# Patient Record
Sex: FEMALE | Race: BLACK OR AFRICAN AMERICAN | Employment: OTHER
[De-identification: names, ages, dates, MRNs, and addresses within clinical notes are randomized per-mention and may not be internally consistent; named-entity substitution may affect disease eponyms.]

---

## 2017-02-20 DIAGNOSIS — M25.551 RIGHT HIP PAIN: Primary | ICD-10-CM

## 2017-02-22 ENCOUNTER — OFFICE VISIT (OUTPATIENT)
Dept: ORTHOPEDIC SURGERY | Facility: CLINIC | Age: 67
End: 2017-02-22

## 2017-02-22 VITALS
DIASTOLIC BLOOD PRESSURE: 83 MMHG | BODY MASS INDEX: 31.62 KG/M2 | HEIGHT: 62 IN | HEART RATE: 79 BPM | WEIGHT: 171.8 LBS | SYSTOLIC BLOOD PRESSURE: 130 MMHG

## 2017-02-22 DIAGNOSIS — M16.11 ARTHRITIS OF RIGHT HIP: Primary | ICD-10-CM

## 2017-02-22 PROCEDURE — 1090F PRES/ABSN URINE INCON ASSESS: CPT | Performed by: ORTHOPAEDIC SURGERY

## 2017-02-22 PROCEDURE — 4004F PT TOBACCO SCREEN RCVD TLK: CPT | Performed by: ORTHOPAEDIC SURGERY

## 2017-02-22 PROCEDURE — G8427 DOCREV CUR MEDS BY ELIG CLIN: HCPCS | Performed by: ORTHOPAEDIC SURGERY

## 2017-02-22 PROCEDURE — G8484 FLU IMMUNIZE NO ADMIN: HCPCS | Performed by: ORTHOPAEDIC SURGERY

## 2017-02-22 PROCEDURE — G8419 CALC BMI OUT NRM PARAM NOF/U: HCPCS | Performed by: ORTHOPAEDIC SURGERY

## 2017-02-22 PROCEDURE — 3017F COLORECTAL CA SCREEN DOC REV: CPT | Performed by: ORTHOPAEDIC SURGERY

## 2017-02-22 PROCEDURE — 4040F PNEUMOC VAC/ADMIN/RCVD: CPT | Performed by: ORTHOPAEDIC SURGERY

## 2017-02-22 PROCEDURE — 99213 OFFICE O/P EST LOW 20 MIN: CPT | Performed by: ORTHOPAEDIC SURGERY

## 2017-02-22 PROCEDURE — 3014F SCREEN MAMMO DOC REV: CPT | Performed by: ORTHOPAEDIC SURGERY

## 2017-02-22 ASSESSMENT — ENCOUNTER SYMPTOMS
COUGH: 0
SHORTNESS OF BREATH: 0
WHEEZING: 0
ABDOMINAL PAIN: 0
CONSTIPATION: 0
VOMITING: 0
NAUSEA: 0
VOICE CHANGE: 0
CHOKING: 0
TROUBLE SWALLOWING: 0
DIARRHEA: 0

## 2017-03-08 ENCOUNTER — HOSPITAL ENCOUNTER (OUTPATIENT)
Dept: PAIN MANAGEMENT | Age: 67
Discharge: HOME | End: 2017-03-08
Payer: MEDICARE

## 2017-03-08 VITALS
WEIGHT: 171 LBS | HEIGHT: 63 IN | RESPIRATION RATE: 16 BRPM | SYSTOLIC BLOOD PRESSURE: 150 MMHG | HEART RATE: 60 BPM | TEMPERATURE: 98 F | OXYGEN SATURATION: 96 % | BODY MASS INDEX: 30.3 KG/M2 | DIASTOLIC BLOOD PRESSURE: 81 MMHG

## 2017-03-08 DIAGNOSIS — M25.552 HIP PAIN, LEFT: ICD-10-CM

## 2017-03-08 PROCEDURE — 2500000003 HC RX 250 WO HCPCS

## 2017-03-08 PROCEDURE — 6360000002 HC RX W HCPCS

## 2017-03-08 PROCEDURE — 20610 DRAIN/INJ JOINT/BURSA W/O US: CPT

## 2017-03-08 PROCEDURE — 27093 INJECTION FOR HIP X-RAY: CPT | Performed by: ORTHOPAEDIC SURGERY

## 2017-03-08 PROCEDURE — 77002 NEEDLE LOCALIZATION BY XRAY: CPT

## 2017-03-08 ASSESSMENT — PAIN DESCRIPTION - DESCRIPTORS: DESCRIPTORS: CONSTANT;BURNING;SHARP;STABBING

## 2017-03-08 ASSESSMENT — PAIN - FUNCTIONAL ASSESSMENT
PAIN_FUNCTIONAL_ASSESSMENT: 0-10
PAIN_FUNCTIONAL_ASSESSMENT: 0-10

## 2017-03-17 ENCOUNTER — OFFICE VISIT (OUTPATIENT)
Dept: ORTHOPEDIC SURGERY | Age: 67
End: 2017-03-17
Payer: MEDICARE

## 2017-03-17 DIAGNOSIS — M16.11 ARTHRITIS OF RIGHT HIP: Primary | ICD-10-CM

## 2017-03-17 PROCEDURE — 99213 OFFICE O/P EST LOW 20 MIN: CPT | Performed by: ORTHOPAEDIC SURGERY

## 2017-03-17 PROCEDURE — G8419 CALC BMI OUT NRM PARAM NOF/U: HCPCS | Performed by: ORTHOPAEDIC SURGERY

## 2017-03-17 PROCEDURE — 1123F ACP DISCUSS/DSCN MKR DOCD: CPT | Performed by: ORTHOPAEDIC SURGERY

## 2017-03-17 PROCEDURE — 3014F SCREEN MAMMO DOC REV: CPT | Performed by: ORTHOPAEDIC SURGERY

## 2017-03-17 PROCEDURE — G8427 DOCREV CUR MEDS BY ELIG CLIN: HCPCS | Performed by: ORTHOPAEDIC SURGERY

## 2017-03-17 PROCEDURE — G8399 PT W/DXA RESULTS DOCUMENT: HCPCS | Performed by: ORTHOPAEDIC SURGERY

## 2017-03-17 PROCEDURE — G8484 FLU IMMUNIZE NO ADMIN: HCPCS | Performed by: ORTHOPAEDIC SURGERY

## 2017-03-17 PROCEDURE — 4040F PNEUMOC VAC/ADMIN/RCVD: CPT | Performed by: ORTHOPAEDIC SURGERY

## 2017-03-17 PROCEDURE — 3017F COLORECTAL CA SCREEN DOC REV: CPT | Performed by: ORTHOPAEDIC SURGERY

## 2017-03-17 PROCEDURE — 1090F PRES/ABSN URINE INCON ASSESS: CPT | Performed by: ORTHOPAEDIC SURGERY

## 2017-03-17 PROCEDURE — 4004F PT TOBACCO SCREEN RCVD TLK: CPT | Performed by: ORTHOPAEDIC SURGERY

## 2017-03-17 ASSESSMENT — ENCOUNTER SYMPTOMS
ANAL BLEEDING: 0
RECTAL PAIN: 0
BLOOD IN STOOL: 0
VOMITING: 0
WHEEZING: 0
ABDOMINAL PAIN: 0
NAUSEA: 0
BACK PAIN: 0
COLOR CHANGE: 0
DIARRHEA: 0
CONSTIPATION: 0
COUGH: 0

## 2017-06-09 ENCOUNTER — OFFICE VISIT (OUTPATIENT)
Dept: ORTHOPEDIC SURGERY | Age: 67
End: 2017-06-09
Payer: MEDICARE

## 2017-06-09 VITALS — WEIGHT: 171.08 LBS | HEIGHT: 63 IN | BODY MASS INDEX: 30.31 KG/M2

## 2017-06-09 DIAGNOSIS — M16.11 ARTHRITIS OF RIGHT HIP: Primary | ICD-10-CM

## 2017-06-09 PROCEDURE — 4040F PNEUMOC VAC/ADMIN/RCVD: CPT | Performed by: ORTHOPAEDIC SURGERY

## 2017-06-09 PROCEDURE — G8399 PT W/DXA RESULTS DOCUMENT: HCPCS | Performed by: ORTHOPAEDIC SURGERY

## 2017-06-09 PROCEDURE — G8419 CALC BMI OUT NRM PARAM NOF/U: HCPCS | Performed by: ORTHOPAEDIC SURGERY

## 2017-06-09 PROCEDURE — 4004F PT TOBACCO SCREEN RCVD TLK: CPT | Performed by: ORTHOPAEDIC SURGERY

## 2017-06-09 PROCEDURE — 3017F COLORECTAL CA SCREEN DOC REV: CPT | Performed by: ORTHOPAEDIC SURGERY

## 2017-06-09 PROCEDURE — 1090F PRES/ABSN URINE INCON ASSESS: CPT | Performed by: ORTHOPAEDIC SURGERY

## 2017-06-09 PROCEDURE — 99213 OFFICE O/P EST LOW 20 MIN: CPT | Performed by: ORTHOPAEDIC SURGERY

## 2017-06-09 PROCEDURE — G8427 DOCREV CUR MEDS BY ELIG CLIN: HCPCS | Performed by: ORTHOPAEDIC SURGERY

## 2017-06-09 PROCEDURE — 3014F SCREEN MAMMO DOC REV: CPT | Performed by: ORTHOPAEDIC SURGERY

## 2017-06-09 PROCEDURE — 1123F ACP DISCUSS/DSCN MKR DOCD: CPT | Performed by: ORTHOPAEDIC SURGERY

## 2017-06-09 ASSESSMENT — ENCOUNTER SYMPTOMS
BLOOD IN STOOL: 0
ANAL BLEEDING: 0
COLOR CHANGE: 0
ABDOMINAL PAIN: 0
DIARRHEA: 0
VOMITING: 0
CONSTIPATION: 0
RECTAL PAIN: 0
NAUSEA: 0
WHEEZING: 0
COUGH: 0
BACK PAIN: 0

## 2017-08-09 ENCOUNTER — HOSPITAL ENCOUNTER (OUTPATIENT)
Dept: PREADMISSION TESTING | Age: 67
Discharge: HOME | End: 2017-08-09
Payer: MEDICARE

## 2017-08-09 ENCOUNTER — HOSPITAL ENCOUNTER (OUTPATIENT)
Dept: GENERAL RADIOLOGY | Age: 67
Discharge: HOME | End: 2017-08-09
Payer: MEDICARE

## 2017-08-09 ENCOUNTER — TELEPHONE (OUTPATIENT)
Dept: ORTHOPEDIC SURGERY | Age: 67
End: 2017-08-09

## 2017-08-09 VITALS
WEIGHT: 179.45 LBS | SYSTOLIC BLOOD PRESSURE: 136 MMHG | DIASTOLIC BLOOD PRESSURE: 88 MMHG | HEIGHT: 62 IN | TEMPERATURE: 98.2 F | OXYGEN SATURATION: 99 % | RESPIRATION RATE: 16 BRPM | HEART RATE: 80 BPM | BODY MASS INDEX: 33.02 KG/M2

## 2017-08-09 DIAGNOSIS — Z01.818 PRE-OP TESTING: Primary | ICD-10-CM

## 2017-08-09 DIAGNOSIS — M16.11 ARTHRITIS OF RIGHT HIP: ICD-10-CM

## 2017-08-09 LAB
ALBUMIN SERPL-MCNC: 3.9 G/DL (ref 3.5–5.2)
ALBUMIN/GLOBULIN RATIO: 1.2 (ref 1–2.5)
ALP BLD-CCNC: 66 U/L (ref 35–104)
ALT SERPL-CCNC: 15 U/L (ref 5–33)
ANION GAP SERPL CALCULATED.3IONS-SCNC: 16 MMOL/L (ref 9–17)
AST SERPL-CCNC: 16 U/L
BILIRUB SERPL-MCNC: 0.38 MG/DL (ref 0.3–1.2)
BILIRUBIN URINE: NEGATIVE
BUN BLDV-MCNC: 18 MG/DL (ref 8–23)
BUN/CREAT BLD: NORMAL (ref 9–20)
CALCIUM SERPL-MCNC: 9.1 MG/DL (ref 8.6–10.4)
CHLORIDE BLD-SCNC: 100 MMOL/L (ref 98–107)
CO2: 26 MMOL/L (ref 20–31)
COLOR: YELLOW
COMMENT UA: NORMAL
CREAT SERPL-MCNC: 0.7 MG/DL (ref 0.5–0.9)
GFR AFRICAN AMERICAN: >60 ML/MIN
GFR NON-AFRICAN AMERICAN: >60 ML/MIN
GFR SERPL CREATININE-BSD FRML MDRD: NORMAL ML/MIN/{1.73_M2}
GFR SERPL CREATININE-BSD FRML MDRD: NORMAL ML/MIN/{1.73_M2}
GLUCOSE BLD-MCNC: 84 MG/DL (ref 70–99)
GLUCOSE URINE: NEGATIVE
HCT VFR BLD CALC: 41 % (ref 36–46)
HEMOGLOBIN: 13.2 G/DL (ref 12–16)
KETONES, URINE: NEGATIVE
LEUKOCYTE ESTERASE, URINE: NEGATIVE
MCH RBC QN AUTO: 30 PG (ref 26–34)
MCHC RBC AUTO-ENTMCNC: 32.3 G/DL (ref 31–37)
MCV RBC AUTO: 92.7 FL (ref 80–100)
NITRITE, URINE: NEGATIVE
PDW BLD-RTO: 16.1 % (ref 12.5–15.4)
PH UA: 7.5 (ref 5–8)
PLATELET # BLD: 296 K/UL (ref 140–450)
PMV BLD AUTO: 8.8 FL (ref 6–12)
POTASSIUM SERPL-SCNC: 3.8 MMOL/L (ref 3.7–5.3)
PROTEIN UA: NEGATIVE
RBC # BLD: 4.42 M/UL (ref 4–5.2)
SODIUM BLD-SCNC: 142 MMOL/L (ref 135–144)
SPECIFIC GRAVITY UA: 1.02 (ref 1–1.03)
TOTAL PROTEIN: 7.2 G/DL (ref 6.4–8.3)
TURBIDITY: CLEAR
URINE HGB: NEGATIVE
UROBILINOGEN, URINE: NORMAL
WBC # BLD: 9.1 K/UL (ref 3.5–11)

## 2017-08-09 PROCEDURE — 93005 ELECTROCARDIOGRAM TRACING: CPT

## 2017-08-09 PROCEDURE — 36415 COLL VENOUS BLD VENIPUNCTURE: CPT

## 2017-08-09 PROCEDURE — 71020 XR CHEST STANDARD TWO VW: CPT

## 2017-08-09 PROCEDURE — 80053 COMPREHEN METABOLIC PANEL: CPT

## 2017-08-09 PROCEDURE — 81003 URINALYSIS AUTO W/O SCOPE: CPT

## 2017-08-09 PROCEDURE — 85027 COMPLETE CBC AUTOMATED: CPT

## 2017-08-09 RX ORDER — ASPIRIN 325 MG
325 TABLET ORAL DAILY
Status: ON HOLD | COMMUNITY
End: 2020-03-24 | Stop reason: HOSPADM

## 2017-08-09 RX ORDER — SODIUM CHLORIDE, SODIUM LACTATE, POTASSIUM CHLORIDE, CALCIUM CHLORIDE 600; 310; 30; 20 MG/100ML; MG/100ML; MG/100ML; MG/100ML
1000 INJECTION, SOLUTION INTRAVENOUS CONTINUOUS
Status: CANCELLED | OUTPATIENT
Start: 2017-08-09

## 2017-08-09 ASSESSMENT — PAIN SCALES - GENERAL: PAINLEVEL_OUTOF10: 6

## 2017-08-09 ASSESSMENT — PAIN DESCRIPTION - LOCATION: LOCATION: HIP

## 2017-08-09 ASSESSMENT — PAIN DESCRIPTION - ORIENTATION: ORIENTATION: RIGHT

## 2017-08-09 ASSESSMENT — PAIN DESCRIPTION - PAIN TYPE: TYPE: CHRONIC PAIN

## 2017-08-13 LAB
EKG ATRIAL RATE: 60 BPM
EKG P AXIS: 21 DEGREES
EKG P-R INTERVAL: 154 MS
EKG Q-T INTERVAL: 412 MS
EKG QRS DURATION: 72 MS
EKG QTC CALCULATION (BAZETT): 412 MS
EKG R AXIS: -15 DEGREES
EKG T AXIS: 62 DEGREES
EKG VENTRICULAR RATE: 60 BPM

## 2017-08-21 ENCOUNTER — ANESTHESIA EVENT (OUTPATIENT)
Dept: OPERATING ROOM | Age: 67
DRG: 469 | End: 2017-08-21
Payer: MEDICARE

## 2017-08-22 ENCOUNTER — ANESTHESIA (OUTPATIENT)
Dept: OPERATING ROOM | Age: 67
DRG: 469 | End: 2017-08-22
Payer: MEDICARE

## 2017-08-22 ENCOUNTER — APPOINTMENT (OUTPATIENT)
Dept: GENERAL RADIOLOGY | Age: 67
DRG: 469 | End: 2017-08-22
Attending: ORTHOPAEDIC SURGERY
Payer: MEDICARE

## 2017-08-22 ENCOUNTER — HOSPITAL ENCOUNTER (INPATIENT)
Age: 67
LOS: 3 days | Discharge: HOME HEALTH | DRG: 469 | End: 2017-08-25
Attending: ORTHOPAEDIC SURGERY | Admitting: ORTHOPAEDIC SURGERY
Payer: MEDICARE

## 2017-08-22 VITALS
SYSTOLIC BLOOD PRESSURE: 86 MMHG | DIASTOLIC BLOOD PRESSURE: 61 MMHG | OXYGEN SATURATION: 100 % | RESPIRATION RATE: 18 BRPM

## 2017-08-22 DIAGNOSIS — M25.552 HIP PAIN, LEFT: Primary | ICD-10-CM

## 2017-08-22 LAB
HCT VFR BLD CALC: 36.8 % (ref 36–46)
HEMOGLOBIN: 12.1 G/DL (ref 12–16)
POC POTASSIUM: 4.5 MMOL/L (ref 3.5–4.5)

## 2017-08-22 PROCEDURE — 6360000002 HC RX W HCPCS: Performed by: SPECIALIST

## 2017-08-22 PROCEDURE — 0SR902A REPLACEMENT OF RIGHT HIP JOINT WITH METAL ON POLYETHYLENE SYNTHETIC SUBSTITUTE, UNCEMENTED, OPEN APPROACH: ICD-10-PCS | Performed by: ORTHOPAEDIC SURGERY

## 2017-08-22 PROCEDURE — 7100000001 HC PACU RECOVERY - ADDTL 15 MIN: Performed by: ORTHOPAEDIC SURGERY

## 2017-08-22 PROCEDURE — 27130 TOTAL HIP ARTHROPLASTY: CPT | Performed by: ORTHOPAEDIC SURGERY

## 2017-08-22 PROCEDURE — 3700000001 HC ADD 15 MINUTES (ANESTHESIA): Performed by: ORTHOPAEDIC SURGERY

## 2017-08-22 PROCEDURE — 84132 ASSAY OF SERUM POTASSIUM: CPT

## 2017-08-22 PROCEDURE — 3600000004 HC SURGERY LEVEL 4 BASE: Performed by: ORTHOPAEDIC SURGERY

## 2017-08-22 PROCEDURE — 2500000003 HC RX 250 WO HCPCS: Performed by: ORTHOPAEDIC SURGERY

## 2017-08-22 PROCEDURE — G8987 SELF CARE CURRENT STATUS: HCPCS

## 2017-08-22 PROCEDURE — 6370000000 HC RX 637 (ALT 250 FOR IP): Performed by: ORTHOPAEDIC SURGERY

## 2017-08-22 PROCEDURE — 85018 HEMOGLOBIN: CPT

## 2017-08-22 PROCEDURE — 2580000003 HC RX 258: Performed by: ORTHOPAEDIC SURGERY

## 2017-08-22 PROCEDURE — 3600000014 HC SURGERY LEVEL 4 ADDTL 15MIN: Performed by: ORTHOPAEDIC SURGERY

## 2017-08-22 PROCEDURE — 97535 SELF CARE MNGMENT TRAINING: CPT

## 2017-08-22 PROCEDURE — 6370000000 HC RX 637 (ALT 250 FOR IP): Performed by: STUDENT IN AN ORGANIZED HEALTH CARE EDUCATION/TRAINING PROGRAM

## 2017-08-22 PROCEDURE — G8979 MOBILITY GOAL STATUS: HCPCS

## 2017-08-22 PROCEDURE — 97166 OT EVAL MOD COMPLEX 45 MIN: CPT

## 2017-08-22 PROCEDURE — 97530 THERAPEUTIC ACTIVITIES: CPT

## 2017-08-22 PROCEDURE — A4364 ADHESIVE, LIQUID OR EQUAL: HCPCS | Performed by: ORTHOPAEDIC SURGERY

## 2017-08-22 PROCEDURE — 64450 NJX AA&/STRD OTHER PN/BRANCH: CPT | Performed by: ANESTHESIOLOGY

## 2017-08-22 PROCEDURE — 7100000000 HC PACU RECOVERY - FIRST 15 MIN: Performed by: ORTHOPAEDIC SURGERY

## 2017-08-22 PROCEDURE — 2500000003 HC RX 250 WO HCPCS: Performed by: ANESTHESIOLOGY

## 2017-08-22 PROCEDURE — 73502 X-RAY EXAM HIP UNI 2-3 VIEWS: CPT

## 2017-08-22 PROCEDURE — 6360000002 HC RX W HCPCS: Performed by: ANESTHESIOLOGY

## 2017-08-22 PROCEDURE — 85014 HEMATOCRIT: CPT

## 2017-08-22 PROCEDURE — 1200000000 HC SEMI PRIVATE

## 2017-08-22 PROCEDURE — 94640 AIRWAY INHALATION TREATMENT: CPT

## 2017-08-22 PROCEDURE — 87086 URINE CULTURE/COLONY COUNT: CPT

## 2017-08-22 PROCEDURE — 6360000002 HC RX W HCPCS: Performed by: ORTHOPAEDIC SURGERY

## 2017-08-22 PROCEDURE — 2580000003 HC RX 258: Performed by: ANESTHESIOLOGY

## 2017-08-22 PROCEDURE — 2500000003 HC RX 250 WO HCPCS: Performed by: STUDENT IN AN ORGANIZED HEALTH CARE EDUCATION/TRAINING PROGRAM

## 2017-08-22 PROCEDURE — C1776 JOINT DEVICE (IMPLANTABLE): HCPCS | Performed by: ORTHOPAEDIC SURGERY

## 2017-08-22 PROCEDURE — 2720000010 HC SURG SUPPLY STERILE: Performed by: ORTHOPAEDIC SURGERY

## 2017-08-22 PROCEDURE — 2580000003 HC RX 258: Performed by: SPECIALIST

## 2017-08-22 PROCEDURE — 97162 PT EVAL MOD COMPLEX 30 MIN: CPT

## 2017-08-22 PROCEDURE — 2500000003 HC RX 250 WO HCPCS: Performed by: SPECIALIST

## 2017-08-22 PROCEDURE — 3700000000 HC ANESTHESIA ATTENDED CARE: Performed by: ORTHOPAEDIC SURGERY

## 2017-08-22 PROCEDURE — G8988 SELF CARE GOAL STATUS: HCPCS

## 2017-08-22 PROCEDURE — G8978 MOBILITY CURRENT STATUS: HCPCS

## 2017-08-22 DEVICE — HC PE LINER 28 / DME
Type: IMPLANTABLE DEVICE | Status: FUNCTIONAL
Brand: DOUBLE MOBILITY LINER

## 2017-08-22 DEVICE — DOUBLE MOBILITY ACETABULAR SHELL Ø50
Type: IMPLANTABLE DEVICE | Status: FUNCTIONAL
Brand: MPACT DOUBLE MOBILITY SHELLS

## 2017-08-22 DEVICE — COMPONENT TOT HIP CAPPED ADV LNR MTL CERM: Type: IMPLANTABLE DEVICE | Status: FUNCTIONAL

## 2017-08-22 DEVICE — AMISTEM H CEMENTLESS STEM STANDARD SIZE 3
Type: IMPLANTABLE DEVICE | Status: FUNCTIONAL
Brand: AMISTEM H FEMORAL STEMS

## 2017-08-22 DEVICE — FEMORAL HEAD Ø 28 SIZE S
Type: IMPLANTABLE DEVICE | Status: FUNCTIONAL
Brand: COCR FEMORAL BALL HEAD

## 2017-08-22 RX ORDER — ALBUTEROL SULFATE 90 UG/1
1 AEROSOL, METERED RESPIRATORY (INHALATION) 4 TIMES DAILY
Status: DISCONTINUED | OUTPATIENT
Start: 2017-08-22 | End: 2017-08-23

## 2017-08-22 RX ORDER — DIPHENHYDRAMINE HCL 25 MG
25 TABLET ORAL EVERY 6 HOURS PRN
Status: DISCONTINUED | OUTPATIENT
Start: 2017-08-22 | End: 2017-08-25 | Stop reason: HOSPADM

## 2017-08-22 RX ORDER — SODIUM CHLORIDE 0.9 % (FLUSH) 0.9 %
10 SYRINGE (ML) INJECTION PRN
Status: DISCONTINUED | OUTPATIENT
Start: 2017-08-22 | End: 2017-08-25 | Stop reason: HOSPADM

## 2017-08-22 RX ORDER — ONDANSETRON 2 MG/ML
INJECTION INTRAMUSCULAR; INTRAVENOUS PRN
Status: DISCONTINUED | OUTPATIENT
Start: 2017-08-22 | End: 2017-08-22 | Stop reason: SDUPTHER

## 2017-08-22 RX ORDER — DOCUSATE SODIUM 100 MG/1
100 CAPSULE, LIQUID FILLED ORAL 2 TIMES DAILY
Status: DISCONTINUED | OUTPATIENT
Start: 2017-08-22 | End: 2017-08-25 | Stop reason: HOSPADM

## 2017-08-22 RX ORDER — MELOXICAM 7.5 MG/1
15 TABLET ORAL DAILY
Status: DISCONTINUED | OUTPATIENT
Start: 2017-08-22 | End: 2017-08-23

## 2017-08-22 RX ORDER — PREGABALIN 75 MG/1
75 CAPSULE ORAL 2 TIMES DAILY
Status: DISCONTINUED | OUTPATIENT
Start: 2017-08-22 | End: 2017-08-23

## 2017-08-22 RX ORDER — ONDANSETRON 2 MG/ML
4 INJECTION INTRAMUSCULAR; INTRAVENOUS EVERY 6 HOURS PRN
Status: DISCONTINUED | OUTPATIENT
Start: 2017-08-22 | End: 2017-08-25 | Stop reason: HOSPADM

## 2017-08-22 RX ORDER — MIDAZOLAM HYDROCHLORIDE 1 MG/ML
INJECTION INTRAMUSCULAR; INTRAVENOUS PRN
Status: DISCONTINUED | OUTPATIENT
Start: 2017-08-22 | End: 2017-08-22 | Stop reason: SDUPTHER

## 2017-08-22 RX ORDER — ALENDRONATE SODIUM 35 MG/1
35 TABLET ORAL
Status: DISCONTINUED | OUTPATIENT
Start: 2017-08-22 | End: 2017-08-22

## 2017-08-22 RX ORDER — SODIUM CHLORIDE, SODIUM LACTATE, POTASSIUM CHLORIDE, CALCIUM CHLORIDE 600; 310; 30; 20 MG/100ML; MG/100ML; MG/100ML; MG/100ML
1000 INJECTION, SOLUTION INTRAVENOUS CONTINUOUS
Status: DISCONTINUED | OUTPATIENT
Start: 2017-08-22 | End: 2017-08-22

## 2017-08-22 RX ORDER — SODIUM CHLORIDE, SODIUM LACTATE, POTASSIUM CHLORIDE, CALCIUM CHLORIDE 600; 310; 30; 20 MG/100ML; MG/100ML; MG/100ML; MG/100ML
INJECTION, SOLUTION INTRAVENOUS CONTINUOUS PRN
Status: DISCONTINUED | OUTPATIENT
Start: 2017-08-22 | End: 2017-08-22 | Stop reason: SDUPTHER

## 2017-08-22 RX ORDER — OXYCODONE HYDROCHLORIDE 5 MG/1
5 TABLET ORAL EVERY 4 HOURS PRN
Status: DISCONTINUED | OUTPATIENT
Start: 2017-08-22 | End: 2017-08-25 | Stop reason: HOSPADM

## 2017-08-22 RX ORDER — PROPOFOL 10 MG/ML
INJECTION, EMULSION INTRAVENOUS CONTINUOUS PRN
Status: DISCONTINUED | OUTPATIENT
Start: 2017-08-22 | End: 2017-08-22 | Stop reason: SDUPTHER

## 2017-08-22 RX ORDER — ASPIRIN 325 MG
325 TABLET ORAL DAILY
Status: CANCELLED | OUTPATIENT
Start: 2017-08-22

## 2017-08-22 RX ORDER — VARENICLINE TARTRATE 25 MG
1 KIT ORAL 2 TIMES DAILY
Status: DISCONTINUED | OUTPATIENT
Start: 2017-08-22 | End: 2017-08-22

## 2017-08-22 RX ORDER — PROMETHAZINE HYDROCHLORIDE 25 MG/ML
6.25 INJECTION, SOLUTION INTRAMUSCULAR; INTRAVENOUS EVERY 6 HOURS PRN
Status: DISCONTINUED | OUTPATIENT
Start: 2017-08-22 | End: 2017-08-22 | Stop reason: HOSPADM

## 2017-08-22 RX ORDER — SODIUM CHLORIDE 0.9 % (FLUSH) 0.9 %
10 SYRINGE (ML) INJECTION EVERY 12 HOURS SCHEDULED
Status: DISCONTINUED | OUTPATIENT
Start: 2017-08-22 | End: 2017-08-25 | Stop reason: HOSPADM

## 2017-08-22 RX ORDER — ACETAMINOPHEN 500 MG
1000 TABLET ORAL 3 TIMES DAILY
Status: DISCONTINUED | OUTPATIENT
Start: 2017-08-22 | End: 2017-08-25 | Stop reason: HOSPADM

## 2017-08-22 RX ORDER — CETIRIZINE HYDROCHLORIDE 5 MG/1
10 TABLET ORAL DAILY
Status: DISCONTINUED | OUTPATIENT
Start: 2017-08-22 | End: 2017-08-25 | Stop reason: HOSPADM

## 2017-08-22 RX ORDER — TRANEXAMIC ACID 100 MG/ML
INJECTION, SOLUTION INTRAVENOUS PRN
Status: DISCONTINUED | OUTPATIENT
Start: 2017-08-22 | End: 2017-08-22 | Stop reason: SDUPTHER

## 2017-08-22 RX ORDER — ASPIRIN 81 MG/1
81 TABLET, CHEWABLE ORAL 2 TIMES DAILY
Status: DISCONTINUED | OUTPATIENT
Start: 2017-08-22 | End: 2017-08-25 | Stop reason: HOSPADM

## 2017-08-22 RX ORDER — MIDAZOLAM HYDROCHLORIDE 1 MG/ML
2 INJECTION INTRAMUSCULAR; INTRAVENOUS ONCE
Status: COMPLETED | OUTPATIENT
Start: 2017-08-22 | End: 2017-08-22

## 2017-08-22 RX ORDER — LORATADINE 10 MG/1
10 TABLET ORAL DAILY
COMMUNITY

## 2017-08-22 RX ORDER — ATENOLOL 50 MG/1
50 TABLET ORAL 2 TIMES DAILY
Status: DISCONTINUED | OUTPATIENT
Start: 2017-08-22 | End: 2017-08-25 | Stop reason: HOSPADM

## 2017-08-22 RX ORDER — SODIUM CHLORIDE 0.9 % (FLUSH) 0.9 %
10 SYRINGE (ML) INJECTION EVERY 12 HOURS SCHEDULED
Status: DISCONTINUED | OUTPATIENT
Start: 2017-08-22 | End: 2017-08-22 | Stop reason: HOSPADM

## 2017-08-22 RX ORDER — HYDROCHLOROTHIAZIDE 50 MG/1
50 TABLET ORAL DAILY
Status: DISCONTINUED | OUTPATIENT
Start: 2017-08-23 | End: 2017-08-25 | Stop reason: HOSPADM

## 2017-08-22 RX ORDER — MAGNESIUM HYDROXIDE 1200 MG/15ML
LIQUID ORAL CONTINUOUS PRN
Status: DISCONTINUED | OUTPATIENT
Start: 2017-08-22 | End: 2017-08-22 | Stop reason: HOSPADM

## 2017-08-22 RX ORDER — KETOROLAC TROMETHAMINE 30 MG/ML
30 INJECTION, SOLUTION INTRAMUSCULAR; INTRAVENOUS EVERY 6 HOURS PRN
Status: DISCONTINUED | OUTPATIENT
Start: 2017-08-22 | End: 2017-08-23

## 2017-08-22 RX ORDER — ATORVASTATIN CALCIUM 40 MG/1
40 TABLET, FILM COATED ORAL DAILY
Status: DISCONTINUED | OUTPATIENT
Start: 2017-08-22 | End: 2017-08-25 | Stop reason: HOSPADM

## 2017-08-22 RX ORDER — OXYCODONE HYDROCHLORIDE 5 MG/1
10 TABLET ORAL EVERY 4 HOURS PRN
Status: DISCONTINUED | OUTPATIENT
Start: 2017-08-22 | End: 2017-08-25 | Stop reason: HOSPADM

## 2017-08-22 RX ORDER — SODIUM CHLORIDE, SODIUM LACTATE, POTASSIUM CHLORIDE, CALCIUM CHLORIDE 600; 310; 30; 20 MG/100ML; MG/100ML; MG/100ML; MG/100ML
INJECTION, SOLUTION INTRAVENOUS CONTINUOUS
Status: DISCONTINUED | OUTPATIENT
Start: 2017-08-22 | End: 2017-08-25

## 2017-08-22 RX ORDER — FENTANYL CITRATE 50 UG/ML
100 INJECTION, SOLUTION INTRAMUSCULAR; INTRAVENOUS ONCE
Status: COMPLETED | OUTPATIENT
Start: 2017-08-22 | End: 2017-08-22

## 2017-08-22 RX ORDER — EPHEDRINE SULFATE 50 MG/ML
INJECTION, SOLUTION INTRAVENOUS PRN
Status: DISCONTINUED | OUTPATIENT
Start: 2017-08-22 | End: 2017-08-22 | Stop reason: SDUPTHER

## 2017-08-22 RX ORDER — FENTANYL CITRATE 50 UG/ML
INJECTION, SOLUTION INTRAMUSCULAR; INTRAVENOUS PRN
Status: DISCONTINUED | OUTPATIENT
Start: 2017-08-22 | End: 2017-08-22 | Stop reason: SDUPTHER

## 2017-08-22 RX ORDER — SODIUM CHLORIDE 0.9 % (FLUSH) 0.9 %
10 SYRINGE (ML) INJECTION PRN
Status: DISCONTINUED | OUTPATIENT
Start: 2017-08-22 | End: 2017-08-22 | Stop reason: HOSPADM

## 2017-08-22 RX ORDER — LIDOCAINE HYDROCHLORIDE 10 MG/ML
1 INJECTION, SOLUTION EPIDURAL; INFILTRATION; INTRACAUDAL; PERINEURAL
Status: COMPLETED | OUTPATIENT
Start: 2017-08-22 | End: 2017-08-22

## 2017-08-22 RX ADMIN — ALBUTEROL SULFATE 1 PUFF: 90 AEROSOL, METERED RESPIRATORY (INHALATION) at 18:11

## 2017-08-22 RX ADMIN — PHENYLEPHRINE HYDROCHLORIDE 100 MCG: 10 INJECTION INTRAMUSCULAR; INTRAVENOUS; SUBCUTANEOUS at 08:03

## 2017-08-22 RX ADMIN — Medication 10 ML: at 23:53

## 2017-08-22 RX ADMIN — MIDAZOLAM HYDROCHLORIDE 2 MG: 1 INJECTION, SOLUTION INTRAMUSCULAR; INTRAVENOUS at 06:54

## 2017-08-22 RX ADMIN — FENTANYL CITRATE 100 MCG: 50 INJECTION INTRAMUSCULAR; INTRAVENOUS at 06:54

## 2017-08-22 RX ADMIN — PREGABALIN 75 MG: 75 CAPSULE ORAL at 20:31

## 2017-08-22 RX ADMIN — Medication 900 MG: at 16:30

## 2017-08-22 RX ADMIN — IPRATROPIUM BROMIDE 1 PUFF: 17 AEROSOL, METERED RESPIRATORY (INHALATION) at 18:11

## 2017-08-22 RX ADMIN — Medication 900 MG: at 07:27

## 2017-08-22 RX ADMIN — LIDOCAINE HYDROCHLORIDE 0.4 ML: 10 INJECTION, SOLUTION INFILTRATION; PERINEURAL at 06:11

## 2017-08-22 RX ADMIN — Medication 900 MG: at 20:29

## 2017-08-22 RX ADMIN — HYDROMORPHONE HYDROCHLORIDE 0.25 MG: 1 INJECTION, SOLUTION INTRAMUSCULAR; INTRAVENOUS; SUBCUTANEOUS at 12:18

## 2017-08-22 RX ADMIN — CETIRIZINE HYDROCHLORIDE 10 MG: 5 TABLET, FILM COATED ORAL at 20:31

## 2017-08-22 RX ADMIN — ATENOLOL 50 MG: 50 TABLET ORAL at 20:31

## 2017-08-22 RX ADMIN — OXYCODONE HYDROCHLORIDE 10 MG: 5 TABLET ORAL at 23:53

## 2017-08-22 RX ADMIN — TRANEXAMIC ACID 801 MG: 100 INJECTION, SOLUTION INTRAVENOUS at 07:49

## 2017-08-22 RX ADMIN — PROPOFOL 120 MCG/KG/MIN: 10 INJECTION, EMULSION INTRAVENOUS at 07:23

## 2017-08-22 RX ADMIN — PHENYLEPHRINE HYDROCHLORIDE 50 MCG: 10 INJECTION INTRAMUSCULAR; INTRAVENOUS; SUBCUTANEOUS at 09:32

## 2017-08-22 RX ADMIN — HYDROMORPHONE HYDROCHLORIDE 0.25 MG: 1 INJECTION, SOLUTION INTRAMUSCULAR; INTRAVENOUS; SUBCUTANEOUS at 12:10

## 2017-08-22 RX ADMIN — KETOROLAC TROMETHAMINE 30 MG: 30 INJECTION, SOLUTION INTRAMUSCULAR at 23:54

## 2017-08-22 RX ADMIN — ONDANSETRON 4 MG: 2 INJECTION, SOLUTION INTRAMUSCULAR; INTRAVENOUS at 09:13

## 2017-08-22 RX ADMIN — PHENYLEPHRINE HYDROCHLORIDE 100 MCG: 10 INJECTION INTRAMUSCULAR; INTRAVENOUS; SUBCUTANEOUS at 09:08

## 2017-08-22 RX ADMIN — EPHEDRINE SULFATE 5 MG: 50 INJECTION, SOLUTION INTRAMUSCULAR; INTRAVENOUS; SUBCUTANEOUS at 07:35

## 2017-08-22 RX ADMIN — SODIUM CHLORIDE, POTASSIUM CHLORIDE, SODIUM LACTATE AND CALCIUM CHLORIDE: 600; 310; 30; 20 INJECTION, SOLUTION INTRAVENOUS at 07:21

## 2017-08-22 RX ADMIN — EPHEDRINE SULFATE 5 MG: 50 INJECTION, SOLUTION INTRAMUSCULAR; INTRAVENOUS; SUBCUTANEOUS at 08:50

## 2017-08-22 RX ADMIN — IPRATROPIUM BROMIDE 1 PUFF: 17 AEROSOL, METERED RESPIRATORY (INHALATION) at 21:10

## 2017-08-22 RX ADMIN — ALBUTEROL SULFATE 1 PUFF: 90 AEROSOL, METERED RESPIRATORY (INHALATION) at 21:10

## 2017-08-22 RX ADMIN — ASPIRIN 81 MG: 81 TABLET, CHEWABLE ORAL at 20:31

## 2017-08-22 RX ADMIN — MIDAZOLAM HYDROCHLORIDE 1 MG: 1 INJECTION, SOLUTION INTRAMUSCULAR; INTRAVENOUS at 07:21

## 2017-08-22 RX ADMIN — FENTANYL CITRATE 50 MCG: 50 INJECTION INTRAMUSCULAR; INTRAVENOUS at 07:21

## 2017-08-22 RX ADMIN — HYDROMORPHONE HYDROCHLORIDE 0.5 MG: 1 INJECTION, SOLUTION INTRAMUSCULAR; INTRAVENOUS; SUBCUTANEOUS at 13:40

## 2017-08-22 RX ADMIN — EPHEDRINE SULFATE 5 MG: 50 INJECTION, SOLUTION INTRAMUSCULAR; INTRAVENOUS; SUBCUTANEOUS at 08:03

## 2017-08-22 RX ADMIN — SODIUM CHLORIDE, POTASSIUM CHLORIDE, SODIUM LACTATE AND CALCIUM CHLORIDE: 600; 310; 30; 20 INJECTION, SOLUTION INTRAVENOUS at 23:57

## 2017-08-22 RX ADMIN — OXYCODONE HYDROCHLORIDE 10 MG: 5 TABLET ORAL at 18:50

## 2017-08-22 RX ADMIN — PHENYLEPHRINE HYDROCHLORIDE 100 MCG: 10 INJECTION INTRAMUSCULAR; INTRAVENOUS; SUBCUTANEOUS at 09:16

## 2017-08-22 RX ADMIN — ACETAMINOPHEN 1000 MG: 500 TABLET ORAL at 20:31

## 2017-08-22 RX ADMIN — PHENYLEPHRINE HYDROCHLORIDE 100 MCG: 10 INJECTION INTRAMUSCULAR; INTRAVENOUS; SUBCUTANEOUS at 08:50

## 2017-08-22 RX ADMIN — HYDROMORPHONE HYDROCHLORIDE 0.25 MG: 1 INJECTION, SOLUTION INTRAMUSCULAR; INTRAVENOUS; SUBCUTANEOUS at 11:18

## 2017-08-22 RX ADMIN — HYDROMORPHONE HYDROCHLORIDE 0.25 MG: 1 INJECTION, SOLUTION INTRAMUSCULAR; INTRAVENOUS; SUBCUTANEOUS at 11:10

## 2017-08-22 RX ADMIN — SODIUM CHLORIDE, POTASSIUM CHLORIDE, SODIUM LACTATE AND CALCIUM CHLORIDE 1000 ML: 600; 310; 30; 20 INJECTION, SOLUTION INTRAVENOUS at 06:11

## 2017-08-22 RX ADMIN — DOCUSATE SODIUM 100 MG: 100 TABLET, FILM COATED ORAL at 20:29

## 2017-08-22 RX ADMIN — TRANEXAMIC ACID 1000 MG: 100 INJECTION, SOLUTION INTRAVENOUS at 09:33

## 2017-08-22 RX ADMIN — PHENYLEPHRINE HYDROCHLORIDE 100 MCG: 10 INJECTION INTRAMUSCULAR; INTRAVENOUS; SUBCUTANEOUS at 07:35

## 2017-08-22 RX ADMIN — PHENYLEPHRINE HYDROCHLORIDE 50 MCG: 10 INJECTION INTRAMUSCULAR; INTRAVENOUS; SUBCUTANEOUS at 09:27

## 2017-08-22 RX ADMIN — PHENYLEPHRINE HYDROCHLORIDE 100 MCG: 10 INJECTION INTRAMUSCULAR; INTRAVENOUS; SUBCUTANEOUS at 08:19

## 2017-08-22 RX ADMIN — HYDROMORPHONE HYDROCHLORIDE 0.5 MG: 1 INJECTION, SOLUTION INTRAMUSCULAR; INTRAVENOUS; SUBCUTANEOUS at 13:49

## 2017-08-22 ASSESSMENT — PAIN DESCRIPTION - PROGRESSION
CLINICAL_PROGRESSION: NOT CHANGED
CLINICAL_PROGRESSION: GRADUALLY WORSENING

## 2017-08-22 ASSESSMENT — PAIN SCALES - GENERAL
PAINLEVEL_OUTOF10: 2
PAINLEVEL_OUTOF10: 0
PAINLEVEL_OUTOF10: 7
PAINLEVEL_OUTOF10: 6
PAINLEVEL_OUTOF10: 2
PAINLEVEL_OUTOF10: 6
PAINLEVEL_OUTOF10: 7
PAINLEVEL_OUTOF10: 7
PAINLEVEL_OUTOF10: 8
PAINLEVEL_OUTOF10: 6
PAINLEVEL_OUTOF10: 2
PAINLEVEL_OUTOF10: 3
PAINLEVEL_OUTOF10: 2
PAINLEVEL_OUTOF10: 4
PAINLEVEL_OUTOF10: 6
PAINLEVEL_OUTOF10: 8
PAINLEVEL_OUTOF10: 7
PAINLEVEL_OUTOF10: 0
PAINLEVEL_OUTOF10: 0
PAINLEVEL_OUTOF10: 2
PAINLEVEL_OUTOF10: 2
PAINLEVEL_OUTOF10: 6
PAINLEVEL_OUTOF10: 0
PAINLEVEL_OUTOF10: 2

## 2017-08-22 ASSESSMENT — PAIN DESCRIPTION - DESCRIPTORS
DESCRIPTORS: ACHING;DULL
DESCRIPTORS: CONSTANT;DISCOMFORT

## 2017-08-22 ASSESSMENT — PAIN DESCRIPTION - FREQUENCY
FREQUENCY: CONTINUOUS
FREQUENCY: CONTINUOUS

## 2017-08-22 ASSESSMENT — PAIN DESCRIPTION - ORIENTATION
ORIENTATION: RIGHT
ORIENTATION: RIGHT;OUTER

## 2017-08-22 ASSESSMENT — PAIN DESCRIPTION - ONSET
ONSET: AWAKENED FROM SLEEP
ONSET: ON-GOING

## 2017-08-22 ASSESSMENT — PAIN DESCRIPTION - LOCATION
LOCATION: HIP
LOCATION: HIP

## 2017-08-22 ASSESSMENT — PAIN DESCRIPTION - PAIN TYPE
TYPE: ACUTE PAIN
TYPE: SURGICAL PAIN

## 2017-08-23 ENCOUNTER — APPOINTMENT (OUTPATIENT)
Dept: ULTRASOUND IMAGING | Age: 67
DRG: 469 | End: 2017-08-23
Attending: ORTHOPAEDIC SURGERY
Payer: MEDICARE

## 2017-08-23 PROBLEM — N17.9 AKI (ACUTE KIDNEY INJURY) (HCC): Status: ACTIVE | Noted: 2017-08-23

## 2017-08-23 LAB
ABSOLUTE EOS #: 0.1 K/UL (ref 0–0.4)
ABSOLUTE LYMPH #: 1.2 K/UL (ref 1–4.8)
ABSOLUTE MONO #: 1.2 K/UL (ref 0.1–1.2)
ANION GAP SERPL CALCULATED.3IONS-SCNC: 14 MMOL/L (ref 9–17)
ANION GAP SERPL CALCULATED.3IONS-SCNC: 19 MMOL/L (ref 9–17)
BASOPHILS # BLD: 0 %
BASOPHILS ABSOLUTE: 0 K/UL (ref 0–0.2)
BUN BLDV-MCNC: 28 MG/DL (ref 8–23)
BUN BLDV-MCNC: 34 MG/DL (ref 8–23)
BUN/CREAT BLD: ABNORMAL (ref 9–20)
BUN/CREAT BLD: ABNORMAL (ref 9–20)
CALCIUM SERPL-MCNC: 7.8 MG/DL (ref 8.6–10.4)
CALCIUM SERPL-MCNC: 8 MG/DL (ref 8.6–10.4)
CHLORIDE BLD-SCNC: 100 MMOL/L (ref 98–107)
CHLORIDE BLD-SCNC: 99 MMOL/L (ref 98–107)
CO2: 24 MMOL/L (ref 20–31)
CO2: 27 MMOL/L (ref 20–31)
CREAT SERPL-MCNC: 1.44 MG/DL (ref 0.5–0.9)
CREAT SERPL-MCNC: 2.14 MG/DL (ref 0.5–0.9)
CULTURE: NO GROWTH
CULTURE: NORMAL
DIFFERENTIAL TYPE: ABNORMAL
EOSINOPHILS RELATIVE PERCENT: 1 %
GFR AFRICAN AMERICAN: 28 ML/MIN
GFR AFRICAN AMERICAN: 44 ML/MIN
GFR NON-AFRICAN AMERICAN: 23 ML/MIN
GFR NON-AFRICAN AMERICAN: 36 ML/MIN
GFR SERPL CREATININE-BSD FRML MDRD: ABNORMAL ML/MIN/{1.73_M2}
GLUCOSE BLD-MCNC: 121 MG/DL (ref 70–99)
GLUCOSE BLD-MCNC: 134 MG/DL (ref 70–99)
HCT VFR BLD CALC: 31.7 % (ref 36–46)
HEMOGLOBIN: 10.3 G/DL (ref 12–16)
LYMPHOCYTES # BLD: 12 %
Lab: NORMAL
MCH RBC QN AUTO: 30.1 PG (ref 26–34)
MCHC RBC AUTO-ENTMCNC: 32.6 G/DL (ref 31–37)
MCV RBC AUTO: 92.5 FL (ref 80–100)
MONOCYTES # BLD: 13 %
PDW BLD-RTO: 16.2 % (ref 12.5–15.4)
PLATELET # BLD: 157 K/UL (ref 140–450)
PLATELET ESTIMATE: ABNORMAL
PMV BLD AUTO: 8.2 FL (ref 6–12)
POTASSIUM SERPL-SCNC: 3.7 MMOL/L (ref 3.7–5.3)
POTASSIUM SERPL-SCNC: 3.7 MMOL/L (ref 3.7–5.3)
RBC # BLD: 3.43 M/UL (ref 4–5.2)
RBC # BLD: ABNORMAL 10*6/UL
SEG NEUTROPHILS: 74 %
SEGMENTED NEUTROPHILS ABSOLUTE COUNT: 7.3 K/UL (ref 1.8–7.7)
SODIUM BLD-SCNC: 137 MMOL/L (ref 135–144)
SODIUM BLD-SCNC: 146 MMOL/L (ref 135–144)
SPECIMEN DESCRIPTION: NORMAL
STATUS: NORMAL
WBC # BLD: 9.7 K/UL (ref 3.5–11)
WBC # BLD: ABNORMAL 10*3/UL

## 2017-08-23 PROCEDURE — 2580000003 HC RX 258: Performed by: ORTHOPAEDIC SURGERY

## 2017-08-23 PROCEDURE — 97530 THERAPEUTIC ACTIVITIES: CPT

## 2017-08-23 PROCEDURE — 6370000000 HC RX 637 (ALT 250 FOR IP): Performed by: ORTHOPAEDIC SURGERY

## 2017-08-23 PROCEDURE — 76937 US GUIDE VASCULAR ACCESS: CPT

## 2017-08-23 PROCEDURE — 6360000002 HC RX W HCPCS: Performed by: ORTHOPAEDIC SURGERY

## 2017-08-23 PROCEDURE — 80048 BASIC METABOLIC PNL TOTAL CA: CPT

## 2017-08-23 PROCEDURE — 36415 COLL VENOUS BLD VENIPUNCTURE: CPT

## 2017-08-23 PROCEDURE — 82570 ASSAY OF URINE CREATININE: CPT

## 2017-08-23 PROCEDURE — 97535 SELF CARE MNGMENT TRAINING: CPT

## 2017-08-23 PROCEDURE — 85025 COMPLETE CBC W/AUTO DIFF WBC: CPT

## 2017-08-23 PROCEDURE — 1200000000 HC SEMI PRIVATE

## 2017-08-23 PROCEDURE — 2580000003 HC RX 258: Performed by: INTERNAL MEDICINE

## 2017-08-23 PROCEDURE — 97110 THERAPEUTIC EXERCISES: CPT

## 2017-08-23 PROCEDURE — 2500000003 HC RX 250 WO HCPCS: Performed by: ORTHOPAEDIC SURGERY

## 2017-08-23 PROCEDURE — 99221 1ST HOSP IP/OBS SF/LOW 40: CPT | Performed by: INTERNAL MEDICINE

## 2017-08-23 PROCEDURE — 84300 ASSAY OF URINE SODIUM: CPT

## 2017-08-23 PROCEDURE — 94640 AIRWAY INHALATION TREATMENT: CPT

## 2017-08-23 PROCEDURE — 76770 US EXAM ABDO BACK WALL COMP: CPT

## 2017-08-23 RX ORDER — ALBUTEROL SULFATE 90 UG/1
1 AEROSOL, METERED RESPIRATORY (INHALATION) EVERY 6 HOURS PRN
Status: DISCONTINUED | OUTPATIENT
Start: 2017-08-23 | End: 2017-08-25 | Stop reason: HOSPADM

## 2017-08-23 RX ORDER — ASPIRIN 81 MG/1
81 TABLET ORAL DAILY
Qty: 42 TABLET | Refills: 0 | Status: SHIPPED | OUTPATIENT
Start: 2017-08-23 | End: 2017-08-25 | Stop reason: HOSPADM

## 2017-08-23 RX ORDER — 0.9 % SODIUM CHLORIDE 0.9 %
500 INTRAVENOUS SOLUTION INTRAVENOUS ONCE
Status: COMPLETED | OUTPATIENT
Start: 2017-08-23 | End: 2017-08-23

## 2017-08-23 RX ORDER — OXYCODONE HYDROCHLORIDE AND ACETAMINOPHEN 5; 325 MG/1; MG/1
1 TABLET ORAL EVERY 6 HOURS PRN
Qty: 90 TABLET | Refills: 0 | Status: SHIPPED | OUTPATIENT
Start: 2017-08-23 | End: 2017-09-18 | Stop reason: SDUPTHER

## 2017-08-23 RX ADMIN — SODIUM CHLORIDE, POTASSIUM CHLORIDE, SODIUM LACTATE AND CALCIUM CHLORIDE: 600; 310; 30; 20 INJECTION, SOLUTION INTRAVENOUS at 08:43

## 2017-08-23 RX ADMIN — ALBUTEROL SULFATE 1 PUFF: 90 AEROSOL, METERED RESPIRATORY (INHALATION) at 12:26

## 2017-08-23 RX ADMIN — DOCUSATE SODIUM 100 MG: 100 TABLET, FILM COATED ORAL at 08:17

## 2017-08-23 RX ADMIN — ASPIRIN 81 MG: 81 TABLET, CHEWABLE ORAL at 20:20

## 2017-08-23 RX ADMIN — PREGABALIN 75 MG: 75 CAPSULE ORAL at 08:17

## 2017-08-23 RX ADMIN — ASPIRIN 81 MG: 81 TABLET, CHEWABLE ORAL at 08:17

## 2017-08-23 RX ADMIN — IPRATROPIUM BROMIDE 1 PUFF: 17 AEROSOL, METERED RESPIRATORY (INHALATION) at 12:26

## 2017-08-23 RX ADMIN — Medication 900 MG: at 03:01

## 2017-08-23 RX ADMIN — ATORVASTATIN CALCIUM 40 MG: 40 TABLET, FILM COATED ORAL at 08:17

## 2017-08-23 RX ADMIN — Medication 900 MG: at 08:43

## 2017-08-23 RX ADMIN — KETOROLAC TROMETHAMINE 30 MG: 30 INJECTION, SOLUTION INTRAMUSCULAR at 06:14

## 2017-08-23 RX ADMIN — ACETAMINOPHEN 1000 MG: 500 TABLET ORAL at 08:17

## 2017-08-23 RX ADMIN — OXYCODONE HYDROCHLORIDE 10 MG: 5 TABLET ORAL at 03:53

## 2017-08-23 RX ADMIN — ATENOLOL 50 MG: 50 TABLET ORAL at 08:17

## 2017-08-23 RX ADMIN — OXYCODONE HYDROCHLORIDE 10 MG: 5 TABLET ORAL at 08:17

## 2017-08-23 RX ADMIN — ACETAMINOPHEN 1000 MG: 500 TABLET ORAL at 20:20

## 2017-08-23 RX ADMIN — IPRATROPIUM BROMIDE 1 PUFF: 17 AEROSOL, METERED RESPIRATORY (INHALATION) at 08:33

## 2017-08-23 RX ADMIN — DOCUSATE SODIUM 100 MG: 100 TABLET, FILM COATED ORAL at 20:20

## 2017-08-23 RX ADMIN — ALBUTEROL SULFATE 1 PUFF: 90 AEROSOL, METERED RESPIRATORY (INHALATION) at 08:33

## 2017-08-23 RX ADMIN — SODIUM CHLORIDE 500 ML: 9 INJECTION, SOLUTION INTRAVENOUS at 14:56

## 2017-08-23 RX ADMIN — ATENOLOL 50 MG: 50 TABLET ORAL at 20:20

## 2017-08-23 ASSESSMENT — PAIN DESCRIPTION - DESCRIPTORS
DESCRIPTORS: DISCOMFORT;ACHING
DESCRIPTORS: CONSTANT;DISCOMFORT

## 2017-08-23 ASSESSMENT — PROMIS GLOBAL HEALTH SCALE
WHO IS THE PERSON COMPLETING THE PROMIS V1.1 SURVEY?: 0
IN GENERAL, HOW WOULD YOU RATE YOUR PHYSICAL HEALTH [ON A SCALE OF 1 (POOR) TO 5 (EXCELLENT)]?: 5
IN GENERAL, HOW WOULD YOU RATE YOUR SATISFACTION WITH YOUR SOCIAL ACTIVITIES AND RELATIONSHIPS [ON A SCALE OF 1 (POOR) TO 5 (EXCELLENT)]?: 5
TO WHAT EXTENT ARE YOU ABLE TO CARRY OUT YOUR EVERYDAY PHYSICAL ACTIVITIES SUCH AS WALKING, CLIMBING STAIRS, CARRYING GROCERIES, OR MOVING A CHAIR [ON A SCALE OF 1 (NOT AT ALL) TO 5 (COMPLETELY)]?: 5
IN THE PAST 7 DAYS, HOW WOULD YOU RATE YOUR FATIGUE ON AVERAGE [ON A SCALE FROM 1 (NONE) TO 5 (VERY SEVERE)]?: 4
IN GENERAL, WOULD YOU SAY YOUR HEALTH IS...[ON A SCALE OF 1 (POOR) TO 5 (EXCELLENT)]: 3
HOW IS THE PROMIS V1.1 BEING ADMINISTERED?: 0
SUM OF RESPONSES TO QUESTIONS 2, 4, 5, & 10: 17
SUM OF RESPONSES TO QUESTIONS 3, 6, 7, & 8: 22
IN GENERAL, HOW WOULD YOU RATE YOUR MENTAL HEALTH, INCLUDING YOUR MOOD AND YOUR ABILITY TO THINK [ON A SCALE OF 1 (POOR) TO 5 (EXCELLENT)]?: 5
IN THE PAST 7 DAYS, HOW WOULD YOU RATE YOUR PAIN ON AVERAGE [ON A SCALE FROM 0 (NO PAIN) TO 10 (WORST IMAGINABLE PAIN)]?: 8
IN THE PAST 7 DAYS, HOW OFTEN HAVE YOU BEEN BOTHERED BY EMOTIONAL PROBLEMS, SUCH AS FEELING ANXIOUS, DEPRESSED, OR IRRITABLE [ON A SCALE FROM 1 (NEVER) TO 5 (ALWAYS)]?: 2
IN GENERAL, WOULD YOU SAY YOUR QUALITY OF LIFE IS...[ON A SCALE OF 1 (POOR) TO 5 (EXCELLENT)]: 5
IN GENERAL, PLEASE RATE HOW WELL YOU CARRY OUT YOUR USUAL SOCIAL ACTIVITIES (INCLUDES ACTIVITIES AT HOME, AT WORK, AND IN YOUR COMMUNITY, AND RESPONSIBILITIES AS A PARENT, CHILD, SPOUSE, EMPLOYEE, FRIEND, ETC) [ON A SCALE OF 1 (POOR) TO 5 (EXCELLENT)]?: 5

## 2017-08-23 ASSESSMENT — HOOS JR
GOING UP OR DOWN STAIRS: 3
WALKING ON UNEVEN SURFACE: 3
RISING FROM SITTING: 1
BENDING TO THE FLOOR TO PICK UP OBJECT: 0
SITTING: 0
LYING IN BED (TURNING OVER, MAINTAINING HIP POSITION): 3
HOOS JR RAW SCORE: 10

## 2017-08-23 ASSESSMENT — PAIN DESCRIPTION - FREQUENCY
FREQUENCY: CONTINUOUS
FREQUENCY: CONTINUOUS

## 2017-08-23 ASSESSMENT — PAIN DESCRIPTION - LOCATION
LOCATION: HIP

## 2017-08-23 ASSESSMENT — PAIN SCALES - GENERAL
PAINLEVEL_OUTOF10: 7
PAINLEVEL_OUTOF10: 4
PAINLEVEL_OUTOF10: 4
PAINLEVEL_OUTOF10: 5
PAINLEVEL_OUTOF10: 4
PAINLEVEL_OUTOF10: 5
PAINLEVEL_OUTOF10: 7
PAINLEVEL_OUTOF10: 7
PAINLEVEL_OUTOF10: 4
PAINLEVEL_OUTOF10: 7

## 2017-08-23 ASSESSMENT — PAIN DESCRIPTION - ONSET: ONSET: ON-GOING

## 2017-08-23 ASSESSMENT — PAIN DESCRIPTION - ORIENTATION
ORIENTATION: RIGHT

## 2017-08-23 ASSESSMENT — PAIN DESCRIPTION - PAIN TYPE
TYPE: SURGICAL PAIN
TYPE: SURGICAL PAIN

## 2017-08-24 PROBLEM — R33.9 URINE RETENTION: Status: ACTIVE | Noted: 2017-08-24

## 2017-08-24 LAB
ANION GAP SERPL CALCULATED.3IONS-SCNC: 13 MMOL/L (ref 9–17)
BUN BLDV-MCNC: 27 MG/DL (ref 8–23)
BUN/CREAT BLD: ABNORMAL (ref 9–20)
CALCIUM SERPL-MCNC: 7.5 MG/DL (ref 8.6–10.4)
CHLORIDE BLD-SCNC: 101 MMOL/L (ref 98–107)
CO2: 24 MMOL/L (ref 20–31)
COMPLEMENT C3: 137 MG/DL (ref 90–180)
COMPLEMENT C4: 26 MG/DL (ref 10–40)
CREAT SERPL-MCNC: 1.08 MG/DL (ref 0.5–0.9)
CREATININE URINE: 100.2 MG/DL (ref 28–217)
CREATININE URINE: 38.7 MG/DL (ref 28–217)
FREE KAPPA/LAMBDA RATIO: 1.46 (ref 0.26–1.65)
GFR AFRICAN AMERICAN: >60 ML/MIN
GFR NON-AFRICAN AMERICAN: 50 ML/MIN
GFR SERPL CREATININE-BSD FRML MDRD: ABNORMAL ML/MIN/{1.73_M2}
GFR SERPL CREATININE-BSD FRML MDRD: ABNORMAL ML/MIN/{1.73_M2}
GLUCOSE BLD-MCNC: 148 MG/DL (ref 70–99)
KAPPA FREE LIGHT CHAINS QNT: 5.47 MG/DL (ref 0.37–1.94)
LAMBDA FREE LIGHT CHAINS QNT: 3.74 MG/DL (ref 0.57–2.63)
PHOSPHORUS: 1.8 MG/DL (ref 2.6–4.5)
POTASSIUM SERPL-SCNC: 3.4 MMOL/L (ref 3.7–5.3)
SODIUM BLD-SCNC: 138 MMOL/L (ref 135–144)
SODIUM,UR: 20 MMOL/L
TOTAL PROTEIN, URINE: 7 MG/DL

## 2017-08-24 PROCEDURE — 84100 ASSAY OF PHOSPHORUS: CPT

## 2017-08-24 PROCEDURE — 94620 HC 6-MINUTE WALK TEST/PULM STRESS TEST SIMPLE: CPT

## 2017-08-24 PROCEDURE — 36415 COLL VENOUS BLD VENIPUNCTURE: CPT

## 2017-08-24 PROCEDURE — 6370000000 HC RX 637 (ALT 250 FOR IP): Performed by: ORTHOPAEDIC SURGERY

## 2017-08-24 PROCEDURE — 86038 ANTINUCLEAR ANTIBODIES: CPT

## 2017-08-24 PROCEDURE — 97535 SELF CARE MNGMENT TRAINING: CPT

## 2017-08-24 PROCEDURE — 2580000003 HC RX 258: Performed by: ORTHOPAEDIC SURGERY

## 2017-08-24 PROCEDURE — 51798 US URINE CAPACITY MEASURE: CPT

## 2017-08-24 PROCEDURE — 82570 ASSAY OF URINE CREATININE: CPT

## 2017-08-24 PROCEDURE — 80048 BASIC METABOLIC PNL TOTAL CA: CPT

## 2017-08-24 PROCEDURE — 97110 THERAPEUTIC EXERCISES: CPT

## 2017-08-24 PROCEDURE — 86160 COMPLEMENT ANTIGEN: CPT

## 2017-08-24 PROCEDURE — 97116 GAIT TRAINING THERAPY: CPT

## 2017-08-24 PROCEDURE — 6370000000 HC RX 637 (ALT 250 FOR IP): Performed by: STUDENT IN AN ORGANIZED HEALTH CARE EDUCATION/TRAINING PROGRAM

## 2017-08-24 PROCEDURE — 51701 INSERT BLADDER CATHETER: CPT

## 2017-08-24 PROCEDURE — 83883 ASSAY NEPHELOMETRY NOT SPEC: CPT

## 2017-08-24 PROCEDURE — 84155 ASSAY OF PROTEIN SERUM: CPT

## 2017-08-24 PROCEDURE — 1200000000 HC SEMI PRIVATE

## 2017-08-24 PROCEDURE — 84156 ASSAY OF PROTEIN URINE: CPT

## 2017-08-24 PROCEDURE — 84165 PROTEIN E-PHORESIS SERUM: CPT

## 2017-08-24 PROCEDURE — 99232 SBSQ HOSP IP/OBS MODERATE 35: CPT | Performed by: INTERNAL MEDICINE

## 2017-08-24 PROCEDURE — 6370000000 HC RX 637 (ALT 250 FOR IP): Performed by: INTERNAL MEDICINE

## 2017-08-24 RX ORDER — MAGNESIUM SULFATE 1 G/100ML
1 INJECTION INTRAVENOUS PRN
Status: DISCONTINUED | OUTPATIENT
Start: 2017-08-24 | End: 2017-08-25 | Stop reason: HOSPADM

## 2017-08-24 RX ORDER — POLYETHYLENE GLYCOL 3350 17 G/17G
17 POWDER, FOR SOLUTION ORAL DAILY
Status: DISCONTINUED | OUTPATIENT
Start: 2017-08-24 | End: 2017-08-25 | Stop reason: HOSPADM

## 2017-08-24 RX ORDER — POTASSIUM CHLORIDE 20MEQ/15ML
40 LIQUID (ML) ORAL PRN
Status: DISCONTINUED | OUTPATIENT
Start: 2017-08-24 | End: 2017-08-25 | Stop reason: HOSPADM

## 2017-08-24 RX ORDER — POTASSIUM CHLORIDE 20 MEQ/1
40 TABLET, EXTENDED RELEASE ORAL PRN
Status: DISCONTINUED | OUTPATIENT
Start: 2017-08-24 | End: 2017-08-25 | Stop reason: HOSPADM

## 2017-08-24 RX ORDER — DOCUSATE SODIUM 100 MG/1
100 CAPSULE, LIQUID FILLED ORAL 2 TIMES DAILY
Qty: 30 CAPSULE | Refills: 0 | Status: SHIPPED | OUTPATIENT
Start: 2017-08-24

## 2017-08-24 RX ORDER — POTASSIUM CHLORIDE 7.45 MG/ML
10 INJECTION INTRAVENOUS PRN
Status: DISCONTINUED | OUTPATIENT
Start: 2017-08-24 | End: 2017-08-25 | Stop reason: HOSPADM

## 2017-08-24 RX ADMIN — POTASSIUM CHLORIDE 40 MEQ: 20 TABLET, EXTENDED RELEASE ORAL at 16:01

## 2017-08-24 RX ADMIN — ACETAMINOPHEN 1000 MG: 500 TABLET ORAL at 14:25

## 2017-08-24 RX ADMIN — DOCUSATE SODIUM 100 MG: 100 TABLET, FILM COATED ORAL at 08:29

## 2017-08-24 RX ADMIN — OXYCODONE HYDROCHLORIDE 10 MG: 5 TABLET ORAL at 16:01

## 2017-08-24 RX ADMIN — Medication 10 ML: at 21:47

## 2017-08-24 RX ADMIN — ASPIRIN 81 MG: 81 TABLET, CHEWABLE ORAL at 21:46

## 2017-08-24 RX ADMIN — CETIRIZINE HYDROCHLORIDE 10 MG: 5 TABLET, FILM COATED ORAL at 08:29

## 2017-08-24 RX ADMIN — ACETAMINOPHEN 1000 MG: 500 TABLET ORAL at 08:29

## 2017-08-24 RX ADMIN — DOCUSATE SODIUM 100 MG: 100 TABLET, FILM COATED ORAL at 21:46

## 2017-08-24 RX ADMIN — ATENOLOL 50 MG: 50 TABLET ORAL at 21:46

## 2017-08-24 RX ADMIN — ATENOLOL 50 MG: 50 TABLET ORAL at 08:29

## 2017-08-24 RX ADMIN — POLYETHYLENE GLYCOL 3350 17 G: 17 POWDER, FOR SOLUTION ORAL at 14:25

## 2017-08-24 RX ADMIN — ACETAMINOPHEN 1000 MG: 500 TABLET ORAL at 21:46

## 2017-08-24 RX ADMIN — OXYCODONE HYDROCHLORIDE 5 MG: 5 TABLET ORAL at 07:25

## 2017-08-24 RX ADMIN — SODIUM CHLORIDE, POTASSIUM CHLORIDE, SODIUM LACTATE AND CALCIUM CHLORIDE: 600; 310; 30; 20 INJECTION, SOLUTION INTRAVENOUS at 04:00

## 2017-08-24 RX ADMIN — ASPIRIN 81 MG: 81 TABLET, CHEWABLE ORAL at 08:29

## 2017-08-24 RX ADMIN — ATORVASTATIN CALCIUM 40 MG: 40 TABLET, FILM COATED ORAL at 08:29

## 2017-08-24 RX ADMIN — HYDROCHLOROTHIAZIDE 50 MG: 50 TABLET ORAL at 08:29

## 2017-08-24 ASSESSMENT — PAIN DESCRIPTION - LOCATION
LOCATION: HIP

## 2017-08-24 ASSESSMENT — PAIN DESCRIPTION - ORIENTATION
ORIENTATION: RIGHT

## 2017-08-24 ASSESSMENT — PAIN SCALES - GENERAL
PAINLEVEL_OUTOF10: 7
PAINLEVEL_OUTOF10: 2
PAINLEVEL_OUTOF10: 6
PAINLEVEL_OUTOF10: 2
PAINLEVEL_OUTOF10: 6
PAINLEVEL_OUTOF10: 7
PAINLEVEL_OUTOF10: 7
PAINLEVEL_OUTOF10: 6
PAINLEVEL_OUTOF10: 7
PAINLEVEL_OUTOF10: 6
PAINLEVEL_OUTOF10: 5
PAINLEVEL_OUTOF10: 6

## 2017-08-24 ASSESSMENT — PAIN DESCRIPTION - FREQUENCY
FREQUENCY: CONTINUOUS

## 2017-08-24 ASSESSMENT — PAIN DESCRIPTION - PAIN TYPE
TYPE: SURGICAL PAIN

## 2017-08-24 ASSESSMENT — PAIN DESCRIPTION - DESCRIPTORS
DESCRIPTORS: ACHING
DESCRIPTORS: ACHING;DISCOMFORT
DESCRIPTORS: CONSTANT;DISCOMFORT

## 2017-08-24 ASSESSMENT — PAIN DESCRIPTION - PROGRESSION
CLINICAL_PROGRESSION: GRADUALLY IMPROVING
CLINICAL_PROGRESSION: NOT CHANGED
CLINICAL_PROGRESSION: GRADUALLY IMPROVING

## 2017-08-24 ASSESSMENT — PAIN DESCRIPTION - ONSET
ONSET: ON-GOING
ONSET: ON-GOING

## 2017-08-25 VITALS
OXYGEN SATURATION: 94 % | BODY MASS INDEX: 36.84 KG/M2 | SYSTOLIC BLOOD PRESSURE: 149 MMHG | WEIGHT: 200.2 LBS | RESPIRATION RATE: 18 BRPM | HEART RATE: 86 BPM | TEMPERATURE: 98.8 F | DIASTOLIC BLOOD PRESSURE: 65 MMHG | HEIGHT: 62 IN

## 2017-08-25 LAB
ALBUMIN (CALCULATED): 2.9 G/DL (ref 3.2–5.2)
ALBUMIN PERCENT: 51 % (ref 45–65)
ALPHA 1 PERCENT: 7 % (ref 3–6)
ALPHA 2 PERCENT: 15 % (ref 6–13)
ALPHA-1-GLOBULIN: 0.4 G/DL (ref 0.1–0.4)
ALPHA-2-GLOBULIN: 0.9 G/DL (ref 0.5–0.9)
ANION GAP SERPL CALCULATED.3IONS-SCNC: 10 MMOL/L (ref 9–17)
ANTI-NUCLEAR ANTIBODY (ANA): NEGATIVE
BETA GLOBULIN: 0.9 G/DL (ref 0.5–1.1)
BETA PERCENT: 15 % (ref 11–19)
BUN BLDV-MCNC: 19 MG/DL (ref 8–23)
BUN/CREAT BLD: ABNORMAL (ref 9–20)
CALCIUM SERPL-MCNC: 7.9 MG/DL (ref 8.6–10.4)
CHLORIDE BLD-SCNC: 104 MMOL/L (ref 98–107)
CO2: 25 MMOL/L (ref 20–31)
CREAT SERPL-MCNC: 0.66 MG/DL (ref 0.5–0.9)
GAMMA GLOBULIN %: 12 % (ref 9–20)
GAMMA GLOBULIN: 0.7 G/DL (ref 0.5–1.5)
GFR AFRICAN AMERICAN: >60 ML/MIN
GFR NON-AFRICAN AMERICAN: >60 ML/MIN
GFR SERPL CREATININE-BSD FRML MDRD: ABNORMAL ML/MIN/{1.73_M2}
GFR SERPL CREATININE-BSD FRML MDRD: ABNORMAL ML/MIN/{1.73_M2}
GLUCOSE BLD-MCNC: 112 MG/DL (ref 70–99)
PATHOLOGIST: ABNORMAL
POTASSIUM SERPL-SCNC: 3.7 MMOL/L (ref 3.7–5.3)
PROTEIN ELECTROPHORESIS, SERUM: ABNORMAL
SODIUM BLD-SCNC: 139 MMOL/L (ref 135–144)
TOTAL PROT. SUM,%: 100 % (ref 98–102)
TOTAL PROT. SUM: 5.8 G/DL (ref 6.3–8.2)
TOTAL PROTEIN: 5.7 G/DL (ref 6.4–8.3)

## 2017-08-25 PROCEDURE — 97110 THERAPEUTIC EXERCISES: CPT

## 2017-08-25 PROCEDURE — 97530 THERAPEUTIC ACTIVITIES: CPT

## 2017-08-25 PROCEDURE — 6370000000 HC RX 637 (ALT 250 FOR IP): Performed by: ORTHOPAEDIC SURGERY

## 2017-08-25 PROCEDURE — 80048 BASIC METABOLIC PNL TOTAL CA: CPT

## 2017-08-25 PROCEDURE — 36415 COLL VENOUS BLD VENIPUNCTURE: CPT

## 2017-08-25 PROCEDURE — 97535 SELF CARE MNGMENT TRAINING: CPT

## 2017-08-25 PROCEDURE — 6370000000 HC RX 637 (ALT 250 FOR IP): Performed by: STUDENT IN AN ORGANIZED HEALTH CARE EDUCATION/TRAINING PROGRAM

## 2017-08-25 PROCEDURE — 97116 GAIT TRAINING THERAPY: CPT

## 2017-08-25 PROCEDURE — 2580000003 HC RX 258: Performed by: ORTHOPAEDIC SURGERY

## 2017-08-25 PROCEDURE — 99232 SBSQ HOSP IP/OBS MODERATE 35: CPT | Performed by: INTERNAL MEDICINE

## 2017-08-25 RX ORDER — SODIUM CHLORIDE 1000 MG
2 TABLET, SOLUBLE MISCELLANEOUS 2 TIMES DAILY WITH MEALS
Status: DISCONTINUED | OUTPATIENT
Start: 2017-08-25 | End: 2017-08-25

## 2017-08-25 RX ADMIN — POLYETHYLENE GLYCOL 3350 17 G: 17 POWDER, FOR SOLUTION ORAL at 08:24

## 2017-08-25 RX ADMIN — HYDROCHLOROTHIAZIDE 50 MG: 50 TABLET ORAL at 08:23

## 2017-08-25 RX ADMIN — ATENOLOL 50 MG: 50 TABLET ORAL at 08:23

## 2017-08-25 RX ADMIN — ATORVASTATIN CALCIUM 40 MG: 40 TABLET, FILM COATED ORAL at 08:23

## 2017-08-25 RX ADMIN — ACETAMINOPHEN 1000 MG: 500 TABLET ORAL at 08:23

## 2017-08-25 RX ADMIN — DOCUSATE SODIUM 100 MG: 100 TABLET, FILM COATED ORAL at 08:24

## 2017-08-25 RX ADMIN — ASPIRIN 81 MG: 81 TABLET, CHEWABLE ORAL at 08:23

## 2017-08-25 RX ADMIN — Medication 10 ML: at 08:24

## 2017-08-25 ASSESSMENT — PAIN DESCRIPTION - DESCRIPTORS
DESCRIPTORS: ACHING;DISCOMFORT
DESCRIPTORS: ACHING

## 2017-08-25 ASSESSMENT — PAIN DESCRIPTION - ORIENTATION
ORIENTATION: RIGHT

## 2017-08-25 ASSESSMENT — PAIN SCALES - GENERAL
PAINLEVEL_OUTOF10: 7
PAINLEVEL_OUTOF10: 3
PAINLEVEL_OUTOF10: 5
PAINLEVEL_OUTOF10: 7
PAINLEVEL_OUTOF10: 4
PAINLEVEL_OUTOF10: 7

## 2017-08-25 ASSESSMENT — PAIN DESCRIPTION - PROGRESSION

## 2017-08-25 ASSESSMENT — PAIN DESCRIPTION - FREQUENCY
FREQUENCY: CONTINUOUS
FREQUENCY: INTERMITTENT

## 2017-08-25 ASSESSMENT — PAIN DESCRIPTION - ONSET: ONSET: ON-GOING

## 2017-08-25 ASSESSMENT — PAIN DESCRIPTION - LOCATION
LOCATION: HIP

## 2017-08-25 ASSESSMENT — PAIN DESCRIPTION - PAIN TYPE
TYPE: SURGICAL PAIN

## 2017-08-26 ENCOUNTER — CARE COORDINATION (OUTPATIENT)
Dept: CASE MANAGEMENT | Age: 67
End: 2017-08-26

## 2017-09-01 DIAGNOSIS — M16.11 ARTHRITIS OF RIGHT HIP: Primary | ICD-10-CM

## 2017-09-06 ENCOUNTER — OFFICE VISIT (OUTPATIENT)
Dept: ORTHOPEDIC SURGERY | Age: 67
End: 2017-09-06

## 2017-09-06 VITALS — WEIGHT: 173.4 LBS | HEIGHT: 62 IN | BODY MASS INDEX: 31.91 KG/M2

## 2017-09-06 DIAGNOSIS — Z96.641 HX OF TOTAL HIP ARTHROPLASTY, RIGHT: Primary | ICD-10-CM

## 2017-09-06 DIAGNOSIS — M16.11 ARTHRITIS OF RIGHT HIP: ICD-10-CM

## 2017-09-06 PROCEDURE — 99024 POSTOP FOLLOW-UP VISIT: CPT | Performed by: ORTHOPAEDIC SURGERY

## 2017-09-06 ASSESSMENT — ENCOUNTER SYMPTOMS
COUGH: 0
DIARRHEA: 0
NAUSEA: 0
CONSTIPATION: 0

## 2017-09-15 ENCOUNTER — HOSPITAL ENCOUNTER (OUTPATIENT)
Dept: PHYSICAL THERAPY | Age: 67
Setting detail: THERAPIES SERIES
Discharge: HOME | End: 2017-09-15
Payer: MEDICARE

## 2017-09-15 PROCEDURE — 97161 PT EVAL LOW COMPLEX 20 MIN: CPT

## 2017-09-15 PROCEDURE — G8979 MOBILITY GOAL STATUS: HCPCS

## 2017-09-15 PROCEDURE — G8978 MOBILITY CURRENT STATUS: HCPCS

## 2017-09-15 PROCEDURE — 97110 THERAPEUTIC EXERCISES: CPT

## 2017-09-18 RX ORDER — OXYCODONE HYDROCHLORIDE AND ACETAMINOPHEN 5; 325 MG/1; MG/1
1 TABLET ORAL EVERY 6 HOURS PRN
Qty: 60 TABLET | Refills: 0 | Status: SHIPPED | OUTPATIENT
Start: 2017-09-18 | End: 2020-06-26

## 2017-09-21 ENCOUNTER — HOSPITAL ENCOUNTER (OUTPATIENT)
Dept: PHYSICAL THERAPY | Age: 67
Setting detail: THERAPIES SERIES
Discharge: HOME | End: 2017-09-21
Payer: MEDICARE

## 2017-09-21 PROCEDURE — 97016 VASOPNEUMATIC DEVICE THERAPY: CPT

## 2017-09-21 PROCEDURE — 97110 THERAPEUTIC EXERCISES: CPT

## 2017-09-25 ENCOUNTER — HOSPITAL ENCOUNTER (OUTPATIENT)
Dept: PHYSICAL THERAPY | Age: 67
Setting detail: THERAPIES SERIES
Discharge: HOME | End: 2017-09-25
Payer: MEDICARE

## 2017-09-25 PROCEDURE — 97110 THERAPEUTIC EXERCISES: CPT

## 2017-09-27 ENCOUNTER — HOSPITAL ENCOUNTER (OUTPATIENT)
Dept: PHYSICAL THERAPY | Age: 67
Setting detail: THERAPIES SERIES
Discharge: HOME | End: 2017-09-27
Payer: MEDICARE

## 2017-09-27 PROCEDURE — 97110 THERAPEUTIC EXERCISES: CPT

## 2017-09-29 ENCOUNTER — HOSPITAL ENCOUNTER (OUTPATIENT)
Dept: PHYSICAL THERAPY | Age: 67
Setting detail: THERAPIES SERIES
Discharge: HOME | End: 2017-09-29
Payer: MEDICARE

## 2017-09-29 PROCEDURE — 97530 THERAPEUTIC ACTIVITIES: CPT

## 2017-09-29 PROCEDURE — 97110 THERAPEUTIC EXERCISES: CPT

## 2017-10-02 ENCOUNTER — HOSPITAL ENCOUNTER (OUTPATIENT)
Dept: PHYSICAL THERAPY | Age: 67
Setting detail: THERAPIES SERIES
Discharge: HOME | End: 2017-10-02
Payer: MEDICARE

## 2017-10-02 PROCEDURE — 97110 THERAPEUTIC EXERCISES: CPT

## 2017-10-02 PROCEDURE — 97530 THERAPEUTIC ACTIVITIES: CPT

## 2017-10-02 NOTE — FLOWSHEET NOTE
[x] Jacob Rausch       Outpatient Physical        Therapy       [address]       Phone: [phone]       Fax: [phone] [] [facility] at [address] [facility]       Phone: [phone]       Fax: [phone] [] [facility]  [address]   Phone: [phone]   Fax:  [phone]     Physical Therapy Daily Treatment Note    Date:  10/2/2017  Patient Name:  Rikki Garcia    :  1950  MRN: [id number]  Physician: Lata WATT Higden: Medicare  Diagnosis: Right total hip   Onset Date: 17              Next Dr. Miguel Stockton:   Visit# / total visits:              Cancels/No Shows: 0/1  G Code due visit 10    Subjective:  Says she forgot her walker in the car, but her  is going to bring it up to her when she is done with therapy  Pain:  [x] Yes  [] No Location: left hip Pain Rating: (0-10 scale) 3/10  Pain altered Tx:  [x] No  [] Yes  Action:  Comments:    Objective:  Pt arrived ambulating with a cane  Modalities: cold pack to left hip x 15 minutes-in supine with legs on wedge after exercises  Precautions: anterior approach- no extension, no ER, no pivoting  Exercises:   Right AIDEE 17  Exercise Reps/ Time Weight/ Level Comments   NuStep 16 min Level 3    Seated:      LAQ 20 1 lb    Hamstring curls 20 green    Adductor sets 15  Ball squeeze   Abductor sets 15  Against pillow   Supine:      Quad sets 15     Hamstring sets 15     Gluteal sets 15     Heel slides 20 1.5 lb    Hip abduction 20 1.5 lb Maxi slide   SAQ 20 1.5 lb    SLR 10 x 2 AAROM    Standing:   Open and closed chain   Mini squats 15     Heel raises 15     Hip flexion-march in place 15  Add weight next visit   Hip abduction 15  Hip neutral no ER   Hamstring curls 15     Step ups-forward 15 4 inch    Step down 10 4 inch    Calf stretches  3 20  sec    Standing with eyes closed without UE support

## 2017-10-04 ENCOUNTER — CARE COORDINATION (OUTPATIENT)
Dept: CASE MANAGEMENT | Age: 67
End: 2017-10-04

## 2017-10-04 ENCOUNTER — HOSPITAL ENCOUNTER (OUTPATIENT)
Dept: PHYSICAL THERAPY | Age: 67
Setting detail: THERAPIES SERIES
Discharge: HOME | End: 2017-10-04
Payer: MEDICARE

## 2017-10-04 PROCEDURE — 97110 THERAPEUTIC EXERCISES: CPT

## 2017-10-04 NOTE — FLOWSHEET NOTE
for next treatment: stairs, add weight to standing exercises    Treatment Charges: Mins Units   [x]  Modalities-CP 15 0   [x]  Ther Exercise 33 2   []  Manual Therapy     []  Ther Activities      []  Aquatics     []  Vasocompression     []  Other     Total Treatment time 41        Assessment: [x] Progressing toward goals. Demonstrates good recall of exercise program    [] No change. [x] Other:Still requires assist with SLR  STG: (to be met in 6 treatments)  1. ? Pain: 4/10  2. ? ROM: R hip abd. 25°  3. ? Strength: R hip and knee musculature increased 1/2 MMT grade  4. ? Function: Patient to report ambulating 100% of the time with cane  5. Independent with Home Exercise Programs  6. Pt to be compliment with all AIDEE precautions     LTG: (to be met in 12 treatments)  1. R hip/knee  MMT 4+/5 grossly  2. Patient to report pain <2/10  3. Patient to have a Lower Extremity Functional score <10                  4.   Patient to demonstrate normal gait symmetry during each phase of gait    Pt. Education:  [] Yes  [] No  [] Reviewed Prior HEP/Ed  Method of Education: [] Verbal  [] Demo  [] Written  Comprehension of Education:  [] Verbalizes understanding. [] Demonstrates understanding. [] Needs review. [x] Demonstrates/verbalizes HEP/Ed previously given. Plan: [x] Continue per plan of care.    [] Other:      Time In: 9:47          Time Out: 11:56    Electronically signed by:  Silvano Cantor PTA

## 2017-10-04 NOTE — CARE COORDINATION
Opened encounter to contact patient for [facility]. Noted patient was in OP PT at time of attempt. Writer to attempt at later time/date.

## 2017-10-06 ENCOUNTER — HOSPITAL ENCOUNTER (OUTPATIENT)
Dept: PHYSICAL THERAPY | Age: 67
Setting detail: THERAPIES SERIES
Discharge: HOME | End: 2017-10-06
Payer: MEDICARE

## 2017-10-06 PROCEDURE — 97110 THERAPEUTIC EXERCISES: CPT

## 2017-10-06 NOTE — FLOWSHEET NOTE
[x] Allison Wei       Outpatient Physical        Therapy       [address]       Phone: [phone]       Fax: [phone] [] [facility] at [address]       Phone: [phone]       Fax: [phone] [] [place]. [address]  [address] [place] [address]   Phone: [phone]   Fax:  [phone]     Physical Therapy Daily Treatment Note    Date:  10/6/2017  Patient Name:  Nell Zhou    :  1950  MRN: [id number]  Physician: [address]: Medicare  Diagnosis: Right total hip   Onset Date: 17              Next Dr. Arndt Hollow:   Visit# / total visits:              Cancels/No Shows: 0/1  G Code due visit 10    Subjective:   Pain:  [x] Yes  [] No Location: right hip Pain Rating: (0-10 scale) 3/10  Pain altered Tx:  [x] No  [] Yes  Action:  Comments: Pt reports mild pain on this date in anterior aspect of R hip. Pt states she did a lot of walking yesterday with both her walker and cane and on both even and uneven terrain. Pt states she is compliant with HEP, performing 15-20 reps 1x/day.      Objective:    Modalities: cold pack to left hip x 15 minutes-in supine with legs on wedge after exercises  Precautions: anterior approach- no extension, no ER, no pivoting  Exercises:   Right AIDEE 17  Exercise Reps/ Time Weight/ Level Comments   NuStep 10 min Level 3    Seated:      LAQ 20 1 lb    Hamstring curls 20 Green    Adductor sets 20  Ball squeeze   Abductor sets 20  Against pillow   Supine:      Quad sets 20     Hamstring sets 20     Gluteal sets 20     Heel slides 20 1 lb    Hip abduction 20 1 lb    SAQ 20 1 lb    SLR 10 x 2     Standing:   Open and closed chain   Mini squats 20     Heel raises 20     Marches 20 1#    Hip abduction 20 1# Hip neutral no ER   Hamstring curls 20 1#    Step ups-forward 20 4 inch    Step down 20 4 inch    Calf stretches  3 15 sec    Standing previously given. Plan: [x] Continue per plan of care.    [] Other:      Time In: 9:50am          Time Out: 10:55am    Electronically signed by:  Ana Laura Tomlin PTA

## 2017-10-09 ENCOUNTER — HOSPITAL ENCOUNTER (OUTPATIENT)
Dept: PHYSICAL THERAPY | Age: 67
Setting detail: THERAPIES SERIES
Discharge: HOME | End: 2017-10-09
Payer: MEDICARE

## 2017-10-09 PROCEDURE — 97110 THERAPEUTIC EXERCISES: CPT

## 2017-10-09 RX ORDER — OXYCODONE HYDROCHLORIDE AND ACETAMINOPHEN 5; 325 MG/1; MG/1
1 TABLET ORAL EVERY 6 HOURS PRN
Qty: 60 TABLET | Refills: 0 | Status: CANCELLED | OUTPATIENT
Start: 2017-10-09

## 2017-10-09 RX ORDER — TRAMADOL HYDROCHLORIDE 50 MG/1
50 TABLET ORAL 3 TIMES DAILY PRN
Qty: 21 TABLET | Refills: 0 | Status: SHIPPED | OUTPATIENT
Start: 2017-10-09

## 2017-10-09 NOTE — FLOWSHEET NOTE
[x] Joseph Vinson       Outpatient Physical        Therapy       [address]       Phone: [phone]       Fax: [phone] [] [facility] at [address] [facility]       Phone: [phone]       Fax: [phone] [] [place]. [facility] [address]   Phone: [phone]   Fax:  [phone]     Physical Therapy Daily Treatment Note    Date:  10/9/2017  Patient Name:  Bridgett Abel    :  [birth year]  MRN: [id number]  Physician: Lata WATT Calypso: Medicare  Diagnosis: Right total hip   Onset Date: 17              Next Dr. Edwige Harmon: pt to call   Visit# / total visits:              Cancels/No Shows: 0/1  G Code due visit 10    Subjective: Pt reports she has soreness in the groin and in the distal incision. Had sharp pain over the weekend, thinks she did too much in therapy on Friday.   Pain:  [x] Yes  [] No Location: right hip Pain Rating: (0-10 scale) 3/10  Pain altered Tx:  [x] No  [] Yes  Action:  Comments:      Objective:    Modalities: cold pack to left hip x 15 minutes-in supine with legs on wedge after exercises  Precautions: anterior approach- no extension, no ER, no pivoting  Exercises:   Right AIDEE 17  Exercise Reps/ Time Weight/ Level Comments   SciFit 12 min Level 3.5    Seated:      LAQ 20 1 lb    Hamstring curls 20 Green    Adductor sets 20 5 sec Ball squeeze   Abductor sets   Against pillow   Supine:      Quad sets 20     Hamstring sets 20     Gluteal sets 20     Heel slides 20 1 lb    Hip abduction 20 1 lb    SAQ 20 1 lb    SLR 10 x 2 AROM    Standing:   Open and closed chain   Mini squats 20     Heel raises 15     Marches 15 1 lb    Hip abduction 15 1 lb Hip neutral no ER   Hamstring curls 15 1 lb    Step ups-forward 15 4 inch    Step down 15 4 inch    Calf stretches  3 20 sec    Standing with eyes closed without UE support  30 sec On foam mat   Other: Specific Instructions for next treatment: Stairs    Treatment Charges: Mins Units   [x]  Modalities-CP 15 0   [x]  Ther Exercise 32 2   []  Manual Therapy     []  Ther Activities      []  Aquatics     []  Vasocompression     []  Other     Total Treatment time 32        Assessment: [x] Progressing toward goals. [] No change. [x] Other:  Decreased reps with some exercises due to pt report of increased pain after last treatment. No c/o increased pain after today's session    STG: (to be met in 6 treatments)  1. ? Pain: 4/10  2. ? ROM: R hip abd. 25°  3. ? Strength: R hip and knee musculature increased 1/2 MMT grade  4. ? Function: Patient to report ambulating 100% of the time with cane  5. Independent with Home Exercise Programs  6. Pt to be compliment with all AIDEE precautions     LTG: (to be met in 12 treatments)  1. R hip/knee  MMT 4+/5 grossly  2. Patient to report pain <2/10  3. Patient to have a Lower Extremity Functional score <10                  4.   Patient to demonstrate normal gait symmetry during each phase of gait    Pt. Education:  [] Yes  [] No  [x] Reviewed Prior HEP/Ed  Method of Education: [x] Verbal  [] Demo  [] Written  Comprehension of Education:  [] Verbalizes understanding. [] Demonstrates understanding. [] Needs review. [x] Demonstrates/verbalizes HEP/Ed previously given. Plan: [x] Continue per plan of care.  recheck scheduled next visit   [] Other:      Time In: 10:01 am          Time Out: 10:59 am    Electronically signed by:  Chaz Patton PTA

## 2017-10-11 ENCOUNTER — HOSPITAL ENCOUNTER (OUTPATIENT)
Dept: PHYSICAL THERAPY | Age: 67
Setting detail: THERAPIES SERIES
Discharge: HOME | End: 2017-10-11
Payer: MEDICARE

## 2017-10-11 ENCOUNTER — CARE COORDINATION (OUTPATIENT)
Dept: CASE MANAGEMENT | Age: 67
End: 2017-10-11

## 2017-10-11 PROCEDURE — G8979 MOBILITY GOAL STATUS: HCPCS

## 2017-10-11 PROCEDURE — 97110 THERAPEUTIC EXERCISES: CPT

## 2017-10-11 PROCEDURE — G8978 MOBILITY CURRENT STATUS: HCPCS

## 2017-10-11 NOTE — PRE-CERTIFICATION NOTE
Medicare Cap   [x] Physical Therapy  [] Speech Therapy  [] Occupational therapy  *PT and Speech caps combine      $1980 Cap limit < kx modifier needed < $8738 requires pre-cert        Patient Name: Mahesh Randall: 1950     Date of Mö[address] 63 Name # units/ charge $$$ charge Daily Total Charge Ongoing Total $$$   9/15/17 eval+ex  77.68+23.89  101.57    3 ex  78.97  180.54    2 ex  55.08  235.62    2 ex  55.08  290.70    2 ex, 1 ther act  47.78 + 33.40 81.18 371.88   10/2 2 ex, 1 there act   81.18 453.06   10/4 2 ex  55.08     10/6 2 ex  55.08     10/9 2 ex  55.08     10/11 3 ex  78.97  697.27

## 2017-10-11 NOTE — FLOWSHEET NOTE
[x] Laurie Chanel       Outpatient Physical        Therapy       [address]       Phone: [phone]       Fax: [phone] [] [facility] at [address]       Phone: [phone]       Fax: [phone] [] [place]. [address] [place] [address] [facility]  [address] [place] [address]   Phone: [phone]   Fax:  [phone]     Physical Therapy Daily Treatment Note    Date:  10/11/2017  Patient Name:  Garry Clark    :  [birth year]  MRN: [id number]  Physician: Lata E Rasheed: Medicare  Diagnosis: Right total hip   Onset Date: 17              Next Dr. Malik Late:  Oct 18  Visit# / total visits: 10/12             Cancels/No Shows: 0/1  G Code due visit 10    Subjective: Pt still anxious to be able to drive but knows she is not ready  Pain:  [x] Yes  [] No Location: right hip Pain Rating: (0-10 scale) 3/10  Pain altered Tx:  [x] No  [] Yes  Action:  Comments:      Objective:    Modalities: cold pack to left hip x 15 minutes-in supine with legs on wedge after exercises  Precautions: anterior approach- no extension, no ER, no pivoting  Exercises:   Right AIDEE 17  Exercise Reps/ Time Weight/ Level Comments   SciFit 12 min Level 3.5    Seated:      LAQ 20 1.5 lb Educated with green Tband loop   Hamstring curls 20 Green Tband loop   Adductor sets 20 5 sec Ball squeeze   Abductor sets 20 green Theraband resisted   Supine:      Quad sets 20     Hamstring sets 20     Gluteal sets 20     Heel slides 20 1.5 lb    Hip abduction 20 1.5 lb    Hip abduction 10 x 2 AROM Side lying   SAQ 20 1.5 lb    SLR 10 x 2 AROM    Standing:   Open and closed chain   Mini squats 15     Heel raises 15     Marches 15 1.5 lb    Hip abduction 15 1.5 lb Hip neutral no ER   Hamstring curls 15 1.5 lb    Step ups-forward 20 4 inch Right leg on step   Step down 15 4 inch Right leg on step   Calf stretches  3 20 sec    Standing with eyes closed

## 2017-10-13 ENCOUNTER — HOSPITAL ENCOUNTER (OUTPATIENT)
Dept: PHYSICAL THERAPY | Age: 67
Setting detail: THERAPIES SERIES
Discharge: HOME | End: 2017-10-13
Payer: MEDICARE

## 2017-10-13 NOTE — FLOWSHEET NOTE
step   Calf stretches  3 20 sec    Standing with eyes closed without UE support  30 sec On foam mat   Other:  Educated in use of Theraband loop for knee flexion and extension in sitting    Ther activity:  Up and down 4 inch and 6 inch stairs using single handrail with reciprocal gait pattern    Specific Instructions for next treatment:     Treatment Charges: Mins Units   [x]  Modalities-CP 15 0   [x]  Ther Exercise 45 3   []  Manual Therapy     []  Ther Activities      []  Aquatics     []  Vasocompression     [x]  Other-gait  10 0   Total Treatment time 70 3       Assessment: [x] Progressing toward goals-see progress note same date. [] No change. [x] Other: Tolerated increased weight with exercises, able to ascend and descend stairs (4-6 inch only) with reciprocal gait pattern    STG: (to be met in 6 treatments)  1. ? Pain: 4/10  2. ? ROM: R hip abd. 25°  3. ? Strength: R hip and knee musculature increased 1/2 MMT grade  4. ? Function: Patient to report ambulating 100% of the time with cane  5. Independent with Home Exercise Programs  6. Pt to be compliment with all AIDEE precautions     LTG: (to be met in 12 treatments)  1. R hip/knee  MMT 4+/5 grossly  2. Patient to report pain <2/10  3. Patient to have a Lower Extremity Functional score <10                  4.   Patient to demonstrate normal gait symmetry during each phase of gait    Pt. Education:  [x] Yes  [] No  [x] Reviewed Prior HEP/Ed  Method of Education: [x] Verbal  [] Demo  [] Written  Comprehension of Education:  [] Verbalizes understanding. [x] Demonstrates understanding-stairs, Theraband loop. [] Needs review. [x] Demonstrates/verbalizes HEP/Ed previously given. Plan: [x] Continue per plan of care.  recheck scheduled next visit   [x] Other:additional visits requested      Time In: 1000          Time Out: 1110am    Electronically signed by:  Jordon Sánchez PT

## 2017-10-13 NOTE — PRE-CERTIFICATION NOTE
Medicare Cap   [x] Physical Therapy  [] Speech Therapy  [] Occupational therapy  *PT and Speech caps combine      $1980 Cap limit < kx modifier needed < $0223 requires pre-cert        Patient Name: Katheryn Drop: 1950     Date of Möhe 63 Name # units/ charge $$$ charge Daily Total Charge Ongoing Total $$$   9/15/17 eval+ex  77.68+23.89  101.57   9/21 3 ex  78.97  180.54   9/25 2 ex  55.08  235.62   9/27 2 ex  55.08  290.70   9/29 2 ex, 1 ther act  47.78 + 33.40 81.18 371.88   10/2 2 ex, 1 there act   81.18 453.06   10/4 2 ex  55.08     10/6 2 ex  55.08     10/9 2 ex  55.08     10/11 3 ex  78.97  697.27   10/13 3 ex  78.97  776.24

## 2017-10-16 ENCOUNTER — HOSPITAL ENCOUNTER (OUTPATIENT)
Dept: PHYSICAL THERAPY | Age: 67
Setting detail: THERAPIES SERIES
Discharge: HOME | End: 2017-10-16
Payer: MEDICARE

## 2017-10-16 PROCEDURE — 97110 THERAPEUTIC EXERCISES: CPT

## 2017-10-16 NOTE — FLOWSHEET NOTE
[x] Karely Maradiaga       Outpatient Physical        Therapy       [address]       Phone: [phone]       Fax: [phone]       Physical Therapy Daily Treatment Note    Date:  10/16/2017  Patient Name:  Kim Armstrong    :  [birth year]  MRN: [id number]  Physician: Lata WATT Rasheed: Medicare  Diagnosis: Right total hip   Onset Date: 17              Next Dr. Santoro Ban:  Oct 18  Visit# / total visits:              Cancels/No Shows: 0/1  G Code due visit 20    Subjective:   Has pain in her right low back and posterior thigh this morning, took pain meds, but is still 9/10  Pain:  [x] Yes  [] No Location: right low back Pain Rating: (0-10 scale) 9/10  Pain altered Tx:  [x] No  [] Yes  Action:  Comments:      Objective:    Modalities: cold pack to left hip x 15 minutes-in supine with legs on wedge after exercises  Precautions: anterior approach- no extension, no ER, no pivoting  Exercises:   Right AIDEE 17  Exercise Reps/ Time Weight/ Level Comments   SciFit 12 min Level 3.5    Seated:      LAQ 20 1.5 lb Educated with green Tband loop   Hamstring curls 20 Green Tband loop   Adductor sets 20 5 sec Ball squeeze   Abductor sets 20 green Theraband resisted   Supine:      Quad sets 20     Hamstring sets 20     Gluteal sets 20     Heel slides 20 1.5 lb    Hip abduction 20 1.5 lb    Hip abduction 10 x 2 AROM Side lying   SAQ 20 1.5 lb    SLR 10 x 2 AROM    Standing:   Open and closed chain   Mini squats 20     Heel raises 20     Marches 20 1.5 lb    Hip abduction 20 1.5 lb Hip neutral no ER   Hamstring curls 20 1.5 lb    Step ups-forward 20 6 inch Right leg on step   Step down 20 4 inch Right leg on step   Crossover steps      Calf stretches  3 20 sec    Standing with eyes closed without UE support  30 sec On foam mat   Other:        Specific Instructions for next treatment:     Treatment Charges: Mins Units   [x]  Modalities-CP 15 0   [x]  Ther Exercise 45 3   []  Manual Therapy

## 2017-10-18 ENCOUNTER — HOSPITAL ENCOUNTER (OUTPATIENT)
Dept: PHYSICAL THERAPY | Age: 67
Setting detail: THERAPIES SERIES
Discharge: HOME | End: 2017-10-18
Payer: MEDICARE

## 2017-10-18 ENCOUNTER — OFFICE VISIT (OUTPATIENT)
Dept: ORTHOPEDIC SURGERY | Age: 67
End: 2017-10-18

## 2017-10-18 VITALS — WEIGHT: 172 LBS | HEIGHT: 62 IN | BODY MASS INDEX: 31.65 KG/M2

## 2017-10-18 DIAGNOSIS — M16.11 ARTHRITIS OF RIGHT HIP: Primary | ICD-10-CM

## 2017-10-18 DIAGNOSIS — Z96.641 HX OF TOTAL HIP ARTHROPLASTY, RIGHT: ICD-10-CM

## 2017-10-18 PROCEDURE — 99024 POSTOP FOLLOW-UP VISIT: CPT | Performed by: ORTHOPAEDIC SURGERY

## 2017-10-18 PROCEDURE — 97110 THERAPEUTIC EXERCISES: CPT

## 2017-10-18 ASSESSMENT — ENCOUNTER SYMPTOMS
NAUSEA: 0
CONSTIPATION: 0
DIARRHEA: 0
COUGH: 0

## 2017-10-18 NOTE — FLOWSHEET NOTE
[x] Kathy Malik       Outpatient Physical        Therapy       [address]       Phone: [phone]       Fax: [phone]       Physical Therapy Daily Treatment Note    Date:  10/18/2017  Patient Name:  Frankey Ginger    :    MRN: [id number]  Physician: [address]: Medicare  Diagnosis: Right total hip   Onset Date: 17              Next Dr. Gerald Biswasft: Oct 18  Visit# / total visits:              Cancels/No Shows: 0/1  G Code due visit 20    Subjective:   No hip pain, but still has some pain in her low back-sees pain mgmt tomorrow for her back.   Saw Dr Edilia Azevedo today, he released her to drive and she is excited about that  Pain:  [x] Yes  [] No Location: hip Pain Rating: (0-10 scale) 0/10  Pain altered Tx:  [x] No  [] Yes  Action:  Comments:      Objective:    Modalities: cold pack to left hip x 15 minutes-in supine with legs on wedge after exercises  Precautions: anterior approach- no extension, no ER, no pivoting  Exercises:   Right AIDEE 17  Exercise Reps/ Time Weight/ Level Comments   SciFit 12 min Level 3.5    Seated:      LAQ 20 1.5 lb    Hamstring curls 20 Green Tband loop   Adductor sets 20 5 sec Ball squeeze   Abductor sets 20 green Theraband resisted   Supine:      Quad sets 20     Hamstring sets 20     Gluteal sets 20     Heel slides 20 1.5 lb    Hip abduction 20 1.5 lb    Hip abduction 20 AROM Side lying   SAQ 20 1.5 lb    SLR 20 AROM    Standing:   Open and closed chain   Mini squats 20     Heel raises 20     Marches 20 1.5 lb    Hip abduction 20 1.5 lb Hip neutral no ER   Hamstring curls 20 1.5 lb    Step ups-forward 20 6 inch Right leg on step   Step down 10 4 inch Right leg on step   Crossover steps 10 4 inch    Calf stretches  3 20 sec    Other:  Added crossover steps to improve strength and balance and to improve ability to ascend and descend stairs with reciprocal gait pattern      Specific Instructions for next

## 2017-10-18 NOTE — PROGRESS NOTES
noted. Patient has negative hip logroll and Stinchfield test on the right. Motor, sensory, vascular examination right lower stomach is grossly intact without focal deficits. Mild tenderness to the right buttock is appreciated. Neuro: alert. oriented  Eyes: Extra-ocular muscles intact  Mouth: Oral mucosa moist. No perioral lesions  Pulm: Respirations unlabored and regular. Skin: warm, well perfused  Psych:   Patient has good fund of knowledge and displays understanging of exam, diagnosis, and plan. Assessment:      1. Arthritis of right hip    2. Hx of total hip arthroplasty, right         Plan:      The patient is already driving her car. . Patient to follow up in 1 year with x-rays. She should continue an aggressive home exercise program for strengthening of her hip. Call the office with any questions or concerns    Follow up:Return in about 1 year (around 10/18/2018) for x rays. Ross Stout RN am scribing for and in the presence of Dr. Corrie Hardin  10/29/2017 7:37 PM    I have reviewed and made changes accordingly to the work scribed by Agustin Pereira RN. The documentation accurately reflects work and decisions made by me. I have also reviewed documentation completed by clinical staff. Corrie Hardin DO, [address] [place] [address]  10/29/2017 7:39 PM      No orders of the defined types were placed in this encounter. No orders of the defined types were placed in this encounter.        Electronically signed by Alba Alicea DO, FAOAO on 10/29/2017 at 7:37 PM

## 2017-10-20 ENCOUNTER — HOSPITAL ENCOUNTER (OUTPATIENT)
Dept: PHYSICAL THERAPY | Age: 67
Setting detail: THERAPIES SERIES
Discharge: HOME | End: 2017-10-20
Payer: MEDICARE

## 2017-10-20 PROCEDURE — 97110 THERAPEUTIC EXERCISES: CPT

## 2017-10-20 NOTE — FLOWSHEET NOTE
[x] [facility]       Outpatient Physical        Therapy       [address]       Phone: [phone]       Fax: [phone]     Physical Therapy Daily Treatment Note    Date:  10/20/2017  Patient Name:  Carter Gonzalez    :    MRN: [id number]  Physician: [address]: Medicare  Diagnosis: Right total hip   Onset Date: 17              Next Dr. Eastman Form: Oct 18  Visit# / total visits:              Cancels/No Shows: 0/1  G Code due visit 20    Subjective:   Pt arrives stating she is feeling much better. No hip pain at the moment.   Pain:  [x] Yes  [] No Location: hip Pain Rating: (0-10 scale) 0/10  Pain altered Tx:  [x] No  [] Yes  Action:  Comments:      Objective:    Modalities: cold pack to left hip x 15 minutes-in supine with legs on wedge after exercises  Precautions: anterior approach- no extension, no ER, no pivoting  Exercises:   Right AIDEE 17  Exercise Reps/ Time Weight/ Level Comments   SciFit 12 min Level 3.5    Seated:      LAQ 20 2 lb    Hamstring curls 20 Green Tband loop   Adductor sets 20 5 sec Ball squeeze   Abductor sets 20 blue Theraband resisted   Supine:      Quad sets 20     Hamstring sets 20     Gluteal sets 20     Heel slides 20 2 lb    Hip abduction 20 2 lb    Hip abduction 20 AROM Side lying   SAQ 20 2 lb    SLR 20 AROM    Standing:   Open and closed chain   Mini squats 20     Heel raises 20     Marches 20 2 lb    Hip abduction 20 2 lb Hip neutral no ER   Hamstring curls 20 2 lb    Step ups-forward 20 6 inch Right leg on step   Step down 20 4 inch Right leg on step   Crossover steps 10 4 inch    Calf stretches  3 20 sec    Other:        Specific Instructions for next treatment:    Treatment Charges: Mins Units   [x]  Modalities-CP 15 0   [x]  Ther Exercise 40 3   []  Manual Therapy     []  Ther Activities      []  Aquatics     []  Vasocompression     []  Other-gait      Total Treatment time 55 3       Assessment: [x] Progressing

## 2017-10-23 ENCOUNTER — HOSPITAL ENCOUNTER (OUTPATIENT)
Dept: PHYSICAL THERAPY | Age: 67
Setting detail: THERAPIES SERIES
Discharge: HOME | End: 2017-10-23
Payer: MEDICARE

## 2017-10-23 PROCEDURE — 97110 THERAPEUTIC EXERCISES: CPT

## 2017-10-23 NOTE — FLOWSHEET NOTE
[]  Ther Activities      []  Aquatics     []  Vasocompression     []  Other-gait      Total Treatment time         Assessment: [x] Progressing toward goals- Tolerated addition of Total Gym squats. Still weak with hip flexion and abduction, self assists for last couple of reps    [] No change. [] Other:      STG: (to be met in 6 treatments)  1. ? Pain: 4/10-progress  2. ? ROM: R hip abd. 25°-progress  3. ? Strength: R hip and knee musculature increased 1/2 MMT grade-met  4. ? Function: Patient to report ambulating 100% of the time with cane-progress  5. Independent with Home Exercise Programs-met  6. Pt to be compliment with all AIDEE precautions-progress     LTG: (to be met in 12 treatments)  1. R hip/knee  MMT 4+/5 grossly-progress  2. Patient to report pain <2/10-progress  3. Patient to have a Lower Extremity Functional score <10-progress                  4.   Patient to demonstrate normal gait symmetry during each phase of gait-progress    Pt. Education:  [x] Yes  [] No  [] Reviewed Prior HEP/Ed  Method of Education: [x] Verbal  [] Demo  [] Written  Comprehension of Education:  [x] Verbalizes understanding. [x] Demonstrates understanding  [] Needs review. [] Demonstrates/verbalizes HEP/Ed previously given. Plan: [x] Continue per plan of care.     [] Other:      Time In: 10:52          Time Out: 11:55    Electronically signed by:  Shira Rodriguez PTA

## 2017-10-25 ENCOUNTER — HOSPITAL ENCOUNTER (OUTPATIENT)
Dept: PHYSICAL THERAPY | Age: 67
Setting detail: THERAPIES SERIES
Discharge: HOME | End: 2017-10-25
Payer: MEDICARE

## 2017-10-25 PROCEDURE — 97110 THERAPEUTIC EXERCISES: CPT

## 2017-10-25 NOTE — FLOWSHEET NOTE
[x] Tennis Coffey       Outpatient Physical        Therapy       [address]       Phone: [phone]       Fax: [phone]     Physical Therapy Daily Treatment Note    Date:  10/25/2017  Patient Name:  César Renner    :  1950  MRN: [id number]  Physician: Maximo Rinne            Insurance: Medicare  Diagnosis: Right total hip   Onset Date: 17              Next Dr. Pedro Aguilar: none  Visit# / total visits:              Cancels/No Shows: 0/1  G Code due visit 20    Subjective:    Still has soreness and occasional sharp pain  Pain:  [x] Yes  [] No Location: hip Pain Rating: (0-10 scale) 0/10  Pain altered Tx:  [x] No  [] Yes  Action:  Comments:      Objective:    Modalities: cold pack to left hip x 15 minutes-in supine with legs on wedge after exercises  Precautions: anterior approach- no extension, no ER, no pivoting  Exercises:   Right AIDEE 17  Exercise Reps/ Time Weight/ Level Comments   NuStep 12 min Level 4    Seated:      LAQ 20 2 lb    Hamstring curls 20 Green Tband loop   Adductor sets 20 5 sec Ball squeeze   Abductor sets 20 blue Theraband resisted   Isometric IR 20  Yellow ball   Supine:      Quad sets 20     Hamstring sets 20     Gluteal sets 20     Heel slides 20 2 lb    Hip abduction 20 2 lb    Hip abduction 20 AROM Side lying   SAQ 20 2 lb    SLR 20 AROM    Total Gym  squats 10 x 2 Level 35    Rebounder ball toss 20  SBA-green ball   Standing:   Open and closed chain   Mini squats 20     Heel raises 20     Marches 20 2 lb    Hip abduction 20 2 lb Hip neutral no ER   Hamstring curls 20 2 lb    Step ups-forward 20 6 inch Right leg on step   Step down 20 4 inch Right leg on step   Crossover steps 10 4 inch    Calf stretches  3 20 sec    Other:  Added isometric IR strengthening exercises. Educated pt to keep feet almost neutral, no excessive ER or IR while performing.   She verbalized understanding      Specific Instructions for next treatment:add compliant surface to

## 2017-10-27 ENCOUNTER — HOSPITAL ENCOUNTER (OUTPATIENT)
Dept: PHYSICAL THERAPY | Age: 67
Setting detail: THERAPIES SERIES
Discharge: HOME | End: 2017-10-27
Payer: MEDICARE

## 2017-10-27 PROCEDURE — 97110 THERAPEUTIC EXERCISES: CPT

## 2017-10-27 NOTE — FLOWSHEET NOTE
[x] Joseph Vinson       Outpatient Physical        Therapy       [address]       Phone: [phone]       Fax: [phone]     Physical Therapy Daily Treatment Note    Date:  10/27/2017  Patient Name:  Bridgett Abel    :  1950  MRN: [id number]  Physician: [address]: Medicare  Diagnosis: Right total hip   Onset Date: 17              Next Dr. Edwige Harmon: none  Visit# / total visits:              Cancels/No Shows: 0/1  G Code due visit 20    Subjective:    Still has soreness and some pain through R hip today  Pain:  [x] Yes  [] No Location: hip Pain Rating: (0-10 scale) 4/10  Pain altered Tx:  [x] No  [] Yes  Action:  Comments:      Objective:    Modalities: cold pack to left hip x 15 minutes-in supine with legs on wedge after exercises  Precautions: anterior approach- no extension, no ER, no pivoting  Exercises:   Right AIDEE 17  Exercise Reps/ Time Weight/ Level Comments   NuStep 12 min Level 4    Seated:      LAQ 20 2 lb    Hamstring curls 20 blue Tband loop   Adductor sets 20 5 sec Ball squeeze   Abductor sets 20 blue Theraband resisted   Isometric IR 20  Yellow ball   Supine:      Quad sets 20     Hamstring sets 20     Gluteal sets 20     Heel slides 20 2 lb    Hip abduction 20 2 lb    Hip abduction 20 AROM Side lying   SAQ 20 2 lb    SLR 10 x2 AROM    Total Gym  squats 20 Level 35    Rebounder ball toss 20  SBA-green ball   Standing:   Open and closed chain on green foam   Mini squats 20     Heel raises 20     Marches 20 2 lb    Hip abduction 20 2 lb Hip neutral no ER   Hamstring curls 20 2 lb    Step ups-forward 20 6 inch Right leg on step   Step down 20 4 inch Right leg on step   Crossover steps 10 4 inch    Calf stretches  3 20 sec wedge   Other:  Educated pt to keep feet almost neutral, no excessive ER or IR while performing standing exercises.   She verbalized understanding      Specific Instructions for next treatment:     Treatment Charges:

## 2017-10-30 ENCOUNTER — HOSPITAL ENCOUNTER (OUTPATIENT)
Dept: PHYSICAL THERAPY | Age: 67
Setting detail: THERAPIES SERIES
Discharge: HOME | End: 2017-10-30
Payer: MEDICARE

## 2017-10-30 PROCEDURE — 97110 THERAPEUTIC EXERCISES: CPT

## 2017-11-01 NOTE — CARE COORDINATION
Spoke with Rhiannon Achilles    Incision status: Stated healing well. Denies s/s of infection. Edema/Swelling: Denies swelling    Pain level and status: Indicated some pain now and then. Use of pain medications: Stated thinking she is taking Tylenol or something like that. Bowels:     Home Care Agency active:     Outpatient therapy: [id number]     Do you have all of your medications:     Changes in medications:      Follow up appointments:    Future Appointments  Date Time Provider Jody Rubio   11/3/2017 10:00 AM Dagmar Cantu PTA [facility]   11/6/2017 10:00 AM Dagmar Cantu PTA [facility]   11/8/2017 10:00 AM Dagmar Cantu PTA [facility]  [place]   11/10/2017 10:00 AM Dagmar Cantu PTA [facility]in[facility]

## 2017-11-03 ENCOUNTER — HOSPITAL ENCOUNTER (OUTPATIENT)
Dept: PHYSICAL THERAPY | Age: 67
Setting detail: THERAPIES SERIES
Discharge: HOME | End: 2017-11-03
Payer: MEDICARE

## 2017-11-03 PROCEDURE — 97110 THERAPEUTIC EXERCISES: CPT

## 2017-11-06 ENCOUNTER — HOSPITAL ENCOUNTER (OUTPATIENT)
Dept: PHYSICAL THERAPY | Age: 67
Setting detail: THERAPIES SERIES
Discharge: HOME | End: 2017-11-06
Payer: MEDICARE

## 2017-11-06 PROCEDURE — 97110 THERAPEUTIC EXERCISES: CPT

## 2017-11-06 NOTE — FLOWSHEET NOTE
[x] Ishan Segundo       Outpatient Physical        Therapy       [address]       Phone: [phone]       Fax: [phone]     Physical Therapy Daily Treatment Note    Date:  2017  Patient Name:  Mariluz Ricketts    :    MRN: [id number]  Physician: [address]: Medicare  Diagnosis: Right total hip   Onset Date: 17              Next Dr. Killian Almonte: none  Visit# / total visits:              Cancels/No Shows:   G Code due visit 20    Subjective:    Says she felt sore this weekend and is still feeling pretty sore today. Pt states she did not perform exercises given previous session at home yet.   Pain:  [x] Yes  [] No Location: hip Pain Rating: (0-10 scale) 7/10-took pain meds before coming today  Pain altered Tx:  [x] No  [] Yes  Action:  Comments:      Objective:    Modalities: cold pack to left hip x 15 minutes-in supine with legs on wedge after exercises  Precautions: anterior approach- no extension, no ER, no pivoting  Exercises:   Right AIDEE 17  Exercise Reps/ Time Weight/ Level Comments   NuStep 6 min Level 4 Decreased time this session due to soreness   Seated:      LAQ 20 2.5 lb    Hamstring curls 20 blue Tband loop   Adductor sets 20 5 sec Ball squeeze   Abductor sets 20 blue Theraband resisted   Isometric IR 20  Yellow ball   Supine:      Quad sets 20     Hamstring sets 20     Gluteal sets 20     Heel slides 20 2.5 lb    Hip abduction 20 2.5 lb    Hip abduction 20 AROM Side lying   SAQ 20 2.5 lb    SLR 10 x 2 AROM    Total Gym  squats 20 Level 35    Rebounder ball toss 20  SBA-green ball   Standing:   Open and closed chain on green foam   Mini squats 20     Heel raises 20     Marches 20 2 lb    Hip abduction 20 2 lb Hip neutral no ER   Hamstring curls 20 2 lb    Step ups-forward 20 6 inch Right leg on step   Step down 20 6 inch Right leg on step   Crossover steps 10 4 inch    Calf stretches  3 20 sec wedge   Other:  Reviewed [facility] for HEP and performing mini lunges in place of step exercises in packet      Specific Instructions for next treatment: review mini lunges    Treatment Charges: Mins Units   [x]  Modalities-CP 15 0   [x]  Ther Exercise 41 3   []  Manual Therapy     []  Ther Activities      []  Aquatics     []  Vasocompression     []  Other-gait      Total Treatment time 41        Assessment: [x] Progressing toward goals- Good tolerance to increased step height for step downs, pt has decreased hip circumduction with increased knee flexion during ambulation with cane. [] No change. [x] Other: Pt able to self correct R ER while performing standing exercises. STG: (to be met in 6 treatments)  1. ? Pain: 4/10-progress  2. ? ROM: R hip abd. 25°-progress  3. ? Strength: R hip and knee musculature increased 1/2 MMT grade-met  4. ? Function: Patient to report ambulating 100% of the time with cane-progress  5. Independent with Home Exercise Programs-met  6. Pt to be compliment with all AIDEE precautions-progress     LTG: (to be met in 12 treatments)  1. R hip/knee  MMT 4+/5 grossly-progress  2. Patient to report pain <2/10-progress  3. Patient to have a Lower Extremity Functional score <10-progress                  4.   Patient to demonstrate normal gait symmetry during each phase of gait-progress    Pt. Education:  [x] Yes  [] No  [x] Reviewed Prior HEP/Ed  Method of Education: [x] Verbal  [x] Demo  [] Written  Comprehension of Education:  [x] Verbalizes understanding. [x] Demonstrates understanding  [] Needs review. [x] Demonstrates/verbalizes HEP/Ed previously given. Plan: [x] Continue per plan of care.      [x] Other: Reassessment G Codes due next visit       Time In: 10:01 am          Time Out: 11:03 am    Electronically signed by:  Jemma ISAACS

## 2017-11-08 ENCOUNTER — HOSPITAL ENCOUNTER (OUTPATIENT)
Dept: PHYSICAL THERAPY | Age: 67
Setting detail: THERAPIES SERIES
Discharge: HOME | End: 2017-11-08
Payer: MEDICARE

## 2017-11-08 PROCEDURE — G8980 MOBILITY D/C STATUS: HCPCS

## 2017-11-08 PROCEDURE — 97110 THERAPEUTIC EXERCISES: CPT

## 2017-11-08 PROCEDURE — G8979 MOBILITY GOAL STATUS: HCPCS

## 2017-11-08 NOTE — DISCHARGE SUMMARY
[x] [facility]ULO [facility]        Outpatient Physical                Therapy       [address]       Phone: [phone]       Fax: [phone]       Physical Therapy Discharge Note    Date: 2017      Patient: Mallory Fleming  : 1950  MRN: [id number]    Physician: [address]: Medicare  Diagnosis: Right total hip      Onset Date: 17                                      Next Dr. Junior Sample: none  Total visits attended:21  Cancels/No shows:  Date of initial visit: 9/15/2017                Date of final visit: 2017      Subjective:  Pain:  [x] Yes  [] No  Location: R Hip Pain Rating: (0-10 scale) 4-5/10  Pain altered Tx:  [x] No  [] Yes  Action:  Comments: Pt reports still has soreness where incision is and will have sharp unexpected pains inside hip approx 1x per day-only lasts a second, rates at 8/10, but cont to be sore afterward, feels like something is \"poking. \"  Pain at worst 8/10-when has sharp \"poking\" pains. Pt feels ready to end PT. Objective:  Test Measurements:    ROM DEGREES A/P ROM DEGREES A/P STRENGTH  STRENGTH      LEFT  RIGHT LEFT RIGHT   HIP FLEX       4+p   HIPEXT       4   HIP ABD   42°   4+   HIP ADD                       KNEE FLEX       4+   KNEE EXT       5               ANKLE DF        4+                PF                Function: LEFS 21% loss of function. Pt reports uses walker for long distances outside of home (leaves in car) uses cane for shorter distances outside of home, ie going to neighbors. Pt reports is trying to go without cane at home. Note Pt has decreased hip circumduction with increased knee flexion during ambulation with cane. Assessment:  STG:(to be met in 6 treatments)  1. ? Pain: 4/10-Progress Toward  2. ? ROM: R hip abd. 25°-Met  3. ? Strength: R hip and knee musculature increased 1/2 MMT grade-Met  4. ? Function: Patient to report ambulating 100% of the time with cane-Progress Toward  5.  Independent with Home Exercise Programs-Met  6. Pt to be compliment with all AIDEE precautions-Progress Toward     LTG:(to be met in 12 treatments)  1. R hip/knee  MMT 4+/5 grossly-Met for all but hip ext  2. Patient to report pain <2/10-Progress Toward   3. Patient to have a Lower Extremity Functional score <10-Progress Toward  4. Patient to demonstrate normal gait symmetry during each phase of gait-Progress Toward    G-CODE  Functional Limitation: Mobility/Walking  Functional Assessment Used: LE functional Index  Current Status Modifier: CJ                   Goal Status Modifier: CI  Discharge Status Modifier:            Treatment to Date:  [x] Therapeutic Exercise    [x] Modalities:  [x] Therapeutic Activity     [] Ultrasound  [] Electrical Stimulation  [x] Gait Training     [] Massage       [] Lumbar/Cervical Traction  [] Neuromuscular Re-education [x] Cold/hotpack [] Iontophoresis: 4 mg/mL  [x] Instruction in Home Exercise Program                     Dexamethasone Sodium  [] Manual Therapy             Phosphate 40-80 mAmin  [] Aquatic Therapy                   [] Vasocompression/    [] Other:             Game Ready    Discharge Status:     [] Pt recovered from conditions. Treatment goals were met. [x] Pt received maximum benefit. No further therapy indicated at this time. [x] Pt to continue exercise/home instructions independently. [] Therapy interrupted due to:    [] Pt has 2 or more no shows/cancels, is discontinued per our policy. [x] Pt has completed prescribed number of treatment sessions. [] Other:         Electronically signed by Claritza Montes De Oca PT on 11/8/2017 at 10:11 AM        If you have any questions or concerns, please don't hesitate to call.   Thank you for your referral.

## 2017-11-08 NOTE — FLOWSHEET NOTE
[x] Lissa Lyonsdelmar       Outpatient Physical        Therapy       [address]       Phone: [phone]       Fax: [phone]     Physical Therapy Daily Treatment Note    Date:  2017  Patient Name:  Domingo Hill    :    MRN: [id number]  Physician: [address]: Medicare  Diagnosis: Right total hip   Onset Date: 17              Next Dr. Emi Vinson: none  Visit# / total visits:              Cancels/No Shows:       Subjective:    Says the pain felt today is due to soreness. She has an appt with pain management on Friday 11/10 so she wants today to be her last therapy session. Says she will be getting a pain injection on Friday and will try driving then. Says she is performing the exercises at home.   Pain:  [x] Yes  [] No Location: hip Pain Rating: (0-10 scale) 4 or 5/10-took pain meds before coming today  Pain altered Tx:  [x] No  [] Yes  Action:  Comments:      Objective:    Modalities: cold pack to left hip x 15 minutes-in supine with legs on wedge after exercises  Precautions: anterior approach- no extension, no ER, no pivoting  Exercises:   Right AIDEE 17  Exercise Reps/ Time Weight/ Level Comments   NuStep 13 min Level 4    Seated:      LAQ 20 2.5 lb    Hamstring curls 20 blue Tband loop   Adductor sets 20 5 sec Ball squeeze   Abductor sets 20 blue Theraband resisted   Isometric IR 20  Yellow ball   Supine:      Quad sets 20     Hamstring sets 20     Gluteal sets 20     Heel slides 20 2.5 lb    Hip abduction 20 2.5 lb    Hip abduction 20 AROM Side lying   SAQ 20 2.5 lb    SLR 10 x 2 AROM    Total Gym  squats 20 Level 35    Rebounder ball toss 20  SBA-green ball   Standing:   Open and closed chain   Mini squats 20     Heel raises 20     Marches 20 2 lb    Hip abduction 20 2 lb Hip neutral no ER   Hamstring curls 20 2 lb    Step ups-forward 20 6 inch Right leg on step   Step down 20 6 inch Right leg on step   Crossover steps 10 4 inch    Calf stretches  3 20 sec wedge   Other:  Reviewed [facility] for HEP and performing mini lunges in place of step exercises in packet      Specific Instructions for next treatment:    Treatment Charges: Mins Units   [x]  Modalities-CP 15 0   [x]  Ther Exercise 32 2   []  Manual Therapy     []  Ther Activities      []  Aquatics     []  Vasocompression     [x]  Other-recheck      Total Treatment time 32        Assessment: [x] Progressing toward goals- Pt has decreased hip circumduction with increased knee flexion during ambulation with cane. Lower Extremity Functional Scale scored 21% impairment compared to previous score of 50% impairment. See progress note same date    [] No change. [x] Other: Pt able to self correct R ER while performing standing exercises. STG: (to be met in 6 treatments)  1. ? Pain: 4/10-progress  2. ? ROM: R hip abd. 25°-progress  3. ? Strength: R hip and knee musculature increased 1/2 MMT grade-met  4. ? Function: Patient to report ambulating 100% of the time with cane-progress  5. Independent with Home Exercise Programs-met  6. Pt to be compliment with all AIDEE precautions-progress     LTG: (to be met in 12 treatments)  1. R hip/knee  MMT 4+/5 grossly-progress  2. Patient to report pain <2/10-progress  3. Patient to have a Lower Extremity Functional score <10-progress                  4.   Patient to demonstrate normal gait symmetry during each phase of gait-progress    Pt. Education:  [x] Yes  [] No  [x] Reviewed Prior HEP/Ed  Method of Education: [x] Verbal  [x] Demo  [] Written  Comprehension of Education:  [x] Verbalizes understanding. [x] Demonstrates understanding  [] Needs review. [x] Demonstrates/verbalizes HEP/Ed previously given. Plan: [] Continue per plan of care.      [x] Other: Pt to continue exercises at home       Time In: 10:01 am          Time Out: 11:20 am    Electronically signed by:  Gaurav ISAACS

## 2017-11-08 NOTE — PRE-CERTIFICATION NOTE
Medicare Cap   [x] Physical Therapy  [] Speech Therapy  [] Occupational therapy  *PT and Speech caps combine      $1980 Cap limit < kx modifier needed < $5221 requires pre-cert        Patient Name: Pacheco Beat: 1950     Date of [place] 63 Name # units/ charge $$$ charge Daily Total Charge Ongoing Total $$$   9/15/17 eval+ex  77.68+23.89  101.57   9/21 3 ex  78.97  180.54   9/25 2 ex  55.08  235.62   9/27 2 ex  55.08  290.70   9/29 2 ex, 1 ther act  47.78 + 33.40 81.18 371.88   10/2 2 ex, 1 there act   81.18 453.06   10/4 2 ex  55.08     10/6 2 ex  55.08     10/9 2 ex  55.08     10/11 3 ex  78.97  697.27   10/13 3 ex  78.97  776.24   10/16 3 ex  78.97     10/18 2 ex  55.08     10/20 3 ex  78.97  989.26   10/23 3 ex  78.97     10/25 3 ex  78.97  1147.20   10/27 2 ex  55.08     10/30 2 ex  55.08  1257.36   11/3 3 ex  78.97     11/6 3 ex  78.97  1415.30

## 2017-11-10 ENCOUNTER — APPOINTMENT (OUTPATIENT)
Dept: PHYSICAL THERAPY | Age: 67
End: 2017-11-10
Payer: MEDICARE

## 2017-11-20 ENCOUNTER — CARE COORDINATION (OUTPATIENT)
Dept: CASE MANAGEMENT | Age: 67
End: 2017-11-20

## 2017-12-15 DIAGNOSIS — M16.11 ARTHRITIS OF RIGHT HIP: Primary | ICD-10-CM

## 2017-12-20 ENCOUNTER — OFFICE VISIT (OUTPATIENT)
Dept: ORTHOPEDIC SURGERY | Age: 67
End: 2017-12-20
Payer: MEDICARE

## 2017-12-20 VITALS — HEIGHT: 62 IN | BODY MASS INDEX: 31.17 KG/M2 | WEIGHT: 169.4 LBS

## 2017-12-20 DIAGNOSIS — M70.61 TROCHANTERIC BURSITIS OF BOTH HIPS: Primary | ICD-10-CM

## 2017-12-20 DIAGNOSIS — M70.62 TROCHANTERIC BURSITIS OF BOTH HIPS: Primary | ICD-10-CM

## 2017-12-20 PROCEDURE — 1090F PRES/ABSN URINE INCON ASSESS: CPT | Performed by: ORTHOPAEDIC SURGERY

## 2017-12-20 PROCEDURE — 1123F ACP DISCUSS/DSCN MKR DOCD: CPT | Performed by: ORTHOPAEDIC SURGERY

## 2017-12-20 PROCEDURE — G8427 DOCREV CUR MEDS BY ELIG CLIN: HCPCS | Performed by: ORTHOPAEDIC SURGERY

## 2017-12-20 PROCEDURE — 4004F PT TOBACCO SCREEN RCVD TLK: CPT | Performed by: ORTHOPAEDIC SURGERY

## 2017-12-20 PROCEDURE — G8399 PT W/DXA RESULTS DOCUMENT: HCPCS | Performed by: ORTHOPAEDIC SURGERY

## 2017-12-20 PROCEDURE — 99213 OFFICE O/P EST LOW 20 MIN: CPT | Performed by: ORTHOPAEDIC SURGERY

## 2017-12-20 PROCEDURE — 4040F PNEUMOC VAC/ADMIN/RCVD: CPT | Performed by: ORTHOPAEDIC SURGERY

## 2017-12-20 PROCEDURE — 3014F SCREEN MAMMO DOC REV: CPT | Performed by: ORTHOPAEDIC SURGERY

## 2017-12-20 PROCEDURE — 3017F COLORECTAL CA SCREEN DOC REV: CPT | Performed by: ORTHOPAEDIC SURGERY

## 2017-12-20 PROCEDURE — G8417 CALC BMI ABV UP PARAM F/U: HCPCS | Performed by: ORTHOPAEDIC SURGERY

## 2017-12-20 PROCEDURE — G8484 FLU IMMUNIZE NO ADMIN: HCPCS | Performed by: ORTHOPAEDIC SURGERY

## 2017-12-20 ASSESSMENT — ENCOUNTER SYMPTOMS
NAUSEA: 0
CONSTIPATION: 0
COUGH: 0
DIARRHEA: 0

## 2017-12-20 NOTE — PROGRESS NOTES
[address] [place] [address]  Dept: [phone]  Dept Fax: [phone]        Ambulatory Follow Up      Subjective:   Ruth Landrum is a 75y.o. year old female who presents to our office today for routine followup regarding her   1. Trochanteric bursitis of both hips    . Chief Complaint   Patient presents with    Hip Pain     right       HPI   Enzo Kuo is here for a follow up regarding her bilateral hip pain. She has a hx of a Right AIDEE on 8/22/17. She states that pain is on the outside of her hips. Her right side is worse than the left. She states she just had a random onset of pain about 2 weeks. She does have a hx of back pain as well but she is being treated by her pain management doctor. Review of Systems   Constitutional: Negative for chills and fever. Respiratory: Negative for cough. Gastrointestinal: Negative for constipation, diarrhea and nausea. Musculoskeletal: Negative for arthralgias, gait problem, joint swelling and myalgias. Neurological: Negative for dizziness, weakness and numbness. I have reviewed the CC, HPI, ROS, PMH, FHX, Social History. I agree with the documentation provided by other staff, residents and have reviewed their documentation prior to providing my signature indicating agreement. Objective :   Ht 5' 2\" (1.575 m)   Wt 169 lb 6.4 oz (76.8 kg)   BMI 30.98 kg/m²  Body mass index is 30.98 kg/m². General: Ruth Landrum is a 75 y.o. female who is alert and oriented and sitting comfortably in our office. Ortho Exam  MS:  Mild limp, no pain with WB  Evaluation of the Bilateral  hip reveals no outward deformity. Patient ambulates with minimal shortening weightbearing phase to the  Bilateral hip. Negative hip log roll and Stinchfield test is appreciated on the affected side.   Increased pain with palpation of the greater trochanteric region of the Bilateral  hip and posterior to the greater trochanteric region is appreciated. Increased pain with piriformis stretch of the hip is noted. Motor, sensory, vascular examination to the affected lower extremity is grossly intact without focal deficits. Patient has full range of motion of the lumbosacral spine and the knee on the affected side. Neuro: alert. oriented  Eyes: Extra-ocular muscles intact  Mouth: Oral mucosa moist. No perioral lesions  Pulm: Respirations unlabored and regular. Skin: warm, well perfused  Psych:   Patient has good fund of knowledge and displays understanging of exam, diagnosis, and plan. Radiology:     History: Bilateral hip pain    Findings: Low AP pelvis and crosstable lateral x-ray of the right hip done the office today shows total hip arthroplasty in good position without complications. No signs of loosening of components is appreciated. No evidence of fracture, subluxation, dislocation, radiopaque foreign body, radiopaque tumors noted. Impression: Right total hip arthroplasty in good position as described above. Assessment:      1. Trochanteric bursitis of both hips       Plan: We will get patient started inPhysical therapy for bilateral hip bursitis. We talked about a possible corticosteroid injection in the future. She will follow up in 6 weeks. Roby Strickland MA am scribing for and in the presence of Dr. Peter Tony  12/22/2017 7:23 AM          Follow up:Return in about 6 weeks (around 1/31/2018). No orders of the defined types were placed in this encounter. Orders Placed This Encounter   Procedures    Ambulatory referral to Physical Therapy     Referral Priority:   Routine     Referral Type:   Eval and Treat     Referral Reason:   Specialty Services Required     Requested Specialty:   Physical Therapy     Number of Visits Requested:   1       I have reviewed and made changes accordingly to the work scribed by Clyde Martinez MA.   The documentation accurately reflects work and decisions made by me. I have also reviewed documentation completed by clinical staff.     Regis Dominguez DO, [facility]  12/22/2017 7:24 AM      Electronically signed by Jose Sher DO, FAOAO on 12/22/2017 at 7:23 AM

## 2018-01-04 ENCOUNTER — HOSPITAL ENCOUNTER (OUTPATIENT)
Dept: PHYSICAL THERAPY | Age: 68
Setting detail: THERAPIES SERIES
Discharge: HOME | End: 2018-01-04
Payer: MEDICARE

## 2018-01-04 PROCEDURE — G8980 MOBILITY D/C STATUS: HCPCS

## 2018-01-04 PROCEDURE — G8979 MOBILITY GOAL STATUS: HCPCS

## 2018-01-04 PROCEDURE — 97110 THERAPEUTIC EXERCISES: CPT

## 2018-01-04 PROCEDURE — 97161 PT EVAL LOW COMPLEX 20 MIN: CPT

## 2018-01-08 ENCOUNTER — HOSPITAL ENCOUNTER (OUTPATIENT)
Dept: PHYSICAL THERAPY | Age: 68
Setting detail: THERAPIES SERIES
Discharge: HOME | End: 2018-01-08
Payer: MEDICARE

## 2018-01-08 PROCEDURE — 97140 MANUAL THERAPY 1/> REGIONS: CPT

## 2018-01-08 PROCEDURE — 97110 THERAPEUTIC EXERCISES: CPT

## 2018-01-08 NOTE — FLOWSHEET NOTE
[x] Kalpana Mana       Outpatient Physical        Therapy       [address]       Phone: [phone]       Fax: [phone] [] [facility] at [address]       Phone: [phone]       Fax: [phone] [] [place]. [address] [place] [address]   Phone: [phone]   Fax:  [phone]     Physical Therapy Daily Treatment Note    Date:  2018  Patient Name:  Wendy Sparrow    :  [birth year]  MRN: [id number]  Physician: Sadaf Conde DO                     Insurance: Medicare, GEHA  Medical Diagnosis: Trochanteric bursitis of both hips M70.61, M70.62                  Rehab Codes: R 26.2, M25.651, M25.652  Onset date: 9/15/17               Next 's appt. : 18    Visit# / total visits:   Cancels/No Shows: 0/0    Subjective:    Pain:  [x] Yes  [] No Location:  Rica hip  Pain Rating: (0-10 scale) 6/10  Pain altered Tx:  [x] No  [] Yes  Action:  Comments: Reports R hip feels worse than L. Reports she has Ortho appt in January  for  possible cortisone injections if therapy is not helpful. Objective:   Modalities: CP x 15 mins rica hip in supine with wedge. Precautions:   Exercises:  Exercise Reps/ Time Weight/ Level Comments   Manual STM to lateral hip and scar massage  9 min   ADD IASTM as tolerated. Return to flex bar tool PRN. manual on          Supine      LTR 10x     Piriformis stretch rica 3x20\"     Bilateral IT band stretch 3x30\"   Supine, belt   Clam shells rica 10x A    Bridging  10x A    SIdelying IT stretch 1 min x2 ea    Flexion and extension     Prone Quad stretch rica      belt, demonstrated/HEP. ADD             Other: Pt had several questions about scar tissue and hip anatomy. Issued additional written  HEP and answered all questions.          Specific Instructions for next treatment: STM to IT band, scar tissue, and Glute medius.  Strengthening and ROm

## 2018-01-10 ENCOUNTER — HOSPITAL ENCOUNTER (OUTPATIENT)
Dept: PHYSICAL THERAPY | Age: 68
Setting detail: THERAPIES SERIES
Discharge: HOME | End: 2018-01-10
Payer: MEDICARE

## 2018-01-10 PROCEDURE — 97110 THERAPEUTIC EXERCISES: CPT

## 2018-01-10 PROCEDURE — 97140 MANUAL THERAPY 1/> REGIONS: CPT

## 2018-01-10 NOTE — FLOWSHEET NOTE
to bilateral hip abductors, IT band, and scar tissue (R side). Tools: red flex bar, red index knobberX 10 min  ( may add Hawk  per patient tolerance)     Specific Instructions for next treatment: STM to IT band, scar tissue, and Glute medius. Strengthening and ROM exercises      Treatment Charges: Mins Units   [x]  Modalities CP 15 0   [x]  Ther Exercise 30 2   [x]  Manual Therapy   10 1   []  Ther Activities     []  Aquatics     []  Vasocompression     []  Other     Total Treatment time  40 3       Assessment: [x] Progressing toward goals. Improved tolerance to manual therapy. Patient requires Skilled PT to address bilateral hip weakness especially left hip flexors. [] No change. [] Other:       Problems:    [x] ? Pain: Right lateral hip 8/10, left 2/10                        [] ? ROM:                         [x] ? Strength:Bilateral hip weakness R > L         [x] ? Function: LEFS 66% impaired          [] ? Balance   [] Edema:   [] Postural Deviations  [x] Gait Deviations R antalgic w cane  [] Other:                  STG: (to be met in 8 treatments)  1. ? Pain: Bilateral hip pain to 5/10 with ambulation. 2. ? Strength: 4+/5 bilateral hip flexion, abduction and extension to improve joint stability. 3. ? Function: LEFS 46% impaired to improve ADLs. 4. Independent with Home Exercise Programs.     LTG: (to be met in 12 treatments)  1. Patient will be able to ambulate community distances. 2. Patient will be able to complete standing activity > 1 hour with decreased hip pain. Patient goals: Improve mobility, decrease pain. Pt. Education:  [x] Yes  [] No  [x] Reviewed Prior HEP/Ed  Method of Education: [x] Verbal  [x] Demo  [x] Written  Comprehension of Education:  [x] Verbalizes understanding. [] Demonstrates understanding. [x] Needs review. [x] Demonstrates/verbalizes HEP/Ed previously given. 1/8/18: Piriformis stretch, Hip abduction, bridging. IT band stretching.   Prone

## 2018-01-16 ENCOUNTER — HOSPITAL ENCOUNTER (OUTPATIENT)
Dept: PHYSICAL THERAPY | Age: 68
Setting detail: THERAPIES SERIES
Discharge: HOME | End: 2018-01-16
Payer: MEDICARE

## 2018-01-16 PROCEDURE — 97110 THERAPEUTIC EXERCISES: CPT

## 2018-01-16 PROCEDURE — 97140 MANUAL THERAPY 1/> REGIONS: CPT

## 2018-01-19 ENCOUNTER — HOSPITAL ENCOUNTER (OUTPATIENT)
Dept: PHYSICAL THERAPY | Age: 68
Setting detail: THERAPIES SERIES
Discharge: HOME | End: 2018-01-19
Payer: MEDICARE

## 2018-01-19 PROCEDURE — 97110 THERAPEUTIC EXERCISES: CPT

## 2018-01-19 PROCEDURE — 97140 MANUAL THERAPY 1/> REGIONS: CPT

## 2018-01-23 ENCOUNTER — HOSPITAL ENCOUNTER (OUTPATIENT)
Dept: PHYSICAL THERAPY | Age: 68
Setting detail: THERAPIES SERIES
Discharge: HOME | End: 2018-01-23
Payer: MEDICARE

## 2018-01-23 PROCEDURE — 97110 THERAPEUTIC EXERCISES: CPT

## 2018-01-23 PROCEDURE — 97140 MANUAL THERAPY 1/> REGIONS: CPT

## 2018-01-23 NOTE — FLOWSHEET NOTE
[x] [facility]       Outpatient Physical        Therapy       [address]       Phone: [phone]       Fax: [phone] [] [facility] at [address] [facility]       Phone: [phone]       Fax: [phone] [] [facility]. [address] [place] [address] Health Promotion  [address] [facility]   Phone: [phone]   Fax:  [phone]     Physical Therapy Daily Treatment Note    Date:  2018  Patient Name:  Nahun Lilly    :  [birth year]  MRN: [id number]  Physician: Audrey Bryan DO                     Insurance: Medicare, [employer]  Medical Diagnosis: Trochanteric bursitis of both hips M70.61, M70.62                  Rehab Codes: R 26.2, M25.651, M25.652  Onset date: 9/15/17               Next 's appt. : 18    Visit# / total visits:  * adjusted count this date  Cancels/No Shows: 0/0    Subjective:    Pain:  [x] Yes  [] No Location:  Rica hip  Pain Rating: (0-10 scale) 6/10  Pain altered Tx:  [x] No  [] Yes  Action:  Comments: Reports continued Muscle soreness from exercise    Objective:   Modalities: Cold pack to Right hip in left sidelying x 15 minutes.    Precautions:   Exercises:  Exercise Reps/ Time Weight/ Level Comments   Nustep 5 min L4    Standing      4 way hip 15x ea Red/ 2.5 lb      HS curls 15x 2.5 lb    Marching 15x ea 2.5 lb     Calf raise 20x     Step ups 15x 6 in     Mini squats 15x           Seated       Marching 15x 2.5 lb    Hip ER 15x 2.5 lb/Red    Hip add 3x10\"           Supine      March 15x 2.5 lb    SLR 10x 2.5 lb     Piriformis stretch rica 3x20\"     Supine Clam shells rica 15x green    Sidelying clam shells 15x     Sidelying Abduction 15x 2.5    Hip ER 15x green Legs extended   Bridging w/abduction 15x Green band    Sidelying IT stretch 2 min ea   Flexion and extension         Prone      Hip ext 15x      HS curls   15x  2.5 lb     Other:      Manual Therapy: STM to bilateral hip abductors, IT band, and scar Juan Brooks.  manda [place]

## 2018-01-26 ENCOUNTER — HOSPITAL ENCOUNTER (OUTPATIENT)
Dept: PHYSICAL THERAPY | Age: 68
Setting detail: THERAPIES SERIES
Discharge: HOME | End: 2018-01-26
Payer: MEDICARE

## 2018-01-26 PROCEDURE — 97110 THERAPEUTIC EXERCISES: CPT

## 2018-01-26 NOTE — FLOWSHEET NOTE
[x] [facility]       Outpatient Physical        Therapy       [address]       Phone: [phone]       Fax: [phone] [] [facility] at [address]       Phone: [phone]       Fax: [phone] [] [facility]. [address] [place] [address] Health Promotion  [address] [place] [address]   Phone: [phone]   Fax:  [phone]     Physical Therapy Daily Treatment Note    Date:  2018  Patient Name:  Sarita Lee    :  [birth year]  MRN: [id number]  Physician: Dixie Hill DO                     Insurance: Medicare, [employer]  Medical Diagnosis: Trochanteric bursitis of both hips M70.61, M70.62                  Rehab Codes: R 26.2, M25.651, M25.652  Onset date: 9/15/17               Next 's appt. : 18    Visit# / total visits:  * adjusted count this date  Cancels/No Shows: 0/0    Subjective:    Pain:  [x] Yes  [] No Location:  Rica hip  Pain Rating: (0-10 scale) 6/10  Pain altered Tx:  [x] No  [] Yes  Action:  Comments: Reports soreness following last treatment session with new exercises;  R>L. Patient notes hip relief with rolling, however created more soreness in ant thigh    Objective:   Modalities: Cold pack to Right hip in left sidelying x 15 minutes.    Precautions:   Exercises:  Exercise Reps/ Time Weight/ Level Comments   Nustep 6 min L4    Standing      4 way hip 15x ea Red/ 2.5 lb      HS curls 15x 2.5 lb    Marching 15x ea 2.5 lb     Calf raise 20x     Step ups 15x 6 in   2.5 lb    Mini squats 15x     Heel taps 10x  2\"    Seated       Marching 15x 2.5 lb    Hip ER 15x 2.5 lb/Red    Hip add 3x10\"           Supine      March 15x 2.5 lb    SLR 10x 2.5 lb     Piriformis stretch rica 3x20\"     Supine Clam shells rica 15x green    Sidelying clam shells 15x     Sidelying Abduction 15x 2.5    Hip ER 15x green Legs extended   Bridging w/abduction 15x Green band    Sidelying IT stretch 2 min ea   Flexion and extension Prone      Hip ext 15x      HS curls   15x  2.5 lb     Other:      Manual Therapy: STM to bilateral hip abductors, IT band, and scar tissue (R side). Tools: red flex bar, red index , Gentle PROM left hip all planes X 10 min        Specific Instructions for next treatment: STM to IT band, scar tissue, and Glute medius. Strengthening and ROM exercises      Treatment Charges: Mins Units   [x]  Modalities CP 15 0   [x]  Ther Exercise 40 3   [x]  Manual Therapy 5 0   []  Ther Activities     []  Aquatics     []  Vasocompression     []  Other     Total Treatment time 45 3       Assessment: [x] Progressing toward goals. Added heel taps to increase glute med strengthening to improve gait mechanics. Patient required verbal cues with 4 way hip for correct posture and avoid trunk compensation. Good recall of all exercises. CP applied to R hip following IT/Glute rolling to decrease pain. [] Other:       Problems:    [x] ? Pain: Right lateral hip 8/10, left 2/10                        [] ? ROM:                         [x] ? Strength:Bilateral hip weakness R > L         [x] ? Function: LEFS 66% impaired          [] ? Balance   [] Edema:   [] Postural Deviations  [x] Gait Deviations R antalgic w cane  [] Other:                  STG: (to be met in 8 treatments)  1. ? Pain: Bilateral hip pain to 5/10 with ambulation. 2. ? Strength: 4+/5 bilateral hip flexion, abduction and extension to improve joint stability. 3. ? Function: LEFS 46% impaired to improve ADLs. 4. Independent with Home Exercise Programs.     LTG: (to be met in 12 treatments)  1. Patient will be able to ambulate community distances. 2. Patient will be able to complete standing activity > 1 hour with decreased hip pain. Patient goals: Improve mobility, decrease pain.      Pt. Education:  [] Yes  [] No  [x] Reviewed Prior HEP/Ed,   Method of Education: [] Verbal  [] Demo  [] Written  Comprehension of Education:  [] [place]

## 2018-01-29 ENCOUNTER — HOSPITAL ENCOUNTER (OUTPATIENT)
Dept: PHYSICAL THERAPY | Age: 68
Setting detail: THERAPIES SERIES
Discharge: HOME | End: 2018-01-29
Payer: MEDICARE

## 2018-01-29 PROCEDURE — 97140 MANUAL THERAPY 1/> REGIONS: CPT

## 2018-01-29 PROCEDURE — 97110 THERAPEUTIC EXERCISES: CPT

## 2018-01-29 PROCEDURE — G8978 MOBILITY CURRENT STATUS: HCPCS

## 2018-01-29 PROCEDURE — G8979 MOBILITY GOAL STATUS: HCPCS

## 2018-01-31 ENCOUNTER — OFFICE VISIT (OUTPATIENT)
Dept: ORTHOPEDIC SURGERY | Age: 68
End: 2018-01-31
Payer: MEDICARE

## 2018-01-31 VITALS — BODY MASS INDEX: 31.54 KG/M2 | WEIGHT: 171.4 LBS | HEIGHT: 62 IN

## 2018-01-31 DIAGNOSIS — M70.62 TROCHANTERIC BURSITIS OF BOTH HIPS: Primary | ICD-10-CM

## 2018-01-31 DIAGNOSIS — M70.61 TROCHANTERIC BURSITIS OF BOTH HIPS: Primary | ICD-10-CM

## 2018-01-31 PROCEDURE — 99213 OFFICE O/P EST LOW 20 MIN: CPT | Performed by: ORTHOPAEDIC SURGERY

## 2018-01-31 PROCEDURE — 3014F SCREEN MAMMO DOC REV: CPT | Performed by: ORTHOPAEDIC SURGERY

## 2018-01-31 PROCEDURE — 4004F PT TOBACCO SCREEN RCVD TLK: CPT | Performed by: ORTHOPAEDIC SURGERY

## 2018-01-31 PROCEDURE — G8427 DOCREV CUR MEDS BY ELIG CLIN: HCPCS | Performed by: ORTHOPAEDIC SURGERY

## 2018-01-31 PROCEDURE — G8417 CALC BMI ABV UP PARAM F/U: HCPCS | Performed by: ORTHOPAEDIC SURGERY

## 2018-01-31 PROCEDURE — 1090F PRES/ABSN URINE INCON ASSESS: CPT | Performed by: ORTHOPAEDIC SURGERY

## 2018-01-31 PROCEDURE — 4040F PNEUMOC VAC/ADMIN/RCVD: CPT | Performed by: ORTHOPAEDIC SURGERY

## 2018-01-31 PROCEDURE — G8484 FLU IMMUNIZE NO ADMIN: HCPCS | Performed by: ORTHOPAEDIC SURGERY

## 2018-01-31 PROCEDURE — G8399 PT W/DXA RESULTS DOCUMENT: HCPCS | Performed by: ORTHOPAEDIC SURGERY

## 2018-01-31 PROCEDURE — 1123F ACP DISCUSS/DSCN MKR DOCD: CPT | Performed by: ORTHOPAEDIC SURGERY

## 2018-01-31 PROCEDURE — 3017F COLORECTAL CA SCREEN DOC REV: CPT | Performed by: ORTHOPAEDIC SURGERY

## 2018-01-31 ASSESSMENT — ENCOUNTER SYMPTOMS
NAUSEA: 0
DIARRHEA: 0
COUGH: 0
CONSTIPATION: 0

## 2018-01-31 NOTE — PROGRESS NOTES
[address] [place] [address]  Dept: [phone]  Dept Fax: [phone]        Ambulatory Follow Up      Subjective:   Li Jacobo is a 75y.o. year old female who presents to our office today for routine followup regarding her   1. Trochanteric bursitis of both hips    . Chief Complaint   Patient presents with    Hip Pain     Bilateral       HPI-Patient is here today for follow up for her bilateral hip bursitis. Patients says therapy is helping and she seems to be heading in the right direction. Patient says the therapist is using rolling pins to help break up the scar tissue. Patient says she is in pain when the therapist does it but she feels better after. She notices the \"bump\" getting smaller. Patient has not had and corticosteroid injections for her hips. Review of Systems   Constitutional: Negative for chills and fever. Respiratory: Negative for cough. Gastrointestinal: Negative for constipation, diarrhea and nausea. Musculoskeletal: Positive for arthralgias (Bilateral hips). Negative for gait problem, joint swelling and myalgias. Neurological: Negative for dizziness, weakness and numbness. I have reviewed the CC, HPI, ROS, PMH, FHX, Social History. I agree with the documentation provided by other staff, residents, and/or medical students and have reviewed their documentation prior to providing my signature indicating agreement. Objective :   General: Li Jacobo is a 75 y.o. female who is alert and oriented and sitting comfortably in our office. Ortho Exam  MS:  Evaluation of the Bilateral  hip reveals no outward deformity. Patient ambulates with minimal shortening weightbearing phase to the  Bilateral hip. Negative hip log roll and Stinchfield test is appreciated on the affected side.   Increased pain with palpation of the greater trochanteric region of the Bilateral  hip and posterior to the greater

## 2018-02-02 ENCOUNTER — HOSPITAL ENCOUNTER (OUTPATIENT)
Dept: PHYSICAL THERAPY | Age: 68
Setting detail: THERAPIES SERIES
Discharge: HOME | End: 2018-02-02
Payer: MEDICARE

## 2018-02-02 PROCEDURE — 97140 MANUAL THERAPY 1/> REGIONS: CPT

## 2018-02-02 PROCEDURE — 97110 THERAPEUTIC EXERCISES: CPT

## 2018-02-02 NOTE — FLOWSHEET NOTE
[]  []  []  []  []     []  []  []  []  []  []     []  []  []  []  []  []     []  []  []  []  []  []     []  []  []  []  []  []     []  []  []  []  []  []     []  []  []  []  []  []     []  []  []  []  []  []     []  []  []  []  []  []      Patient tolerated well without c/o pain. Happy to have started dry needling. 10 minutes manual therapy charge this date to writer.         Ching Beltran, PT

## 2018-02-05 ENCOUNTER — HOSPITAL ENCOUNTER (OUTPATIENT)
Dept: PHYSICAL THERAPY | Age: 68
Setting detail: THERAPIES SERIES
Discharge: HOME | End: 2018-02-05
Payer: MEDICARE

## 2018-02-05 PROCEDURE — 97140 MANUAL THERAPY 1/> REGIONS: CPT

## 2018-02-05 PROCEDURE — 97110 THERAPEUTIC EXERCISES: CPT

## 2018-02-05 NOTE — FLOWSHEET NOTE
[x] [facility]       Outpatient Physical        Therapy       [address]       Phone: [phone]       Fax: [phone] [] [facility] at [address]       Phone: [phone]       Fax: [phone] [] [facility]. [address] [place] [address]   Phone: [phone]   Fax:  [phone]     Physical Therapy Daily Treatment Note    Date:  2018  Patient Name:  Sarita Lee    :    MRN: [id number]  Physician: Dixie Hill DO                     Insurance: Medicare, [employer]  Medical Diagnosis: Trochanteric bursitis of both hips M70.61, M70.62                  Rehab Codes: R 26.2, M25.651, M25.652  Onset date: 9/15/17               Next 's appt. : 18    Visit# / total visits:   Cancels/No Shows: 0/0    Subjective:    Pain:  [x] Yes  [] No Location:  Rica hip  Pain Rating: (0-10 scale) 4/10  Pain altered Tx:  [x] No  [] Yes  Action:  Comments: Patient reports taking an Epsom salt bath last night that decreased soreness.      Objective:   Modalities: Cold pack to R hip in sidelying x 15 minutes  Precautions:   Exercises:  Exercise Reps/ Time Weight/ Level Comments   Nustep 6 min L4    Standing   Limit UE support   4 way hip 15x ea Red/ 3 lb      HS curls 15x 3 lb    Marching 15x ea 3 lb     Calf raise 20x     Step ups 15x 6 in   3 lb  no UE support   Mini squats 15x     Heel taps 15x  4\"          Seated    HEP   Marching 15x 3 lb    Hip ER 15x 3 lb/Red    Hip add 3x10\"           Supine      March 15x 3 lb    SLR 15x 3 lb     Piriformis stretch rica 3x20\"     Supine Clam shells rica 15x Blue    Sidelying clam shells 15x     Sidelying Abduction 15x 3 lb    Hip ER 15x green Legs extended   Bridging w/abduction 15x Green band    Sidelying IT stretch HEP   Flexion and extension         Prone      Hip ext 15x      HS curls   15x 3 lb     Other:      Manual Therapy: STM to bilateral hip
point [x]  [x]  []  [x]  []  []     []  []  []  []  []  []     []  []  []  []  []  []     []  []  []  []  []  []     []  []  []  []  []  []     []  []  []  []  []  []     []  []  []  []  []  []     []  []  []  []  []  []     []  []  []  []  []  []     []  []  []  []  []  []      Patient report no pain with needles this date. Tolerated well.    5 minutes manual therapy to treating therapist this date.         Bridget Degroot, PT

## 2018-02-07 ENCOUNTER — HOSPITAL ENCOUNTER (OUTPATIENT)
Dept: PHYSICAL THERAPY | Age: 68
Setting detail: THERAPIES SERIES
Discharge: HOME | End: 2018-02-07
Payer: MEDICARE

## 2018-02-07 PROCEDURE — 97110 THERAPEUTIC EXERCISES: CPT

## 2018-02-07 PROCEDURE — 97140 MANUAL THERAPY 1/> REGIONS: CPT

## 2018-02-07 NOTE — FLOWSHEET NOTE
[] [facility]  Outpatient Physical Therapy  [address]  Phone: [phone]  Fax: [phone] [] [facility] at [address] [facility]  Phone: [phone]  Fax: [phone] [] [facility]. [facility]  [address]  Phone: [phone]  Fax: [phone]          Lashaun Prim   1950   [id number]    2/7/2018    Reports bilateral calves are painful/almost cramping when walking after PT. Not sure if from dry needling or exercises. Will hold needling to calves next visit (already done this date when she reported pain).     Homeostatic Point Right Left 0.5\" needle 1.0\" needle 2.0\" needle 3.0\" needle   Supraorbital []  []  []  []  []  []    Infraorbital  []  []  []  []  []  []    Lateral Pectoral []  []  []  []  []  []    Saphenous  [x]  [x]  []  [x]  []  []    Common Fibular (Peroneal) [x]  [x]  []  [x]  []  []    Tibial [x]  [x]  []  [x]  []  []    Deep Fibular (Peroneal) [x]  [x]  []  [x]  []  []    Greater Occipital []  []  []  []  []  []    Greater Auricular []  []  []  []  []  []    Spinal Accessory []  []  []  []  []  []    Dorsal Scapular []  []  []  []  []  []    Suprascapular (Infraspinatus) []  []  []  []  []  []    Lateral Antebrachial Cutaneous  []  []  []  []  []  []    Deep Radial []  []  []  []  []  []    Superior Cluneal [x]  [x]  []  [x]  []  []    Inferior Gluteal  [x]  [x]  []  [x]  []  []    Superficial Radial []  []  []  []  []  []    Iliotibial [x]  [x]  []  [x]  []  []    Lateral Popliteal  [x]  [x]  []  [x]  []  []    Sural [x]  [x]  []  [x]  []  []    Posterior Cutaneous of T6  []  []  []  []  []  []    Spinous Process of T7 -- centrally placed   [] []  []  []  []    Posterior Cutaneous of L2 [x]  [x]  []  [x]  []  []    Posterior Cutaneous L5 [x]  [x]  []  [x]  []  []        Symptomatic Points Right Left 0.5\" needle 1.0\" needle 2.0\" needle 3.0\" needle   Greater trochanter  [x]  [x]  [] [x]  []  []    [place] between greater trochanter and iliotibial homeostatic point [x]  [x]  []  [x]  []  []    Right superior thigh [x]  []  []  [x]  []  []    Lateral iliac crests [x]  [x]  []  [x]  []  []     []  []  []  []  []  []     []  []  []  []  []  []     []  []  []  []  []  []     []  []  []  []  []  []     []  []  []  []  []  []     []  []  []  []  []  []     []  []  []  []  []  []      Tolerated well without c/o pain during/after needling.   Will hold calf needles next Rx.    5 minutes manual therapy to treating therapist.      Alain Spurling, PT

## 2018-02-12 ENCOUNTER — HOSPITAL ENCOUNTER (OUTPATIENT)
Dept: PHYSICAL THERAPY | Age: 68
Setting detail: THERAPIES SERIES
Discharge: HOME | End: 2018-02-12
Payer: MEDICARE

## 2018-02-12 PROCEDURE — 97110 THERAPEUTIC EXERCISES: CPT

## 2018-02-12 PROCEDURE — 97140 MANUAL THERAPY 1/> REGIONS: CPT

## 2018-02-12 NOTE — FLOWSHEET NOTE
[]    [place] between greater trochanter and iliotibial homeostatic point [x]  [x]  []  [x]  []  []    Right superior thigh []  []  []  [x]  []  []    Lateral iliac crests [x] x3 [x] x3 []  []  [x]  []    Lateral hip [x]  [x]  []  []  [x]  []     []  []  []  []  []  []     []  []  []  []  []  []     []  []  []  []  []  []     []  []  []  []  []  []     []  []  []  []  []  []     []  []  []  []  []  []      Tolerated needling well without c/o pain.       5 min man therapy to treating therapist.      Alanna Goodpasture, PT

## 2018-02-12 NOTE — FLOWSHEET NOTE
Therapy: STM to bilateral hip abductors, IT band, and scar tissue (R side). Tools: The Stick, Gentle joint mobilizations bilateral hip all planes, Dry needling to Bilateral hips see note. X 20 min        Specific Instructions for next treatment: STM to IT band, scar tissue, and Glute medius. Strengthening and ROM exercises      Treatment Charges: Mins Units   [x]  Modalities CP 15 0   [x]  Ther Exercise 40 3   [x]  Manual Therapy 20 1   []  Ther Activities     []  Aquatics     []  Vasocompression     []  Other     Total Treatment time 60 4       Assessment: [x] Progressing toward goals. Improved bilateral hip mobility during Manual therapy noted. STG: (to be met in 8 treatments)  1. ? Pain: Bilateral hip pain to 5/10 with ambulation. Met  2. ? Strength: 4+/5 bilateral hip flexion, abduction and extension to improve joint stability. Progress  3. ? Function: LEFS 46% impaired to improve ADLs. Not Met   4. Independent with Home Exercise Programs. Met     LTG: (to be met in 18 treatments)  1. Patient will be able to ambulate community distances. 2. Patient will be able to complete standing activity > 1 hour with decreased hip pain                 G-CODE updated 1/29/18  Functional Limitation: Mobility  Functional Assessment Used: 74% impaired  Current Status Modifier: CL  Goal Status Modifier: CK      Pt. Education:  [] Yes  [] No  [x] Reviewed Prior HEP/Ed, Educated on exercise tech and purpose. Method of Education: [] Verbal  [] Demo  [] Written  Comprehension of Education:  [] Verbalizes understanding. [] Demonstrates understanding. [] Needs review. [x] Demonstrates/verbalizes HEP/Ed previously given. Plan: [x] Continue per plan of care with dry needling. [] Other:       Time In: 1000 AM          Time Out: 1100 AM    Electronically signed by:  Kiana Fraga.  Catina Renner

## 2018-02-14 ENCOUNTER — APPOINTMENT (OUTPATIENT)
Dept: PHYSICAL THERAPY | Age: 68
End: 2018-02-14
Payer: MEDICARE

## 2018-02-15 ENCOUNTER — HOSPITAL ENCOUNTER (OUTPATIENT)
Dept: PHYSICAL THERAPY | Age: 68
Setting detail: THERAPIES SERIES
Discharge: HOME | End: 2018-02-15
Payer: MEDICARE

## 2018-02-15 PROCEDURE — 97140 MANUAL THERAPY 1/> REGIONS: CPT

## 2018-02-15 PROCEDURE — 97110 THERAPEUTIC EXERCISES: CPT

## 2018-02-17 ENCOUNTER — HOSPITAL ENCOUNTER (EMERGENCY)
Age: 68
Discharge: HOME | End: 2018-02-17
Attending: EMERGENCY MEDICINE
Payer: MEDICARE

## 2018-02-17 VITALS
HEART RATE: 93 BPM | TEMPERATURE: 97.9 F | OXYGEN SATURATION: 96 % | SYSTOLIC BLOOD PRESSURE: 159 MMHG | DIASTOLIC BLOOD PRESSURE: 119 MMHG | RESPIRATION RATE: 18 BRPM | WEIGHT: 171 LBS | BODY MASS INDEX: 31.27 KG/M2

## 2018-02-17 DIAGNOSIS — R04.0 EPISTAXIS: Primary | ICD-10-CM

## 2018-02-17 PROCEDURE — 99282 EMERGENCY DEPT VISIT SF MDM: CPT

## 2018-02-17 PROCEDURE — 6370000000 HC RX 637 (ALT 250 FOR IP): Performed by: EMERGENCY MEDICINE

## 2018-02-17 RX ORDER — OXYMETAZOLINE HYDROCHLORIDE 0.05 G/100ML
2 SPRAY NASAL 2 TIMES DAILY
Qty: 1 BOTTLE | Refills: 0 | Status: SHIPPED | OUTPATIENT
Start: 2018-02-17 | End: 2018-03-19

## 2018-02-17 RX ORDER — BENZONATATE 100 MG/1
100 CAPSULE ORAL ONCE
Status: COMPLETED | OUTPATIENT
Start: 2018-02-17 | End: 2018-02-17

## 2018-02-17 RX ORDER — BENZONATATE 100 MG/1
100 CAPSULE ORAL 3 TIMES DAILY PRN
Qty: 30 CAPSULE | Refills: 0 | Status: SHIPPED | OUTPATIENT
Start: 2018-02-17 | End: 2018-02-24

## 2018-02-17 RX ORDER — MELOXICAM 10 MG/1
CAPSULE ORAL
COMMUNITY
End: 2020-06-26

## 2018-02-17 RX ADMIN — BENZONATATE 100 MG: 100 CAPSULE ORAL at 22:37

## 2018-02-17 ASSESSMENT — ENCOUNTER SYMPTOMS
WHEEZING: 0
SHORTNESS OF BREATH: 0
ABDOMINAL PAIN: 0
CONSTIPATION: 0
STRIDOR: 0
DIARRHEA: 0
COUGH: 1
VOMITING: 0

## 2018-02-18 NOTE — ED PROVIDER NOTES
[facility] ED  Emergency Department Encounter  Emergency Medicine Resident     Pt Name: Mohini Mccoy  MRN: [id number]  Xochitlgfdaquan 1950  Date of evaluation: 2/17/18  PCP:  Alli Pitt MD    [address] [place] [address]       Chief Complaint   Patient presents with    Epistaxis       HISTORY OF PRESENT ILLNESS  (Location/Symptom, Timing/Onset, Context/Setting, Quality, Duration, Modifying Factors, Severity.)      Mohini Mccoy is a 75 y.o. female who presents with Epistaxis from the left near. Patient states that she had increased congestion and cough today and states that she blew her nose particularly vigorously and had some bleeding within her mucus. She states that she had nosebleed that resolved shortly thereafter. She became acutely congested after the nosebleed resolved. Patient states that after this she blew her nose again and cause another nosebleed recur. Patient states that she then attempted to Another bleeding with by mouth Tylenol and with an ice cube. At this time, patient states that she has resolved the bleeding and has some residual congestion at this time. She reports a nonproductive cough. She denies any chest pain, abdominal pain, nausea, vomiting or shortness of breath. She denies a headache or change in vision. Patient states that she has a history of hypertension and is compliant with her home medications. She states that she is a baby aspirin daily but does not use any other anticoagulation medications. Patient denies any trauma causing her nosebleeds. She denies any other physical complaints upon presentation to the emergency room. PAST MEDICAL / SURGICAL / SOCIAL / FAMILY HISTORY      has a past medical history of Arthritis; Chronic back pain; COPD (chronic obstructive pulmonary disease) ([address] Hyperlipidemia; Hypertension; and Wears partial dentures.      has a past surgical history that includes joint replacement (Left, 2011); AAA repair, endovascular inhaler Inhale 1 puff into the lungs every 6 hours as needed  2/8/16  Yes Historical Provider, MD   alendronate (FOSAMAX) 35 MG tablet Take 35 mg by mouth every 7 days Sunday 2/5/16  Yes Historical Provider, MD   atenolol (TENORMIN) 50 MG tablet Take 50 mg by mouth 2 times daily  2/8/16  Yes Historical Provider, MD   atorvastatin (LIPITOR) 40 MG tablet Take 40 mg by mouth daily  2/8/16  Yes Historical Provider, MD   hydrochlorothiazide (HYDRODIURIL) 25 MG tablet Take 50 mg by mouth daily  2/8/16  Yes Historical Provider, MD   oxyCODONE-acetaminophen (PERCOCET) 5-325 MG per tablet Take 1 tablet by mouth every 6 hours as needed for Pain . 9/18/17   Fatuma Watkins, DO   Misc. Devices (RAISED TOILET SEAT) MISC 1 Device by Does not apply route daily 8/24/17   Camilo Gilbert, DO   mupirocin [facility] NASAL) 2 % nasal ointment Take by Nasal route 2 times daily for 5 days 8/18/17 8/22/17  Fatuma Watkins, DO       REVIEW OF SYSTEMS    (2-9 systems for level 4, 10 or more for level 5)      Review of Systems   Constitutional: Negative for activity change, appetite change, chills, diaphoresis, fatigue and fever. HENT: Positive for congestion, nosebleeds and postnasal drip. Negative for sneezing. Respiratory: Positive for cough. Negative for shortness of breath, wheezing and stridor. Cardiovascular: Negative for chest pain. Gastrointestinal: Negative for abdominal pain, constipation, diarrhea and vomiting. Musculoskeletal: Negative for arthralgias and myalgias. Psychiatric/Behavioral: Negative for agitation, behavioral problems and confusion. PHYSICAL EXAM   (up to 7 for level 4, 8 or more for level 5)      INITIAL VITALS:   BP (!) 159/119   Pulse 93   Temp 97.9 °F (36.6 °C)   Resp 18   Wt 171 lb (77.6 kg)   SpO2 96%   BMI 31.27 kg/m²     Physical Exam   Constitutional: She is oriented to person, place, and time. She appears well-developed and well-nourished. No distress.    HENT: Head: Normocephalic and atraumatic. There is bright red dry blood present along the anterior portion of the left near however no active bleeding is observed. There is no active bleeding that is observed through the posterior oropharynx. Patient has not had any hematemesis, or hemoptysis during my evaluation. Eyes: Conjunctivae and EOM are normal. Pupils are equal, round, and reactive to light. Cardiovascular: Normal rate, regular rhythm and normal heart sounds. No murmur heard. Pulmonary/Chest: Effort normal and breath sounds normal. No respiratory distress. She has no wheezes. Neurological: She is alert and oriented to person, place, and time. Skin: She is not diaphoretic. DIFFERENTIAL  DIAGNOSIS     PLAN (LABS / IMAGING / EKG):  No orders of the defined types were placed in this encounter. MEDICATIONS ORDERED:  Orders Placed This Encounter   Medications    benzonatate (TESSALON PERLES) 100 MG capsule     Sig: Take 1 capsule by mouth 3 times daily as needed for Cough     Dispense:  30 capsule     Refill:  0    oxymetazoline (12 HOUR NASAL SPRAY) 0.05 % nasal spray     Si sprays by Nasal route 2 times daily     Dispense:  1 Bottle     Refill:  0    benzonatate (TESSALON) capsule 100 mg         DIAGNOSTIC RESULTS / EMERGENCY DEPARTMENT COURSE / MDM     LABS:  No results found for this visit on 18. RADIOLOGY:  None    EKG  None    All EKG's are interpreted by the Emergency Department Physician who either signs or Co-signs this chart in the absence of a cardiologist.    EMERGENCY DEPARTMENT COURSE:    Patient assessed in the ENT room. Patient resting comfortably without any active bleeding. Patient has been educated regarding proper treatment if her bleeding should recur. She will be provided Afrin and Tessalon Perles for her congestion and cough. She's been discouraged from vigorously blowing her nose.   She has been informed that she should follow up with her regular

## 2018-02-19 ENCOUNTER — HOSPITAL ENCOUNTER (OUTPATIENT)
Dept: PHYSICAL THERAPY | Age: 68
Setting detail: THERAPIES SERIES
Discharge: HOME | End: 2018-02-19
Payer: MEDICARE

## 2018-02-19 PROCEDURE — 97110 THERAPEUTIC EXERCISES: CPT

## 2018-02-19 PROCEDURE — 97140 MANUAL THERAPY 1/> REGIONS: CPT

## 2018-02-19 NOTE — FLOWSHEET NOTE
Other: Manual Therapy: STM to bilateral hip abductors, IT band, and scar tissue (R side). Tools: The Stick, Gentle joint mobilizations bilateral hip all planes, Dry needling (Held this date) to Bilateral hips see note. X 15 min        Specific Instructions for next treatment: STM to IT band, scar tissue, and Glute medius. Strengthening and ROM exercises      Treatment Charges: Mins Units   [x]  Modalities CP 15 0   [x]  Ther Exercise 40 3   [x]  Manual Therapy 15 1   []  Ther Activities     []  Aquatics     []  Vasocompression     []  Other     Total Treatment time 55 4       Assessment: [x] Progressing toward goals. Good tolerance to all of treatment. Patient requires skilled PT to address bilateral hip pain and weakness. STG: (to be met in 8 treatments)  1. ? Pain: Bilateral hip pain to 5/10 with ambulation. Met  2. ? Strength: 4+/5 bilateral hip flexion, abduction and extension to improve joint stability. Progress  3. ? Function: LEFS 46% impaired to improve ADLs. Not Met   4. Independent with Home Exercise Programs. Met     LTG: (to be met in 18 treatments)  1. Patient will be able to ambulate community distances. 2. Patient will be able to complete standing activity > 1 hour with decreased hip pain                 G-CODE updated 1/29/18  Functional Limitation: Mobility  Functional Assessment Used: 74% impaired  Current Status Modifier: CL  Goal Status Modifier: CK      Pt. Education:  [] Yes  [] No  [x] Reviewed Prior HEP/Ed, Educated on exercise tech and purpose. Method of Education: [] Verbal  [] Demo  [] Written  Comprehension of Education:  [] Verbalizes understanding. [] Demonstrates understanding. [] Needs review. [x] Demonstrates/verbalizes HEP/Ed previously given. Plan: [x] Continue per plan of care with dry needling PRN. [] Other:       Time In: 1000 AM          Time Out: 1110 AM    Electronically signed by:  Albert Kowalski.  Emmanuel Roe

## 2018-02-21 ENCOUNTER — APPOINTMENT (OUTPATIENT)
Dept: PHYSICAL THERAPY | Age: 68
End: 2018-02-21
Payer: MEDICARE

## 2018-02-22 ENCOUNTER — HOSPITAL ENCOUNTER (OUTPATIENT)
Dept: PHYSICAL THERAPY | Age: 68
Setting detail: THERAPIES SERIES
Discharge: HOME | End: 2018-02-22
Payer: MEDICARE

## 2018-02-22 PROCEDURE — 97110 THERAPEUTIC EXERCISES: CPT

## 2018-02-22 PROCEDURE — 97140 MANUAL THERAPY 1/> REGIONS: CPT

## 2018-02-27 ENCOUNTER — HOSPITAL ENCOUNTER (OUTPATIENT)
Dept: PHYSICAL THERAPY | Age: 68
Setting detail: THERAPIES SERIES
Discharge: HOME | End: 2018-02-27
Payer: MEDICARE

## 2018-02-27 PROCEDURE — 97110 THERAPEUTIC EXERCISES: CPT

## 2018-02-27 PROCEDURE — 97140 MANUAL THERAPY 1/> REGIONS: CPT

## 2018-02-27 NOTE — FLOWSHEET NOTE
superior thigh []  []  []  []  []  []    Lateral iliac crests []  []  []  []  [x]  []    Lateral hip [x] x2 [x] x2  []  []  [x]  []    Posterior Cutaneous L1 [x]  [x]  []  []  [x]  []     []  []  []  []  []  []     []  []  []  []  []  []     []  []  []  []  []  []     []  []  []  []  []  []     []  []  []  []  []  []      Needling as noted without c/o pain.     5 min man therapy to treating therapist.      Alain Spurling, PT

## 2018-03-02 ENCOUNTER — HOSPITAL ENCOUNTER (OUTPATIENT)
Dept: PHYSICAL THERAPY | Age: 68
Setting detail: THERAPIES SERIES
Discharge: HOME | End: 2018-03-02
Payer: MEDICARE

## 2018-03-02 PROCEDURE — 97140 MANUAL THERAPY 1/> REGIONS: CPT

## 2018-03-02 PROCEDURE — 97110 THERAPEUTIC EXERCISES: CPT

## 2018-03-02 NOTE — FLOWSHEET NOTE
Prone      Hip ext 15x 3 lb      HS curls   15x 3 lb     Other:      Manual Therapy: STM to bilateral hip abductors, IT band, and scar tissue (R side). Tools: The Stick, Gentle joint mobilizations bilateral hip all planes, Dry needling to Bilateral hips see note. X 15 min        Specific Instructions for next treatment: STM to IT band, scar tissue, and Glute medius. Strengthening and ROM exercises      Treatment Charges: Mins Units   [x]  Modalities CP 15 0   [x]  Ther Exercise 40 3   [x]  Manual Therapy 15 1   []  Ther Activities     []  Aquatics     []  Vasocompression     []  Other     Total Treatment time 55 4       Assessment: [x] Progressing toward goals. Good tolerance to all of treatment. Patient requires skilled PT to address bilateral hip pain and weakness. STG: (to be met in 8 treatments)  1. ? Pain: Bilateral hip pain to 5/10 with ambulation. Met  2. ? Strength: 4+/5 bilateral hip flexion, abduction and extension to improve joint stability. Progress  3. ? Function: LEFS 46% impaired to improve ADLs. Not Met   4. Independent with Home Exercise Programs. Met     LTG: (to be met in 18 treatments)  1. Patient will be able to ambulate community distances. 2. Patient will be able to complete standing activity > 1 hour with decreased hip pain                 G-CODE updated 1/29/18  Functional Limitation: Mobility  Functional Assessment Used: 74% impaired  Current Status Modifier: CL  Goal Status Modifier: CK      Pt. Education:  [x] Yes  [] No  [x] Reviewed Prior HEP/Ed, Continued to explain Exercise principles. Method of Education: [] Verbal  [] Demo  [] Written  Comprehension of Education:  [x] Verbalizes understanding. [] Demonstrates understanding. [] Needs review. [x] Demonstrates/verbalizes HEP/Ed previously given. Plan: [x] Continue per plan of care with dry needling PRN. [] Other:       Time In: 1000 AM          Time Out: 0265 AM     Electronically signed by:  Anthony Ferreira.  Alison Sparks

## 2018-03-06 ENCOUNTER — HOSPITAL ENCOUNTER (OUTPATIENT)
Dept: PHYSICAL THERAPY | Age: 68
Setting detail: THERAPIES SERIES
Discharge: HOME | End: 2018-03-06
Payer: MEDICARE

## 2018-03-06 PROCEDURE — 97140 MANUAL THERAPY 1/> REGIONS: CPT

## 2018-03-06 PROCEDURE — 97110 THERAPEUTIC EXERCISES: CPT

## 2018-03-08 ENCOUNTER — HOSPITAL ENCOUNTER (OUTPATIENT)
Dept: PHYSICAL THERAPY | Age: 68
Setting detail: THERAPIES SERIES
Discharge: HOME | End: 2018-03-08
Payer: MEDICARE

## 2018-03-08 PROCEDURE — G8979 MOBILITY GOAL STATUS: HCPCS

## 2018-03-08 PROCEDURE — 97140 MANUAL THERAPY 1/> REGIONS: CPT

## 2018-03-08 PROCEDURE — G8980 MOBILITY D/C STATUS: HCPCS

## 2018-03-08 PROCEDURE — 97110 THERAPEUTIC EXERCISES: CPT

## 2018-03-08 NOTE — DISCHARGE SUMMARY
[x] Abdon Lemus        Outpatient Physical                Therapy       [address]       Phone: [phone]       Fax: [phone] [] [facility]       Promotion at [address] [facility]       Phone: [phone]       Fax: [phone] [] [facility] Promotion     [address]     Phone: [phone]     Fax:  [phone]     Physical Therapy Discharge Note    Date: 3/8/2018      Patient: Salena Guerrero  :   MRN: [id number]    Physician: Helen Gaona DO                     Insurance: Medicare, [employer]) Est. $1800 used by end of POC   Medical Diagnosis: Trochanteric bursitis of both hips M70.61, M70.62                  Rehab Codes: R 26.2, M25.651, M25.652  Onset date: 9/15/17                Next 's appt. : 18    Visit# / total visits:                   Cancels/No Shows: 0/0      Subjective:  Pain:  [x] Yes  [] No  Location: B hips  Pain Rating: (0-10 scale) 3/10  Pain altered Tx:  [] No  [] Yes  Action:  Comments: Patient B hip pain has improved and she is ready to discharge. Objective:  Test Measurements:  MMT: Bilateral hip flexion, abduction, and extension 4+/5   Function: LEFS 47% impaired    Assessment:  Patient has improved bilateral hip strength and pain has been reduced to a manageable level. Gait and mobility have improved. Discharging to [facility]. STG: (to be met in 8 treatments)  1. ? Pain: Bilateral hip pain to 5/10 with ambulation. Met  2. ? Strength: 4+/5 bilateral hip flexion, abduction and extension to improve joint stability. Met  3. ? Function: LEFS 46% impaired to improve ADLs.  Met  4. Independent with Home Exercise Programs. Met      LTG: (to be met in 18 treatments)  1. Patient will be able to ambulate community distances. Met  2.  Patient will be able to complete standing activity > 1 hour with decreased hip pain Met     G-CODE updated

## 2019-04-10 ENCOUNTER — HOSPITAL ENCOUNTER (OUTPATIENT)
Dept: MAMMOGRAPHY | Age: 69
Discharge: HOME | End: 2019-04-12
Payer: MEDICARE

## 2019-04-10 DIAGNOSIS — Z12.31 SCREENING MAMMOGRAM, ENCOUNTER FOR: ICD-10-CM

## 2019-04-10 PROCEDURE — 77063 BREAST TOMOSYNTHESIS BI: CPT

## 2020-03-11 ENCOUNTER — APPOINTMENT (OUTPATIENT)
Dept: CT IMAGING | Age: 70
DRG: 981 | End: 2020-03-11
Payer: MEDICARE

## 2020-03-11 ENCOUNTER — APPOINTMENT (OUTPATIENT)
Dept: GENERAL RADIOLOGY | Age: 70
DRG: 981 | End: 2020-03-11
Payer: MEDICARE

## 2020-03-11 ENCOUNTER — HOSPITAL ENCOUNTER (INPATIENT)
Age: 70
LOS: 15 days | Discharge: NURSING, REHAB OR LONG-TERM CARE | DRG: 981 | End: 2020-03-26
Attending: EMERGENCY MEDICINE | Admitting: INTERNAL MEDICINE
Payer: MEDICARE

## 2020-03-11 PROBLEM — R06.02 SHORTNESS OF BREATH: Status: ACTIVE | Noted: 2020-03-11

## 2020-03-11 LAB
-: NORMAL
ABSOLUTE EOS #: 0.42 K/UL (ref 0–0.4)
ABSOLUTE IMMATURE GRANULOCYTE: 0.21 K/UL (ref 0–0.3)
ABSOLUTE LYMPH #: 3.26 K/UL (ref 1–4.8)
ABSOLUTE MONO #: 0 K/UL (ref 0.1–0.8)
ACTION: NORMAL
ADENOVIRUS PCR: NOT DETECTED
ALBUMIN SERPL-MCNC: 3 G/DL (ref 3.5–5.2)
ALBUMIN/GLOBULIN RATIO: 0.9 (ref 1–2.5)
ALLEN TEST: ABNORMAL
ALLEN TEST: ABNORMAL
ALLEN TEST: POSITIVE
ALP BLD-CCNC: 89 U/L (ref 35–104)
ALT SERPL-CCNC: 64 U/L (ref 5–33)
AMORPHOUS: NORMAL
AMPHETAMINE SCREEN URINE: NEGATIVE
AMYLASE: 82 U/L (ref 28–100)
ANION GAP SERPL CALCULATED.3IONS-SCNC: 16 MMOL/L (ref 9–17)
ANION GAP: 19 MMOL/L (ref 7–16)
AST SERPL-CCNC: 78 U/L
BACTERIA: NORMAL
BARBITURATE SCREEN URINE: NEGATIVE
BASOPHILS # BLD: 1 % (ref 0–2)
BASOPHILS ABSOLUTE: 0.11 K/UL (ref 0–0.2)
BENZODIAZEPINE SCREEN, URINE: NEGATIVE
BILIRUB SERPL-MCNC: 0.18 MG/DL (ref 0.3–1.2)
BILIRUBIN DIRECT: 0.1 MG/DL
BILIRUBIN URINE: NEGATIVE
BILIRUBIN, INDIRECT: 0.08 MG/DL (ref 0–1)
BNP INTERPRETATION: ABNORMAL
BORDETELLA PARAPERTUSSIS: NOT DETECTED
BORDETELLA PERTUSSIS PCR: NOT DETECTED
BUN BLDV-MCNC: 16 MG/DL (ref 8–23)
BUN/CREAT BLD: ABNORMAL (ref 9–20)
BUPRENORPHINE URINE: NORMAL
CALCIUM SERPL-MCNC: 8 MG/DL (ref 8.6–10.4)
CANNABINOID SCREEN URINE: NEGATIVE
CASTS UA: NORMAL /LPF (ref 0–2)
CHLAMYDIA PNEUMONIAE BY PCR: NOT DETECTED
CHLORIDE BLD-SCNC: 110 MMOL/L (ref 98–107)
CO2: 17 MMOL/L (ref 20–31)
COCAINE METABOLITE, URINE: NEGATIVE
COLOR: YELLOW
COMMENT UA: ABNORMAL
CORONAVIRUS 229E PCR: NOT DETECTED
CORONAVIRUS HKU1 PCR: NOT DETECTED
CORONAVIRUS NL63 PCR: NOT DETECTED
CORONAVIRUS OC43 PCR: NOT DETECTED
CREAT SERPL-MCNC: 1.11 MG/DL (ref 0.5–0.9)
CREATININE URINE: 35.8 MG/DL (ref 28–217)
CRYSTALS, UA: NORMAL /HPF
DATE AND TIME: NORMAL
DIFFERENTIAL TYPE: ABNORMAL
DIRECT EXAM: NORMAL
EOSINOPHILS RELATIVE PERCENT: 4 % (ref 1–4)
EPITHELIAL CELLS UA: NORMAL /HPF (ref 0–5)
ESTIMATED AVERAGE GLUCOSE: 131 MG/DL
FIO2: 40
FIO2: 50
FIO2: ABNORMAL
GFR AFRICAN AMERICAN: 59 ML/MIN
GFR NON-AFRICAN AMERICAN: 47 ML/MIN
GFR NON-AFRICAN AMERICAN: 49 ML/MIN
GFR SERPL CREATININE-BSD FRML MDRD: 57 ML/MIN
GFR SERPL CREATININE-BSD FRML MDRD: ABNORMAL ML/MIN/{1.73_M2}
GLOBULIN: ABNORMAL G/DL (ref 1.5–3.8)
GLUCOSE BLD-MCNC: 257 MG/DL (ref 74–100)
GLUCOSE BLD-MCNC: 264 MG/DL (ref 70–99)
GLUCOSE BLD-MCNC: 600 MG/DL (ref 74–100)
GLUCOSE URINE: ABNORMAL
HBA1C MFR BLD: 6.2 % (ref 4–6)
HCO3 VENOUS: 15.3 MMOL/L (ref 22–29)
HCT VFR BLD CALC: 41.3 % (ref 36.3–47.1)
HEMOGLOBIN: 12.2 G/DL (ref 11.9–15.1)
HUMAN METAPNEUMOVIRUS PCR: NOT DETECTED
IMMATURE GRANULOCYTES: 2 %
INFLUENZA A BY PCR: NOT DETECTED
INFLUENZA A H1 (2009) PCR: NORMAL
INFLUENZA A H1 PCR: NORMAL
INFLUENZA A H3 PCR: NORMAL
INFLUENZA B BY PCR: NOT DETECTED
INR BLD: 1
KETONES, URINE: NEGATIVE
LACTIC ACID, WHOLE BLOOD: 5.3 MMOL/L (ref 0.7–2.1)
LACTIC ACID, WHOLE BLOOD: 5.4 MMOL/L (ref 0.7–2.1)
LACTIC ACID: ABNORMAL MMOL/L
LEUKOCYTE ESTERASE, URINE: NEGATIVE
LIPASE: 39 U/L (ref 13–60)
LYMPHOCYTES # BLD: 31 % (ref 24–44)
Lab: NORMAL
MCH RBC QN AUTO: 30.6 PG (ref 25.2–33.5)
MCHC RBC AUTO-ENTMCNC: 29.5 G/DL (ref 28.4–34.8)
MCV RBC AUTO: 103.5 FL (ref 82.6–102.9)
MDMA URINE: NORMAL
METHADONE SCREEN, URINE: NEGATIVE
METHAMPHETAMINE, URINE: NORMAL
MODE: ABNORMAL
MONOCYTES # BLD: 0 % (ref 1–7)
MORPHOLOGY: ABNORMAL
MORPHOLOGY: ABNORMAL
MRSA, DNA, NASAL: NORMAL
MUCUS: NORMAL
MYCOPLASMA PNEUMONIAE PCR: NOT DETECTED
NEGATIVE BASE EXCESS, ART: 7 (ref 0–2)
NEGATIVE BASE EXCESS, ART: 9 (ref 0–2)
NEGATIVE BASE EXCESS, VEN: 22 (ref 0–2)
NITRITE, URINE: NEGATIVE
NOTIFY: NORMAL
NRBC AUTOMATED: 0 PER 100 WBC
O2 DEVICE/FLOW/%: ABNORMAL
O2 SAT, VEN: 29 % (ref 60–85)
OPIATES, URINE: NEGATIVE
OTHER OBSERVATIONS UA: NORMAL
OXYCODONE SCREEN URINE: NEGATIVE
PARAINFLUENZA 1 PCR: NOT DETECTED
PARAINFLUENZA 2 PCR: NOT DETECTED
PARAINFLUENZA 3 PCR: NOT DETECTED
PARAINFLUENZA 4 PCR: NOT DETECTED
PARTIAL THROMBOPLASTIN TIME: 18.2 SEC (ref 20.5–30.5)
PATIENT TEMP: ABNORMAL
PCO2, VEN: 117 MM HG (ref 41–51)
PDW BLD-RTO: 14.7 % (ref 11.8–14.4)
PH UA: 6 (ref 5–8)
PH VENOUS: 6.72 (ref 7.32–7.43)
PHENCYCLIDINE, URINE: NEGATIVE
PLATELET # BLD: 254 K/UL (ref 138–453)
PLATELET ESTIMATE: ABNORMAL
PMV BLD AUTO: 10.3 FL (ref 8.1–13.5)
PO2, VEN: 38.9 MM HG (ref 30–50)
POC CHLORIDE: 96 MMOL/L (ref 98–107)
POC CREATININE: 1.14 MG/DL (ref 0.51–1.19)
POC HCO3: 18.1 MMOL/L (ref 21–28)
POC HCO3: 21.3 MMOL/L (ref 21–28)
POC HEMATOCRIT: 34 % (ref 36–46)
POC HEMOGLOBIN: 11.5 G/DL (ref 12–16)
POC IONIZED CALCIUM: 1.21 MMOL/L (ref 1.15–1.33)
POC LACTIC ACID: 8.21 MMOL/L (ref 0.56–1.39)
POC O2 SATURATION: 90 % (ref 94–98)
POC O2 SATURATION: 96 % (ref 94–98)
POC PCO2 TEMP: ABNORMAL MM HG
POC PCO2: 43.2 MM HG (ref 35–48)
POC PCO2: 55.6 MM HG (ref 35–48)
POC PH TEMP: ABNORMAL
POC PH: 7.19 (ref 7.35–7.45)
POC PH: 7.23 (ref 7.35–7.45)
POC PO2 TEMP: ABNORMAL MM HG
POC PO2: 73.2 MM HG (ref 83–108)
POC PO2: 95.4 MM HG (ref 83–108)
POC POTASSIUM: 3.9 MMOL/L (ref 3.5–4.5)
POC SODIUM: 130 MMOL/L (ref 138–146)
POSITIVE BASE EXCESS, ART: ABNORMAL (ref 0–3)
POSITIVE BASE EXCESS, ART: ABNORMAL (ref 0–3)
POSITIVE BASE EXCESS, VEN: ABNORMAL (ref 0–3)
POTASSIUM SERPL-SCNC: 4.3 MMOL/L (ref 3.7–5.3)
PRO-BNP: 3678 PG/ML
PROPOXYPHENE, URINE: NORMAL
PROTEIN UA: ABNORMAL
PROTHROMBIN TIME: 10.2 SEC (ref 9–12)
RBC # BLD: 3.99 M/UL (ref 3.95–5.11)
RBC # BLD: ABNORMAL 10*6/UL
RBC UA: NORMAL /HPF (ref 0–2)
READ BACK: YES
RENAL EPITHELIAL, UA: NORMAL /HPF
RESP SYNCYTIAL VIRUS PCR: NOT DETECTED
RHINO/ENTEROVIRUS PCR: NOT DETECTED
SAMPLE SITE: ABNORMAL
SEG NEUTROPHILS: 62 % (ref 36–66)
SEGMENTED NEUTROPHILS ABSOLUTE COUNT: 6.5 K/UL (ref 1.8–7.7)
SODIUM BLD-SCNC: 143 MMOL/L (ref 135–144)
SODIUM,UR: 148 MMOL/L
SPECIFIC GRAVITY UA: 1.01 (ref 1–1.03)
SPECIMEN DESCRIPTION: NORMAL
TCO2 (CALC), ART: 20 MMOL/L (ref 22–29)
TCO2 (CALC), ART: 23 MMOL/L (ref 22–29)
TEST INFORMATION: NORMAL
TOTAL CO2, VENOUS: 19 MMOL/L (ref 23–30)
TOTAL PROTEIN: 6.3 G/DL (ref 6.4–8.3)
TRICHOMONAS: NORMAL
TRICYCLIC ANTIDEPRESSANTS, UR: NORMAL
TROPONIN INTERP: ABNORMAL
TROPONIN INTERP: NORMAL
TROPONIN T: ABNORMAL NG/ML
TROPONIN T: NORMAL NG/ML
TROPONIN, HIGH SENSITIVITY: 12 NG/L (ref 0–14)
TROPONIN, HIGH SENSITIVITY: 199 NG/L (ref 0–14)
TURBIDITY: ABNORMAL
URINE HGB: ABNORMAL
UROBILINOGEN, URINE: NORMAL
WBC # BLD: 10.5 K/UL (ref 3.5–11.3)
WBC # BLD: ABNORMAL 10*3/UL
WBC UA: NORMAL /HPF (ref 0–5)
YEAST: NORMAL

## 2020-03-11 PROCEDURE — 80307 DRUG TEST PRSMV CHEM ANLYZR: CPT

## 2020-03-11 PROCEDURE — 6370000000 HC RX 637 (ALT 250 FOR IP): Performed by: STUDENT IN AN ORGANIZED HEALTH CARE EDUCATION/TRAINING PROGRAM

## 2020-03-11 PROCEDURE — 82565 ASSAY OF CREATININE: CPT

## 2020-03-11 PROCEDURE — 0BH18EZ INSERTION OF ENDOTRACHEAL AIRWAY INTO TRACHEA, VIA NATURAL OR ARTIFICIAL OPENING ENDOSCOPIC: ICD-10-PCS | Performed by: EMERGENCY MEDICINE

## 2020-03-11 PROCEDURE — 36556 INSERT NON-TUNNEL CV CATH: CPT

## 2020-03-11 PROCEDURE — 2580000003 HC RX 258: Performed by: STUDENT IN AN ORGANIZED HEALTH CARE EDUCATION/TRAINING PROGRAM

## 2020-03-11 PROCEDURE — 80048 BASIC METABOLIC PNL TOTAL CA: CPT

## 2020-03-11 PROCEDURE — 80076 HEPATIC FUNCTION PANEL: CPT

## 2020-03-11 PROCEDURE — 6360000002 HC RX W HCPCS: Performed by: STUDENT IN AN ORGANIZED HEALTH CARE EDUCATION/TRAINING PROGRAM

## 2020-03-11 PROCEDURE — 85027 COMPLETE CBC AUTOMATED: CPT

## 2020-03-11 PROCEDURE — 99291 CRITICAL CARE FIRST HOUR: CPT | Performed by: INTERNAL MEDICINE

## 2020-03-11 PROCEDURE — 82803 BLOOD GASES ANY COMBINATION: CPT

## 2020-03-11 PROCEDURE — 83036 HEMOGLOBIN GLYCOSYLATED A1C: CPT

## 2020-03-11 PROCEDURE — 84300 ASSAY OF URINE SODIUM: CPT

## 2020-03-11 PROCEDURE — 36600 WITHDRAWAL OF ARTERIAL BLOOD: CPT

## 2020-03-11 PROCEDURE — 6360000002 HC RX W HCPCS: Performed by: EMERGENCY MEDICINE

## 2020-03-11 PROCEDURE — 85049 AUTOMATED PLATELET COUNT: CPT

## 2020-03-11 PROCEDURE — 82150 ASSAY OF AMYLASE: CPT

## 2020-03-11 PROCEDURE — 6360000002 HC RX W HCPCS

## 2020-03-11 PROCEDURE — 82330 ASSAY OF CALCIUM: CPT

## 2020-03-11 PROCEDURE — 2500000003 HC RX 250 WO HCPCS

## 2020-03-11 PROCEDURE — 5A1955Z RESPIRATORY VENTILATION, GREATER THAN 96 CONSECUTIVE HOURS: ICD-10-PCS | Performed by: INTERNAL MEDICINE

## 2020-03-11 PROCEDURE — 51702 INSERT TEMP BLADDER CATH: CPT

## 2020-03-11 PROCEDURE — 87641 MR-STAPH DNA AMP PROBE: CPT

## 2020-03-11 PROCEDURE — 06JY3ZZ INSPECTION OF LOWER VEIN, PERCUTANEOUS APPROACH: ICD-10-PCS | Performed by: EMERGENCY MEDICINE

## 2020-03-11 PROCEDURE — 37799 UNLISTED PX VASCULAR SURGERY: CPT

## 2020-03-11 PROCEDURE — 87040 BLOOD CULTURE FOR BACTERIA: CPT

## 2020-03-11 PROCEDURE — 83880 ASSAY OF NATRIURETIC PEPTIDE: CPT

## 2020-03-11 PROCEDURE — 36620 INSERTION CATHETER ARTERY: CPT

## 2020-03-11 PROCEDURE — 2700000000 HC OXYGEN THERAPY PER DAY

## 2020-03-11 PROCEDURE — 93005 ELECTROCARDIOGRAM TRACING: CPT | Performed by: STUDENT IN AN ORGANIZED HEALTH CARE EDUCATION/TRAINING PROGRAM

## 2020-03-11 PROCEDURE — 2580000003 HC RX 258: Performed by: EMERGENCY MEDICINE

## 2020-03-11 PROCEDURE — 99291 CRITICAL CARE FIRST HOUR: CPT

## 2020-03-11 PROCEDURE — 6360000004 HC RX CONTRAST MEDICATION: Performed by: EMERGENCY MEDICINE

## 2020-03-11 PROCEDURE — 87899 AGENT NOS ASSAY W/OPTIC: CPT

## 2020-03-11 PROCEDURE — 84132 ASSAY OF SERUM POTASSIUM: CPT

## 2020-03-11 PROCEDURE — 83605 ASSAY OF LACTIC ACID: CPT

## 2020-03-11 PROCEDURE — C9113 INJ PANTOPRAZOLE SODIUM, VIA: HCPCS | Performed by: STUDENT IN AN ORGANIZED HEALTH CARE EDUCATION/TRAINING PROGRAM

## 2020-03-11 PROCEDURE — 84484 ASSAY OF TROPONIN QUANT: CPT

## 2020-03-11 PROCEDURE — 0100U HC RESPIRPTHGN MULT REV TRANS & AMP PRB TECH 21 TRGT: CPT

## 2020-03-11 PROCEDURE — 85014 HEMATOCRIT: CPT

## 2020-03-11 PROCEDURE — 81001 URINALYSIS AUTO W/SCOPE: CPT

## 2020-03-11 PROCEDURE — 85610 PROTHROMBIN TIME: CPT

## 2020-03-11 PROCEDURE — 87086 URINE CULTURE/COLONY COUNT: CPT

## 2020-03-11 PROCEDURE — 71260 CT THORAX DX C+: CPT

## 2020-03-11 PROCEDURE — 89220 SPUTUM SPECIMEN COLLECTION: CPT

## 2020-03-11 PROCEDURE — 83690 ASSAY OF LIPASE: CPT

## 2020-03-11 PROCEDURE — 70450 CT HEAD/BRAIN W/O DYE: CPT

## 2020-03-11 PROCEDURE — 94002 VENT MGMT INPAT INIT DAY: CPT

## 2020-03-11 PROCEDURE — 85730 THROMBOPLASTIN TIME PARTIAL: CPT

## 2020-03-11 PROCEDURE — 71045 X-RAY EXAM CHEST 1 VIEW: CPT

## 2020-03-11 PROCEDURE — 94640 AIRWAY INHALATION TREATMENT: CPT

## 2020-03-11 PROCEDURE — 93005 ELECTROCARDIOGRAM TRACING: CPT | Performed by: EMERGENCY MEDICINE

## 2020-03-11 PROCEDURE — 85025 COMPLETE CBC W/AUTO DIFF WBC: CPT

## 2020-03-11 PROCEDURE — 87070 CULTURE OTHR SPECIMN AEROBIC: CPT

## 2020-03-11 PROCEDURE — 2500000003 HC RX 250 WO HCPCS: Performed by: STUDENT IN AN ORGANIZED HEALTH CARE EDUCATION/TRAINING PROGRAM

## 2020-03-11 PROCEDURE — 2000000000 HC ICU R&B

## 2020-03-11 PROCEDURE — 84295 ASSAY OF SERUM SODIUM: CPT

## 2020-03-11 PROCEDURE — 36415 COLL VENOUS BLD VENIPUNCTURE: CPT

## 2020-03-11 PROCEDURE — 87449 NOS EACH ORGANISM AG IA: CPT

## 2020-03-11 PROCEDURE — 94761 N-INVAS EAR/PLS OXIMETRY MLT: CPT

## 2020-03-11 PROCEDURE — 82435 ASSAY OF BLOOD CHLORIDE: CPT

## 2020-03-11 PROCEDURE — 87205 SMEAR GRAM STAIN: CPT

## 2020-03-11 PROCEDURE — 87804 INFLUENZA ASSAY W/OPTIC: CPT

## 2020-03-11 PROCEDURE — 82570 ASSAY OF URINE CREATININE: CPT

## 2020-03-11 PROCEDURE — 82947 ASSAY GLUCOSE BLOOD QUANT: CPT

## 2020-03-11 PROCEDURE — 94770 HC ETCO2 MONITOR DAILY: CPT

## 2020-03-11 RX ORDER — FENTANYL CITRATE 50 UG/ML
INJECTION, SOLUTION INTRAMUSCULAR; INTRAVENOUS
Status: DISCONTINUED
Start: 2020-03-11 | End: 2020-03-11

## 2020-03-11 RX ORDER — SODIUM CHLORIDE 0.9 % (FLUSH) 0.9 %
10 SYRINGE (ML) INJECTION EVERY 12 HOURS SCHEDULED
Status: DISCONTINUED | OUTPATIENT
Start: 2020-03-11 | End: 2020-03-26 | Stop reason: HOSPADM

## 2020-03-11 RX ORDER — ALBUTEROL SULFATE 2.5 MG/3ML
2.5 SOLUTION RESPIRATORY (INHALATION) 2 TIMES DAILY
Status: DISCONTINUED | OUTPATIENT
Start: 2020-03-12 | End: 2020-03-13

## 2020-03-11 RX ORDER — FENTANYL CITRATE 50 UG/ML
100 INJECTION, SOLUTION INTRAMUSCULAR; INTRAVENOUS ONCE
Status: DISCONTINUED | OUTPATIENT
Start: 2020-03-11 | End: 2020-03-11

## 2020-03-11 RX ORDER — HEPARIN SODIUM 1000 [USP'U]/ML
80 INJECTION, SOLUTION INTRAVENOUS; SUBCUTANEOUS ONCE
Status: COMPLETED | OUTPATIENT
Start: 2020-03-11 | End: 2020-03-11

## 2020-03-11 RX ORDER — HEPARIN SODIUM 1000 [USP'U]/ML
80 INJECTION, SOLUTION INTRAVENOUS; SUBCUTANEOUS PRN
Status: DISCONTINUED | OUTPATIENT
Start: 2020-03-11 | End: 2020-03-13

## 2020-03-11 RX ORDER — PROPOFOL 10 MG/ML
INJECTION, EMULSION INTRAVENOUS
Status: DISCONTINUED
Start: 2020-03-11 | End: 2020-03-11

## 2020-03-11 RX ORDER — 0.9 % SODIUM CHLORIDE 0.9 %
250 INTRAVENOUS SOLUTION INTRAVENOUS ONCE
Status: COMPLETED | OUTPATIENT
Start: 2020-03-11 | End: 2020-03-11

## 2020-03-11 RX ORDER — ACETAMINOPHEN 325 MG/1
650 TABLET ORAL EVERY 6 HOURS PRN
Status: DISCONTINUED | OUTPATIENT
Start: 2020-03-11 | End: 2020-03-26 | Stop reason: HOSPADM

## 2020-03-11 RX ORDER — METHYLPREDNISOLONE SODIUM SUCCINATE 125 MG/2ML
125 INJECTION, POWDER, LYOPHILIZED, FOR SOLUTION INTRAMUSCULAR; INTRAVENOUS ONCE
Status: DISCONTINUED | OUTPATIENT
Start: 2020-03-11 | End: 2020-03-11

## 2020-03-11 RX ORDER — SODIUM CHLORIDE 9 MG/ML
INJECTION, SOLUTION INTRAVENOUS CONTINUOUS
Status: DISCONTINUED | OUTPATIENT
Start: 2020-03-11 | End: 2020-03-12

## 2020-03-11 RX ORDER — ALBUTEROL SULFATE 2.5 MG/3ML
2.5 SOLUTION RESPIRATORY (INHALATION)
Status: DISCONTINUED | OUTPATIENT
Start: 2020-03-11 | End: 2020-03-11

## 2020-03-11 RX ORDER — ACETAMINOPHEN 650 MG/1
650 SUPPOSITORY RECTAL EVERY 6 HOURS PRN
Status: DISCONTINUED | OUTPATIENT
Start: 2020-03-11 | End: 2020-03-26 | Stop reason: HOSPADM

## 2020-03-11 RX ORDER — KETAMINE HYDROCHLORIDE 10 MG/ML
INJECTION, SOLUTION INTRAMUSCULAR; INTRAVENOUS
Status: DISCONTINUED
Start: 2020-03-11 | End: 2020-03-11

## 2020-03-11 RX ORDER — HEPARIN SODIUM 5000 [USP'U]/ML
5000 INJECTION, SOLUTION INTRAVENOUS; SUBCUTANEOUS EVERY 8 HOURS SCHEDULED
Status: DISCONTINUED | OUTPATIENT
Start: 2020-03-11 | End: 2020-03-11

## 2020-03-11 RX ORDER — POLYETHYLENE GLYCOL 3350 17 G/17G
17 POWDER, FOR SOLUTION ORAL DAILY PRN
Status: DISCONTINUED | OUTPATIENT
Start: 2020-03-11 | End: 2020-03-26 | Stop reason: HOSPADM

## 2020-03-11 RX ORDER — ONDANSETRON 2 MG/ML
4 INJECTION INTRAMUSCULAR; INTRAVENOUS EVERY 6 HOURS PRN
Status: DISCONTINUED | OUTPATIENT
Start: 2020-03-11 | End: 2020-03-26 | Stop reason: HOSPADM

## 2020-03-11 RX ORDER — ALBUTEROL SULFATE 2.5 MG/3ML
2.5 SOLUTION RESPIRATORY (INHALATION) EVERY 4 HOURS
Status: DISCONTINUED | OUTPATIENT
Start: 2020-03-11 | End: 2020-03-11

## 2020-03-11 RX ORDER — SODIUM CHLORIDE 0.9 % (FLUSH) 0.9 %
10 SYRINGE (ML) INJECTION PRN
Status: DISCONTINUED | OUTPATIENT
Start: 2020-03-11 | End: 2020-03-26 | Stop reason: HOSPADM

## 2020-03-11 RX ORDER — NICOTINE 21 MG/24HR
1 PATCH, TRANSDERMAL 24 HOURS TRANSDERMAL DAILY
Status: DISCONTINUED | OUTPATIENT
Start: 2020-03-11 | End: 2020-03-26 | Stop reason: HOSPADM

## 2020-03-11 RX ORDER — IPRATROPIUM BROMIDE AND ALBUTEROL SULFATE 2.5; .5 MG/3ML; MG/3ML
1 SOLUTION RESPIRATORY (INHALATION) 4 TIMES DAILY
Status: DISCONTINUED | OUTPATIENT
Start: 2020-03-11 | End: 2020-03-13

## 2020-03-11 RX ORDER — PROMETHAZINE HYDROCHLORIDE 25 MG/1
12.5 TABLET ORAL EVERY 6 HOURS PRN
Status: DISCONTINUED | OUTPATIENT
Start: 2020-03-11 | End: 2020-03-26 | Stop reason: HOSPADM

## 2020-03-11 RX ORDER — SODIUM CHLORIDE 9 MG/ML
10 INJECTION INTRAVENOUS DAILY
Status: DISCONTINUED | OUTPATIENT
Start: 2020-03-11 | End: 2020-03-17

## 2020-03-11 RX ORDER — POTASSIUM CHLORIDE 7.45 MG/ML
10 INJECTION INTRAVENOUS PRN
Status: DISCONTINUED | OUTPATIENT
Start: 2020-03-11 | End: 2020-03-24

## 2020-03-11 RX ORDER — FENTANYL CITRATE 50 UG/ML
25 INJECTION, SOLUTION INTRAMUSCULAR; INTRAVENOUS
Status: DISCONTINUED | OUTPATIENT
Start: 2020-03-11 | End: 2020-03-11

## 2020-03-11 RX ORDER — HEPARIN SODIUM 10000 [USP'U]/100ML
18 INJECTION, SOLUTION INTRAVENOUS CONTINUOUS
Status: DISCONTINUED | OUTPATIENT
Start: 2020-03-11 | End: 2020-03-13

## 2020-03-11 RX ORDER — PROPOFOL 10 MG/ML
10 INJECTION, EMULSION INTRAVENOUS
Status: DISCONTINUED | OUTPATIENT
Start: 2020-03-11 | End: 2020-03-15

## 2020-03-11 RX ORDER — KETAMINE HYDROCHLORIDE 10 MG/ML
100 INJECTION, SOLUTION INTRAMUSCULAR; INTRAVENOUS ONCE
Status: DISCONTINUED | OUTPATIENT
Start: 2020-03-11 | End: 2020-03-11

## 2020-03-11 RX ORDER — ALBUTEROL SULFATE 2.5 MG/3ML
2.5 SOLUTION RESPIRATORY (INHALATION)
Status: DISCONTINUED | OUTPATIENT
Start: 2020-03-11 | End: 2020-03-26 | Stop reason: HOSPADM

## 2020-03-11 RX ORDER — PANTOPRAZOLE SODIUM 40 MG/10ML
40 INJECTION, POWDER, LYOPHILIZED, FOR SOLUTION INTRAVENOUS DAILY
Status: DISCONTINUED | OUTPATIENT
Start: 2020-03-11 | End: 2020-03-17

## 2020-03-11 RX ORDER — METHYLPREDNISOLONE SODIUM SUCCINATE 40 MG/ML
40 INJECTION, POWDER, LYOPHILIZED, FOR SOLUTION INTRAMUSCULAR; INTRAVENOUS EVERY 8 HOURS
Status: DISCONTINUED | OUTPATIENT
Start: 2020-03-11 | End: 2020-03-12

## 2020-03-11 RX ORDER — POTASSIUM CHLORIDE 29.8 MG/ML
20 INJECTION INTRAVENOUS PRN
Status: DISCONTINUED | OUTPATIENT
Start: 2020-03-11 | End: 2020-03-24

## 2020-03-11 RX ORDER — HEPARIN SODIUM 1000 [USP'U]/ML
40 INJECTION, SOLUTION INTRAVENOUS; SUBCUTANEOUS PRN
Status: DISCONTINUED | OUTPATIENT
Start: 2020-03-11 | End: 2020-03-13

## 2020-03-11 RX ADMIN — ALBUTEROL SULFATE 10 MG: 2.5 SOLUTION RESPIRATORY (INHALATION) at 12:10

## 2020-03-11 RX ADMIN — HEPARIN SODIUM AND DEXTROSE 18 UNITS/KG/HR: 10000; 5 INJECTION INTRAVENOUS at 20:11

## 2020-03-11 RX ADMIN — NOREPINEPHRINE BITARTRATE 10 MCG/MIN: 1 INJECTION, SOLUTION, CONCENTRATE INTRAVENOUS at 16:00

## 2020-03-11 RX ADMIN — ALBUTEROL SULFATE 2.5 MG: 2.5 SOLUTION RESPIRATORY (INHALATION) at 23:44

## 2020-03-11 RX ADMIN — Medication 10 ML: at 17:48

## 2020-03-11 RX ADMIN — METHYLPREDNISOLONE SODIUM SUCCINATE 40 MG: 40 INJECTION, POWDER, FOR SOLUTION INTRAMUSCULAR; INTRAVENOUS at 17:38

## 2020-03-11 RX ADMIN — SODIUM CHLORIDE: 9 INJECTION, SOLUTION INTRAVENOUS at 16:17

## 2020-03-11 RX ADMIN — PANTOPRAZOLE SODIUM 40 MG: 40 INJECTION, POWDER, FOR SOLUTION INTRAVENOUS at 17:38

## 2020-03-11 RX ADMIN — ALBUTEROL SULFATE 10 MG: 2.5 SOLUTION RESPIRATORY (INHALATION) at 12:24

## 2020-03-11 RX ADMIN — HEPARIN SODIUM 5000 UNITS: 5000 INJECTION INTRAVENOUS; SUBCUTANEOUS at 17:38

## 2020-03-11 RX ADMIN — Medication 500 MG: at 14:20

## 2020-03-11 RX ADMIN — Medication 150 MCG/HR: at 19:30

## 2020-03-11 RX ADMIN — Medication 2 MG/HR: at 16:25

## 2020-03-11 RX ADMIN — CEFTRIAXONE SODIUM 1 G: 1 INJECTION, POWDER, FOR SOLUTION INTRAMUSCULAR; INTRAVENOUS at 13:35

## 2020-03-11 RX ADMIN — ALBUTEROL SULFATE 10 MG: 2.5 SOLUTION RESPIRATORY (INHALATION) at 12:20

## 2020-03-11 RX ADMIN — HEPARIN SODIUM 6220 UNITS: 1000 INJECTION, SOLUTION INTRAVENOUS; SUBCUTANEOUS at 20:10

## 2020-03-11 RX ADMIN — SODIUM CHLORIDE 250 ML: 9 INJECTION, SOLUTION INTRAVENOUS at 20:00

## 2020-03-11 RX ADMIN — IPRATROPIUM BROMIDE AND ALBUTEROL SULFATE 1 AMPULE: .5; 3 SOLUTION RESPIRATORY (INHALATION) at 20:03

## 2020-03-11 RX ADMIN — IPRATROPIUM BROMIDE 0.5 MG: 0.5 SOLUTION RESPIRATORY (INHALATION) at 12:10

## 2020-03-11 RX ADMIN — IOHEXOL 75 ML: 350 INJECTION, SOLUTION INTRAVENOUS at 14:44

## 2020-03-11 ASSESSMENT — PULMONARY FUNCTION TESTS
PIF_VALUE: 11
PIF_VALUE: 24
PIF_VALUE: 8
PIF_VALUE: 13

## 2020-03-11 ASSESSMENT — PAIN SCALES - GENERAL: PAINLEVEL_OUTOF10: 0

## 2020-03-11 NOTE — ED NOTES
Pt attempting to sit up on transport to ICU while in elevator, propofol bolus 20 mg, fentanyl 100 mcg bolus administered, pt sedated, transport to unit continued     Gianfranco Mauricio RN  03/11/20 7484

## 2020-03-11 NOTE — ED PROVIDER NOTES
[facility] ED  EMERGENCY DEPARTMENT ENCOUNTER      Pt Name: March Hodgkin  MRN: [id number]  Xochitlgfdaquan 1950  Date of evaluation: 3/11/20  PCP:  Michelle Finch MD    CHIEF COMPLAINT:   Chief Complaint   Patient presents with    Respiratory Arrest     Pt arrives via medics for difficulty breathing, on arrival of medics pt outside on porch tripoding position with albuterol in hand then \"went out\" unknown if pulse palpated, CPR started, on monitor pt found to be ST pulse palpated, given mag 2 gm IV along with solumedrol 125 mg     HISTORY OF PRESENT ILLNESS   March Hodgkin is a 78 y.o. female whopresents with presents to the emergency department via EMS for respiratory arrest.  History is provided by EMS and the . EMS stated they found the patient on the porch trying to use her albuterol here earlier in respiratory distress. They thought she may have lost her pulse and she received CPR for approximately for45 seconds to a minute, per EMS. There is no signs of trauma. The  who is at bedside states the patient has a history of COPD she has been coughing and having worsening COPD symptoms over the past day and got abruptly worse this morning. No history of DVT or PE. There is no recent travel or sick contacts. REVIEW OF SYSTEMS       Review of Systems   Unable to perform ROS: Critical illness       10 essential systems negative except as stated above and in the HPI. PAST MEDICAL HISTORY   PMH:  has a past medical history of Arthritis, Chronic back pain, COPD (chronic obstructive pulmonary disease) ([address] Hyperlipidemia, Hypertension, and Wears partial dentures. SurgicalHistory:  has a past surgical history that includes joint replacement (Left, 2011); AAA repair, endovascular (06/29/2016); Nerve Block (Right, 03/08/2017); joint replacement (Right, 08/22/2017); and Total hip arthroplasty (Right, 8/22/2017). Social History:  reports that she has been smoking.  She has components:    POC Chloride 96 (*)     All other components within normal limits   VENOUS BLOOD GAS, POINT OF CARE - Abnormal; Notable for the following components:    pH, Valentin 6.724 (*)     pCO2, Valentin 117.0 (*)     HCO3, Venous 15.3 (*)     Total CO2, Venous 19 (*)     Negative Base Excess, Valentin 22 (*)     O2 Sat, Valentin 29 (*)     All other components within normal limits   CREATININE W/GFR POINT OF CARE - Abnormal; Notable for the following components:    GFR Comment 57 (*)     GFR Non- 47 (*)     All other components within normal limits   LACTIC ACID,POINT OF CARE - Abnormal; Notable for the following components:    POC Lactic Acid 8.21 (*)     All other components within normal limits   POCT GLUCOSE - Abnormal; Notable for the following components:    POC Glucose 600 (*)     All other components within normal limits   ANION GAP (CALC) POC - Abnormal; Notable for the following components:    Anion Gap 19 (*)     All other components within normal limits   CULTURE, URINE   CULTURE, BLOOD 1   CULTURE, BLOOD 1   RAPID INFLUENZA A/B ANTIGENS   RESPIRATORY VIRUS PCR PANEL   LIPASE   AMYLASE   TROPONIN   PROTIME-INR   POTASSIUM (POC)   CALCIUM, IONIC (POC)   MICROSCOPIC URINALYSIS          EKG: All EKG's are interpreted by the Emergency Department Physician who either signs or Co-signs this chart inthe absence of a cardiologist.  EKG Interpretation    Interpreted by me    Rhythm: Sinus tachycardia  Rate: normal  Axis: normal  Ectopy: none  Conduction: normal  ST Segments: no acute change  T Waves: no acute change  Q Waves: none    Clinical Impression: no acute changes and normal EKG  RADIOLOGY:  I directly visualized the following  images and reviewed the radiologist interpretations:  XR CHEST PORTABLE   Final Result   Chronic pulmonary change with interstitial and airspace opacities concerning   for pneumonia.          CT Head WO Contrast    (Results Pending)   CT CHEST PULMONARY EMBOLISM W CONTRAST

## 2020-03-11 NOTE — ED NOTES
On arrival pt agonal resp, lung sounds tight bilaterally  Atropine one amp IV @ 1137  Etomidate 20 mg IV @1138  Succs 100 mg IV @1138  Successful intubation attempt x 1 @ 1139 (+) color change, bilateral breath sounds, 7.5 ET 23 @ the lip     Breonna Max, RN  03/11/20 1143

## 2020-03-11 NOTE — ED PROVIDER NOTES
PROCEDURE NOTE - CENTRAL VENOUS LINE PLACEMENT    PATIENT NAME: Louise Bahena  MEDICAL RECORD NO. [id number]  DATE: 3/11/2020  ATTENDING PHYSICIAN: Dr. Siddiqui Body DIAGNOSIS:  vascular access and centrally administered medications  POSTOPERATIVE DIAGNOSIS:  Same  PROCEDURE PERFORMED:  Right Femoral Vein Central Line Insertion  PERFORMING PHYSICIAN: Ryan Gleason MD  ANESTHESIA:  Local utilizing 1% lidocaine  ESTIMATED BLOOD LOSS:  Less than 25 ml  COMPLICATIONS:  None immediately appreciated. DISCUSSION:  Louise Bahena is a 78y.o.-year-old female who requires central IV access vascular access and centrally administered medications. The history and physical examination were reviewed and confirmed. CONSENT: Unable to be obtained due to the emergent nature of this procedure. PROCEDURE:  A timeout was initiated by the bedside nurse and was confirmed by those present. The patient was placed in a supine position. The skin overlying the Right Femoral Vein was prepped with chlorhexadine and draped in sterile fashion. The skin was infiltrated with local anesthetic. The vessel and surrounding anatomy was visualized using ultrasound. Through the anesthetized region, the introducer needle was inserted into the femoral vein returning dark red non pulsatile blood. A guidewire was attempted to be placed through the center of the needle, but found resistance. Numerous attempts were made to thread the guidewire, but all were unsuccessful. The patient tolerated the procedure well with no immediate complication evident.      Ryan Gleason MD  2:30 PM, 3/11/20          Ryan Gleason MD  Resident  03/11/20 8334

## 2020-03-11 NOTE — PROGRESS NOTES
Patient's , Irving Mar, took all of patient's belongings home ,as charted, EXCEPT for patient's ring that remains on her left ring finger. Patient's primary nurse ,Elizabeth, notified.

## 2020-03-11 NOTE — PLAN OF CARE
Problem: OXYGENATION/RESPIRATORY FUNCTION  Goal: Patient will maintain patent airway  Outcome: Ongoing  Goal: Patient will achieve/maintain normal respiratory rate/effort  Description: Respiratory rate and effort will be within normal limits for the patient  Outcome: Ongoing     Problem: MECHANICAL VENTILATION  Goal: Patient will maintain patent airway  Outcome: Ongoing  Goal: Oral health is maintained or improved  Outcome: Ongoing  Goal: ET tube will be managed safely  Outcome: Ongoing  Goal: Ability to express needs and understand communication  Outcome: Ongoing  Goal: Mobility/activity is maintained at optimum level for patient  Outcome: Ongoing     Problem: SKIN INTEGRITY  Goal: Skin integrity is maintained or improved  Outcome: Ongoing

## 2020-03-11 NOTE — H&P
REPLACEMENT Right 08/22/2017    ant. total hip    NERVE BLOCK Right 03/08/2017    right hip arthrogram injection depomedrol 80mg    TOTAL HIP ARTHROPLASTY Right 8/22/2017    HIP TOTAL ARTHROPLASTY ANTERIOR APPROACH - MEDACTA, C-ARM, MEDACTA TABLE, FASCIA ILIACA BLOCK, NSA=SPINAL VS GENERAL  performed by Sandro Bah DO at [address]:      Allergies   Allergen Reactions    Penicillins      chills         HOME MEDS: :      Prior to Admission medications    Medication Sig Start Date End Date Taking? Authorizing Provider   Meloxicam 10 MG CAPS Take by mouth    Historical Provider, MD   traMADol (ULTRAM) 50 MG tablet Take 1 tablet by mouth 3 times daily as needed for Pain 10/9/17   Sandro Bah,    oxyCODONE-acetaminophen (PERCOCET) 5-325 MG per tablet Take 1 tablet by mouth every 6 hours as needed for Pain . 9/18/17   Camilo Rubio DO   docusate sodium (COLACE) 100 MG capsule Take 1 capsule by mouth 2 times daily 8/24/17   [facility], DO   Misc.  Devices (RAISED TOILET SEAT) MISC 1 Device by Does not apply route daily 8/24/17   Camilo Rubio, DO   loratadine (CLARITIN) 10 MG tablet Take 10 mg by mouth daily    Historical Provider, MD   mupirocin (BACTROBAN NASAL) 2 % nasal ointment Take by Nasal route 2 times daily for 5 days 8/18/17 8/22/17  Sandro Bah DO   aspirin 325 MG tablet Take 325 mg by mouth daily Pt to stop 7 days pre-op    Historical Provider, MD   CHANTIX STARTING MONTH JOEL 0.5 MG X 11 & 1 MG X 42 tablet On 5/20/16 states she has not yet started 2/8/16   Historical Provider, MD   COMBIVENT RESPIMAT  MCG/ACT AERS inhaler Inhale 1 puff into the lungs every 6 hours as needed  2/8/16   Historical Provider, MD   alendronate (FOSAMAX) 35 MG tablet Take 35 mg by mouth every 7 days Sunday 2/5/16   Historical Provider, MD   atenolol (TENORMIN) 50 MG tablet Take 50 mg by mouth 2 times daily  2/8/16   Historical Provider, MD   atorvastatin (LIPITOR) 1213 -- -- 18 100 % -- --   03/11/20 1210 (!) 150/104 120 30 100 % -- --   03/11/20 1205 (!) 159/111 123 23 100 % -- --   03/11/20 1201 (!) 176/117 126 25 100 % -- --   03/11/20 1158 (!) 194/133 133 27 100 % -- --   03/11/20 1150 -- 125 20 100 % -- --   03/11/20 1148 -- 120 20 -- 5' 2\" (1.575 m) --   03/11/20 1145 (!) 176/106 120 19 -- -- --   03/11/20 1143 -- -- -- -- 5' 2\" (1.575 m) 170 lb (77.1 kg)   03/11/20 1141 (!) 156/97 129 16 -- -- --   03/11/20 1138 (!) 146/102 137 24 -- -- --   03/11/20 1137 -- 69 (!) 36 -- -- --   03/11/20 1135 -- (!) 47 -- -- -- --       No intake or output data in the 24 hours ending 03/11/20 1434    Wt Readings from Last 3 Encounters:   03/11/20 170 lb (77.1 kg)   02/17/18 171 lb (77.6 kg)   01/31/18 171 lb 6.4 oz (77.7 kg)     Body mass index is 31.09 kg/m². PHYSICAL EXAM:  Constitutional: Appears well, no distress, intubated/sedatedon propofol and on analgesia fentanyl  EENT: neck supple with midline trachea.   Neck: Supple, symmetrical, trachea midline, no adenopathy,  no jvd, skin normal  Respiratory: Bilateral wheezing  Cardiovascular: regular rate and rhythm, normal S1, S2, no murmur noted  Abdomen: Patient is doing abdominal breathing, tachypneic  Extremities:   no pedal edema, no clubbing or cyanosis    MEDICATIONS:  Scheduled Meds:   propofol        methylPREDNISolone  125 mg Intravenous Once    fentaNYL        ketamine        fentaNYL        fentaNYL        azithromycin  500 mg Intravenous Once    ketamine  100 mg Intravenous Once    fentanNYL  100 mcg Intravenous Once    fentanNYL  100 mcg Intravenous Once    fentaNYL         Continuous Infusions:   propofol      fentaNYL       PRN Meds:   albuterol, 2.5 mg, As Directed RT PRN  ipratropium, 0.5 mg, Q4H PRN  fentanNYL, 25 mcg, Q1H PRN  iohexol, 75 mL, ONCE PRN          ABGs:   Lab Results   Component Value Date    FIO2 NOT REPORTED 03/11/2020       DATA:  Complete Blood Count:   Recent Labs elevated proBNP of greater than 3000 and bilateral pleural effusion and bilateral pulmonary edema with acute presentation and will need to determine if cardiac causes contributing for acute pulmonary edema, alternatively she may have acute bronchospasm as she has COPD and later she developed pulmonary edema which is possible. CTA chest shows that she has possible thrombus of the distal endograft she has large endovascular graft from ascending to arch and descending aorta apparently was done in 2016 (currently no record available)  When she is on ventilator in ICU a repeat blood gas was done and pH is 7.1 9/55/73/21 on PRVC/20/400/5/50 percent. Acute hypoxic and hypercapnic respiratory failure. Severe respiratory acidosis. Acute pulmonary edema likely cardiogenic. Bilateral pulmonary infiltrate/pneumonia possible. Hypotension which could be hypovolemic/cardiogenic. Possible thrombosis of distal limb of aortic graft. History of ascending and descending aortic graft. Chronic obstructive pulmonary disease severity is not known. Lactic acidosis secondary to hypovolemia or ischemia/hypoperfusion. Vascular surgery evaluation for possible thrombosis which could be causing lactic acidosis. We will give fluid boluses intermittently depending upon the lactic acid. We will repeat lactic acid. We will repeat troponin now and if elevated will keep following troponin. If troponin elevated then will get cardiology consultation. IV heparin to be started. Echocardiogram.  We will repeat ABG later. Continue with Levophed. Left femoral central line was placed in the ICU. Chest x-ray to be repeated tomorrow. Sputum blood and urine culture. Rocephin and Zithromax started will continue. Monitor urine output and renal function. We will check viral respiratory PCR. Discussed with nursing staff, treatment plan discussed.   Discussed with respiratory therapist.    Total critical care time caring for this patient with life threatening, unstable organ failure, including direct patient contact, management of life support systems, review of data including imaging and labs, discussions with other team members and physicians at least 54  Min so far today, excluding procedures. Please note that this chart was generated using voice recognition Dragon dictation software. Although every effort was made to ensure the accuracy of this automated transcription, some errors in transcription may have occurred.     Di Mitchell MD  3/11/2020 8:03 PM

## 2020-03-11 NOTE — PLAN OF CARE
Problem: OXYGENATION/RESPIRATORY FUNCTION  Goal: Patient will maintain patent airway  3/11/2020 1830 by Agus Olivarez RN  Outcome: Ongoing  3/11/2020 1201 by Caleb Welch RCP  Outcome: Ongoing  Goal: Patient will achieve/maintain normal respiratory rate/effort  Description: Respiratory rate and effort will be within normal limits for the patient  3/11/2020 1830 by Agus Olivarez RN  Outcome: Ongoing  3/11/2020 1201 by Caleb Welch RCP  Outcome: Ongoing     Problem: MECHANICAL VENTILATION  Goal: Patient will maintain patent airway  3/11/2020 1830 by Agus Olivarez RN  Outcome: Ongoing  3/11/2020 1201 by Caleb Welch RCP  Outcome: Ongoing  Goal: Oral health is maintained or improved  3/11/2020 1830 by Agus Olivarez RN  Outcome: Ongoing  3/11/2020 1201 by Caleb Welch RCP  Outcome: Ongoing  Goal: ET tube will be managed safely  3/11/2020 1830 by Agus Olivarez RN  Outcome: Ongoing  3/11/2020 1201 by Caleb Welch RCP  Outcome: Ongoing  Goal: Ability to express needs and understand communication  3/11/2020 1830 by Agus Olivarez RN  Outcome: Ongoing  3/11/2020 1201 by Caleb Welch RCP  Outcome: Ongoing  Goal: Mobility/activity is maintained at optimum level for patient  3/11/2020 1830 by Agus Olivarez RN  Outcome: Ongoing  3/11/2020 1201 by Caleb Welch RCP  Outcome: Ongoing     Problem: SKIN INTEGRITY  Goal: Skin integrity is maintained or improved  3/11/2020 1830 by Agus Olivarez RN  Outcome: Ongoing  3/11/2020 1201 by Caleb Welch RCP  Outcome: Ongoing     Problem: Falls - Risk of:  Goal: Will remain free from falls  Description: Will remain free from falls  Outcome: Ongoing  Goal: Absence of physical injury  Description: Absence of physical injury  Outcome: Ongoing     Problem: Restraint Use - Nonviolent/Non-Self-Destructive Behavior:  Goal: Absence of restraint indications  Description: Absence of restraint indications  Outcome: Not Met This Shift  Goal: Absence of restraint-related injury  Description: Absence of restraint-related injury  Outcome: Met This Shift     Problem: Anxiety:  Goal: Level of anxiety will decrease  Description: Level of anxiety will decrease  Outcome: Ongoing

## 2020-03-11 NOTE — ED NOTES
Bed: 13  Expected date:   Expected time:   Means of arrival:   Comments:  [address] Peacock RN  03/11/20 1134

## 2020-03-11 NOTE — ED NOTES
Propofol titrated to 30 mcg/kg/min @ 1231  Titrated to 20 mcg/kg/min @ 1235  Titrated to 10 mcg/kg/min @ Preeti Rios RN  03/11/20 0945

## 2020-03-11 NOTE — PROGRESS NOTES
Date: 3/11/2020  Time: 1145  Patient identity confirmed:  Yes  Indications: impending resp failure  Preoxygenation: yes    Laryngoscope size and type Glidescope  Airway introducer used: Yes  Evac: Yes  ETT size:a 7.5 cuffed  Number of attempts:1   Cords visualized:  [x] Clearly  [] Poorly  Breath sounds present bilaterally: Yes   ETCO2   [x] Positive   ETT secured at  23cm    ETT secured with luiza  Chest x-ray ordered: Yes     Difficult airway:    No       If yes, was red tape placed around ETT:   No    Was this a Code Situation:    No             BP: (!) 156/97        Procedure performed by: Dr Enio Garza  11:50 AM

## 2020-03-12 ENCOUNTER — APPOINTMENT (OUTPATIENT)
Dept: GENERAL RADIOLOGY | Age: 70
DRG: 981 | End: 2020-03-12
Payer: MEDICARE

## 2020-03-12 LAB
ABSOLUTE EOS #: 0 K/UL (ref 0–0.4)
ABSOLUTE IMMATURE GRANULOCYTE: 0.16 K/UL (ref 0–0.3)
ABSOLUTE LYMPH #: 0.79 K/UL (ref 1–4.8)
ABSOLUTE MONO #: 0.47 K/UL (ref 0.1–0.8)
ALLEN TEST: ABNORMAL
ALLEN TEST: ABNORMAL
ANION GAP SERPL CALCULATED.3IONS-SCNC: 16 MMOL/L (ref 9–17)
BASOPHILS # BLD: 0 % (ref 0–2)
BASOPHILS ABSOLUTE: 0 K/UL (ref 0–0.2)
BUN BLDV-MCNC: 22 MG/DL (ref 8–23)
BUN/CREAT BLD: ABNORMAL (ref 9–20)
CALCIUM SERPL-MCNC: 7.4 MG/DL (ref 8.6–10.4)
CHLORIDE BLD-SCNC: 114 MMOL/L (ref 98–107)
CO2: 16 MMOL/L (ref 20–31)
CREAT SERPL-MCNC: 1.37 MG/DL (ref 0.5–0.9)
CULTURE: NO GROWTH
CULTURE: NORMAL
DIFFERENTIAL TYPE: ABNORMAL
DIRECT EXAM: NORMAL
EKG ATRIAL RATE: 120 BPM
EKG ATRIAL RATE: 97 BPM
EKG P AXIS: 20 DEGREES
EKG P AXIS: 61 DEGREES
EKG P-R INTERVAL: 146 MS
EKG P-R INTERVAL: 156 MS
EKG Q-T INTERVAL: 324 MS
EKG Q-T INTERVAL: 356 MS
EKG QRS DURATION: 86 MS
EKG QRS DURATION: 94 MS
EKG QTC CALCULATION (BAZETT): 452 MS
EKG QTC CALCULATION (BAZETT): 457 MS
EKG R AXIS: -19 DEGREES
EKG R AXIS: 21 DEGREES
EKG T AXIS: 102 DEGREES
EKG T AXIS: 26 DEGREES
EKG VENTRICULAR RATE: 120 BPM
EKG VENTRICULAR RATE: 97 BPM
EOSINOPHILS RELATIVE PERCENT: 0 % (ref 1–4)
FIO2: 30
FIO2: 30
GFR AFRICAN AMERICAN: 46 ML/MIN
GFR NON-AFRICAN AMERICAN: 38 ML/MIN
GFR SERPL CREATININE-BSD FRML MDRD: ABNORMAL ML/MIN/{1.73_M2}
GFR SERPL CREATININE-BSD FRML MDRD: ABNORMAL ML/MIN/{1.73_M2}
GLUCOSE BLD-MCNC: 121 MG/DL (ref 65–105)
GLUCOSE BLD-MCNC: 129 MG/DL (ref 65–105)
GLUCOSE BLD-MCNC: 238 MG/DL (ref 70–99)
GLUCOSE BLD-MCNC: 276 MG/DL (ref 74–100)
HCT VFR BLD CALC: 35.9 % (ref 36.3–47.1)
HCT VFR BLD CALC: 39.1 % (ref 36.3–47.1)
HEMOGLOBIN: 11 G/DL (ref 11.9–15.1)
HEMOGLOBIN: 11.7 G/DL (ref 11.9–15.1)
IMMATURE GRANULOCYTES: 1 %
INR BLD: 1.1
LACTIC ACID, WHOLE BLOOD: 1.1 MMOL/L (ref 0.7–2.1)
LACTIC ACID, WHOLE BLOOD: 1.1 MMOL/L (ref 0.7–2.1)
LACTIC ACID, WHOLE BLOOD: 2.9 MMOL/L (ref 0.7–2.1)
LACTIC ACID, WHOLE BLOOD: 4.2 MMOL/L (ref 0.7–2.1)
LACTIC ACID: ABNORMAL MMOL/L
LACTIC ACID: ABNORMAL MMOL/L
LACTIC ACID: NORMAL MMOL/L
LACTIC ACID: NORMAL MMOL/L
LV EF: 54 %
LVEF MODALITY: NORMAL
LYMPHOCYTES # BLD: 5 % (ref 24–44)
Lab: NORMAL
MAGNESIUM: 2.3 MG/DL (ref 1.6–2.6)
MCH RBC QN AUTO: 30.1 PG (ref 25.2–33.5)
MCH RBC QN AUTO: 30.2 PG (ref 25.2–33.5)
MCHC RBC AUTO-ENTMCNC: 29.9 G/DL (ref 28.4–34.8)
MCHC RBC AUTO-ENTMCNC: 30.6 G/DL (ref 28.4–34.8)
MCV RBC AUTO: 101 FL (ref 82.6–102.9)
MCV RBC AUTO: 98.4 FL (ref 82.6–102.9)
MODE: ABNORMAL
MODE: ABNORMAL
MONOCYTES # BLD: 3 % (ref 1–7)
MORPHOLOGY: ABNORMAL
MORPHOLOGY: ABNORMAL
NEGATIVE BASE EXCESS, ART: 6 (ref 0–2)
NEGATIVE BASE EXCESS, ART: 8 (ref 0–2)
NRBC AUTOMATED: 0 PER 100 WBC
NRBC AUTOMATED: 0 PER 100 WBC
NUCLEATED RED BLOOD CELLS: 1 PER 100 WBC
O2 DEVICE/FLOW/%: ABNORMAL
O2 DEVICE/FLOW/%: ABNORMAL
PARTIAL THROMBOPLASTIN TIME: >120 SEC (ref 20.5–30.5)
PARTIAL THROMBOPLASTIN TIME: >120 SEC (ref 20.5–30.5)
PATIENT TEMP: ABNORMAL
PATIENT TEMP: ABNORMAL
PDW BLD-RTO: 14.9 % (ref 11.8–14.4)
PDW BLD-RTO: 15.1 % (ref 11.8–14.4)
PLATELET # BLD: 242 K/UL (ref 138–453)
PLATELET # BLD: 259 K/UL (ref 138–453)
PLATELET ESTIMATE: ABNORMAL
PMV BLD AUTO: 10.2 FL (ref 8.1–13.5)
PMV BLD AUTO: 11.4 FL (ref 8.1–13.5)
POC HCO3: 19.2 MMOL/L (ref 21–28)
POC HCO3: 21.3 MMOL/L (ref 21–28)
POC O2 SATURATION: 92 % (ref 94–98)
POC O2 SATURATION: 94 % (ref 94–98)
POC PCO2 TEMP: ABNORMAL MM HG
POC PCO2 TEMP: ABNORMAL MM HG
POC PCO2: 44.4 MM HG (ref 35–48)
POC PCO2: 46.4 MM HG (ref 35–48)
POC PH TEMP: ABNORMAL
POC PH TEMP: ABNORMAL
POC PH: 7.24 (ref 7.35–7.45)
POC PH: 7.27 (ref 7.35–7.45)
POC PO2 TEMP: ABNORMAL MM HG
POC PO2 TEMP: ABNORMAL MM HG
POC PO2: 73.4 MM HG (ref 83–108)
POC PO2: 80.3 MM HG (ref 83–108)
POSITIVE BASE EXCESS, ART: ABNORMAL (ref 0–3)
POSITIVE BASE EXCESS, ART: ABNORMAL (ref 0–3)
POTASSIUM SERPL-SCNC: 3.5 MMOL/L (ref 3.7–5.3)
PROTHROMBIN TIME: 11.1 SEC (ref 9–12)
RBC # BLD: 3.65 M/UL (ref 3.95–5.11)
RBC # BLD: 3.87 M/UL (ref 3.95–5.11)
RBC # BLD: ABNORMAL 10*6/UL
SAMPLE SITE: ABNORMAL
SAMPLE SITE: ABNORMAL
SEG NEUTROPHILS: 91 % (ref 36–66)
SEGMENTED NEUTROPHILS ABSOLUTE COUNT: 14.38 K/UL (ref 1.8–7.7)
SODIUM BLD-SCNC: 146 MMOL/L (ref 135–144)
SPECIMEN DESCRIPTION: NORMAL
TCO2 (CALC), ART: 21 MMOL/L (ref 22–29)
TCO2 (CALC), ART: 23 MMOL/L (ref 22–29)
TROPONIN INTERP: ABNORMAL
TROPONIN T: ABNORMAL NG/ML
TROPONIN, HIGH SENSITIVITY: 453 NG/L (ref 0–14)
TROPONIN, HIGH SENSITIVITY: 560 NG/L (ref 0–14)
TROPONIN, HIGH SENSITIVITY: 663 NG/L (ref 0–14)
WBC # BLD: 15.8 K/UL (ref 3.5–11.3)
WBC # BLD: 17.4 K/UL (ref 3.5–11.3)
WBC # BLD: ABNORMAL 10*3/UL

## 2020-03-12 PROCEDURE — 82803 BLOOD GASES ANY COMBINATION: CPT

## 2020-03-12 PROCEDURE — 94770 HC ETCO2 MONITOR DAILY: CPT

## 2020-03-12 PROCEDURE — 94003 VENT MGMT INPAT SUBQ DAY: CPT

## 2020-03-12 PROCEDURE — 83735 ASSAY OF MAGNESIUM: CPT

## 2020-03-12 PROCEDURE — 2500000003 HC RX 250 WO HCPCS: Performed by: STUDENT IN AN ORGANIZED HEALTH CARE EDUCATION/TRAINING PROGRAM

## 2020-03-12 PROCEDURE — 2000000000 HC ICU R&B

## 2020-03-12 PROCEDURE — 37799 UNLISTED PX VASCULAR SURGERY: CPT

## 2020-03-12 PROCEDURE — 99291 CRITICAL CARE FIRST HOUR: CPT | Performed by: INTERNAL MEDICINE

## 2020-03-12 PROCEDURE — 93010 ELECTROCARDIOGRAM REPORT: CPT | Performed by: INTERNAL MEDICINE

## 2020-03-12 PROCEDURE — 85730 THROMBOPLASTIN TIME PARTIAL: CPT

## 2020-03-12 PROCEDURE — 6360000002 HC RX W HCPCS: Performed by: STUDENT IN AN ORGANIZED HEALTH CARE EDUCATION/TRAINING PROGRAM

## 2020-03-12 PROCEDURE — 2580000003 HC RX 258: Performed by: STUDENT IN AN ORGANIZED HEALTH CARE EDUCATION/TRAINING PROGRAM

## 2020-03-12 PROCEDURE — 93306 TTE W/DOPPLER COMPLETE: CPT

## 2020-03-12 PROCEDURE — 80307 DRUG TEST PRSMV CHEM ANLYZR: CPT

## 2020-03-12 PROCEDURE — 85025 COMPLETE CBC W/AUTO DIFF WBC: CPT

## 2020-03-12 PROCEDURE — 6370000000 HC RX 637 (ALT 250 FOR IP): Performed by: STUDENT IN AN ORGANIZED HEALTH CARE EDUCATION/TRAINING PROGRAM

## 2020-03-12 PROCEDURE — 83605 ASSAY OF LACTIC ACID: CPT

## 2020-03-12 PROCEDURE — 2700000000 HC OXYGEN THERAPY PER DAY

## 2020-03-12 PROCEDURE — C9113 INJ PANTOPRAZOLE SODIUM, VIA: HCPCS | Performed by: STUDENT IN AN ORGANIZED HEALTH CARE EDUCATION/TRAINING PROGRAM

## 2020-03-12 PROCEDURE — 94640 AIRWAY INHALATION TREATMENT: CPT

## 2020-03-12 PROCEDURE — 36415 COLL VENOUS BLD VENIPUNCTURE: CPT

## 2020-03-12 PROCEDURE — 6360000002 HC RX W HCPCS: Performed by: INTERNAL MEDICINE

## 2020-03-12 PROCEDURE — 71045 X-RAY EXAM CHEST 1 VIEW: CPT

## 2020-03-12 PROCEDURE — 82947 ASSAY GLUCOSE BLOOD QUANT: CPT

## 2020-03-12 PROCEDURE — 2580000003 HC RX 258: Performed by: INTERNAL MEDICINE

## 2020-03-12 PROCEDURE — 80048 BASIC METABOLIC PNL TOTAL CA: CPT

## 2020-03-12 PROCEDURE — 94761 N-INVAS EAR/PLS OXIMETRY MLT: CPT

## 2020-03-12 PROCEDURE — 84484 ASSAY OF TROPONIN QUANT: CPT

## 2020-03-12 RX ORDER — ATORVASTATIN CALCIUM 40 MG/1
40 TABLET, FILM COATED ORAL NIGHTLY
Status: DISCONTINUED | OUTPATIENT
Start: 2020-03-12 | End: 2020-03-26 | Stop reason: HOSPADM

## 2020-03-12 RX ORDER — ASPIRIN 81 MG/1
81 TABLET, CHEWABLE ORAL DAILY
Status: DISCONTINUED | OUTPATIENT
Start: 2020-03-12 | End: 2020-03-26 | Stop reason: HOSPADM

## 2020-03-12 RX ORDER — DEXTROSE MONOHYDRATE 50 MG/ML
100 INJECTION, SOLUTION INTRAVENOUS PRN
Status: DISCONTINUED | OUTPATIENT
Start: 2020-03-12 | End: 2020-03-26 | Stop reason: HOSPADM

## 2020-03-12 RX ORDER — DEXTROSE MONOHYDRATE 25 G/50ML
12.5 INJECTION, SOLUTION INTRAVENOUS PRN
Status: DISCONTINUED | OUTPATIENT
Start: 2020-03-12 | End: 2020-03-26 | Stop reason: HOSPADM

## 2020-03-12 RX ORDER — NICOTINE POLACRILEX 4 MG
15 LOZENGE BUCCAL PRN
Status: DISCONTINUED | OUTPATIENT
Start: 2020-03-12 | End: 2020-03-26 | Stop reason: HOSPADM

## 2020-03-12 RX ORDER — SODIUM CHLORIDE 450 MG/100ML
INJECTION, SOLUTION INTRAVENOUS CONTINUOUS
Status: DISCONTINUED | OUTPATIENT
Start: 2020-03-12 | End: 2020-03-15

## 2020-03-12 RX ORDER — METHYLPREDNISOLONE SODIUM SUCCINATE 40 MG/ML
40 INJECTION, POWDER, LYOPHILIZED, FOR SOLUTION INTRAMUSCULAR; INTRAVENOUS EVERY 12 HOURS
Status: DISCONTINUED | OUTPATIENT
Start: 2020-03-13 | End: 2020-03-13

## 2020-03-12 RX ADMIN — IPRATROPIUM BROMIDE AND ALBUTEROL SULFATE 1 AMPULE: .5; 3 SOLUTION RESPIRATORY (INHALATION) at 20:48

## 2020-03-12 RX ADMIN — METHYLPREDNISOLONE SODIUM SUCCINATE 40 MG: 40 INJECTION, POWDER, FOR SOLUTION INTRAMUSCULAR; INTRAVENOUS at 01:01

## 2020-03-12 RX ADMIN — ALBUTEROL SULFATE 2.5 MG: 2.5 SOLUTION RESPIRATORY (INHALATION) at 03:19

## 2020-03-12 RX ADMIN — Medication 150 MCG/HR: at 01:48

## 2020-03-12 RX ADMIN — Medication 10 ML: at 08:43

## 2020-03-12 RX ADMIN — POTASSIUM CHLORIDE 20 MEQ: 29.8 INJECTION, SOLUTION INTRAVENOUS at 08:40

## 2020-03-12 RX ADMIN — AZITHROMYCIN MONOHYDRATE 500 MG: 500 INJECTION, POWDER, LYOPHILIZED, FOR SOLUTION INTRAVENOUS at 01:01

## 2020-03-12 RX ADMIN — CEFTRIAXONE SODIUM 1 G: 1 INJECTION, POWDER, FOR SOLUTION INTRAMUSCULAR; INTRAVENOUS at 23:51

## 2020-03-12 RX ADMIN — POTASSIUM CHLORIDE 20 MEQ: 29.8 INJECTION, SOLUTION INTRAVENOUS at 06:47

## 2020-03-12 RX ADMIN — Medication 125 MCG/HR: at 21:06

## 2020-03-12 RX ADMIN — Medication 150 MCG/HR: at 08:28

## 2020-03-12 RX ADMIN — IPRATROPIUM BROMIDE AND ALBUTEROL SULFATE 1 AMPULE: .5; 3 SOLUTION RESPIRATORY (INHALATION) at 11:16

## 2020-03-12 RX ADMIN — IPRATROPIUM BROMIDE AND ALBUTEROL SULFATE 1 AMPULE: .5; 3 SOLUTION RESPIRATORY (INHALATION) at 08:00

## 2020-03-12 RX ADMIN — Medication 10 ML: at 00:24

## 2020-03-12 RX ADMIN — IPRATROPIUM BROMIDE AND ALBUTEROL SULFATE 1 AMPULE: .5; 3 SOLUTION RESPIRATORY (INHALATION) at 15:48

## 2020-03-12 RX ADMIN — Medication 8 MG/HR: at 05:44

## 2020-03-12 RX ADMIN — NOREPINEPHRINE BITARTRATE 2 MCG/MIN: 1 INJECTION, SOLUTION, CONCENTRATE INTRAVENOUS at 23:50

## 2020-03-12 RX ADMIN — SODIUM CHLORIDE: 4.5 INJECTION, SOLUTION INTRAVENOUS at 10:42

## 2020-03-12 RX ADMIN — Medication 125 MCG/HR: at 15:41

## 2020-03-12 RX ADMIN — Medication 10 ML: at 19:48

## 2020-03-12 RX ADMIN — DESMOPRESSIN ACETATE 40 MG: 0.2 TABLET ORAL at 19:48

## 2020-03-12 RX ADMIN — INSULIN LISPRO 4 UNITS: 100 INJECTION, SOLUTION INTRAVENOUS; SUBCUTANEOUS at 06:12

## 2020-03-12 RX ADMIN — METHYLPREDNISOLONE SODIUM SUCCINATE 40 MG: 40 INJECTION, POWDER, FOR SOLUTION INTRAMUSCULAR; INTRAVENOUS at 08:43

## 2020-03-12 RX ADMIN — Medication 8 MG/HR: at 21:07

## 2020-03-12 RX ADMIN — PANTOPRAZOLE SODIUM 40 MG: 40 INJECTION, POWDER, FOR SOLUTION INTRAVENOUS at 08:43

## 2020-03-12 RX ADMIN — METHYLPREDNISOLONE SODIUM SUCCINATE 40 MG: 40 INJECTION, POWDER, FOR SOLUTION INTRAMUSCULAR; INTRAVENOUS at 17:54

## 2020-03-12 RX ADMIN — CEFTRIAXONE SODIUM 2 G: 2 INJECTION, POWDER, FOR SOLUTION INTRAMUSCULAR; INTRAVENOUS at 00:23

## 2020-03-12 RX ADMIN — ASPIRIN 81 MG: 81 TABLET ORAL at 12:18

## 2020-03-12 ASSESSMENT — PULMONARY FUNCTION TESTS
PIF_VALUE: 8
PIF_VALUE: 10
PIF_VALUE: 13
PIF_VALUE: 15
PIF_VALUE: 20
PIF_VALUE: 10
PIF_VALUE: 19
PIF_VALUE: 20
PIF_VALUE: 7
PIF_VALUE: 10
PIF_VALUE: 17
PIF_VALUE: 19
PIF_VALUE: 13
PIF_VALUE: 11
PIF_VALUE: 20
PIF_VALUE: 7
PIF_VALUE: 7
PIF_VALUE: 13
PIF_VALUE: 24
PIF_VALUE: 8
PIF_VALUE: 5
PIF_VALUE: 25
PIF_VALUE: 6
PIF_VALUE: 21
PIF_VALUE: 10
PIF_VALUE: 21
PIF_VALUE: 11
PIF_VALUE: 11
PIF_VALUE: 10
PIF_VALUE: 21
PIF_VALUE: 18
PIF_VALUE: 21
PIF_VALUE: 20
PIF_VALUE: 24
PIF_VALUE: 15
PIF_VALUE: 20
PIF_VALUE: 9
PIF_VALUE: 23
PIF_VALUE: 13
PIF_VALUE: 7
PIF_VALUE: 24
PIF_VALUE: 22
PIF_VALUE: 22
PIF_VALUE: 18
PIF_VALUE: 22
PIF_VALUE: 7
PIF_VALUE: 21

## 2020-03-12 NOTE — FLOWSHEET NOTE
Ring was removed from left hand and placed in a specimen cup. Ring in cup with given to the pt's  to take home.

## 2020-03-12 NOTE — PLAN OF CARE
Beth Baig RN  Outcome: Ongoing  3/12/2020 0530 by Piedad Berumen RN  Outcome: Ongoing     Problem: Restraint Use - Nonviolent/Non-Self-Destructive Behavior:  Goal: Absence of restraint indications  Description: Absence of restraint indications  3/12/2020 1528 by Olga Lidia Noguera RN  Outcome: Ongoing  3/12/2020 0530 by Piedad Berumen RN  Outcome: Not Met This Shift  Goal: Absence of restraint-related injury  Description: Absence of restraint-related injury  3/12/2020 1528 by Olga Lidia Noguera RN  Outcome: Ongoing  3/12/2020 0530 by Piedad Berumen RN  Outcome: Not Met This Shift     Problem: Anxiety:  Goal: Level of anxiety will decrease  Description: Level of anxiety will decrease  3/12/2020 1528 by Olga Lidia Noguera RN  Outcome: Ongoing  3/12/2020 0530 by Piedad Berumen RN  Outcome: Ongoing     Problem: Nutrition  Goal: Optimal nutrition therapy  3/12/2020 1528 by Olga Lidia Noguera RN  Outcome: Ongoing  3/12/2020 1316 by Bernarda Alvarado RD, LD  Outcome: Ongoing  Note: Nutrition Problem: Inadequate oral intake  Intervention: Food and/or Nutrient Delivery: (Start nutrition as able.  If TF needed, suggest Standard without Fiber goal 55 mL/hr (1584 kcal and 73 g pro/day))  Nutritional Goals: meet % of estimated nutrition needs

## 2020-03-12 NOTE — PLAN OF CARE
Problem: OXYGENATION/RESPIRATORY FUNCTION  Goal: Patient will maintain patent airway  3/12/2020 0844 by Frederick Ruiz RCP  Outcome: Ongoing  3/12/2020 0530 by Alli Kelley RN  Outcome: Ongoing  Goal: Patient will achieve/maintain normal respiratory rate/effort  Description: Respiratory rate and effort will be within normal limits for the patient  3/12/2020 0844 by Frederick Ruiz RCP  Outcome: Ongoing  3/12/2020 0530 by Alli Kelley RN  Outcome: Ongoing     Problem: MECHANICAL VENTILATION  Goal: Patient will maintain patent airway  3/12/2020 0844 by Frederick Ruiz RCP  Outcome: Ongoing  3/12/2020 0530 by Alli Kelley RN  Outcome: Ongoing  Goal: Oral health is maintained or improved  3/12/2020 0844 by Frederick Ruiz RCP  Outcome: Ongoing  3/12/2020 0530 by Alli Kelley RN  Outcome: Ongoing  Goal: ET tube will be managed safely  3/12/2020 0844 by Frederick Ruiz RCP  Outcome: Ongoing  3/12/2020 0530 by Alli Kelley RN  Outcome: Ongoing  Goal: Ability to express needs and understand communication  3/12/2020 0844 by Frederick Ruiz RCP  Outcome: Ongoing  3/12/2020 0530 by Alli Kelley RN  Outcome: Ongoing  Goal: Mobility/activity is maintained at optimum level for patient  3/12/2020 0844 by Frederick Ruiz RCP  Outcome: Ongoing  3/12/2020 0530 by Alli Kelley RN  Outcome: Ongoing     Problem: SKIN INTEGRITY  Goal: Skin integrity is maintained or improved  3/12/2020 0844 by Frederick Ruiz RCP  Outcome: Ongoing  3/12/2020 0530 by Alli Kelley RN  Outcome: Ongoing   BRONCHOSPASM/BRONCHOCONSTRICTION     [x]         IMPROVE AERATION/BREATH SOUNDS  [x]   ADMINISTER BRONCHODILATOR THERAPY AS APPROPRIATE  [x]   ASSESS BREATH SOUNDS  []   IMPLEMENT AEROSOL/MDI PROTOCOL  [x]   PATIENT EDUCATION AS NEEDED

## 2020-03-12 NOTE — PROGRESS NOTES
Spoke to vascular surgery resident bedside. 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 Discussed images. Vascular Surgery plan no active intervention currently. Ok with starting Heparin gtt, Troponins trended up 12<199. F/u troponin trend. Started on heparin gtt. Given repeat 250 bolus. F/u ECHO.      Samuel Benson MD      Department of Internal Medicine  [facility]         3/11/2020, 8:26 PM

## 2020-03-12 NOTE — CONSULTS
disease. Xr Chest Portable    Result Date: 3/11/2020  EXAMINATION: ONE XRAY VIEW OF THE CHEST 3/11/2020 12:25 pm COMPARISON: Chest radiograph performed 08/09/2017. HISTORY: ORDERING SYSTEM PROVIDED HISTORY: sob TECHNOLOGIST PROVIDED HISTORY: sob Reason for Exam: arrest Acuity: Acute Type of Exam: Initial FINDINGS: There are interstitial and airspace opacities in the lungs bilaterally. There is underlying chronic pulmonary change. There is no effusion. There is no pneumothorax. The mediastinal structures are unremarkable. The upper abdomen is unremarkable. The extrathoracic soft tissues are unremarkable. There is an endotracheal tube with the tip in the midtrachea. There is a gastric tube with the tip below the diaphragm. Chronic pulmonary change with interstitial and airspace opacities concerning for pneumonia. Ct Chest Pulmonary Embolism W Contrast    Result Date: 3/11/2020  EXAMINATION: CTA OF THE CHEST 3/11/2020 2:28 pm TECHNIQUE: CTA of the chest was performed after the administration of intravenous contrast.  Multiplanar reformatted images are provided for review. MIP images are provided for review. Dose modulation, iterative reconstruction, and/or weight based adjustment of the mA/kV was utilized to reduce the radiation dose to as low as reasonably achievable. COMPARISON: None. HISTORY: ORDERING SYSTEM PROVIDED HISTORY: r/o pe TECHNOLOGIST PROVIDED HISTORY: r/o pe Reason for Exam: s/p arrest Acuity: Unknown Type of Exam: Unknown Respiratory failure. Cardiopulmonary arrest. FINDINGS: Pulmonary Arteries: Pulmonary arteries are adequately opacified for evaluation. No evidence of intraluminal filling defect to suggest pulmonary embolism. Main pulmonary artery is normal in caliber. Mediastinum: Cardiomegaly. Prior endovascular repair of the aorta. 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   Cannot exclude acute dissection on the basis of this exam.  Coronary artery
by      Attending Physician Statement:    I have discussed the care of  Briseida Guaman , including pertinent history and exam findings, with the Cardiology fellow/resident. I have seen and examined the patient and the key elements of all parts of the encounter have been performed by me. I agree with the assessment, plan and orders as documented by the fellow/resident, after I modified exam findings and plan of treatments, and the final version is my approved version of the assessment. Additional Comments: The patient was seen and examined, agree with above, presented with SOB, requiring mechanical ventilation. Had brachycardia which required atropine. Troponin is high, possible type I vs type 2 MI. Will continue supportive therapy. Pulmonary edema. On IV heparin. Levophed, wean as tolerated, will do echo today. Plan for Coronary angiography when other issues resolve, through right radial approach due to Mural thrombus in the TEVAR.

## 2020-03-12 NOTE — PROGRESS NOTES
Nutrition Assessment    Type and Reason for Visit: Initial(vent )    Nutrition Recommendations: Start nutrition as able. If TF needed, suggest Standard without Fiber goal 55 mL/hr (1584 kcal and 73 g pro/day). Will continue to follow/monitor plans. Nutrition Assessment: Pt is intubated. No nutrition at present. Will provide recommendations. Malnutrition Assessment:  · Malnutrition Status: Insufficient data  · Context: Acute illness or injury  · Findings of the 6 clinical characteristics of malnutrition (Minimum of 2 out of 6 clinical characteristics is required to make the diagnosis of moderate or severe Protein Calorie Malnutrition based on AND/ASPEN Guidelines):  1. Energy Intake-Unable to assess, Unable to assess    2. Weight Loss-Unable to assess,    3. Fat Loss-No significant subcutaneous fat loss,    4. Muscle Loss-No significant muscle mass loss,    5. Fluid Accumulation-Moderate to severe fluid accumulation, Extremities, Generalized  6.  Strength-Not measured    Nutrition Risk Level: High    Nutrient Needs:  · Estimated Daily Total Kcal: 1600 kcal/day  · Estimated Daily Protein (g): 75 g pro/day    Nutrition Diagnosis:   · Problem: Inadequate oral intake  · Etiology: related to Impaired respiratory function-inability to consume food     Signs and symptoms:  as evidenced by NPO status due to medical condition    Objective Information:  · Current Nutrition Therapies:  · Oral Diet Orders: NPO   · Anthropometric Measures:  · Ht: 5' 2\" (157.5 cm)   · Current Body Wt: 178 lb 9.2 oz (81 kg)  · Ideal Body Wt: 110 lb (49.9 kg), % Ideal Body 162%  · BMI Classification: BMI 30.0 - 34.9 Obese Class I    Nutrition Interventions:   Start nutrition as able.  If TF needed, suggest Standard without Fiber goal 55 mL/hr (1584 kcal and 73 g pro/day)  Continued Inpatient Monitoring, Education Not Indicated    Nutrition Evaluation:   · Evaluation: Goals set   · Goals: meet % of estimated nutrition needs · Monitoring: Nutrition Progression, Weight, Pertinent Labs, I&O      Electronically signed by Sheila Lizama RD, LD on 3/12/20 at 1:14 PM EDT    Contact Number: [phone]

## 2020-03-12 NOTE — PROGRESS NOTES
5.4<4.2<2.7. Given 2 boluses of 250 mL. WBC trended down 17.4<15. 8. Troponins trended up 12<199<663<560. Continued on high-dose heparin drip. ABG better early morning showed pH 7.2/PCO2 46/bicarb 21/PO2 73.4  Overnight patient had brownish secretions from NG. No active signs and symptoms of bleeding. Hb stable. Continue to monitor. No other acute issues overnight.     AWAKE & FOLLOWING COMMANDS:  [x] No   [] Yes    CURRENT VENTILATION STATUS:     [x] Ventilator  [] BIPAP  [] Nasal Cannula [] Room Air        SECRETIONS Amount:  [] Small [x] Moderate  [] Large  [] None  Color:     [] White [] Colored  [] Bloody    SEDATION:  RAAS Score:  [] Propofol gtt  [x] Versed gtt  [] Ativan gtt   [] No Sedation    PARALYZED:  [x] No    [] Yes    DIARRHEA:                [x] No                [] Yes  (C. Difficile status: [] positive                                                                                                                       [] negative                                                                                                                     [] pending)    VASOPRESSORS:  [] No    [x] Yes    If yes -   [x] Levophed       [] Dopamine     [] Vasopressin       [] Dobutamine  [] Phenylephrine         [] Epinephrine    CENTRAL LINES:     [] No   [x] Yes   (Date of Insertion:   )           If yes -     [] Right IJ     [] Left IJ [x] Right Femoral [] Left Femoral                   [] Right Subclavian [] Left Subclavian       ALBERT'S CATHETER:   [] No   [x] Yes  (Date of Insertion:   )     URINE OUTPUT:            [] Good   [x] Low              [] Anuric      OBJECTIVE:     VITAL SIGNS:  /61   Pulse 93   Temp 95.7 °F (35.4 °C) (Core)   Resp 21   Ht 5' 2\" (1.575 m)   Wt 178 lb 9.2 oz (81 kg)   SpO2 97%   BMI 32.66 kg/m²   Tmax over 24 hours:  Temp (24hrs), Av.7 °F (35.4 °C), Min:95.7 °F (35.4 °C), Max:95.7 °F (35.4 °C)      Patient Vitals for the past 8 hrs:   BP Pulse Resp SpO2 mechanical ventilation  EENT: PERRLA, EOMI, sclera clear, anicteric, oropharynx clear, no lesions, neck supple with midline trachea. Neck: Supple, symmetrical, trachea midline, no adenopathy, thyroid symmetric, no jvd skin normal  Respiratory: Lateral wheezes and crackles present.    Cardiovascular: regular rate and rhythm, normal S1, S2, no murmur noted and 2+ pulses throughout  Abdomen: soft, nontender, nondistended, no masses or organomegaly  NEUROLOGIC Intubated and sedated continued on mechanical ventilation  Extremities:  peripheral pulses normal, no pedal edema, no clubbing or cyanosis  SKIN: normal coloration and turgor    Any additional physical findings:      MEDICATIONS:  Scheduled Meds:   insulin lispro  0-12 Units Subcutaneous 4 times per day    sodium chloride flush  10 mL Intravenous 2 times per day    nicotine  1 patch Transdermal Daily    pantoprazole  40 mg Intravenous Daily    And    sodium chloride (PF)  10 mL Intravenous Daily    ipratropium-albuterol  1 ampule Inhalation 4x daily    cefTRIAXone (ROCEPHIN) IV  2 g Intravenous Q24H    azithromycin  500 mg Intravenous Q24H    methylPREDNISolone  40 mg Intravenous Q8H    albuterol  2.5 mg Nebulization BID     Continuous Infusions:   dextrose      propofol Stopped (03/11/20 1625)    fentaNYL 150 mcg/hr (03/12/20 0148)    sodium chloride 125 mL/hr at 03/11/20 1617    norepinephrine 8 mcg/min (03/12/20 0650)    midazolam 8 mg/hr (03/12/20 0544)    heparin (porcine) 14 Units/kg/hr (03/12/20 0343)     PRN Meds:   glucose, 15 g, PRN  dextrose, 12.5 g, PRN  glucagon (rDNA), 1 mg, PRN  dextrose, 100 mL/hr, PRN  sodium chloride flush, 10 mL, PRN  acetaminophen, 650 mg, Q6H PRN    Or  acetaminophen, 650 mg, Q6H PRN  polyethylene glycol, 17 g, Daily PRN  promethazine, 12.5 mg, Q6H PRN    Or  ondansetron, 4 mg, Q6H PRN  potassium chloride, 10 mEq, PRN  potassium chloride, 20 mEq, PRN  magnesium sulfate, 2 g, PRN  albuterol, 2.5 mg, As Directed Calcium:  Recent Labs     03/12/20  0455   CALCIUM 7.4*     S. Ionized Calcium:No results for input(s): IONCA in the last 72 hours. Urinalysis:   Lab Results   Component Value Date    NITRU NEGATIVE 03/11/2020    COLORU YELLOW 03/11/2020    PHUR 6.0 03/11/2020    WBCUA 50  03/11/2020    RBCUA 10 TO 20 03/11/2020    MUCUS NOT REPORTED 03/11/2020    TRICHOMONAS NOT REPORTED 03/11/2020    YEAST NOT REPORTED 03/11/2020    BACTERIA NOT REPORTED 03/11/2020    SPECGRAV 1.014 03/11/2020    LEUKOCYTESUR NEGATIVE 03/11/2020    UROBILINOGEN Normal 03/11/2020    BILIRUBINUR NEGATIVE 03/11/2020    GLUCOSEU 1+ 03/11/2020    KETUA NEGATIVE 03/11/2020    AMORPHOUS NOT REPORTED 03/11/2020       CARDIAC ENZYMES: No results for input(s): CKMB, CKMBINDEX, TROPONINI in the last 72 hours. Invalid input(s): CKTOTAL;3  BNP: No results for input(s): BNP in the last 72 hours. LFTS  Recent Labs     03/11/20  1246   ALKPHOS 89   ALT 64*   AST 78*   BILITOT 0.18*   BILIDIR 0.10   LABALBU 3.0*       AMYLASE/LIPASE/AMMONIA  Recent Labs     03/11/20  1246   AMYLASE 82   LIPASE 39       Last 3 Blood Glucose:   Recent Labs     03/11/20  1246 03/12/20  0455   GLUCOSE 264* 238*      HgBA1c:    Lab Results   Component Value Date    LABA1C 6.2 03/11/2020         TSH:  No results found for: TSH  ANEMIA STUDIES  No results for input(s): LABIRON, TIBC, FERRITIN, [id number], FOLATE, OCCULTBLD in the last 72 hours.             Cultures during this admission:     Blood cultures:                 [] None drawn      [x] Negative             []  Positive (Details:  )  Urine Culture:                   [] None drawn      [x] Negative             []  Positive (Details:  )  Sputum Culture:               [] None drawn       [x] Negative             []  Positive (Details:  )        Chest Xray (3/12/2020):  Resolution of interstitial opacities which may represent resolving pneumonia   versus decreased pulmonary edema.       Endotracheal tube is slightly off.  Echocardiogram.  Follow-up with cardiology. Not a candidate for beta-blocker at this time. Follow-up with vascular surgery for concern for thrombosis of aortic graft and need a definite answer from them. Monitor urine output and renal function. We will repeat arterial blood gas tomorrow. With aspirin and statins. Currently on Rocephin and Zithromax  Continue with IV steroid will decrease the dose of Solu-Medrol though. This with nursing staff, treatment plan discussed P  Discussed with respiratory therapist.    Total critical care time caring for this patient with life threatening, unstable organ failure, including direct patient contact, management of life support systems, review of data including imaging and labs, discussions with other team members and physicians at least 36  Min so far today, excluding procedures. Please note that this chart was generated using voice recognition Dragon dictation software. Although every effort was made to ensure the accuracy of this automated transcription, some errors in transcription may have occurred.             Casey Mari MD  3/12/2020 9:29 AM

## 2020-03-12 NOTE — PLAN OF CARE
Problem: OXYGENATION/RESPIRATORY FUNCTION  Goal: Patient will maintain patent airway  3/12/2020 0530 by Tj Talamantes RN  Outcome: Ongoing  3/11/2020 1830 by Jerel Ascencio RN  Outcome: Ongoing  Goal: Patient will achieve/maintain normal respiratory rate/effort  Description: Respiratory rate and effort will be within normal limits for the patient  3/12/2020 0530 by Tj Talamantes RN  Outcome: Ongoing  3/11/2020 1830 by Jerel Ascencio RN  Outcome: Ongoing     Problem: MECHANICAL VENTILATION  Goal: Patient will maintain patent airway  3/12/2020 0530 by Tj Talamantes RN  Outcome: Ongoing  3/11/2020 1830 by Jerel Ascencio RN  Outcome: Ongoing  Goal: Oral health is maintained or improved  3/12/2020 0530 by Tj Talamantes RN  Outcome: Ongoing  3/11/2020 1830 by Jerel Ascencio RN  Outcome: Ongoing  Goal: ET tube will be managed safely  3/12/2020 0530 by Tj Talamantes RN  Outcome: Ongoing  3/11/2020 1830 by Jerel Ascencio RN  Outcome: Ongoing  Goal: Ability to express needs and understand communication  3/12/2020 0530 by Tj Talamantes RN  Outcome: Ongoing  3/11/2020 1830 by Jerel Ascencio RN  Outcome: Ongoing  Goal: Mobility/activity is maintained at optimum level for patient  3/12/2020 0530 by Tj Talamantes RN  Outcome: Ongoing  3/11/2020 1830 by Jerel Ascencio RN  Outcome: Ongoing     Problem: SKIN INTEGRITY  Goal: Skin integrity is maintained or improved  3/12/2020 0530 by Tj Talamantes RN  Outcome: Ongoing  3/11/2020 1830 by Jerel Ascencio RN  Outcome: Ongoing     Problem: Falls - Risk of:  Goal: Will remain free from falls  Description: Will remain free from falls  3/12/2020 0530 by Tj Talamantes RN  Outcome: Ongoing  3/11/2020 1830 by Jerel Ascencio RN  Outcome: Ongoing  Goal: Absence of physical injury  Description: Absence of physical injury  3/12/2020 0530 by Tj Talamantes RN  Outcome: Ongoing  3/11/2020 1830 by Jerel Ascencio RN  Outcome: Ongoing     Problem: Anxiety:  Goal: Level of anxiety will

## 2020-03-13 ENCOUNTER — APPOINTMENT (OUTPATIENT)
Dept: GENERAL RADIOLOGY | Age: 70
DRG: 981 | End: 2020-03-13
Payer: MEDICARE

## 2020-03-13 LAB
ABSOLUTE EOS #: 0 K/UL (ref 0–0.4)
ABSOLUTE IMMATURE GRANULOCYTE: 0 K/UL (ref 0–0.3)
ABSOLUTE LYMPH #: 0.22 K/UL (ref 1–4.8)
ABSOLUTE MONO #: 1.08 K/UL (ref 0.1–0.8)
ALLEN TEST: ABNORMAL
ALLEN TEST: ABNORMAL
ANION GAP SERPL CALCULATED.3IONS-SCNC: 10 MMOL/L (ref 9–17)
BASOPHILS # BLD: 0 % (ref 0–2)
BASOPHILS ABSOLUTE: 0 K/UL (ref 0–0.2)
BUN BLDV-MCNC: 30 MG/DL (ref 8–23)
BUN/CREAT BLD: ABNORMAL (ref 9–20)
CALCIUM SERPL-MCNC: 7.1 MG/DL (ref 8.6–10.4)
CHLORIDE BLD-SCNC: 111 MMOL/L (ref 98–107)
CO2: 17 MMOL/L (ref 20–31)
CREAT SERPL-MCNC: 1.24 MG/DL (ref 0.5–0.9)
D-DIMER QUANTITATIVE: 10.25 MG/L FEU
DIFFERENTIAL TYPE: ABNORMAL
EOSINOPHILS RELATIVE PERCENT: 0 % (ref 1–4)
FIBRINOGEN: 339 MG/DL (ref 140–420)
FIO2: 30
FIO2: 30
GFR AFRICAN AMERICAN: 52 ML/MIN
GFR NON-AFRICAN AMERICAN: 43 ML/MIN
GFR SERPL CREATININE-BSD FRML MDRD: ABNORMAL ML/MIN/{1.73_M2}
GFR SERPL CREATININE-BSD FRML MDRD: ABNORMAL ML/MIN/{1.73_M2}
GLUCOSE BLD-MCNC: 132 MG/DL (ref 65–105)
GLUCOSE BLD-MCNC: 135 MG/DL (ref 65–105)
GLUCOSE BLD-MCNC: 136 MG/DL (ref 65–105)
GLUCOSE BLD-MCNC: 140 MG/DL (ref 65–105)
GLUCOSE BLD-MCNC: 144 MG/DL (ref 65–105)
GLUCOSE BLD-MCNC: 152 MG/DL (ref 70–99)
HCT VFR BLD CALC: 31.3 % (ref 36.3–47.1)
HCT VFR BLD CALC: 31.5 % (ref 36.3–47.1)
HEMOGLOBIN: 9.5 G/DL (ref 11.9–15.1)
HEMOGLOBIN: 9.5 G/DL (ref 11.9–15.1)
IMMATURE GRANULOCYTES: 0 %
INR BLD: 1
INR BLD: 1
LACTIC ACID, WHOLE BLOOD: 0.6 MMOL/L (ref 0.7–2.1)
LACTIC ACID, WHOLE BLOOD: 0.7 MMOL/L (ref 0.7–2.1)
LACTIC ACID, WHOLE BLOOD: 0.9 MMOL/L (ref 0.7–2.1)
LACTIC ACID, WHOLE BLOOD: 0.9 MMOL/L (ref 0.7–2.1)
LACTIC ACID, WHOLE BLOOD: 1 MMOL/L (ref 0.7–2.1)
LACTIC ACID: ABNORMAL MMOL/L
LACTIC ACID: NORMAL MMOL/L
LYMPHOCYTES # BLD: 1 % (ref 24–44)
MCH RBC QN AUTO: 29.6 PG (ref 25.2–33.5)
MCHC RBC AUTO-ENTMCNC: 30.2 G/DL (ref 28.4–34.8)
MCV RBC AUTO: 98.1 FL (ref 82.6–102.9)
MODE: ABNORMAL
MODE: ABNORMAL
MONOCYTES # BLD: 5 % (ref 1–7)
MORPHOLOGY: ABNORMAL
MORPHOLOGY: ABNORMAL
NEGATIVE BASE EXCESS, ART: 6 (ref 0–2)
NEGATIVE BASE EXCESS, ART: 7 (ref 0–2)
NRBC AUTOMATED: 0 PER 100 WBC
O2 DEVICE/FLOW/%: ABNORMAL
O2 DEVICE/FLOW/%: ABNORMAL
PARTIAL THROMBOPLASTIN TIME: 23.9 SEC (ref 20.5–30.5)
PARTIAL THROMBOPLASTIN TIME: 25.5 SEC (ref 20.5–30.5)
PARTIAL THROMBOPLASTIN TIME: 30.8 SEC (ref 20.5–30.5)
PARTIAL THROMBOPLASTIN TIME: 50.3 SEC (ref 20.5–30.5)
PARTIAL THROMBOPLASTIN TIME: 90.3 SEC (ref 20.5–30.5)
PATIENT TEMP: ABNORMAL
PATIENT TEMP: ABNORMAL
PDW BLD-RTO: 15.4 % (ref 11.8–14.4)
PLATELET # BLD: 221 K/UL (ref 138–453)
PLATELET ESTIMATE: ABNORMAL
PMV BLD AUTO: 10.5 FL (ref 8.1–13.5)
POC HCO3: 19.9 MMOL/L (ref 21–28)
POC HCO3: 20.2 MMOL/L (ref 21–28)
POC O2 SATURATION: 90 % (ref 94–98)
POC O2 SATURATION: 91 % (ref 94–98)
POC PCO2 TEMP: ABNORMAL MM HG
POC PCO2 TEMP: ABNORMAL MM HG
POC PCO2: 44.2 MM HG (ref 35–48)
POC PCO2: 44.6 MM HG (ref 35–48)
POC PH TEMP: ABNORMAL
POC PH TEMP: ABNORMAL
POC PH: 7.26 (ref 7.35–7.45)
POC PH: 7.26 (ref 7.35–7.45)
POC PO2 TEMP: ABNORMAL MM HG
POC PO2 TEMP: ABNORMAL MM HG
POC PO2: 66.4 MM HG (ref 83–108)
POC PO2: 70.5 MM HG (ref 83–108)
POSITIVE BASE EXCESS, ART: ABNORMAL (ref 0–3)
POSITIVE BASE EXCESS, ART: ABNORMAL (ref 0–3)
POTASSIUM SERPL-SCNC: 3.7 MMOL/L (ref 3.7–5.3)
PROTHROMBIN TIME: 10.4 SEC (ref 9–12)
PROTHROMBIN TIME: 10.4 SEC (ref 9–12)
RBC # BLD: 3.21 M/UL (ref 3.95–5.11)
RBC # BLD: ABNORMAL 10*6/UL
SAMPLE SITE: ABNORMAL
SAMPLE SITE: ABNORMAL
SEG NEUTROPHILS: 94 % (ref 36–66)
SEGMENTED NEUTROPHILS ABSOLUTE COUNT: 20.2 K/UL (ref 1.8–7.7)
SODIUM BLD-SCNC: 138 MMOL/L (ref 135–144)
TCO2 (CALC), ART: 21 MMOL/L (ref 22–29)
TCO2 (CALC), ART: 22 MMOL/L (ref 22–29)
TROPONIN INTERP: ABNORMAL
TROPONIN T: ABNORMAL NG/ML
TROPONIN, HIGH SENSITIVITY: 258 NG/L (ref 0–14)
WBC # BLD: 21.5 K/UL (ref 3.5–11.3)
WBC # BLD: ABNORMAL 10*3/UL

## 2020-03-13 PROCEDURE — 2580000003 HC RX 258: Performed by: STUDENT IN AN ORGANIZED HEALTH CARE EDUCATION/TRAINING PROGRAM

## 2020-03-13 PROCEDURE — 2700000000 HC OXYGEN THERAPY PER DAY

## 2020-03-13 PROCEDURE — 84484 ASSAY OF TROPONIN QUANT: CPT

## 2020-03-13 PROCEDURE — 83605 ASSAY OF LACTIC ACID: CPT

## 2020-03-13 PROCEDURE — 71045 X-RAY EXAM CHEST 1 VIEW: CPT

## 2020-03-13 PROCEDURE — 6360000002 HC RX W HCPCS: Performed by: STUDENT IN AN ORGANIZED HEALTH CARE EDUCATION/TRAINING PROGRAM

## 2020-03-13 PROCEDURE — 6360000002 HC RX W HCPCS: Performed by: INTERNAL MEDICINE

## 2020-03-13 PROCEDURE — 82803 BLOOD GASES ANY COMBINATION: CPT

## 2020-03-13 PROCEDURE — 37799 UNLISTED PX VASCULAR SURGERY: CPT

## 2020-03-13 PROCEDURE — 2000000000 HC ICU R&B

## 2020-03-13 PROCEDURE — 85018 HEMOGLOBIN: CPT

## 2020-03-13 PROCEDURE — 85730 THROMBOPLASTIN TIME PARTIAL: CPT

## 2020-03-13 PROCEDURE — 2580000003 HC RX 258: Performed by: INTERNAL MEDICINE

## 2020-03-13 PROCEDURE — C9113 INJ PANTOPRAZOLE SODIUM, VIA: HCPCS | Performed by: STUDENT IN AN ORGANIZED HEALTH CARE EDUCATION/TRAINING PROGRAM

## 2020-03-13 PROCEDURE — 85379 FIBRIN DEGRADATION QUANT: CPT

## 2020-03-13 PROCEDURE — 94770 HC ETCO2 MONITOR DAILY: CPT

## 2020-03-13 PROCEDURE — 6370000000 HC RX 637 (ALT 250 FOR IP): Performed by: STUDENT IN AN ORGANIZED HEALTH CARE EDUCATION/TRAINING PROGRAM

## 2020-03-13 PROCEDURE — 85610 PROTHROMBIN TIME: CPT

## 2020-03-13 PROCEDURE — 94003 VENT MGMT INPAT SUBQ DAY: CPT

## 2020-03-13 PROCEDURE — 99291 CRITICAL CARE FIRST HOUR: CPT | Performed by: INTERNAL MEDICINE

## 2020-03-13 PROCEDURE — 82947 ASSAY GLUCOSE BLOOD QUANT: CPT

## 2020-03-13 PROCEDURE — 85014 HEMATOCRIT: CPT

## 2020-03-13 PROCEDURE — 94640 AIRWAY INHALATION TREATMENT: CPT

## 2020-03-13 PROCEDURE — 85025 COMPLETE CBC W/AUTO DIFF WBC: CPT

## 2020-03-13 PROCEDURE — 80048 BASIC METABOLIC PNL TOTAL CA: CPT

## 2020-03-13 PROCEDURE — 94761 N-INVAS EAR/PLS OXIMETRY MLT: CPT

## 2020-03-13 PROCEDURE — 85384 FIBRINOGEN ACTIVITY: CPT

## 2020-03-13 RX ORDER — IPRATROPIUM BROMIDE AND ALBUTEROL SULFATE 2.5; .5 MG/3ML; MG/3ML
1 SOLUTION RESPIRATORY (INHALATION) EVERY 6 HOURS
Status: DISCONTINUED | OUTPATIENT
Start: 2020-03-14 | End: 2020-03-16

## 2020-03-13 RX ORDER — METHYLPREDNISOLONE SODIUM SUCCINATE 40 MG/ML
40 INJECTION, POWDER, LYOPHILIZED, FOR SOLUTION INTRAMUSCULAR; INTRAVENOUS EVERY 8 HOURS
Status: DISCONTINUED | OUTPATIENT
Start: 2020-03-13 | End: 2020-03-16

## 2020-03-13 RX ORDER — HEPARIN SODIUM 1000 [USP'U]/ML
4000 INJECTION, SOLUTION INTRAVENOUS; SUBCUTANEOUS PRN
Status: DISCONTINUED | OUTPATIENT
Start: 2020-03-13 | End: 2020-03-24

## 2020-03-13 RX ORDER — HEPARIN SODIUM 1000 [USP'U]/ML
2000 INJECTION, SOLUTION INTRAVENOUS; SUBCUTANEOUS PRN
Status: DISCONTINUED | OUTPATIENT
Start: 2020-03-13 | End: 2020-03-24

## 2020-03-13 RX ORDER — METHYLPREDNISOLONE SODIUM SUCCINATE 40 MG/ML
40 INJECTION, POWDER, LYOPHILIZED, FOR SOLUTION INTRAMUSCULAR; INTRAVENOUS DAILY
Status: DISCONTINUED | OUTPATIENT
Start: 2020-03-14 | End: 2020-03-13

## 2020-03-13 RX ORDER — HEPARIN SODIUM 10000 [USP'U]/100ML
1000 INJECTION, SOLUTION INTRAVENOUS CONTINUOUS
Status: DISCONTINUED | OUTPATIENT
Start: 2020-03-13 | End: 2020-03-19

## 2020-03-13 RX ADMIN — CEFTRIAXONE SODIUM 1 G: 1 INJECTION, POWDER, FOR SOLUTION INTRAMUSCULAR; INTRAVENOUS at 23:17

## 2020-03-13 RX ADMIN — INSULIN LISPRO 2 UNITS: 100 INJECTION, SOLUTION INTRAVENOUS; SUBCUTANEOUS at 19:47

## 2020-03-13 RX ADMIN — ALBUTEROL SULFATE 2.5 MG: 2.5 SOLUTION RESPIRATORY (INHALATION) at 00:12

## 2020-03-13 RX ADMIN — METHYLPREDNISOLONE SODIUM SUCCINATE 40 MG: 40 INJECTION, POWDER, FOR SOLUTION INTRAMUSCULAR; INTRAVENOUS at 06:27

## 2020-03-13 RX ADMIN — METHYLPREDNISOLONE SODIUM SUCCINATE 40 MG: 40 INJECTION, POWDER, FOR SOLUTION INTRAMUSCULAR; INTRAVENOUS at 23:17

## 2020-03-13 RX ADMIN — Medication 10 ML: at 07:54

## 2020-03-13 RX ADMIN — AZITHROMYCIN MONOHYDRATE 500 MG: 500 INJECTION, POWDER, LYOPHILIZED, FOR SOLUTION INTRAVENOUS at 01:17

## 2020-03-13 RX ADMIN — INSULIN LISPRO 2 UNITS: 100 INJECTION, SOLUTION INTRAVENOUS; SUBCUTANEOUS at 23:18

## 2020-03-13 RX ADMIN — IPRATROPIUM BROMIDE AND ALBUTEROL SULFATE 1 AMPULE: .5; 3 SOLUTION RESPIRATORY (INHALATION) at 16:20

## 2020-03-13 RX ADMIN — ASPIRIN 81 MG: 81 TABLET ORAL at 07:54

## 2020-03-13 RX ADMIN — HEPARIN SODIUM AND DEXTROSE 12 UNITS/KG/HR: 10000; 5 INJECTION INTRAVENOUS at 08:40

## 2020-03-13 RX ADMIN — DESMOPRESSIN ACETATE 40 MG: 0.2 TABLET ORAL at 20:54

## 2020-03-13 RX ADMIN — Medication 125 MCG/HR: at 04:43

## 2020-03-13 RX ADMIN — METHYLPREDNISOLONE SODIUM SUCCINATE 40 MG: 40 INJECTION, POWDER, FOR SOLUTION INTRAMUSCULAR; INTRAVENOUS at 15:15

## 2020-03-13 RX ADMIN — PROPOFOL 10 MCG/KG/MIN: 10 INJECTION, EMULSION INTRAVENOUS at 08:01

## 2020-03-13 RX ADMIN — IPRATROPIUM BROMIDE AND ALBUTEROL SULFATE 1 AMPULE: .5; 3 SOLUTION RESPIRATORY (INHALATION) at 19:38

## 2020-03-13 RX ADMIN — PANTOPRAZOLE SODIUM 40 MG: 40 INJECTION, POWDER, FOR SOLUTION INTRAVENOUS at 07:54

## 2020-03-13 RX ADMIN — IPRATROPIUM BROMIDE AND ALBUTEROL SULFATE 1 AMPULE: .5; 3 SOLUTION RESPIRATORY (INHALATION) at 11:56

## 2020-03-13 RX ADMIN — Medication 10 ML: at 20:54

## 2020-03-13 RX ADMIN — SODIUM CHLORIDE: 4.5 INJECTION, SOLUTION INTRAVENOUS at 06:27

## 2020-03-13 RX ADMIN — IPRATROPIUM BROMIDE AND ALBUTEROL SULFATE 1 AMPULE: .5; 3 SOLUTION RESPIRATORY (INHALATION) at 08:11

## 2020-03-13 RX ADMIN — ALBUTEROL SULFATE 2.5 MG: 2.5 SOLUTION RESPIRATORY (INHALATION) at 03:50

## 2020-03-13 ASSESSMENT — PULMONARY FUNCTION TESTS
PIF_VALUE: 26
PIF_VALUE: 10
PIF_VALUE: 7
PIF_VALUE: 17
PIF_VALUE: 13
PIF_VALUE: 12
PIF_VALUE: 7
PIF_VALUE: 18
PIF_VALUE: 12
PIF_VALUE: 17
PIF_VALUE: 20
PIF_VALUE: 23
PIF_VALUE: 19
PIF_VALUE: 20
PIF_VALUE: 19
PIF_VALUE: 17
PIF_VALUE: 7
PIF_VALUE: 21
PIF_VALUE: 21
PIF_VALUE: 20
PIF_VALUE: 10
PIF_VALUE: 7
PIF_VALUE: 26
PIF_VALUE: 21
PIF_VALUE: 12
PIF_VALUE: 20
PIF_VALUE: 20
PIF_VALUE: 23
PIF_VALUE: 21
PIF_VALUE: 20
PIF_VALUE: 8
PIF_VALUE: 17
PIF_VALUE: 21
PIF_VALUE: 24
PIF_VALUE: 19
PIF_VALUE: 24
PIF_VALUE: 25
PIF_VALUE: 18
PIF_VALUE: 14
PIF_VALUE: 12
PIF_VALUE: 47
PIF_VALUE: 9
PIF_VALUE: 15
PIF_VALUE: 26
PIF_VALUE: 20
PIF_VALUE: 21
PIF_VALUE: 23
PIF_VALUE: 20
PIF_VALUE: 17
PIF_VALUE: 21
PIF_VALUE: 20
PIF_VALUE: 20
PIF_VALUE: 9
PIF_VALUE: 18
PIF_VALUE: 22
PIF_VALUE: 20
PIF_VALUE: 22
PIF_VALUE: 21
PIF_VALUE: 17
PIF_VALUE: 18
PIF_VALUE: 20

## 2020-03-13 NOTE — PROGRESS NOTES
Critical Care Team - Daily Progress Note      Date and time: 3/13/2020 6:37 AM  Patient's name:  Harlan Rios Record Number: [id number]  Patient's account/billing number: [de-identified]  Patient's YOB: 1950  Age: 78 y.o. Date of Admission: 3/11/2020 11:34 AM  Length of stay during current admission: 2      Primary Care Physician: Angelica Chawla MD  ICU Attending Physician: Dr. Leonard Hernandez Status: Full Code    Reason for ICU admission:   Chief Complaint   Patient presents with    Respiratory Arrest     Pt arrives via medics for difficulty breathing, on arrival of medics pt outside on porch tripoding position with albuterol in hand then \"went out\" unknown if pulse palpated, CPR started, on monitor pt found to be ST pulse palpated, given mag 2 gm IV along with solumedrol 125 mg         SUBJECTIVE:   Susana Turner is a 78 y.o. with PMH of COPD, essential hypertension, descending thoracic aortic aneurysm was brought to the ED for evaluation of acute onset respiratory arrest and   Loss of pulse s/p CPR and 1 dose of atropine. Later she was intubated sedated on Versed, fentanyl. Started on Rocephin, Zithromax in ER for pneumonia. CT PE was done which is negative for PE but showed bilateral pulmonary infiltrates, pleural effusions, thoracic aortic mesh, mural thrombus throughout the descending limb of endograft  thrombus of distal limb of graft. Vascular surgery was also consulted for concerns of thrombosis of distal limb of graft. Patient's troponins also trended up overnight. Cardiology was consulted and patient was started on heparin drip. OVERNIGHT EVENTS:     Patient seen and examined bedside. VS B.P-133/51. HR 90s. On levo for pressure support at 4. Trying to wean off. Afebrile overnight  Currently sedated on Versed at 8, fentanyl PCA at 6.25. Trying to wean off Versed.    Continued on mechanical ventilation PRVC RR 18//PEEP 5/FiO2 30% SPO2 95%  Labs this morning showed [] None drawn      [x] Negative             []  Positive (Details:  )  Sputum Culture:               [] None drawn       [x] Negative             []  Positive (Details:  )        Chest Xray (3/13/2020):  Resolution of interstitial opacities which may represent resolving pneumonia   versus decreased pulmonary edema.       Endotracheal tube is slightly low in position.  Consider minimally retracting.                 ASSESSMENT:     Active Problems:    Shortness of breath  Resolved Problems:    * No resolved hospital problems. *          PLAN:     1. Acute on chronic hypoxic and hypercapnic respiratory failure severe respiratory acidosis-resolving  Currently sedated on Versed at 8, fentanyl PCA at 6.25. Trying to wean off Versed. Continued on mechanical ventilation PRVC RR 18//PEEP 5/FiO2 30% SPO2 95%  ABG better early morning showed pH 7.2/PCO2 44/bicarb 19.9/PO2-66.4  RR 20//PEEP 8/FiO2 30% SPO2 95%    2. Acute bilateral infiltrates concerning for pulmonary edema vs pneumonia-resolving  Continued on Zithromax. Rocephin. Breathing treatments with albuterol and DuoNeb. IV methylprednisone 40 mg daily. Chest x-ray this morning showed resolving pulmonary edema. 3.  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 No active intervention at this point of time. On low-dose heparin drip for now. Per vascular might do CTA when patient medically stable. 4   Hypotension-continued on pressure support of levo at 4. Weaning off.  5.  Elevated troponins-downtrending. Follow-up EKG this morning. Continued on low-dose heparin drip for now. Cardiology following. 6.  Lactic acidosis resolved. On half-normal saline at 50  Leukocytosis wbc trended up to 21. Afebrile. Continue to monitor. Hypernatremia-resolved. On half-normal saline at 50.   Free water flushes 300 every 6.    7.  COPD stage unspecified -continued on breathing treatments with albuterol. IV steroid every 12  Zithromax and Rocephin for now. 8.  ASHLEY prerenal.  Oliguric type likely from hypo tension-creatinine better 1.24 today-half-normal saline at 50 Daily BMP. Urine output 1.3 L overnight. 9.  Nicotine dependence      Diet-started on tube feeds this morning. Consulted dietary. DVT prophylaxis-on low-dose heparin drip   GI prophylaxis IV Protonix 40 daily    Destinee Tello M.D. Department of Internal Medicine/ Critical care  [facility])             3/13/2020, 6:37 AM        Attending Physician Statement  I have discussed the care of Briseida Guaman, including pertinent history and exam findings with the resident. I have reviewed the key elements of all parts of the encounter with the resident. I have seen and examined the patient with the resident. I agree with the assessment and plan and status of the problem list as documented. I have seen the patient during the round today, I reviewed the chart, lab seen and medications reviewed. Echocardiogram shows ejection fraction of 87%, normal systolic function and estimated RV systolic pressure is 31. Her urine output is 1.2 L in last 24 hours creatinine is 1.24 which is improved her lactic acid was 0.6 and no further lactic acid is needed. She is on heparin drip she has mild bruising from the site of central line yesterday and concern for GI blood loss no drop in hemoglobin, she is on a steroid and WBC count is 21 today. She continues on mechanical ventilatory support, she is more arousable today and follows some command, arterial blood gas shows 7.2 6/44/70/20 with 91% saturation remains metabolic acidosis. She is currently on small dose of Levophed and will try to wean her off Levophed before restarting any beta-blocker. Continue monitor intake and output and urine output.   Continue current ventilatory support and start spontaneous breathing trial she likely

## 2020-03-13 NOTE — PROGRESS NOTES
Ventilator Bronchodilator assessment    Breath sounds: Cl/DIm - exp wheezes  Inspiratory Pressure: 19  Plateau Pressure: 15    Patient assessed at level 2          [x]    Bronchodilator Assessment    BRONCHODILATOR ASSESSMENT SCORE  Score 0 (Home) 1 2 3 4   Breath Sounds   []  Chronic Ventilator: Patient at baseline []  Mild Wheezes/ Clear [x]  Intermittent wheezes with good air entry []  Bilateral/unilateral wheezing with diminished air entry []  Insp/Exp wheeze and/or poor aeration   Ventilator Pressures   []  Chronic Ventilator [x]  Insp. Pressure less than 25 cm H20 []  Insp. Pressure less than 25 cm H20 []  Insp. Pressure exceeds 25 cm H20 []  Insp.  Pressure exceeds 30 cm H20   Plateau Pressure []  NA   [x]  Plateau Pressure less than 4  []  Plateau Pressure less than or equal to 5 []  Plateau Pressure greater than or equal to 6 []  Plateau Pressure greater than or equal to 8       Linda Mueller  7:43 PM

## 2020-03-13 NOTE — CARE COORDINATION
Care Transition  Met with  and he chose [facility]. Referral sent.      Called Admission and left vm to notify of referral.

## 2020-03-13 NOTE — PROGRESS NOTES
138 03/13/2020    K 3.7 03/13/2020     03/13/2020    CO2 17 03/13/2020    BUN 30 03/13/2020    CREATININE 1.24 03/13/2020    GFRAA 52 03/13/2020    LABGLOM 43 03/13/2020    GLUCOSE 152 03/13/2020    PROT 6.3 03/11/2020    LABALBU 3.0 03/11/2020    CALCIUM 7.1 03/13/2020    BILITOT 0.18 03/11/2020    ALKPHOS 89 03/11/2020    AST 78 03/11/2020    ALT 64 03/11/2020     BMP:    Lab Results   Component Value Date     03/13/2020    K 3.7 03/13/2020     03/13/2020    CO2 17 03/13/2020    BUN 30 03/13/2020    LABALBU 3.0 03/11/2020    CREATININE 1.24 03/13/2020    CALCIUM 7.1 03/13/2020    GFRAA 52 03/13/2020    LABGLOM 43 03/13/2020    GLUCOSE 152 03/13/2020     Sodium:    Lab Results   Component Value Date     03/13/2020     Potassium:    Lab Results   Component Value Date    K 3.7 03/13/2020     BUN/Creatinine:    Lab Results   Component Value Date    BUN 30 03/13/2020    CREATININE 1.24 03/13/2020     Hepatic Function Panel:    Lab Results   Component Value Date    ALKPHOS 89 03/11/2020    ALT 64 03/11/2020    AST 78 03/11/2020    PROT 6.3 03/11/2020    BILITOT 0.18 03/11/2020    BILIDIR 0.10 03/11/2020    IBILI 0.08 03/11/2020    LABALBU 3.0 03/11/2020     Albumin:    Lab Results   Component Value Date    LABALBU 3.0 03/11/2020     Calcium:    Lab Results   Component Value Date    CALCIUM 7.1 03/13/2020     Ionized Calcium:  No results found for: IONCA  Magnesium:    Lab Results   Component Value Date    MG 2.3 03/12/2020     Phosphorus:    Lab Results   Component Value Date    PHOS 1.8 08/24/2017     LDH:  No results found for: LDH  Uric Acid:  No results found for: LABURIC, URICACID  PT/INR:    Lab Results   Component Value Date    PROTIME 10.4 03/13/2020    INR 1.0 03/13/2020     Warfarin PT/INR:  No components found for: PTPATWAR, PTINRWAR  PTT:    Lab Results   Component Value Date    APTT 23.9 03/13/2020   [APTT}  Troponin:  No results found for: TROPONINI  Last 3 Troponin:  No results

## 2020-03-13 NOTE — PROGRESS NOTES
03/11/20  1246 03/12/20  0455 03/13/20  0510    146* 138   K 4.3 3.5* 3.7   * 114* 111*   CO2 17* 16* 17*   BUN 16 22 30*   CREATININE 1.11* 1.37* 1.24*   GLUCOSE 264* 238* 152*     Hepatic:   Recent Labs     03/11/20  1246   AST 78*   ALT 64*   BILITOT 0.18*   ALKPHOS 89     Troponin:  Recent Labs     03/12/20  0455 03/12/20  1231 03/13/20  0510   TROPHS 560* 453* 258*            No results for input(s): TROPONINI in the last 72 hours. Recent Labs     03/12/20  0455 03/12/20  1231 03/13/20  0510   TROPONINT NOT REPORTED NOT REPORTED NOT REPORTED     BNP:   Recent Labs     03/11/20  1246   PROBNP 3,678*               No results for input(s): BNP in the last 72 hours. Lipids: No results for input(s): CHOL, HDL in the last 72 hours. Invalid input(s): LDLCALCU  INR:   Recent Labs     03/11/20  2236 03/13/20  0145 03/13/20  0811   INR 1.1 1.0 1.0       Objective:   Vitals: BP (!) 94/46   Pulse 87   Temp 98.2 °F (36.8 °C) (Core) Comment (Src): adkins probe  Resp 21   Ht 5' 2\" (1.575 m)   Wt 191 lb 12.8 oz (87 kg)   SpO2 97%   BMI 35.08 kg/m²      Constitutional and General Appearance: Intubated and sedated   HEENT: PERRL, no cervical lymphadenopathy. No masses palpable. Normal oral mucosa  Respiratory:  · Normal excursion and expansion without use of accessory muscles  · Resp Auscultation: Good respiratory effort. No for increased work of breathing.  On auscultation: clear to auscultation bilaterally  Cardiovascular:  · The apical impulse is not displaced  · Heart tones are crisp and normal. regular S1 and S2.  · Jugular venous pulsation Normal  · The carotid upstroke is normal in amplitude and contour without delay or bruit  · Peripheral pulses are symmetrical and full      Abdomen:   · No masses or tenderness  · Bowel sounds present  Extremities:  ·  No Cyanosis or Clubbing  ·  Lower extremity edema: No  ·  Skin: Warm and dry  Neurological:  Intubated and sedated    Diagnostic Studies:

## 2020-03-14 ENCOUNTER — APPOINTMENT (OUTPATIENT)
Dept: GENERAL RADIOLOGY | Age: 70
DRG: 981 | End: 2020-03-14
Payer: MEDICARE

## 2020-03-14 LAB
ALLEN TEST: ABNORMAL
ANION GAP SERPL CALCULATED.3IONS-SCNC: 10 MMOL/L (ref 9–17)
BUN BLDV-MCNC: 32 MG/DL (ref 8–23)
BUN/CREAT BLD: ABNORMAL (ref 9–20)
CALCIUM SERPL-MCNC: 7.2 MG/DL (ref 8.6–10.4)
CHLORIDE BLD-SCNC: 110 MMOL/L (ref 98–107)
CO2: 19 MMOL/L (ref 20–31)
CREAT SERPL-MCNC: 1.11 MG/DL (ref 0.5–0.9)
FIO2: 30
GFR AFRICAN AMERICAN: 59 ML/MIN
GFR NON-AFRICAN AMERICAN: 49 ML/MIN
GFR SERPL CREATININE-BSD FRML MDRD: ABNORMAL ML/MIN/{1.73_M2}
GFR SERPL CREATININE-BSD FRML MDRD: ABNORMAL ML/MIN/{1.73_M2}
GLUCOSE BLD-MCNC: 146 MG/DL (ref 65–105)
GLUCOSE BLD-MCNC: 150 MG/DL (ref 65–105)
GLUCOSE BLD-MCNC: 165 MG/DL (ref 70–99)
GLUCOSE BLD-MCNC: 182 MG/DL (ref 65–105)
HCT VFR BLD CALC: 29.2 % (ref 36.3–47.1)
HEMOGLOBIN: 9.1 G/DL (ref 11.9–15.1)
LACTIC ACID, WHOLE BLOOD: 1.2 MMOL/L (ref 0.7–2.1)
LACTIC ACID: NORMAL MMOL/L
MCH RBC QN AUTO: 30 PG (ref 25.2–33.5)
MCHC RBC AUTO-ENTMCNC: 31.2 G/DL (ref 28.4–34.8)
MCV RBC AUTO: 96.4 FL (ref 82.6–102.9)
MODE: ABNORMAL
NEGATIVE BASE EXCESS, ART: 3 (ref 0–2)
NRBC AUTOMATED: 0.1 PER 100 WBC
O2 DEVICE/FLOW/%: ABNORMAL
PARTIAL THROMBOPLASTIN TIME: 49.5 SEC (ref 20.5–30.5)
PARTIAL THROMBOPLASTIN TIME: 51 SEC (ref 20.5–30.5)
PARTIAL THROMBOPLASTIN TIME: 73.3 SEC (ref 20.5–30.5)
PATIENT TEMP: ABNORMAL
PDW BLD-RTO: 15.6 % (ref 11.8–14.4)
PHOSPHORUS: 2.8 MG/DL (ref 2.6–4.5)
PLATELET # BLD: 217 K/UL (ref 138–453)
PMV BLD AUTO: 10.3 FL (ref 8.1–13.5)
POC HCO3: 22.2 MMOL/L (ref 21–28)
POC O2 SATURATION: 95 % (ref 94–98)
POC PCO2 TEMP: ABNORMAL MM HG
POC PCO2: 38.2 MM HG (ref 35–48)
POC PH TEMP: ABNORMAL
POC PH: 7.37 (ref 7.35–7.45)
POC PO2 TEMP: ABNORMAL MM HG
POC PO2: 77 MM HG (ref 83–108)
POSITIVE BASE EXCESS, ART: ABNORMAL (ref 0–3)
POTASSIUM SERPL-SCNC: 4 MMOL/L (ref 3.7–5.3)
RBC # BLD: 3.03 M/UL (ref 3.95–5.11)
SAMPLE SITE: ABNORMAL
SODIUM BLD-SCNC: 139 MMOL/L (ref 135–144)
TCO2 (CALC), ART: 23 MMOL/L (ref 22–29)
WBC # BLD: 17.5 K/UL (ref 3.5–11.3)

## 2020-03-14 PROCEDURE — 2700000000 HC OXYGEN THERAPY PER DAY

## 2020-03-14 PROCEDURE — 80048 BASIC METABOLIC PNL TOTAL CA: CPT

## 2020-03-14 PROCEDURE — 2580000003 HC RX 258: Performed by: STUDENT IN AN ORGANIZED HEALTH CARE EDUCATION/TRAINING PROGRAM

## 2020-03-14 PROCEDURE — 94761 N-INVAS EAR/PLS OXIMETRY MLT: CPT

## 2020-03-14 PROCEDURE — 82803 BLOOD GASES ANY COMBINATION: CPT

## 2020-03-14 PROCEDURE — 6370000000 HC RX 637 (ALT 250 FOR IP): Performed by: INTERNAL MEDICINE

## 2020-03-14 PROCEDURE — 94640 AIRWAY INHALATION TREATMENT: CPT

## 2020-03-14 PROCEDURE — 6360000002 HC RX W HCPCS: Performed by: STUDENT IN AN ORGANIZED HEALTH CARE EDUCATION/TRAINING PROGRAM

## 2020-03-14 PROCEDURE — 6370000000 HC RX 637 (ALT 250 FOR IP): Performed by: STUDENT IN AN ORGANIZED HEALTH CARE EDUCATION/TRAINING PROGRAM

## 2020-03-14 PROCEDURE — 85730 THROMBOPLASTIN TIME PARTIAL: CPT

## 2020-03-14 PROCEDURE — 6360000002 HC RX W HCPCS: Performed by: INTERNAL MEDICINE

## 2020-03-14 PROCEDURE — 84100 ASSAY OF PHOSPHORUS: CPT

## 2020-03-14 PROCEDURE — 71045 X-RAY EXAM CHEST 1 VIEW: CPT

## 2020-03-14 PROCEDURE — 85027 COMPLETE CBC AUTOMATED: CPT

## 2020-03-14 PROCEDURE — 37799 UNLISTED PX VASCULAR SURGERY: CPT

## 2020-03-14 PROCEDURE — 99291 CRITICAL CARE FIRST HOUR: CPT | Performed by: INTERNAL MEDICINE

## 2020-03-14 PROCEDURE — 83605 ASSAY OF LACTIC ACID: CPT

## 2020-03-14 PROCEDURE — 82947 ASSAY GLUCOSE BLOOD QUANT: CPT

## 2020-03-14 PROCEDURE — 2580000003 HC RX 258: Performed by: INTERNAL MEDICINE

## 2020-03-14 PROCEDURE — 6360000002 HC RX W HCPCS: Performed by: EMERGENCY MEDICINE

## 2020-03-14 PROCEDURE — C9113 INJ PANTOPRAZOLE SODIUM, VIA: HCPCS | Performed by: STUDENT IN AN ORGANIZED HEALTH CARE EDUCATION/TRAINING PROGRAM

## 2020-03-14 PROCEDURE — 94770 HC ETCO2 MONITOR DAILY: CPT

## 2020-03-14 PROCEDURE — 2000000000 HC ICU R&B

## 2020-03-14 PROCEDURE — 6370000000 HC RX 637 (ALT 250 FOR IP): Performed by: EMERGENCY MEDICINE

## 2020-03-14 PROCEDURE — 94003 VENT MGMT INPAT SUBQ DAY: CPT

## 2020-03-14 RX ORDER — FENTANYL CITRATE 50 UG/ML
25 INJECTION, SOLUTION INTRAMUSCULAR; INTRAVENOUS
Status: COMPLETED | OUTPATIENT
Start: 2020-03-14 | End: 2020-03-16

## 2020-03-14 RX ORDER — ATENOLOL 50 MG/1
50 TABLET ORAL 2 TIMES DAILY
Status: DISCONTINUED | OUTPATIENT
Start: 2020-03-14 | End: 2020-03-14

## 2020-03-14 RX ORDER — CHLORDIAZEPOXIDE HYDROCHLORIDE 25 MG/1
25 CAPSULE, GELATIN COATED ORAL EVERY 8 HOURS
Status: DISCONTINUED | OUTPATIENT
Start: 2020-03-14 | End: 2020-03-15

## 2020-03-14 RX ORDER — QUETIAPINE FUMARATE 25 MG/1
25 TABLET, FILM COATED ORAL ONCE
Status: COMPLETED | OUTPATIENT
Start: 2020-03-14 | End: 2020-03-14

## 2020-03-14 RX ORDER — ATENOLOL 25 MG/1
25 TABLET ORAL ONCE
Status: COMPLETED | OUTPATIENT
Start: 2020-03-14 | End: 2020-03-14

## 2020-03-14 RX ORDER — ATENOLOL 25 MG/1
25 TABLET ORAL 2 TIMES DAILY
Status: DISCONTINUED | OUTPATIENT
Start: 2020-03-14 | End: 2020-03-14

## 2020-03-14 RX ORDER — FENTANYL CITRATE 50 UG/ML
25 INJECTION, SOLUTION INTRAMUSCULAR; INTRAVENOUS ONCE
Status: COMPLETED | OUTPATIENT
Start: 2020-03-14 | End: 2020-03-14

## 2020-03-14 RX ADMIN — PANTOPRAZOLE SODIUM 40 MG: 40 INJECTION, POWDER, FOR SOLUTION INTRAVENOUS at 07:11

## 2020-03-14 RX ADMIN — PROPOFOL 45 MCG/KG/MIN: 10 INJECTION, EMULSION INTRAVENOUS at 14:24

## 2020-03-14 RX ADMIN — INSULIN LISPRO 2 UNITS: 100 INJECTION, SOLUTION INTRAVENOUS; SUBCUTANEOUS at 05:18

## 2020-03-14 RX ADMIN — Medication 10 ML: at 07:11

## 2020-03-14 RX ADMIN — PROPOFOL 40 MCG/KG/MIN: 10 INJECTION, EMULSION INTRAVENOUS at 09:52

## 2020-03-14 RX ADMIN — INSULIN LISPRO 2 UNITS: 100 INJECTION, SOLUTION INTRAVENOUS; SUBCUTANEOUS at 11:48

## 2020-03-14 RX ADMIN — IPRATROPIUM BROMIDE AND ALBUTEROL SULFATE 1 AMPULE: .5; 3 SOLUTION RESPIRATORY (INHALATION) at 20:01

## 2020-03-14 RX ADMIN — INSULIN LISPRO 2 UNITS: 100 INJECTION, SOLUTION INTRAVENOUS; SUBCUTANEOUS at 16:35

## 2020-03-14 RX ADMIN — FENTANYL CITRATE 25 MCG: 50 INJECTION, SOLUTION INTRAMUSCULAR; INTRAVENOUS at 04:02

## 2020-03-14 RX ADMIN — FENTANYL CITRATE 25 MCG: 50 INJECTION, SOLUTION INTRAMUSCULAR; INTRAVENOUS at 14:57

## 2020-03-14 RX ADMIN — IPRATROPIUM BROMIDE AND ALBUTEROL SULFATE 1 AMPULE: .5; 3 SOLUTION RESPIRATORY (INHALATION) at 13:15

## 2020-03-14 RX ADMIN — Medication 25 MCG/HR: at 22:04

## 2020-03-14 RX ADMIN — ATENOLOL 25 MG: 25 TABLET ORAL at 13:47

## 2020-03-14 RX ADMIN — FENTANYL CITRATE 25 MCG: 50 INJECTION, SOLUTION INTRAMUSCULAR; INTRAVENOUS at 00:18

## 2020-03-14 RX ADMIN — PROPOFOL 45 MCG/KG/MIN: 10 INJECTION, EMULSION INTRAVENOUS at 19:00

## 2020-03-14 RX ADMIN — ALBUTEROL SULFATE 2.5 MG: 2.5 SOLUTION RESPIRATORY (INHALATION) at 03:14

## 2020-03-14 RX ADMIN — PROPOFOL 45 MCG/KG/MIN: 10 INJECTION, EMULSION INTRAVENOUS at 00:13

## 2020-03-14 RX ADMIN — CHLORDIAZEPOXIDE HYDROCHLORIDE 25 MG: 25 CAPSULE ORAL at 22:17

## 2020-03-14 RX ADMIN — QUETIAPINE FUMARATE 25 MG: 25 TABLET ORAL at 03:49

## 2020-03-14 RX ADMIN — AZITHROMYCIN MONOHYDRATE 500 MG: 500 INJECTION, POWDER, LYOPHILIZED, FOR SOLUTION INTRAVENOUS at 00:14

## 2020-03-14 RX ADMIN — PROPOFOL 50 MCG/KG/MIN: 10 INJECTION, EMULSION INTRAVENOUS at 04:56

## 2020-03-14 RX ADMIN — HEPARIN SODIUM 2000 UNITS: 1000 INJECTION, SOLUTION INTRAVENOUS; SUBCUTANEOUS at 07:06

## 2020-03-14 RX ADMIN — METHYLPREDNISOLONE SODIUM SUCCINATE 40 MG: 40 INJECTION, POWDER, FOR SOLUTION INTRAMUSCULAR; INTRAVENOUS at 06:54

## 2020-03-14 RX ADMIN — FENTANYL CITRATE 25 MCG: 50 INJECTION, SOLUTION INTRAMUSCULAR; INTRAVENOUS at 12:53

## 2020-03-14 RX ADMIN — DESMOPRESSIN ACETATE 40 MG: 0.2 TABLET ORAL at 22:17

## 2020-03-14 RX ADMIN — METHYLPREDNISOLONE SODIUM SUCCINATE 40 MG: 40 INJECTION, POWDER, FOR SOLUTION INTRAMUSCULAR; INTRAVENOUS at 13:56

## 2020-03-14 RX ADMIN — PROPOFOL 50 MCG/KG/MIN: 10 INJECTION, EMULSION INTRAVENOUS at 20:30

## 2020-03-14 RX ADMIN — PROPOFOL 42 MCG/KG/MIN: 10 INJECTION, EMULSION INTRAVENOUS at 23:58

## 2020-03-14 RX ADMIN — ATENOLOL 25 MG: 25 TABLET ORAL at 18:41

## 2020-03-14 RX ADMIN — FENTANYL CITRATE 25 MCG: 50 INJECTION, SOLUTION INTRAMUSCULAR; INTRAVENOUS at 01:45

## 2020-03-14 RX ADMIN — ASPIRIN 81 MG: 81 TABLET ORAL at 07:11

## 2020-03-14 RX ADMIN — FENTANYL CITRATE 25 MCG: 50 INJECTION, SOLUTION INTRAMUSCULAR; INTRAVENOUS at 08:02

## 2020-03-14 RX ADMIN — IPRATROPIUM BROMIDE AND ALBUTEROL SULFATE 1 AMPULE: .5; 3 SOLUTION RESPIRATORY (INHALATION) at 07:33

## 2020-03-14 RX ADMIN — HEPARIN SODIUM AND DEXTROSE 9 UNITS/KG/HR: 10000; 5 INJECTION INTRAVENOUS at 13:48

## 2020-03-14 RX ADMIN — CHLORDIAZEPOXIDE HYDROCHLORIDE 25 MG: 25 CAPSULE ORAL at 13:48

## 2020-03-14 ASSESSMENT — PULMONARY FUNCTION TESTS
PIF_VALUE: 20
PIF_VALUE: 14
PIF_VALUE: 14
PIF_VALUE: 9
PIF_VALUE: 14
PIF_VALUE: 14
PIF_VALUE: 10
PIF_VALUE: 9
PIF_VALUE: 14
PIF_VALUE: 16
PIF_VALUE: 7
PIF_VALUE: 14
PIF_VALUE: 10
PIF_VALUE: 10
PIF_VALUE: 14
PIF_VALUE: 10
PIF_VALUE: 14
PIF_VALUE: 15
PIF_VALUE: 14
PIF_VALUE: 14
PIF_VALUE: 9
PIF_VALUE: 10
PIF_VALUE: 14
PIF_VALUE: 6
PIF_VALUE: 12
PIF_VALUE: 14
PIF_VALUE: 17
PIF_VALUE: 14
PIF_VALUE: 9
PIF_VALUE: 9
PIF_VALUE: 10
PIF_VALUE: 14
PIF_VALUE: 8
PIF_VALUE: 10
PIF_VALUE: 9
PIF_VALUE: 12
PIF_VALUE: 14

## 2020-03-14 NOTE — PLAN OF CARE
Problem: OXYGENATION/RESPIRATORY FUNCTION  Goal: Patient will maintain patent airway  Outcome: Met This Shift     Problem: MECHANICAL VENTILATION  Goal: Patient will maintain patent airway  Outcome: Met This Shift  Goal: Oral health is maintained or improved  Outcome: Met This Shift  Goal: ET tube will be managed safely  Outcome: Met This Shift     Problem: Falls - Risk of:  Goal: Will remain free from falls  Description: Will remain free from falls  Outcome: Met This Shift  Goal: Absence of physical injury  Description: Absence of physical injury  Outcome: Met This Shift     Problem: Restraint Use - Nonviolent/Non-Self-Destructive Behavior:  Goal: Absence of restraint-related injury  Description: Absence of restraint-related injury  Outcome: Met This Shift     Problem: OXYGENATION/RESPIRATORY FUNCTION  Goal: Patient will achieve/maintain normal respiratory rate/effort  Description: Respiratory rate and effort will be within normal limits for the patient  Outcome: Ongoing     Problem: SKIN INTEGRITY  Goal: Skin integrity is maintained or improved  Outcome: Ongoing     Problem: Anxiety:  Goal: Level of anxiety will decrease  Description: Level of anxiety will decrease  Outcome: Ongoing     Problem: Nutrition  Goal: Optimal nutrition therapy  Outcome: Ongoing     Problem: MECHANICAL VENTILATION  Goal: Ability to express needs and understand communication  Outcome: Not Met This Shift  Goal: Mobility/activity is maintained at optimum level for patient  Outcome: Not Met This Shift     Problem: Restraint Use - Nonviolent/Non-Self-Destructive Behavior:  Goal: Absence of restraint indications  Description: Absence of restraint indications  Outcome: Not Met This Shift

## 2020-03-14 NOTE — PLAN OF CARE
Problem: MECHANICAL VENTILATION  Goal: Patient will maintain patent airway  3/14/2020 1959 by Veta Fleischer, RCP  Outcome: Ongoing     Problem: MECHANICAL VENTILATION  Goal: Oral health is maintained or improved  3/14/2020 1959 by Veta Fleischer, RCP  Outcome: Ongoing     Problem: MECHANICAL VENTILATION  Goal: ET tube will be managed safely  3/14/2020 1959 by Veta Fleischer, RCP  Outcome: Ongoing     Problem: OXYGENATION/RESPIRATORY FUNCTION  Goal: Patient will maintain patent airway  3/14/2020 1959 by Veta Fleischer, RCP  Outcome: Ongoing     Problem: OXYGENATION/RESPIRATORY FUNCTION  Goal: Patient will achieve/maintain normal respiratory rate/effort  Description: Respiratory rate and effort will be within normal limits for the patient  3/14/2020 1959 by Veta Fleischer, RCP  Outcome: Ongoing

## 2020-03-14 NOTE — PLAN OF CARE
Problem: OXYGENATION/RESPIRATORY FUNCTION  Goal: Patient will maintain patent airway  3/14/2020 0945 by Omar Reddy RN  Outcome: Ongoing  3/14/2020 0817 by Rocio Singer RCP  Outcome: Ongoing  3/14/2020 0537 by Varinder Brower RN  Outcome: Met This Shift  Goal: Patient will achieve/maintain normal respiratory rate/effort  Description: Respiratory rate and effort will be within normal limits for the patient  3/14/2020 0945 by Omar Reddy RN  Outcome: Ongoing  3/14/2020 0817 by CARYN BusbyP  Outcome: Ongoing  3/14/2020 0537 by Varinder Brower RN  Outcome: Ongoing     Problem: MECHANICAL VENTILATION  Goal: Patient will maintain patent airway  3/14/2020 0817 by Rocio Singer RCP  Outcome: Ongoing  3/14/2020 0537 by Varinder Brower RN  Outcome: Met This Shift  Goal: Oral health is maintained or improved  3/14/2020 0817 by CARYN BusbyP  Outcome: Ongoing  3/14/2020 0537 by Varinder Brower RN  Outcome: Met This Shift  Goal: ET tube will be managed safely  3/14/2020 0817 by CARYN BusbyP  Outcome: Ongoing  3/14/2020 0537 by Varinder Brower RN  Outcome: Met This Shift  Goal: Ability to express needs and understand communication  3/14/2020 0817 by Rocio Singer RCP  Outcome: Ongoing  3/14/2020 0537 by Varinder Brower RN  Outcome: Not Met This Shift  Goal: Mobility/activity is maintained at optimum level for patient  3/14/2020 0817 by Rocio Singer RCP  Outcome: Ongoing  3/14/2020 0537 by Varinder Brower RN  Outcome: Not Met This Shift     Problem: SKIN INTEGRITY  Goal: Skin integrity is maintained or improved  3/14/2020 0945 by Omar Reddy RN  Outcome: Ongoing  3/14/2020 0817 by Rocio Singer RCP  Outcome: Ongoing  3/14/2020 0537 by Varinder Brower RN  Outcome: Ongoing     Problem: Falls - Risk of:  Goal: Will remain free from falls  Description: Will remain free from falls  3/14/2020 0945 by Omar Reddy RN  Outcome: Ongoing  3/14/2020 0537 by Varinder Brower RN  Outcome: Met This Shift  Goal: Absence of physical

## 2020-03-14 NOTE — PROGRESS NOTES
ventricle is normal in size with normal systolic function globally. Calculated ejection fraction is 54%. Mild mitral regurgitation. Mild tricuspid regurgitation. Estimated right ventricular systolic pressure  is 31 mmHg.        Patient's Active Problem List   Active Problems:    Shortness of breath  Resolved Problems:    * No resolved hospital problems. *        Assessment / Acute Cardiac Problems:   1. Elevated troponin -  Type I vs Type II  MI  2. Pulmonary edema  3. Possible PEA arrest in the ED  4. Descending aortic thrombus  5. ASHLEY  6. COPD   7. Lactic acidosis: Resolved  8. Hypertension  9. Hyperlipidemia        Plan of Treatment:   1. Troponins peaked at 560  2. Will consider cardiac cath once patient is more stable. 3. Cardiac cath only through radial approach due to descending aortic mural thrombus  4. Heparin drip for Aortic thrombus        Kong March MD  Fellow, [address] [place] [address]          Attending Cardiologist Addendum: I have reviewed and performed the history, physical, subjective, objective, assessment, and plan with the resident/fellow and agree with the note. I performed the history and physical personally. I have made changes to the note above as needed. Thank you for allowing me to participate in the care of this patient, please do not hesitate to call if you have any questions. Sara Nunes, [id number] [facility]  [facility]  [phone]

## 2020-03-14 NOTE — PROGRESS NOTES
Critical Care Team - Daily Progress Note      Date and time: 3/14/2020 10:52 AM  Patient's name:  Harlan Rios Record Number: [id number]  Patient's account/billing number: [de-identified]  Patient's YOB: 1950  Age: 78 y.o. Date of Admission: 3/11/2020 11:34 AM  Length of stay during current admission: 3      Primary Care Physician: Dutch Dudley MD  ICU Attending Physician: Dr. Brian Huertas Status: Full Code    Reason for ICU admission:   Chief Complaint   Patient presents with    Respiratory Arrest     Pt arrives via medics for difficulty breathing, on arrival of medics pt outside on porch tripoding position with albuterol in hand then \"went out\" unknown if pulse palpated, CPR started, on monitor pt found to be ST pulse palpated, given mag 2 gm IV along with solumedrol 125 mg       Subjective:   [place]ifer park 78 y. o. with PMH of COPD, essential hypertension, descending thoracic aortic aneurysm was brought to the ED for evaluation of acute onset respiratory arrest and   Loss of pulse s/p CPR and 1 dose of atropine. Later she was intubated sedated on Versed, fentanyl. Started on Rocephin, Zithromax in ER for pneumonia. CT PE was done which is negative for PE but showed bilateral pulmonary infiltrates, pleural effusions, thoracic aortic mesh, mural thrombus throughout the descending limb of endograft  thrombus of distal limb of graft. Vascular surgery was also consulted for concerns of thrombosis of distal limb of graft. Patient's troponins also trended up overnight. Cardiology was consulted and patient was started on heparin drip. OVERNIGHT EVENTS:   No issues overnight noted. Currently intubated on PRVC mood 18/400/5/30, sedated with propofol 40, fentanyl PRN Q2h. ABG this morning 7.37/38/95/22. chest xray  CXR this morning showed Partial improved aeration of the left lung which may represent decreasing pulmonary edema and decreasing pleural effusion.  WBC 17 down from 21 yest. Cardiovascular: regular rate and rhythm, normal S1, S2, no murmur noted and 2+ pulses throughout  Abdomen: soft, nontender, nondistended, no masses or organomegaly  NEUROLOGIC Intubated and sedated continued on mechanical ventilation  Extremities:  peripheral pulses normal, no pedal edema, no clubbing or cyanosis      Any additional physical findings:  MEDICATIONS:  Scheduled Meds:   methylPREDNISolone  40 mg Intravenous Q8H    ipratropium-albuterol  1 ampule Inhalation Q6H    insulin lispro  0-12 Units Subcutaneous 4 times per day    aspirin  81 mg Oral Daily    atorvastatin  40 mg Oral Nightly    cefTRIAXone (ROCEPHIN) IV  1 g Intravenous Q24H    sodium chloride flush  10 mL Intravenous 2 times per day    nicotine  1 patch Transdermal Daily    pantoprazole  40 mg Intravenous Daily    And    sodium chloride (PF)  10 mL Intravenous Daily     Continuous Infusions:   heparin (porcine) 11 Units/kg/hr (03/14/20 0706)    dextrose      sodium chloride 50 mL/hr at 03/13/20 0725    propofol 40 mcg/kg/min (03/14/20 0952)    fentaNYL Stopped (03/13/20 1341)    norepinephrine Stopped (03/14/20 0806)     PRN Meds:   fentanNYL, 25 mcg, Q2H PRN  heparin (porcine), 4,000 Units, PRN  heparin (porcine), 2,000 Units, PRN  glucose, 15 g, PRN  dextrose, 12.5 g, PRN  glucagon (rDNA), 1 mg, PRN  dextrose, 100 mL/hr, PRN  sodium chloride flush, 10 mL, PRN  acetaminophen, 650 mg, Q6H PRN    Or  acetaminophen, 650 mg, Q6H PRN  polyethylene glycol, 17 g, Daily PRN  promethazine, 12.5 mg, Q6H PRN    Or  ondansetron, 4 mg, Q6H PRN  potassium chloride, 10 mEq, PRN  potassium chloride, 20 mEq, PRN  magnesium sulfate, 2 g, PRN  albuterol, 2.5 mg, As Directed RT PRN        SUPPORT DEVICES: [x] Ventilator [] BIPAP  [] Nasal Cannula [] Room Air    VENT SETTINGS (Comprehensive) (if applicable):  Vent Information  $Ventilation: $Subsequent Day  Ventilator Started: (S) Yes  Ventilation Day(s): 4  Skin Assessment: Clean, dry, & intact  Equipment Changed: HME  Vent Type: Servo i  Vent Mode: CPAP  Vt Ordered: 400 mL  Rate Set: 18 bmp  Pressure Support: 8 cmH20  FiO2 : 30 %  Sensitivity: 2  PEEP/CPAP: 5  I Time/ I Time %: 0.7 s  Humidification Source: HME  Additional Respiratory  Assessments  Pulse: 93  Resp: 22  SpO2: 97 %  End Tidal CO2: 27 (%)  Position: Semi-Fontanez's  Humidification Source: HME  Oral Care Completed?: Yes  Oral Care: Teeth brushed, Mouth swabbed, Mouthwash, Mouth moisturizer, Mouth suctioned  Subglottic Suction Done?: Yes    ABGs:     Lab Results   Component Value Date    UON8ZOT 23 03/14/2020    FIO2 30.0 03/14/2020     Lactic Acid:   Lab Results   Component Value Date    LACTA NOT REPORTED 03/14/2020    LACTA NOT REPORTED 03/13/2020    LACTA NOT REPORTED 03/13/2020         DATA:  Complete Blood Count:   Recent Labs     03/12/20 0455 03/13/20  0000 03/13/20  0510 03/14/20  0546   WBC 15.8*  --  21.5* 17.5*   HGB 11.0* 9.5* 9.5* 9.1*   MCV 98.4  --  98.1 96.4     --  221 217   RBC 3.65*  --  3.21* 3.03*   HCT 35.9* 31.3* 31.5* 29.2*   MCH 30.1  --  29.6 30.0   MCHC 30.6  --  30.2 31.2   RDW 14.9*  --  15.4* 15.6*   MPV 10.2  --  10.5 10.3        PT/INR:    Lab Results   Component Value Date    PROTIME 10.4 03/13/2020    INR 1.0 03/13/2020     PTT:    Lab Results   Component Value Date    APTT 49.5 03/14/2020       Basal Metabolic Profile:   Recent Labs     03/12/20 0455 03/13/20  0510 03/14/20  0517   * 138 139   K 3.5* 3.7 4.0   BUN 22 30* 32*   CREATININE 1.37* 1.24* 1.11*   * 111* 110*   CO2 16* 17* 19*      Magnesium:   Lab Results   Component Value Date    MG 2.3 03/12/2020    MG 2.1 06/30/2016     Phosphorus:   Lab Results   Component Value Date    PHOS 2.8 03/14/2020    PHOS 1.8 08/24/2017    PHOS 4.1 06/30/2016     S. Calcium:  Recent Labs     03/14/20  0517   CALCIUM 7.2*     S. Ionized Calcium:No results for input(s): IONCA in the last 72 hours.       Urinalysis:   Lab Results   Component

## 2020-03-15 ENCOUNTER — APPOINTMENT (OUTPATIENT)
Dept: GENERAL RADIOLOGY | Age: 70
DRG: 981 | End: 2020-03-15
Payer: MEDICARE

## 2020-03-15 LAB
ABSOLUTE EOS #: 0 K/UL (ref 0–0.4)
ABSOLUTE IMMATURE GRANULOCYTE: 0 K/UL (ref 0–0.3)
ABSOLUTE LYMPH #: 0.51 K/UL (ref 1–4.8)
ABSOLUTE MONO #: 1.35 K/UL (ref 0.1–0.8)
ALLEN TEST: NORMAL
ANION GAP SERPL CALCULATED.3IONS-SCNC: 10 MMOL/L (ref 9–17)
BASOPHILS # BLD: 0 % (ref 0–2)
BASOPHILS ABSOLUTE: 0 K/UL (ref 0–0.2)
BUN BLDV-MCNC: 32 MG/DL (ref 8–23)
BUN/CREAT BLD: ABNORMAL (ref 9–20)
CALCIUM SERPL-MCNC: 7.3 MG/DL (ref 8.6–10.4)
CHLORIDE BLD-SCNC: 112 MMOL/L (ref 98–107)
CO2: 20 MMOL/L (ref 20–31)
CREAT SERPL-MCNC: 0.85 MG/DL (ref 0.5–0.9)
DIFFERENTIAL TYPE: ABNORMAL
EOSINOPHILS RELATIVE PERCENT: 0 % (ref 1–4)
FIO2: 30
GFR AFRICAN AMERICAN: >60 ML/MIN
GFR NON-AFRICAN AMERICAN: >60 ML/MIN
GFR SERPL CREATININE-BSD FRML MDRD: ABNORMAL ML/MIN/{1.73_M2}
GFR SERPL CREATININE-BSD FRML MDRD: ABNORMAL ML/MIN/{1.73_M2}
GLUCOSE BLD-MCNC: 132 MG/DL (ref 65–105)
GLUCOSE BLD-MCNC: 149 MG/DL (ref 65–105)
GLUCOSE BLD-MCNC: 155 MG/DL (ref 70–99)
GLUCOSE BLD-MCNC: 163 MG/DL (ref 65–105)
GLUCOSE BLD-MCNC: 169 MG/DL (ref 65–105)
HCT VFR BLD CALC: 31.7 % (ref 36.3–47.1)
HEMOGLOBIN: 9.8 G/DL (ref 11.9–15.1)
IMMATURE GRANULOCYTES: 0 %
LYMPHOCYTES # BLD: 3 % (ref 24–44)
MCH RBC QN AUTO: 30 PG (ref 25.2–33.5)
MCHC RBC AUTO-ENTMCNC: 30.9 G/DL (ref 28.4–34.8)
MCV RBC AUTO: 96.9 FL (ref 82.6–102.9)
MODE: NORMAL
MONOCYTES # BLD: 8 % (ref 1–7)
MORPHOLOGY: ABNORMAL
MORPHOLOGY: ABNORMAL
NEGATIVE BASE EXCESS, ART: 1 (ref 0–2)
NRBC AUTOMATED: 0.2 PER 100 WBC
O2 DEVICE/FLOW/%: NORMAL
PARTIAL THROMBOPLASTIN TIME: 40.7 SEC (ref 20.5–30.5)
PARTIAL THROMBOPLASTIN TIME: 48.9 SEC (ref 20.5–30.5)
PARTIAL THROMBOPLASTIN TIME: 61.5 SEC (ref 20.5–30.5)
PARTIAL THROMBOPLASTIN TIME: 90.3 SEC (ref 20.5–30.5)
PARTIAL THROMBOPLASTIN TIME: 98.9 SEC (ref 20.5–30.5)
PATIENT TEMP: NORMAL
PDW BLD-RTO: 15.8 % (ref 11.8–14.4)
PLATELET # BLD: 230 K/UL (ref 138–453)
PLATELET # BLD: 234 K/UL (ref 138–453)
PLATELET ESTIMATE: ABNORMAL
PMV BLD AUTO: 12.3 FL (ref 8.1–13.5)
POC HCO3: 23.7 MMOL/L (ref 21–28)
POC O2 SATURATION: 96 % (ref 94–98)
POC PCO2 TEMP: NORMAL MM HG
POC PCO2: 40.7 MM HG (ref 35–48)
POC PH TEMP: NORMAL
POC PH: 7.37 (ref 7.35–7.45)
POC PO2 TEMP: NORMAL MM HG
POC PO2: 86.2 MM HG (ref 83–108)
POSITIVE BASE EXCESS, ART: NORMAL (ref 0–3)
POTASSIUM SERPL-SCNC: 5 MMOL/L (ref 3.7–5.3)
RBC # BLD: 3.27 M/UL (ref 3.95–5.11)
RBC # BLD: ABNORMAL 10*6/UL
SAMPLE SITE: NORMAL
SEG NEUTROPHILS: 89 % (ref 36–66)
SEGMENTED NEUTROPHILS ABSOLUTE COUNT: 15.04 K/UL (ref 1.8–7.7)
SODIUM BLD-SCNC: 142 MMOL/L (ref 135–144)
TCO2 (CALC), ART: 25 MMOL/L (ref 22–29)
WBC # BLD: 16.9 K/UL (ref 3.5–11.3)
WBC # BLD: ABNORMAL 10*3/UL

## 2020-03-15 PROCEDURE — 2580000003 HC RX 258: Performed by: STUDENT IN AN ORGANIZED HEALTH CARE EDUCATION/TRAINING PROGRAM

## 2020-03-15 PROCEDURE — 6370000000 HC RX 637 (ALT 250 FOR IP): Performed by: STUDENT IN AN ORGANIZED HEALTH CARE EDUCATION/TRAINING PROGRAM

## 2020-03-15 PROCEDURE — 6360000002 HC RX W HCPCS: Performed by: INTERNAL MEDICINE

## 2020-03-15 PROCEDURE — 94003 VENT MGMT INPAT SUBQ DAY: CPT

## 2020-03-15 PROCEDURE — 71045 X-RAY EXAM CHEST 1 VIEW: CPT

## 2020-03-15 PROCEDURE — 80048 BASIC METABOLIC PNL TOTAL CA: CPT

## 2020-03-15 PROCEDURE — 6370000000 HC RX 637 (ALT 250 FOR IP): Performed by: INTERNAL MEDICINE

## 2020-03-15 PROCEDURE — 82803 BLOOD GASES ANY COMBINATION: CPT

## 2020-03-15 PROCEDURE — 99291 CRITICAL CARE FIRST HOUR: CPT | Performed by: INTERNAL MEDICINE

## 2020-03-15 PROCEDURE — 94770 HC ETCO2 MONITOR DAILY: CPT

## 2020-03-15 PROCEDURE — 94761 N-INVAS EAR/PLS OXIMETRY MLT: CPT

## 2020-03-15 PROCEDURE — 85025 COMPLETE CBC W/AUTO DIFF WBC: CPT

## 2020-03-15 PROCEDURE — 85049 AUTOMATED PLATELET COUNT: CPT

## 2020-03-15 PROCEDURE — 85730 THROMBOPLASTIN TIME PARTIAL: CPT

## 2020-03-15 PROCEDURE — 82947 ASSAY GLUCOSE BLOOD QUANT: CPT

## 2020-03-15 PROCEDURE — 2580000003 HC RX 258: Performed by: INTERNAL MEDICINE

## 2020-03-15 PROCEDURE — 2500000003 HC RX 250 WO HCPCS: Performed by: STUDENT IN AN ORGANIZED HEALTH CARE EDUCATION/TRAINING PROGRAM

## 2020-03-15 PROCEDURE — 6360000002 HC RX W HCPCS: Performed by: STUDENT IN AN ORGANIZED HEALTH CARE EDUCATION/TRAINING PROGRAM

## 2020-03-15 PROCEDURE — 2000000000 HC ICU R&B

## 2020-03-15 PROCEDURE — 37799 UNLISTED PX VASCULAR SURGERY: CPT

## 2020-03-15 PROCEDURE — 2700000000 HC OXYGEN THERAPY PER DAY

## 2020-03-15 PROCEDURE — 94640 AIRWAY INHALATION TREATMENT: CPT

## 2020-03-15 PROCEDURE — C9113 INJ PANTOPRAZOLE SODIUM, VIA: HCPCS | Performed by: STUDENT IN AN ORGANIZED HEALTH CARE EDUCATION/TRAINING PROGRAM

## 2020-03-15 RX ORDER — FUROSEMIDE 10 MG/ML
20 INJECTION INTRAMUSCULAR; INTRAVENOUS ONCE
Status: COMPLETED | OUTPATIENT
Start: 2020-03-15 | End: 2020-03-15

## 2020-03-15 RX ORDER — CHLORDIAZEPOXIDE HYDROCHLORIDE 25 MG/1
25 CAPSULE, GELATIN COATED ORAL ONCE
Status: COMPLETED | OUTPATIENT
Start: 2020-03-15 | End: 2020-03-15

## 2020-03-15 RX ORDER — CHLORDIAZEPOXIDE HYDROCHLORIDE 25 MG/1
50 CAPSULE, GELATIN COATED ORAL EVERY 8 HOURS
Status: DISCONTINUED | OUTPATIENT
Start: 2020-03-15 | End: 2020-03-18

## 2020-03-15 RX ORDER — SODIUM CHLORIDE 450 MG/100ML
INJECTION, SOLUTION INTRAVENOUS CONTINUOUS
Status: DISCONTINUED | OUTPATIENT
Start: 2020-03-15 | End: 2020-03-22

## 2020-03-15 RX ADMIN — CEFTRIAXONE SODIUM 1 G: 1 INJECTION, POWDER, FOR SOLUTION INTRAMUSCULAR; INTRAVENOUS at 00:54

## 2020-03-15 RX ADMIN — METHYLPREDNISOLONE SODIUM SUCCINATE 40 MG: 40 INJECTION, POWDER, FOR SOLUTION INTRAMUSCULAR; INTRAVENOUS at 08:20

## 2020-03-15 RX ADMIN — PANTOPRAZOLE SODIUM 40 MG: 40 INJECTION, POWDER, FOR SOLUTION INTRAVENOUS at 08:20

## 2020-03-15 RX ADMIN — Medication 10 ML: at 08:25

## 2020-03-15 RX ADMIN — IPRATROPIUM BROMIDE AND ALBUTEROL SULFATE 1 AMPULE: .5; 3 SOLUTION RESPIRATORY (INHALATION) at 08:06

## 2020-03-15 RX ADMIN — METHYLPREDNISOLONE SODIUM SUCCINATE 40 MG: 40 INJECTION, POWDER, FOR SOLUTION INTRAMUSCULAR; INTRAVENOUS at 22:38

## 2020-03-15 RX ADMIN — DESMOPRESSIN ACETATE 40 MG: 0.2 TABLET ORAL at 22:01

## 2020-03-15 RX ADMIN — FUROSEMIDE 20 MG: 10 INJECTION, SOLUTION INTRAMUSCULAR; INTRAVENOUS at 13:03

## 2020-03-15 RX ADMIN — METOPROLOL TARTRATE 25 MG: 25 TABLET ORAL at 21:00

## 2020-03-15 RX ADMIN — ASPIRIN 81 MG: 81 TABLET ORAL at 08:23

## 2020-03-15 RX ADMIN — INSULIN LISPRO 2 UNITS: 100 INJECTION, SOLUTION INTRAVENOUS; SUBCUTANEOUS at 23:56

## 2020-03-15 RX ADMIN — DEXMEDETOMIDINE HYDROCHLORIDE 0.2 MCG/KG/HR: 100 INJECTION, SOLUTION INTRAVENOUS at 12:50

## 2020-03-15 RX ADMIN — HEPARIN SODIUM 2000 UNITS: 1000 INJECTION, SOLUTION INTRAVENOUS; SUBCUTANEOUS at 15:20

## 2020-03-15 RX ADMIN — HEPARIN SODIUM 2000 UNITS: 1000 INJECTION, SOLUTION INTRAVENOUS; SUBCUTANEOUS at 01:01

## 2020-03-15 RX ADMIN — PROPOFOL 40 MCG/KG/MIN: 10 INJECTION, EMULSION INTRAVENOUS at 10:00

## 2020-03-15 RX ADMIN — INSULIN LISPRO 2 UNITS: 100 INJECTION, SOLUTION INTRAVENOUS; SUBCUTANEOUS at 01:02

## 2020-03-15 RX ADMIN — METOPROLOL TARTRATE 25 MG: 25 TABLET ORAL at 08:30

## 2020-03-15 RX ADMIN — HEPARIN SODIUM AND DEXTROSE 10 UNITS/KG/HR: 10000; 5 INJECTION INTRAVENOUS at 23:59

## 2020-03-15 RX ADMIN — METHYLPREDNISOLONE SODIUM SUCCINATE 40 MG: 40 INJECTION, POWDER, FOR SOLUTION INTRAMUSCULAR; INTRAVENOUS at 00:00

## 2020-03-15 RX ADMIN — INSULIN LISPRO 2 UNITS: 100 INJECTION, SOLUTION INTRAVENOUS; SUBCUTANEOUS at 07:26

## 2020-03-15 RX ADMIN — IPRATROPIUM BROMIDE AND ALBUTEROL SULFATE 1 AMPULE: .5; 3 SOLUTION RESPIRATORY (INHALATION) at 19:50

## 2020-03-15 RX ADMIN — CHLORDIAZEPOXIDE HYDROCHLORIDE 25 MG: 25 CAPSULE ORAL at 04:43

## 2020-03-15 RX ADMIN — Medication 10 ML: at 08:20

## 2020-03-15 RX ADMIN — CHLORDIAZEPOXIDE HYDROCHLORIDE 50 MG: 25 CAPSULE ORAL at 22:00

## 2020-03-15 RX ADMIN — INSULIN LISPRO 2 UNITS: 100 INJECTION, SOLUTION INTRAVENOUS; SUBCUTANEOUS at 19:15

## 2020-03-15 RX ADMIN — Medication 125 MCG/HR: at 07:23

## 2020-03-15 RX ADMIN — CHLORDIAZEPOXIDE HYDROCHLORIDE 25 MG: 25 CAPSULE ORAL at 12:15

## 2020-03-15 RX ADMIN — IPRATROPIUM BROMIDE AND ALBUTEROL SULFATE 1 AMPULE: .5; 3 SOLUTION RESPIRATORY (INHALATION) at 01:55

## 2020-03-15 RX ADMIN — DEXMEDETOMIDINE HYDROCHLORIDE 0.5 MCG/KG/HR: 100 INJECTION, SOLUTION INTRAVENOUS at 21:57

## 2020-03-15 RX ADMIN — IPRATROPIUM BROMIDE AND ALBUTEROL SULFATE 1 AMPULE: .5; 3 SOLUTION RESPIRATORY (INHALATION) at 15:09

## 2020-03-15 RX ADMIN — METHYLPREDNISOLONE SODIUM SUCCINATE 40 MG: 40 INJECTION, POWDER, FOR SOLUTION INTRAMUSCULAR; INTRAVENOUS at 14:28

## 2020-03-15 RX ADMIN — PROPOFOL 50 MCG/KG/MIN: 10 INJECTION, EMULSION INTRAVENOUS at 03:52

## 2020-03-15 RX ADMIN — CHLORDIAZEPOXIDE HYDROCHLORIDE 25 MG: 25 CAPSULE ORAL at 12:30

## 2020-03-15 ASSESSMENT — PULMONARY FUNCTION TESTS
PIF_VALUE: 20
PIF_VALUE: 14
PIF_VALUE: 11
PIF_VALUE: 14
PIF_VALUE: 23
PIF_VALUE: 37

## 2020-03-15 NOTE — PLAN OF CARE
Problem: OXYGENATION/RESPIRATORY FUNCTION  Goal: Patient will maintain patent airway  3/14/2020 2229 by Ira Chang RN  Outcome: Ongoing     Problem: OXYGENATION/RESPIRATORY FUNCTION  Goal: Patient will achieve/maintain normal respiratory rate/effort  Description: Respiratory rate and effort will be within normal limits for the patient  3/14/2020 2229 by Ira Chang RN  Outcome: Ongoing     Problem: MECHANICAL VENTILATION  Goal: Patient will maintain patent airway  3/14/2020 2229 by Ira Chang RN  Outcome: Ongoing     Problem: MECHANICAL VENTILATION  Goal: Oral health is maintained or improved  3/14/2020 2229 by Ira Chang RN  Outcome: Ongoing     Problem: MECHANICAL VENTILATION  Goal: ET tube will be managed safely  3/14/2020 2229 by Ira Chang RN  Outcome: Ongoing     Problem: MECHANICAL VENTILATION  Goal: Ability to express needs and understand communication  3/14/2020 2229 by Ira Chang RN  Outcome: Ongoing     Problem: MECHANICAL VENTILATION  Goal: Mobility/activity is maintained at optimum level for patient  3/14/2020 2229 by Ira Chang RN  Outcome: Ongoing     Problem: SKIN INTEGRITY  Goal: Skin integrity is maintained or improved  3/14/2020 2229 by Ira Chang RN  Outcome: Ongoing     Problem: Falls - Risk of:  Goal: Will remain free from falls  Description: Will remain free from falls  3/14/2020 2229 by Ira Chang RN  Outcome: Ongoing     Problem: Restraint Use - Nonviolent/Non-Self-Destructive Behavior:  Goal: Absence of restraint indications  Description: Absence of restraint indications  3/14/2020 2229 by Ira Chang RN  Outcome: Ongoing     Problem: Falls - Risk of:  Goal: Absence of physical injury  Description: Absence of physical injury  3/14/2020 2229 by Ira Chang RN  Outcome: Ongoing     Problem: Restraint Use - Nonviolent/Non-Self-Destructive Behavior:  Goal: Absence of restraint-related injury  Description: Absence of restraint-related

## 2020-03-15 NOTE — PROGRESS NOTES
3. 3 L per 24 hours. We will start the patient on Precedex and wean her off from fentanyl and propofol as tolerated. Librium dose increased from 25 to 50 3 times daily. AWAKE & FOLLOWING COMMANDS:  [x] No   [] Yes    CURRENT VENTILATION STATUS:     [x] Ventilator  [] BIPAP  [] Nasal Cannula [] Room Air      IF INTUBATED, ET TUBE MARKING AT LOWER LIP:    24   cms    SECRETIONS Amount:  [x] Small [] Moderate  [] Large  [] None  Color:     [] White [] Colored  [] Bloody    SEDATION:  RAAS Score:  [x] Propofol gtt  [] Versed gtt  [] Ativan gtt   [] No Sedation    PARALYZED:  [x] No    [] Yes    DIARRHEA:                [x] No                [] Yes  (C. Difficile status: [] positive                                                                                                                       [] negative                                                                                                                     [] pending)    VASOPRESSORS:  [x] No    [] Yes    If yes -   [] Levophed       [] Dopamine     [] Vasopressin       [] Dobutamine  [] Phenylephrine         [] Epinephrine    CENTRAL LINES:     [] No   [x] Yes   (Date of Insertion:   )           If yes -     [] Right IJ     [] Left IJ [] Right Femoral [x] Left Femoral                   [] Right Subclavian [] Left Subclavian       ALBERT'S CATHETER:   [] No   [x] Yes  (Date of Insertion:   )     URINE OUTPUT:            [x] Good   [] Low              [] Anuric    REVIEW OF SYSTEMS:  Patient is intubated and sedated with propofol and fentanyl     OBJECTIVE:     VITAL SIGNS:  BP (!) 117/52   Pulse 64   Temp 99.3 °F (37.4 °C) (Core) Comment (Src): Albert probe  Resp 17   Ht 5' 2\" (1.575 m)   Wt 182 lb 15.7 oz (83 kg)   SpO2 93%   BMI 33.47 kg/m²   Tmax over 24 hours:  No data recorded.       Patient Vitals for the past 8 hrs:   BP Pulse Resp SpO2   03/15/20 1630 -- 64 17 93 %   03/15/20 1615 -- 65 11 94 %   03/15/20 1600 (!) 117/52 60 13 93 % murmur noted and 2+ pulses throughout  Abdomen: soft, nontender, nondistended, no masses or organomegaly  NEUROLOGIC Intubated and sedated continued on mechanical ventilation  Extremities:  peripheral pulses normal, no pedal edema, no clubbing or cyanosis      Any additional physical findings:  MEDICATIONS:  Scheduled Meds:   chlordiazePOXIDE  50 mg Oral Q8H    metoprolol tartrate  25 mg Oral BID    methylPREDNISolone  40 mg Intravenous Q8H    ipratropium-albuterol  1 ampule Inhalation Q6H    insulin lispro  0-12 Units Subcutaneous 4 times per day    aspirin  81 mg Oral Daily    atorvastatin  40 mg Oral Nightly    cefTRIAXone (ROCEPHIN) IV  1 g Intravenous Q24H    sodium chloride flush  10 mL Intravenous 2 times per day    nicotine  1 patch Transdermal Daily    pantoprazole  40 mg Intravenous Daily    And    sodium chloride (PF)  10 mL Intravenous Daily     Continuous Infusions:   dexmedetomidine (PRECEDEX) IV infusion 0.5 mcg/kg/hr (03/15/20 1551)    heparin (porcine) 10 Units/kg/hr (03/15/20 1519)    dextrose      norepinephrine Stopped (03/14/20 0806)     PRN Meds:   fentanNYL, 25 mcg, Q2H PRN  heparin (porcine), 4,000 Units, PRN  heparin (porcine), 2,000 Units, PRN  glucose, 15 g, PRN  dextrose, 12.5 g, PRN  glucagon (rDNA), 1 mg, PRN  dextrose, 100 mL/hr, PRN  sodium chloride flush, 10 mL, PRN  acetaminophen, 650 mg, Q6H PRN    Or  acetaminophen, 650 mg, Q6H PRN  polyethylene glycol, 17 g, Daily PRN  promethazine, 12.5 mg, Q6H PRN    Or  ondansetron, 4 mg, Q6H PRN  potassium chloride, 10 mEq, PRN  potassium chloride, 20 mEq, PRN  magnesium sulfate, 2 g, PRN  albuterol, 2.5 mg, As Directed RT PRN        SUPPORT DEVICES: [] Ventilator [] BIPAP  [] Nasal Cannula [] Room Air    VENT SETTINGS (Comprehensive) (if applicable):  Vent Information  $Ventilation: $Subsequent Day  Ventilator Started: (S) Yes  Ventilation Day(s): 4  Skin Assessment: Clean, dry, & intact  Equipment Changed: HME  Vent Type: TO 20 03/11/2020    MUCUS NOT REPORTED 03/11/2020    TRICHOMONAS NOT REPORTED 03/11/2020    YEAST NOT REPORTED 03/11/2020    BACTERIA NOT REPORTED 03/11/2020    SPECGRAV 1.014 03/11/2020    LEUKOCYTESUR NEGATIVE 03/11/2020    UROBILINOGEN Normal 03/11/2020    BILIRUBINUR NEGATIVE 03/11/2020    GLUCOSEU 1+ 03/11/2020    KETUA NEGATIVE 03/11/2020    AMORPHOUS NOT REPORTED 03/11/2020       CARDIAC ENZYMES: No results for input(s): CKMB, CKMBINDEX, TROPONINI in the last 72 hours. Invalid input(s): CKTOTAL;3  BNP: No results for input(s): BNP in the last 72 hours. LFTS  No results for input(s): ALKPHOS, ALT, AST, BILITOT, BILIDIR, LABALBU in the last 72 hours. AMYLASE/LIPASE/AMMONIA  No results for input(s): AMYLASE, LIPASE, AMMONIA in the last 72 hours. Last 3 Blood Glucose:   Recent Labs     03/13/20  0510 03/14/20  0517 03/15/20  0453   GLUCOSE 152* 165* 155*      HgBA1c:    Lab Results   Component Value Date    LABA1C 6.2 03/11/2020     TSH:  No results found for: TSH  ANEMIA STUDIES  No results for input(s): LABIRON, TIBC, FERRITIN, HZXFLHIP97, FOLATE, OCCULTBLD in the last 72 hours. Cultures during this admission:   Blood cultures:                 [] None drawn      [x] Negative             []  Positive (Details:  )  Urine Culture:                   [] None drawn      [x] Negative             []  Positive (Details:  )  Sputum Culture:               [] None drawn       [] Negative             []  Positive (Details:  )   Endotracheal aspirate:     [x] None drawn       [] Negative             []  Positive (Details:  )      ASSESSMENT:     Active Problems:    Shortness of breath  Resolved Problems:    * No resolved hospital problems. *    PLAN:   1. Acute on chronic hypoxic and hypercapnic respiratory failure. S/p intubated. Currently on PRVC mood 18/400/5/30, currently sedated with propofol 30, fentanyl 125 mcg continues. Will start the patient on precedex and wean propofol and fentanyl as tolerated. patient during my round today, I have reviewed the events, events noted and lab seen arterial blood gases and ventilator setting reviewed. She remains very restless and agitated she is on propofol drip and she is also started on fentanyl drip 100 mcg she is also getting Librium 25 mg 3 times a day. Her chest x-ray today shows pulmonary edema and also possible right lower lobe infiltrate her endotracheal secretion is not purulent and thin and a small. She is on free water 300 mg every 6 hours her sodium is 142 today. Urine output is 3.3 L overnight but urine output is low this morning  Vent PRVC/18/400/5/30% and ABG 7.37/40/96/23, she is on a spontaneous breathing trial with pressure support of 8 but she is very restless on propofol fentanyl drip and Librium and she has abdominal breathing likely secondary to severe COPD and contributed by delirium and agitation     We will start her on Precedex drip. Increase Librium 50 mg 3 times a day. Wean off propofol drip. We will try to wean down fentanyl drip. Tube feeding to continue. Discontinue IV fluids. Continue with free water. We will give Lasix 20 mg IV 1 dose. Currently on Rocephin IV will finish 5 to 7 days of treatment.     Discussed with nursing staff, treatment plan discussed     Total critical care time caring for this patient with life threatening, unstable organ failure, including direct patient contact, management of life support systems, review of data including imaging and labs, discussions with other team members and physicians at least 27  Min so far today, excluding procedures.        Please note that this chart was generated using voice recognition Dragon dictation software.  Although every effort was made to ensure the accuracy of this automated transcription, some errors in transcription may have occurred.  Olegario White MD  3/15/2020 5:35 PM

## 2020-03-15 NOTE — PLAN OF CARE
Problem: Restraint Use - Nonviolent/Non-Self-Destructive Behavior:  Goal: Absence of restraint-related injury  Description: Absence of restraint-related injury  Outcome: Met This Shift     Problem: OXYGENATION/RESPIRATORY FUNCTION  Goal: Patient will maintain patent airway  Outcome: Ongoing  Goal: Patient will achieve/maintain normal respiratory rate/effort  Description: Respiratory rate and effort will be within normal limits for the patient  Outcome: Ongoing     Problem: MECHANICAL VENTILATION  Goal: Patient will maintain patent airway  Outcome: Ongoing  Goal: Oral health is maintained or improved  Outcome: Ongoing  Goal: ET tube will be managed safely  Outcome: Ongoing  Goal: Ability to express needs and understand communication  Outcome: Ongoing  Goal: Mobility/activity is maintained at optimum level for patient  Outcome: Ongoing     Problem: SKIN INTEGRITY  Goal: Skin integrity is maintained or improved  Outcome: Ongoing     Problem: Falls - Risk of:  Goal: Will remain free from falls  Description: Will remain free from falls  Outcome: Ongoing  Goal: Absence of physical injury  Description: Absence of physical injury  Outcome: Ongoing     Problem: Anxiety:  Goal: Level of anxiety will decrease  Description: Level of anxiety will decrease  Outcome: Ongoing     Problem: Nutrition  Goal: Optimal nutrition therapy  Outcome: Ongoing     Problem: Restraint Use - Nonviolent/Non-Self-Destructive Behavior:  Goal: Absence of restraint indications  Description: Absence of restraint indications  Outcome: Not Met This Shift

## 2020-03-16 ENCOUNTER — APPOINTMENT (OUTPATIENT)
Dept: GENERAL RADIOLOGY | Age: 70
DRG: 981 | End: 2020-03-16
Payer: MEDICARE

## 2020-03-16 LAB
ABSOLUTE EOS #: 0 K/UL (ref 0–0.4)
ABSOLUTE IMMATURE GRANULOCYTE: 0.18 K/UL (ref 0–0.3)
ABSOLUTE LYMPH #: 1.42 K/UL (ref 1–4.8)
ABSOLUTE MONO #: 1.77 K/UL (ref 0.1–0.8)
ALLEN TEST: ABNORMAL
ALLEN TEST: ABNORMAL
ANION GAP SERPL CALCULATED.3IONS-SCNC: 12 MMOL/L (ref 9–17)
ANION GAP: 7 MMOL/L (ref 7–16)
BASOPHILS # BLD: 0 % (ref 0–2)
BASOPHILS ABSOLUTE: 0 K/UL (ref 0–0.2)
BUN BLDV-MCNC: 43 MG/DL (ref 8–23)
BUN/CREAT BLD: ABNORMAL (ref 9–20)
CALCIUM SERPL-MCNC: 8.2 MG/DL (ref 8.6–10.4)
CHLORIDE BLD-SCNC: 106 MMOL/L (ref 98–107)
CO2: 21 MMOL/L (ref 20–31)
CREAT SERPL-MCNC: 1.03 MG/DL (ref 0.5–0.9)
DIFFERENTIAL TYPE: ABNORMAL
EOSINOPHILS RELATIVE PERCENT: 0 % (ref 1–4)
FIO2: 100
FIO2: 40
GFR AFRICAN AMERICAN: >60 ML/MIN
GFR NON-AFRICAN AMERICAN: 37 ML/MIN
GFR NON-AFRICAN AMERICAN: 53 ML/MIN
GFR SERPL CREATININE-BSD FRML MDRD: 44 ML/MIN
GFR SERPL CREATININE-BSD FRML MDRD: ABNORMAL ML/MIN/{1.73_M2}
GLUCOSE BLD-MCNC: 157 MG/DL (ref 70–99)
GLUCOSE BLD-MCNC: 170 MG/DL (ref 65–105)
GLUCOSE BLD-MCNC: 173 MG/DL (ref 74–100)
GLUCOSE BLD-MCNC: 177 MG/DL (ref 65–105)
GLUCOSE BLD-MCNC: 215 MG/DL (ref 74–100)
GLUCOSE BLD-MCNC: 216 MG/DL (ref 65–105)
HCT VFR BLD CALC: 32.7 % (ref 36.3–47.1)
HEMOGLOBIN: 10.5 G/DL (ref 11.9–15.1)
IMMATURE GRANULOCYTES: 1 %
LYMPHOCYTES # BLD: 8 % (ref 24–44)
MCH RBC QN AUTO: 30.4 PG (ref 25.2–33.5)
MCHC RBC AUTO-ENTMCNC: 32.1 G/DL (ref 28.4–34.8)
MCV RBC AUTO: 94.8 FL (ref 82.6–102.9)
MODE: ABNORMAL
MODE: ABNORMAL
MONOCYTES # BLD: 10 % (ref 1–7)
MORPHOLOGY: ABNORMAL
NEGATIVE BASE EXCESS, ART: 2 (ref 0–2)
NEGATIVE BASE EXCESS, ART: ABNORMAL (ref 0–2)
NRBC AUTOMATED: 0.6 PER 100 WBC
O2 DEVICE/FLOW/%: ABNORMAL
O2 DEVICE/FLOW/%: ABNORMAL
PARTIAL THROMBOPLASTIN TIME: 51.2 SEC (ref 20.5–30.5)
PATIENT TEMP: ABNORMAL
PATIENT TEMP: ABNORMAL
PDW BLD-RTO: 15.4 % (ref 11.8–14.4)
PLATELET # BLD: 246 K/UL (ref 138–453)
PLATELET ESTIMATE: ABNORMAL
PMV BLD AUTO: 11.1 FL (ref 8.1–13.5)
POC CHLORIDE: 108 MMOL/L (ref 98–107)
POC CREATININE: 1.42 MG/DL (ref 0.51–1.19)
POC HCO3: 26.3 MMOL/L (ref 21–28)
POC HCO3: 26.7 MMOL/L (ref 21–28)
POC HEMATOCRIT: 36 % (ref 36–46)
POC HEMOGLOBIN: 12.2 G/DL (ref 12–16)
POC IONIZED CALCIUM: 1.19 MMOL/L (ref 1.15–1.33)
POC LACTIC ACID: 0.9 MMOL/L (ref 0.56–1.39)
POC O2 SATURATION: 100 % (ref 94–98)
POC O2 SATURATION: 95 % (ref 94–98)
POC PCO2 TEMP: ABNORMAL MM HG
POC PCO2 TEMP: ABNORMAL MM HG
POC PCO2: 48.4 MM HG (ref 35–48)
POC PCO2: 63.4 MM HG (ref 35–48)
POC PH TEMP: ABNORMAL
POC PH TEMP: ABNORMAL
POC PH: 7.23 (ref 7.35–7.45)
POC PH: 7.34 (ref 7.35–7.45)
POC PO2 TEMP: ABNORMAL MM HG
POC PO2 TEMP: ABNORMAL MM HG
POC PO2: 326.6 MM HG (ref 83–108)
POC PO2: 82.4 MM HG (ref 83–108)
POC POTASSIUM: 4.7 MMOL/L (ref 3.5–4.5)
POC SODIUM: 142 MMOL/L (ref 138–146)
POSITIVE BASE EXCESS, ART: 0 (ref 0–3)
POSITIVE BASE EXCESS, ART: ABNORMAL (ref 0–3)
POTASSIUM SERPL-SCNC: 4.8 MMOL/L (ref 3.7–5.3)
RBC # BLD: 3.45 M/UL (ref 3.95–5.11)
RBC # BLD: ABNORMAL 10*6/UL
SAMPLE SITE: ABNORMAL
SAMPLE SITE: ABNORMAL
SEG NEUTROPHILS: 81 % (ref 36–66)
SEGMENTED NEUTROPHILS ABSOLUTE COUNT: 14.33 K/UL (ref 1.8–7.7)
SODIUM BLD-SCNC: 139 MMOL/L (ref 135–144)
TCO2 (CALC), ART: 28 MMOL/L (ref 22–29)
TCO2 (CALC), ART: 29 MMOL/L (ref 22–29)
WBC # BLD: 17.7 K/UL (ref 3.5–11.3)
WBC # BLD: ABNORMAL 10*3/UL

## 2020-03-16 PROCEDURE — 85730 THROMBOPLASTIN TIME PARTIAL: CPT

## 2020-03-16 PROCEDURE — 6360000002 HC RX W HCPCS: Performed by: INTERNAL MEDICINE

## 2020-03-16 PROCEDURE — 80048 BASIC METABOLIC PNL TOTAL CA: CPT

## 2020-03-16 PROCEDURE — 37799 UNLISTED PX VASCULAR SURGERY: CPT

## 2020-03-16 PROCEDURE — 82330 ASSAY OF CALCIUM: CPT

## 2020-03-16 PROCEDURE — 2580000003 HC RX 258: Performed by: INTERNAL MEDICINE

## 2020-03-16 PROCEDURE — 85014 HEMATOCRIT: CPT

## 2020-03-16 PROCEDURE — 94640 AIRWAY INHALATION TREATMENT: CPT

## 2020-03-16 PROCEDURE — 82803 BLOOD GASES ANY COMBINATION: CPT

## 2020-03-16 PROCEDURE — C9113 INJ PANTOPRAZOLE SODIUM, VIA: HCPCS | Performed by: STUDENT IN AN ORGANIZED HEALTH CARE EDUCATION/TRAINING PROGRAM

## 2020-03-16 PROCEDURE — 6360000002 HC RX W HCPCS

## 2020-03-16 PROCEDURE — 2000000000 HC ICU R&B

## 2020-03-16 PROCEDURE — 82565 ASSAY OF CREATININE: CPT

## 2020-03-16 PROCEDURE — 2580000003 HC RX 258: Performed by: STUDENT IN AN ORGANIZED HEALTH CARE EDUCATION/TRAINING PROGRAM

## 2020-03-16 PROCEDURE — 84132 ASSAY OF SERUM POTASSIUM: CPT

## 2020-03-16 PROCEDURE — 36415 COLL VENOUS BLD VENIPUNCTURE: CPT

## 2020-03-16 PROCEDURE — 6370000000 HC RX 637 (ALT 250 FOR IP): Performed by: STUDENT IN AN ORGANIZED HEALTH CARE EDUCATION/TRAINING PROGRAM

## 2020-03-16 PROCEDURE — 94761 N-INVAS EAR/PLS OXIMETRY MLT: CPT

## 2020-03-16 PROCEDURE — 6360000002 HC RX W HCPCS: Performed by: STUDENT IN AN ORGANIZED HEALTH CARE EDUCATION/TRAINING PROGRAM

## 2020-03-16 PROCEDURE — 94003 VENT MGMT INPAT SUBQ DAY: CPT

## 2020-03-16 PROCEDURE — 71045 X-RAY EXAM CHEST 1 VIEW: CPT

## 2020-03-16 PROCEDURE — 2500000003 HC RX 250 WO HCPCS

## 2020-03-16 PROCEDURE — 85025 COMPLETE CBC W/AUTO DIFF WBC: CPT

## 2020-03-16 PROCEDURE — 6360000002 HC RX W HCPCS: Performed by: EMERGENCY MEDICINE

## 2020-03-16 PROCEDURE — 82947 ASSAY GLUCOSE BLOOD QUANT: CPT

## 2020-03-16 PROCEDURE — 97110 THERAPEUTIC EXERCISES: CPT

## 2020-03-16 PROCEDURE — 6370000000 HC RX 637 (ALT 250 FOR IP): Performed by: INTERNAL MEDICINE

## 2020-03-16 PROCEDURE — 2500000003 HC RX 250 WO HCPCS: Performed by: STUDENT IN AN ORGANIZED HEALTH CARE EDUCATION/TRAINING PROGRAM

## 2020-03-16 PROCEDURE — 83605 ASSAY OF LACTIC ACID: CPT

## 2020-03-16 PROCEDURE — 2700000000 HC OXYGEN THERAPY PER DAY

## 2020-03-16 PROCEDURE — 84295 ASSAY OF SERUM SODIUM: CPT

## 2020-03-16 PROCEDURE — 97161 PT EVAL LOW COMPLEX 20 MIN: CPT

## 2020-03-16 PROCEDURE — 94770 HC ETCO2 MONITOR DAILY: CPT

## 2020-03-16 PROCEDURE — 82435 ASSAY OF BLOOD CHLORIDE: CPT

## 2020-03-16 PROCEDURE — 99291 CRITICAL CARE FIRST HOUR: CPT | Performed by: INTERNAL MEDICINE

## 2020-03-16 RX ORDER — FENTANYL CITRATE 50 UG/ML
INJECTION, SOLUTION INTRAMUSCULAR; INTRAVENOUS
Status: DISPENSED
Start: 2020-03-16 | End: 2020-03-17

## 2020-03-16 RX ORDER — METOPROLOL TARTRATE 50 MG/1
50 TABLET, FILM COATED ORAL 2 TIMES DAILY
Status: DISCONTINUED | OUTPATIENT
Start: 2020-03-16 | End: 2020-03-26 | Stop reason: HOSPADM

## 2020-03-16 RX ORDER — METHYLPREDNISOLONE SODIUM SUCCINATE 40 MG/ML
40 INJECTION, POWDER, LYOPHILIZED, FOR SOLUTION INTRAMUSCULAR; INTRAVENOUS DAILY
Status: DISCONTINUED | OUTPATIENT
Start: 2020-03-17 | End: 2020-03-18

## 2020-03-16 RX ORDER — LABETALOL HYDROCHLORIDE 5 MG/ML
10 INJECTION, SOLUTION INTRAVENOUS ONCE
Status: COMPLETED | OUTPATIENT
Start: 2020-03-16 | End: 2020-03-16

## 2020-03-16 RX ORDER — FENTANYL CITRATE 50 UG/ML
100 INJECTION, SOLUTION INTRAMUSCULAR; INTRAVENOUS
Status: DISPENSED | OUTPATIENT
Start: 2020-03-16 | End: 2020-03-18

## 2020-03-16 RX ORDER — LABETALOL HYDROCHLORIDE 5 MG/ML
INJECTION, SOLUTION INTRAVENOUS
Status: COMPLETED
Start: 2020-03-16 | End: 2020-03-16

## 2020-03-16 RX ORDER — PROPOFOL 10 MG/ML
10 INJECTION, EMULSION INTRAVENOUS
Status: DISCONTINUED | OUTPATIENT
Start: 2020-03-16 | End: 2020-03-16

## 2020-03-16 RX ORDER — IPRATROPIUM BROMIDE AND ALBUTEROL SULFATE 2.5; .5 MG/3ML; MG/3ML
1 SOLUTION RESPIRATORY (INHALATION) EVERY 6 HOURS PRN
Status: DISCONTINUED | OUTPATIENT
Start: 2020-03-16 | End: 2020-03-17

## 2020-03-16 RX ORDER — FENTANYL CITRATE 50 UG/ML
100 INJECTION, SOLUTION INTRAMUSCULAR; INTRAVENOUS
Status: ACTIVE | OUTPATIENT
Start: 2020-03-16 | End: 2020-03-16

## 2020-03-16 RX ORDER — FENTANYL CITRATE 50 UG/ML
INJECTION, SOLUTION INTRAMUSCULAR; INTRAVENOUS
Status: COMPLETED
Start: 2020-03-16 | End: 2020-03-16

## 2020-03-16 RX ORDER — PROPOFOL 10 MG/ML
INJECTION, EMULSION INTRAVENOUS
Status: COMPLETED
Start: 2020-03-16 | End: 2020-03-16

## 2020-03-16 RX ORDER — FUROSEMIDE 10 MG/ML
20 INJECTION INTRAMUSCULAR; INTRAVENOUS ONCE
Status: COMPLETED | OUTPATIENT
Start: 2020-03-16 | End: 2020-03-16

## 2020-03-16 RX ADMIN — Medication 10 MG: at 04:40

## 2020-03-16 RX ADMIN — FUROSEMIDE 20 MG: 10 INJECTION, SOLUTION INTRAMUSCULAR; INTRAVENOUS at 20:47

## 2020-03-16 RX ADMIN — ASPIRIN 81 MG: 81 TABLET ORAL at 08:14

## 2020-03-16 RX ADMIN — Medication 10 ML: at 10:50

## 2020-03-16 RX ADMIN — METOPROLOL TARTRATE 25 MG: 25 TABLET ORAL at 08:14

## 2020-03-16 RX ADMIN — FENTANYL CITRATE 100 MCG: 50 INJECTION, SOLUTION INTRAMUSCULAR; INTRAVENOUS at 17:12

## 2020-03-16 RX ADMIN — INSULIN LISPRO 2 UNITS: 100 INJECTION, SOLUTION INTRAVENOUS; SUBCUTANEOUS at 18:20

## 2020-03-16 RX ADMIN — METOPROLOL TARTRATE 50 MG: 50 TABLET, FILM COATED ORAL at 20:20

## 2020-03-16 RX ADMIN — DEXMEDETOMIDINE HYDROCHLORIDE 1 MCG/KG/HR: 100 INJECTION, SOLUTION INTRAVENOUS at 20:42

## 2020-03-16 RX ADMIN — Medication 10 ML: at 20:21

## 2020-03-16 RX ADMIN — DESMOPRESSIN ACETATE 40 MG: 0.2 TABLET ORAL at 20:21

## 2020-03-16 RX ADMIN — LABETALOL HYDROCHLORIDE 10 MG: 5 INJECTION, SOLUTION INTRAVENOUS at 03:52

## 2020-03-16 RX ADMIN — FENTANYL CITRATE 100 MCG: 50 INJECTION, SOLUTION INTRAMUSCULAR; INTRAVENOUS at 15:05

## 2020-03-16 RX ADMIN — FENTANYL CITRATE 100 MCG: 50 INJECTION, SOLUTION INTRAMUSCULAR; INTRAVENOUS at 05:17

## 2020-03-16 RX ADMIN — DEXMEDETOMIDINE HYDROCHLORIDE 0.5 MCG/KG/HR: 100 INJECTION, SOLUTION INTRAVENOUS at 14:12

## 2020-03-16 RX ADMIN — METHYLPREDNISOLONE SODIUM SUCCINATE 40 MG: 40 INJECTION, POWDER, FOR SOLUTION INTRAMUSCULAR; INTRAVENOUS at 06:45

## 2020-03-16 RX ADMIN — IPRATROPIUM BROMIDE AND ALBUTEROL SULFATE 1 AMPULE: .5; 3 SOLUTION RESPIRATORY (INHALATION) at 13:50

## 2020-03-16 RX ADMIN — DEXMEDETOMIDINE HYDROCHLORIDE 0.7 MCG/KG/HR: 100 INJECTION, SOLUTION INTRAVENOUS at 06:16

## 2020-03-16 RX ADMIN — CEFTRIAXONE SODIUM 1 G: 1 INJECTION, POWDER, FOR SOLUTION INTRAMUSCULAR; INTRAVENOUS at 00:00

## 2020-03-16 RX ADMIN — IPRATROPIUM BROMIDE AND ALBUTEROL SULFATE 1 AMPULE: .5; 3 SOLUTION RESPIRATORY (INHALATION) at 20:06

## 2020-03-16 RX ADMIN — PROPOFOL 5 MG: 10 INJECTION, EMULSION INTRAVENOUS at 05:05

## 2020-03-16 RX ADMIN — CEFTRIAXONE SODIUM 1 G: 1 INJECTION, POWDER, FOR SOLUTION INTRAMUSCULAR; INTRAVENOUS at 23:48

## 2020-03-16 RX ADMIN — Medication 10 MG: at 03:52

## 2020-03-16 RX ADMIN — CHLORDIAZEPOXIDE HYDROCHLORIDE 50 MG: 25 CAPSULE ORAL at 05:07

## 2020-03-16 RX ADMIN — IPRATROPIUM BROMIDE AND ALBUTEROL SULFATE 1 AMPULE: .5; 3 SOLUTION RESPIRATORY (INHALATION) at 02:30

## 2020-03-16 RX ADMIN — PANTOPRAZOLE SODIUM 40 MG: 40 INJECTION, POWDER, FOR SOLUTION INTRAVENOUS at 10:50

## 2020-03-16 RX ADMIN — Medication 10 ML: at 08:18

## 2020-03-16 RX ADMIN — FENTANYL CITRATE 25 MCG: 50 INJECTION, SOLUTION INTRAMUSCULAR; INTRAVENOUS at 03:07

## 2020-03-16 RX ADMIN — IPRATROPIUM BROMIDE AND ALBUTEROL SULFATE 1 AMPULE: .5; 3 SOLUTION RESPIRATORY (INHALATION) at 08:20

## 2020-03-16 RX ADMIN — CHLORDIAZEPOXIDE HYDROCHLORIDE 50 MG: 25 CAPSULE ORAL at 20:46

## 2020-03-16 RX ADMIN — INSULIN LISPRO 2 UNITS: 100 INJECTION, SOLUTION INTRAVENOUS; SUBCUTANEOUS at 05:23

## 2020-03-16 RX ADMIN — INSULIN LISPRO 4 UNITS: 100 INJECTION, SOLUTION INTRAVENOUS; SUBCUTANEOUS at 13:59

## 2020-03-16 RX ADMIN — CHLORDIAZEPOXIDE HYDROCHLORIDE 50 MG: 25 CAPSULE ORAL at 13:56

## 2020-03-16 ASSESSMENT — PULMONARY FUNCTION TESTS
PIF_VALUE: 19
PIF_VALUE: 19
PIF_VALUE: 29
PIF_VALUE: 39
PIF_VALUE: 10
PIF_VALUE: 22
PIF_VALUE: 20
PIF_VALUE: 21
PIF_VALUE: 47
PIF_VALUE: 21

## 2020-03-16 ASSESSMENT — PAIN SCALES - GENERAL
PAINLEVEL_OUTOF10: 0
PAINLEVEL_OUTOF10: 5
PAINLEVEL_OUTOF10: 4

## 2020-03-16 NOTE — PROGRESS NOTES
Nasal Cannula [] Room Air      IF INTUBATED, ET TUBE MARKING AT LOWER LIP:    24   cms    SECRETIONS Amount:  [x] Small [] Moderate  [] Large  [] None  Color:     [] White [] Colored  [] Bloody    SEDATION:  RAAS Score:  [x] Propofol gtt  [] Versed gtt  [] Ativan gtt   [] No Sedation    PARALYZED:  [x] No    [] Yes    DIARRHEA:                [x] No                [] Yes  (C. Difficile status: [] positive                                                                                                                       [] negative                                                                                                                     [] pending)    VASOPRESSORS:  [x] No    [] Yes    If yes -   [] Levophed       [] Dopamine     [] Vasopressin       [] Dobutamine  [] Phenylephrine         [] Epinephrine    CENTRAL LINES:     [] No   [x] Yes   (Date of Insertion:   )           If yes -     [] Right IJ     [] Left IJ [] Right Femoral [x] Left Femoral                   [] Right Subclavian [] Left Subclavian       ALBERT'S CATHETER:   [] No   [x] Yes  (Date of Insertion:   )     URINE OUTPUT:            [x] Good (1.3 ml/kg/hr)  [] Low              [] Anuric    REVIEW OF SYSTEMS:  Patient is intubated and sedated    OBJECTIVE:     VITAL SIGNS:  /62   Pulse 103   Temp 99 °F (37.2 °C) (Oral)   Resp 21   Ht 5' 2\" (1.575 m)   Wt 187 lb 6.3 oz (85 kg)   SpO2 97%   BMI 34.27 kg/m²   Tmax over 24 hours:  Temp (24hrs), Av.2 °F (36.8 °C), Min:97.3 °F (36.3 °C), Max:99 °F (37.2 °C)      Patient Vitals for the past 8 hrs:   BP Temp Temp src Pulse Resp SpO2 Weight   20 0730 -- -- -- 103 21 97 % --   20 0715 -- -- -- 98 22 96 % --   20 0700 114/62 -- -- 100 23 96 % --   20 0600 107/64 -- -- 98 22 95 % --   20 0504 -- -- -- 121 (!) 31 100 % --   20 0503 -- -- -- -- (!) 31 100 % --   20 0500 (!) 159/89 -- -- 118 29 100 % --   20 0400 (!) 207/111 99 °F (37.2 °C) CTA when patient medically stable. 4. Hypotension. Resolved. Off of levophed. Episodes of hypertension overnight  5. Hx of Essential HTN -  Increase Lopressor to 50 mg BID. 6. Elevated troponins-downtrending. Continued on low-dose heparin drip for now.  Cardiology following. Awaiting Cath. 7. Lactic acidosis. resolved. stop Iv fluid. Continue with free water flushes though OG tube. Leukocytosis likely due to steroids. down trended to 16 from 17 yesterday. Afebrile.  Continue to monitor. Recent Labs     03/14/20  0546 03/15/20  0453   WBC 17.5* 16.9*   8.   9. Hypernatremia-resolved.  IVF KVO. Continue with free water flushes though OG tube. 10. COPD stage unspecified -continued on breathing treatments with albuterol. IV steroid, Zithromax (completed) and Rocephin for now  11. ASHLEY prerenal. creatinine trending up 1.03 -> 1.42 today-half-normal saline at 50 Daily BMP. Urine output 1.3 ml/kg/hr last 24 hr.  12. Nicotine dependence  13. Alcohol withdrawal. Continue Librium 50 mg tid.           Diet- tube feeds  DVT prophylaxis-on low-dose heparin drip   GI prophylaxis IV Protonix 40 daily         Conrad Cochran DO  Emergency Medicine Resident   [address], [place]   3/16/2020, 7:48 AM      Attending Physician Statement  I have discussed the care of Berlin Martinez, including pertinent history and exam findings with the resident. I have reviewed the key elements of all parts of the encounter with the resident. I have seen and examined the patient with the resident. I agree with the assessment and plan and status of the problem list as documented. Please see full note by critical care resident. I have seen the patient during my round today, I have reviewed the chart, lab seen and medications reviewed.     I have seen the events from yesterday, she did have good response with Lasix but her creatinine slightly increased to 1.03 urine output is okay this morning but her

## 2020-03-16 NOTE — PROGRESS NOTES
[facility]   Progress Note                    Date:   3/16/2020  Patient name:  Jurgen Darnell  Date of admission:  3/11/2020 11:34 AM  MRN:   [id number]  YOB: 1950  PCP:    Alex Naylor MD    Reason for Admission:  Shortness of breath [R06.02]    Subjective:       Clinical Changes / Abnormalities: The patient was seen and examined. Remains intubated but is off pressors. Patient apparently went into DTs yesterday. She remains on heparin drip        I/O last 3 completed shifts: In: 2799.5 [I.V.:1108.5; NG/GT:1691]  Out: 2665 [Urine:2665]  No intake/output data recorded.       In: 3629 [I.V.:466; NG/GT:1029]  Out: 1675 [Urine:1675]      Intake/Output Summary (Last 24 hours) at 3/16/2020 0956  Last data filed at 3/16/2020 0700  Gross per 24 hour   Intake 2343 ml   Output 2605 ml   Net -262 ml         I/O since admission:  liters    Medications:   Scheduled Meds:   chlordiazePOXIDE  50 mg Oral Q8H    metoprolol tartrate  25 mg Oral BID    methylPREDNISolone  40 mg Intravenous Q8H    ipratropium-albuterol  1 ampule Inhalation Q6H    insulin lispro  0-12 Units Subcutaneous 4 times per day    aspirin  81 mg Oral Daily    atorvastatin  40 mg Oral Nightly    cefTRIAXone (ROCEPHIN) IV  1 g Intravenous Q24H    sodium chloride flush  10 mL Intravenous 2 times per day    nicotine  1 patch Transdermal Daily    pantoprazole  40 mg Intravenous Daily    And    sodium chloride (PF)  10 mL Intravenous Daily     Continuous Infusions:   propofol Stopped (03/16/20 0600)    dexmedetomidine (PRECEDEX) IV infusion 0.6 mcg/kg/hr (03/16/20 0811)    sodium chloride 10 mL/hr at 03/15/20 2300    heparin (porcine) 10 Units/kg/hr (03/15/20 2359)    dextrose      norepinephrine Stopped (03/14/20 0806)     CBC:   Recent Labs     03/14/20  0546 03/15/20  0453   WBC 17.5* 16.9*   HGB 9.1* 9.8*    234  230     BMP:    Recent Labs     03/14/20  0517 03/15/20  0453 03/16/20  0403 03/16/20  0459    142 139  --    K 4.0 5.0 4.8  --    * 112* 106  --    CO2 19* 20 21  --    BUN 32* 32* 43*  --    CREATININE 1.11* 0.85 1.03* 1.42*   GLUCOSE 165* 155* 157*  --          Objective:   Vitals: /62   Pulse 91   Temp 99 °F (37.2 °C) (Oral)   Resp 22   Ht 5' 2\" (1.575 m)   Wt 187 lb 6.3 oz (85 kg)   SpO2 95%   BMI 34.27 kg/m²      Constitutional and General Appearance: Intubated and sedated   HEENT: PERRL, no cervical lymphadenopathy. No masses palpable. Normal oral mucosa  Respiratory:  · Normal excursion and expansion without use of accessory muscles  · Resp Auscultation: Good respiratory effort. No for increased work of breathing. On auscultation: clear to auscultation bilaterally  Cardiovascular:  · The apical impulse is not displaced  · Heart tones are crisp and normal. regular S1 and S2.  · Jugular venous pulsation Normal  · The carotid upstroke is normal in amplitude and contour without delay or bruit  · Peripheral pulses are symmetrical and full      Abdomen:   · No masses or tenderness  · Bowel sounds present  Extremities:  ·  No Cyanosis or Clubbing  ·  Lower extremity edema: No  ·  Skin: Warm and dry  Neurological:  Intubated and sedated    Diagnostic Studies:     EKG: Sinus tachycardia, PVCs, ST depression in lateral leads        ECHO:  3/12/2020  Left ventricle is normal in size with normal systolic function globally. Calculated ejection fraction is 54%. Mild mitral regurgitation. Mild tricuspid regurgitation. Estimated right ventricular systolic pressure  is 31 mmHg.        Patient's Active Problem List   Active Problems:    Shortness of breath  Resolved Problems:    * No resolved hospital problems. *        Assessment / Acute Cardiac Problems:   1. Elevated troponin -  Type I vs Type II  MI  2. Pulmonary edema  3. Possible PEA arrest in the ED  4. Descending aortic thrombus  5. ASHLEY  6. COPD   7.  Lactic acidosis:

## 2020-03-16 NOTE — PROGRESS NOTES
today, excluding procedures. Please note that this chart was generated using voice recognition Dragon dictation software. Although every effort was made to ensure the accuracy of this automated transcription, some errors in transcription may have occurred.       Josi Spain MD  3/16/2020 2:36 PM

## 2020-03-16 NOTE — PLAN OF CARE
Problem: OXYGENATION/RESPIRATORY FUNCTION  Goal: Patient will maintain patent airway  3/15/2020 2156 by Ashish Trent RN  Outcome: Ongoing     Problem: OXYGENATION/RESPIRATORY FUNCTION  Goal: Patient will achieve/maintain normal respiratory rate/effort  Description: Respiratory rate and effort will be within normal limits for the patient  3/15/2020 2156 by Ashish Trent RN  Outcome: Ongoing     Problem: MECHANICAL VENTILATION  Goal: Patient will maintain patent airway  3/15/2020 2156 by Ashish Trent RN  Outcome: Ongoing     Problem: MECHANICAL VENTILATION  Goal: Patient will maintain patent airway  3/15/2020 2156 by Ashish Trent RN  Outcome: Ongoing     Problem: MECHANICAL VENTILATION  Goal: Oral health is maintained or improved  3/15/2020 2156 by Ashish Trent RN  Outcome: Ongoing     Problem: MECHANICAL VENTILATION  Goal: ET tube will be managed safely  3/15/2020 2156 by Ashish Trent RN  Outcome: Ongoing     Problem: MECHANICAL VENTILATION  Goal: Ability to express needs and understand communication  3/15/2020 2156 by Ashish Trent RN  Outcome: Ongoing     Problem: MECHANICAL VENTILATION  Goal: Mobility/activity is maintained at optimum level for patient  3/15/2020 2156 by Ashish Trent RN  Outcome: Ongoing     Problem: SKIN INTEGRITY  Goal: Skin integrity is maintained or improved  3/15/2020 2156 by Ashish Trent RN  Outcome: Ongoing     Problem: Falls - Risk of:  Goal: Will remain free from falls  Description: Will remain free from falls  3/15/2020 2156 by Ashish Trent RN  Outcome: Ongoing     Problem: Falls - Risk of:  Goal: Absence of physical injury  Description: Absence of physical injury  3/15/2020 2156 by Ashish Trent RN  Outcome: Ongoing     Problem: Restraint Use - Nonviolent/Non-Self-Destructive Behavior:  Goal: Absence of restraint indications  Description: Absence of restraint indications  3/15/2020 2156 by Ashish Trent RN  Outcome: Ongoing     Problem: Restraint Use -

## 2020-03-16 NOTE — PLAN OF CARE
Problem: OXYGENATION/RESPIRATORY FUNCTION  Goal: Patient will maintain patent airway  3/15/2020 2004 by Gail Wright RCP  Outcome: Ongoing     Problem: OXYGENATION/RESPIRATORY FUNCTION  Goal: Patient will achieve/maintain normal respiratory rate/effort  Description: Respiratory rate and effort will be within normal limits for the patient  3/15/2020 2004 by Gail Wright RCP  Outcome: Ongoing     Problem: MECHANICAL VENTILATION  Goal: Patient will maintain patent airway  3/15/2020 2004 by Gail Wright RCP  Outcome: Ongoing     Problem: MECHANICAL VENTILATION  Goal: Oral health is maintained or improved  3/15/2020 2004 by Gail Wright RCP  Outcome: Ongoing     Problem: MECHANICAL VENTILATION  Goal: ET tube will be managed safely  3/15/2020 2004 by Gail Wright RCP  Outcome: Ongoing     Problem: MECHANICAL VENTILATION  Goal: Ability to express needs and understand communication  3/15/2020 2004 by Gail Wright RCP  Outcome: Ongoing     Problem: MECHANICAL VENTILATION  Goal: Mobility/activity is maintained at optimum level for patient  3/15/2020 2004 by Gail Wright RCP  Outcome: Ongoing     Problem: SKIN INTEGRITY  Goal: Skin integrity is maintained or improved  3/15/2020 2004 by Gail Wright RCP  Outcome: Ongoing   BRONCHOSPASM/BRONCHOCONSTRICTION     [x]         IMPROVE AERATION/BREATH SOUNDS  [x]   ADMINISTER BRONCHODILATOR THERAPY AS APPROPRIATE  [x]   ASSESS BREATH SOUNDS  [x]   IMPLEMENT AEROSOL/MDI PROTOCOL  [x]   PATIENT EDUCATION AS NEEDED

## 2020-03-17 ENCOUNTER — APPOINTMENT (OUTPATIENT)
Dept: GENERAL RADIOLOGY | Age: 70
DRG: 981 | End: 2020-03-17
Payer: MEDICARE

## 2020-03-17 PROBLEM — I21.4 NSTEMI (NON-ST ELEVATED MYOCARDIAL INFARCTION) (HCC): Status: ACTIVE | Noted: 2020-03-11

## 2020-03-17 PROBLEM — J96.01 ACUTE RESPIRATORY FAILURE WITH HYPOXIA AND HYPERCAPNIA (HCC): Status: ACTIVE | Noted: 2020-03-11

## 2020-03-17 PROBLEM — I50.1 ACUTE CARDIAC PULMONARY EDEMA (HCC): Status: ACTIVE | Noted: 2020-03-11

## 2020-03-17 PROBLEM — J96.02 ACUTE RESPIRATORY FAILURE WITH HYPOXIA AND HYPERCAPNIA (HCC): Status: ACTIVE | Noted: 2020-03-11

## 2020-03-17 PROBLEM — J44.1 COPD WITH ACUTE EXACERBATION (HCC): Status: ACTIVE | Noted: 2020-03-11

## 2020-03-17 PROBLEM — R57.1 HYPOVOLEMIC SHOCK (HCC): Status: ACTIVE | Noted: 2020-03-11

## 2020-03-17 LAB
ALLEN TEST: ABNORMAL
ANION GAP SERPL CALCULATED.3IONS-SCNC: 9 MMOL/L (ref 9–17)
BUN BLDV-MCNC: 38 MG/DL (ref 8–23)
BUN/CREAT BLD: ABNORMAL (ref 9–20)
CALCIUM SERPL-MCNC: 8.4 MG/DL (ref 8.6–10.4)
CHLORIDE BLD-SCNC: 105 MMOL/L (ref 98–107)
CO2: 26 MMOL/L (ref 20–31)
CREAT SERPL-MCNC: 0.83 MG/DL (ref 0.5–0.9)
CULTURE: NORMAL
CULTURE: NORMAL
FIO2: 45
GFR AFRICAN AMERICAN: >60 ML/MIN
GFR NON-AFRICAN AMERICAN: >60 ML/MIN
GFR SERPL CREATININE-BSD FRML MDRD: ABNORMAL ML/MIN/{1.73_M2}
GFR SERPL CREATININE-BSD FRML MDRD: ABNORMAL ML/MIN/{1.73_M2}
GLUCOSE BLD-MCNC: 128 MG/DL (ref 70–99)
GLUCOSE BLD-MCNC: 130 MG/DL (ref 65–105)
GLUCOSE BLD-MCNC: 136 MG/DL (ref 74–100)
GLUCOSE BLD-MCNC: 150 MG/DL (ref 65–105)
GLUCOSE BLD-MCNC: 95 MG/DL (ref 65–105)
GLUCOSE BLD-MCNC: 96 MG/DL (ref 65–105)
Lab: NORMAL
Lab: NORMAL
MAGNESIUM: 2.5 MG/DL (ref 1.6–2.6)
MODE: ABNORMAL
NEGATIVE BASE EXCESS, ART: ABNORMAL (ref 0–2)
O2 DEVICE/FLOW/%: ABNORMAL
PARTIAL THROMBOPLASTIN TIME: 53.7 SEC (ref 20.5–30.5)
PATIENT TEMP: ABNORMAL
PLATELET # BLD: 252 K/UL (ref 138–453)
POC HCO3: 30.6 MMOL/L (ref 21–28)
POC O2 SATURATION: 98 % (ref 94–98)
POC PCO2 TEMP: ABNORMAL MM HG
POC PCO2: 44 MM HG (ref 35–48)
POC PH TEMP: ABNORMAL
POC PH: 7.45 (ref 7.35–7.45)
POC PO2 TEMP: ABNORMAL MM HG
POC PO2: 105.4 MM HG (ref 83–108)
POSITIVE BASE EXCESS, ART: 6 (ref 0–3)
POTASSIUM SERPL-SCNC: 4.6 MMOL/L (ref 3.7–5.3)
SAMPLE SITE: ABNORMAL
SODIUM BLD-SCNC: 140 MMOL/L (ref 135–144)
SPECIMEN DESCRIPTION: NORMAL
SPECIMEN DESCRIPTION: NORMAL
TCO2 (CALC), ART: 32 MMOL/L (ref 22–29)

## 2020-03-17 PROCEDURE — 6370000000 HC RX 637 (ALT 250 FOR IP): Performed by: INTERNAL MEDICINE

## 2020-03-17 PROCEDURE — 6360000002 HC RX W HCPCS: Performed by: STUDENT IN AN ORGANIZED HEALTH CARE EDUCATION/TRAINING PROGRAM

## 2020-03-17 PROCEDURE — 6370000000 HC RX 637 (ALT 250 FOR IP): Performed by: STUDENT IN AN ORGANIZED HEALTH CARE EDUCATION/TRAINING PROGRAM

## 2020-03-17 PROCEDURE — 99291 CRITICAL CARE FIRST HOUR: CPT | Performed by: INTERNAL MEDICINE

## 2020-03-17 PROCEDURE — 74018 RADEX ABDOMEN 1 VIEW: CPT

## 2020-03-17 PROCEDURE — 71045 X-RAY EXAM CHEST 1 VIEW: CPT

## 2020-03-17 PROCEDURE — 82803 BLOOD GASES ANY COMBINATION: CPT

## 2020-03-17 PROCEDURE — 2580000003 HC RX 258: Performed by: STUDENT IN AN ORGANIZED HEALTH CARE EDUCATION/TRAINING PROGRAM

## 2020-03-17 PROCEDURE — 85049 AUTOMATED PLATELET COUNT: CPT

## 2020-03-17 PROCEDURE — 80048 BASIC METABOLIC PNL TOTAL CA: CPT

## 2020-03-17 PROCEDURE — 37799 UNLISTED PX VASCULAR SURGERY: CPT

## 2020-03-17 PROCEDURE — 2500000003 HC RX 250 WO HCPCS: Performed by: STUDENT IN AN ORGANIZED HEALTH CARE EDUCATION/TRAINING PROGRAM

## 2020-03-17 PROCEDURE — 94761 N-INVAS EAR/PLS OXIMETRY MLT: CPT

## 2020-03-17 PROCEDURE — 85730 THROMBOPLASTIN TIME PARTIAL: CPT

## 2020-03-17 PROCEDURE — 94640 AIRWAY INHALATION TREATMENT: CPT

## 2020-03-17 PROCEDURE — C9113 INJ PANTOPRAZOLE SODIUM, VIA: HCPCS | Performed by: STUDENT IN AN ORGANIZED HEALTH CARE EDUCATION/TRAINING PROGRAM

## 2020-03-17 PROCEDURE — 83735 ASSAY OF MAGNESIUM: CPT

## 2020-03-17 PROCEDURE — 94770 HC ETCO2 MONITOR DAILY: CPT

## 2020-03-17 PROCEDURE — 2000000000 HC ICU R&B

## 2020-03-17 PROCEDURE — 6360000002 HC RX W HCPCS: Performed by: EMERGENCY MEDICINE

## 2020-03-17 PROCEDURE — 94003 VENT MGMT INPAT SUBQ DAY: CPT

## 2020-03-17 PROCEDURE — 2700000000 HC OXYGEN THERAPY PER DAY

## 2020-03-17 RX ORDER — FUROSEMIDE 10 MG/ML
20 INJECTION INTRAMUSCULAR; INTRAVENOUS ONCE
Status: COMPLETED | OUTPATIENT
Start: 2020-03-17 | End: 2020-03-17

## 2020-03-17 RX ORDER — IPRATROPIUM BROMIDE AND ALBUTEROL SULFATE 2.5; .5 MG/3ML; MG/3ML
1 SOLUTION RESPIRATORY (INHALATION) EVERY 6 HOURS
Status: DISCONTINUED | OUTPATIENT
Start: 2020-03-17 | End: 2020-03-17

## 2020-03-17 RX ORDER — IPRATROPIUM BROMIDE AND ALBUTEROL SULFATE 2.5; .5 MG/3ML; MG/3ML
1 SOLUTION RESPIRATORY (INHALATION) 4 TIMES DAILY
Status: DISCONTINUED | OUTPATIENT
Start: 2020-03-17 | End: 2020-03-18

## 2020-03-17 RX ORDER — METOCLOPRAMIDE HYDROCHLORIDE 5 MG/ML
10 INJECTION INTRAMUSCULAR; INTRAVENOUS EVERY 6 HOURS
Status: DISCONTINUED | OUTPATIENT
Start: 2020-03-17 | End: 2020-03-18

## 2020-03-17 RX ORDER — AMLODIPINE BESYLATE 5 MG/1
5 TABLET ORAL DAILY
Status: DISCONTINUED | OUTPATIENT
Start: 2020-03-17 | End: 2020-03-18

## 2020-03-17 RX ADMIN — HEPARIN SODIUM AND DEXTROSE 10 UNITS/KG/HR: 10000; 5 INJECTION INTRAVENOUS at 04:29

## 2020-03-17 RX ADMIN — METOCLOPRAMIDE 10 MG: 5 INJECTION, SOLUTION INTRAMUSCULAR; INTRAVENOUS at 12:13

## 2020-03-17 RX ADMIN — Medication 10 ML: at 21:05

## 2020-03-17 RX ADMIN — CHLORDIAZEPOXIDE HYDROCHLORIDE 50 MG: 25 CAPSULE ORAL at 21:15

## 2020-03-17 RX ADMIN — METOPROLOL TARTRATE 50 MG: 50 TABLET, FILM COATED ORAL at 08:32

## 2020-03-17 RX ADMIN — DEXMEDETOMIDINE HYDROCHLORIDE 0.5 MCG/KG/HR: 100 INJECTION, SOLUTION INTRAVENOUS at 07:36

## 2020-03-17 RX ADMIN — FENTANYL CITRATE 100 MCG: 50 INJECTION, SOLUTION INTRAMUSCULAR; INTRAVENOUS at 03:09

## 2020-03-17 RX ADMIN — DEXMEDETOMIDINE HYDROCHLORIDE 0.8 MCG/KG/HR: 100 INJECTION, SOLUTION INTRAVENOUS at 22:47

## 2020-03-17 RX ADMIN — IPRATROPIUM BROMIDE AND ALBUTEROL SULFATE 1 AMPULE: .5; 3 SOLUTION RESPIRATORY (INHALATION) at 20:09

## 2020-03-17 RX ADMIN — AMLODIPINE BESYLATE 5 MG: 5 TABLET ORAL at 12:12

## 2020-03-17 RX ADMIN — METOCLOPRAMIDE 10 MG: 5 INJECTION, SOLUTION INTRAMUSCULAR; INTRAVENOUS at 18:08

## 2020-03-17 RX ADMIN — DEXMEDETOMIDINE HYDROCHLORIDE 1 MCG/KG/HR: 100 INJECTION, SOLUTION INTRAVENOUS at 03:03

## 2020-03-17 RX ADMIN — CHLORDIAZEPOXIDE HYDROCHLORIDE 50 MG: 25 CAPSULE ORAL at 05:18

## 2020-03-17 RX ADMIN — ASPIRIN 81 MG: 81 TABLET ORAL at 08:32

## 2020-03-17 RX ADMIN — PANTOPRAZOLE SODIUM 40 MG: 40 INJECTION, POWDER, FOR SOLUTION INTRAVENOUS at 08:32

## 2020-03-17 RX ADMIN — METHYLPREDNISOLONE SODIUM SUCCINATE 40 MG: 40 INJECTION, POWDER, FOR SOLUTION INTRAMUSCULAR; INTRAVENOUS at 08:42

## 2020-03-17 RX ADMIN — IPRATROPIUM BROMIDE AND ALBUTEROL SULFATE 1 AMPULE: .5; 3 SOLUTION RESPIRATORY (INHALATION) at 16:20

## 2020-03-17 RX ADMIN — FUROSEMIDE 20 MG: 10 INJECTION, SOLUTION INTRAMUSCULAR; INTRAVENOUS at 14:26

## 2020-03-17 RX ADMIN — CHLORDIAZEPOXIDE HYDROCHLORIDE 50 MG: 25 CAPSULE ORAL at 12:50

## 2020-03-17 ASSESSMENT — PULMONARY FUNCTION TESTS
PIF_VALUE: 11
PIF_VALUE: 11
PIF_VALUE: 26
PIF_VALUE: 11
PIF_VALUE: 11
PIF_VALUE: 12
PIF_VALUE: 25
PIF_VALUE: 27
PIF_VALUE: 24

## 2020-03-17 NOTE — PROGRESS NOTES
respiratory therapist.  She is tolerating spontaneous breathing trial better, overnight she was less agitated she remains on Precedex drip, she is also on Librium 50 mg 3 times a day and she has been off propofol and she has been on intermittent doses of fentanyl. When I saw her she was arousable and alert while she was on ventilator her endotracheal secretions were reported to be mild to moderate she move all extremities follows command when I examined her she was on CPAP/pressure support her tidal volumes were around 3  respiratory rate was 22 and she was on 40% FiO2 her PEEP is improved from 8-5. She received 1 dose of Lasix yesterday with good response, her creatinine is 0.83 today we will give her another dose of Lasix now. We will extubate her and post extubation may need BiPAP may be difficult to use because of her agitation/delirium/alcohol withdrawal.  Continue with Solu-Medrol for COPD exacerbation. May need extra dose of Lasix    Discussed with respiratory therapist.  Discussed with nursing staff, treatment plan discussed. Total critical care time caring for this patient with life threatening, unstable organ failure, including direct patient contact, management of life support systems, review of data including imaging and labs, discussions with other team members and physicians at least 27  Min so far today, excluding procedures. Please note that this chart was generated using voice recognition Dragon dictation software. Although every effort was made to ensure the accuracy of this automated transcription, some errors in transcription may have occurred.       Lanie Quintero MD  3/17/2020 2:38 PM

## 2020-03-17 NOTE — PROGRESS NOTES
(TENORMIN) 50 MG tablet Take 50 mg by mouth 2 times daily  2/8/16   Historical Provider, MD   atorvastatin (LIPITOR) 40 MG tablet Take 40 mg by mouth daily  2/8/16   Historical Provider, MD   hydrochlorothiazide (HYDRODIURIL) 25 MG tablet Take 50 mg by mouth daily  2/8/16   Historical Provider, MD   .      Assessment         RR 20  Breath Sounds: slight wheeze      · Bronchodilator assessment at level  3  · Hyperinflation assessment at level   · Secretion Management assessment at level    ·   · []    Bronchodilator Assessment  BRONCHODILATOR ASSESSMENT SCORE  Score 0 1 2 3 4 5   Breath Sounds   []  Patient Baseline []  No Wheeze good aeration []  Faint, scattered wheezing, good aeration [x]  Expiratory Wheezing and or moderately diminished []  Insp/Exp wheeze and/or very diminished []  Insp/Exp and/ or marked distress   Respiratory Rate   []  Patient Baseline []  Less than 20 []  Less than 20 [x]  20-25 []  Greater than 25 []  Greater than 25   Peak flow % of Pred or PB [x]  NA   []  Greater than 90%  []  81-90% []  71-80% []  Less than or equal to 70%  or unable to perform []  Unable due to Respiratory Distress   Dyspnea re []  Patient Baseline []  No SOB []  No SOB [x]  SOB on exertion []  SOB min activity []  At rest/acute   e FEV% Predicted       [x]  NA []  Above 69%  []  Unable []  Above 60-69%  []  Unable []  Above 50-59%  []  Unable []  Above 35-49%  []  Unable []  Less than 35%  []  Unable

## 2020-03-17 NOTE — PLAN OF CARE
Problem: OXYGENATION/RESPIRATORY FUNCTION  Goal: Patient will maintain patent airway  Outcome: Ongoing  Goal: Patient will achieve/maintain normal respiratory rate/effort  Respiratory rate and effort will be within normal limits for the patient  Outcome: Ongoing    Problem: MECHANICAL VENTILATION  Goal: Patient will maintain patent airway  Outcome: Ongoing  Goal: Oral health is maintained or improved  Outcome: Ongoing  Goal: ET tube will be managed safely  Outcome: Ongoing  Goal: Ability to express needs and understand communication  Outcome: Ongoing  Goal: Mobility/activity is maintained at optimum level for patient  Outcome: Ongoing    Problem: ASPIRATION PRECAUTIONS  Goal: Patients risk of aspiration is minimized  Outcome: Ongoing    Problem: SKIN INTEGRITY  Goal: Skin integrity is maintained or improved  Outcome: Ongoing                   Ventilator Bronchodilator assessment    Breath sounds: clear, diminished  Inspiratory Pressure: 26  Plateau Pressure: 19    Patient assessed at level 2          []    Bronchodilator Assessment    BRONCHODILATOR ASSESSMENT SCORE  Score 0 (Home) 1 2 3 4   Breath Sounds   []  Chronic Ventilator: Patient at baseline []  Mild Wheezes/ Clear [x]  Intermittent wheezes with good air entry []  Bilateral/unilateral wheezing with diminished air entry []  Insp/Exp wheeze and/or poor aeration   Ventilator Pressures   []  Chronic Ventilator []  Insp. Pressure less than 25 cm H20 []  Insp. Pressure less than 25 cm H20 [x]  Insp. Pressure exceeds 25 cm H20 []  Insp.  Pressure exceeds 30 cm H20   Plateau Pressure []  NA   []  Plateau Pressure less than 4  [x]  Plateau Pressure less than or equal to 5 []  Plateau Pressure greater than or equal to 6 []  Plateau Pressure greater than or equal to 8       RASHEEDA MARINA  9:30 AM     BRONCHOSPASM/BRONCHOCONSTRICTION     [x]         IMPROVE AERATION/BREATH SOUNDS  [x]   ADMINISTER BRONCHODILATOR THERAPY AS APPROPRIATE  [x]   ASSESS BREATH SOUNDS  [x]   IMPLEMENT AEROSOL/MDI PROTOCOL  [x]   PATIENT EDUCATION AS NEEDED

## 2020-03-18 PROBLEM — J81.0 ACUTE PULMONARY EDEMA (HCC): Status: ACTIVE | Noted: 2020-03-11

## 2020-03-18 PROBLEM — R57.9 SHOCK CIRCULATORY (HCC): Status: ACTIVE | Noted: 2020-03-11

## 2020-03-18 LAB
ABSOLUTE EOS #: 0 K/UL (ref 0–0.44)
ABSOLUTE IMMATURE GRANULOCYTE: 0.46 K/UL (ref 0–0.3)
ABSOLUTE LYMPH #: 1.54 K/UL (ref 1.1–3.7)
ABSOLUTE MONO #: 1.69 K/UL (ref 0.1–1.2)
ANION GAP SERPL CALCULATED.3IONS-SCNC: 11 MMOL/L (ref 9–17)
BASOPHILS # BLD: 0 % (ref 0–2)
BASOPHILS ABSOLUTE: 0 K/UL (ref 0–0.2)
BUN BLDV-MCNC: 37 MG/DL (ref 8–23)
BUN/CREAT BLD: ABNORMAL (ref 9–20)
CALCIUM SERPL-MCNC: 8.6 MG/DL (ref 8.6–10.4)
CHLORIDE BLD-SCNC: 104 MMOL/L (ref 98–107)
CO2: 29 MMOL/L (ref 20–31)
CREAT SERPL-MCNC: 0.79 MG/DL (ref 0.5–0.9)
DIFFERENTIAL TYPE: ABNORMAL
EOSINOPHILS RELATIVE PERCENT: 0 % (ref 1–4)
GFR AFRICAN AMERICAN: >60 ML/MIN
GFR NON-AFRICAN AMERICAN: >60 ML/MIN
GFR SERPL CREATININE-BSD FRML MDRD: ABNORMAL ML/MIN/{1.73_M2}
GFR SERPL CREATININE-BSD FRML MDRD: ABNORMAL ML/MIN/{1.73_M2}
GLUCOSE BLD-MCNC: 106 MG/DL (ref 65–105)
GLUCOSE BLD-MCNC: 111 MG/DL (ref 65–105)
GLUCOSE BLD-MCNC: 114 MG/DL (ref 65–105)
GLUCOSE BLD-MCNC: 123 MG/DL (ref 65–105)
GLUCOSE BLD-MCNC: 124 MG/DL (ref 70–99)
HCT VFR BLD CALC: 33.8 % (ref 36.3–47.1)
HEMOGLOBIN: 10.7 G/DL (ref 11.9–15.1)
IMMATURE GRANULOCYTES: 3 %
LYMPHOCYTES # BLD: 10 % (ref 24–43)
MCH RBC QN AUTO: 29.9 PG (ref 25.2–33.5)
MCHC RBC AUTO-ENTMCNC: 31.7 G/DL (ref 28.4–34.8)
MCV RBC AUTO: 94.4 FL (ref 82.6–102.9)
MONOCYTES # BLD: 11 % (ref 3–12)
MORPHOLOGY: ABNORMAL
MORPHOLOGY: ABNORMAL
NRBC AUTOMATED: 0.5 PER 100 WBC
PARTIAL THROMBOPLASTIN TIME: 57.4 SEC (ref 20.5–30.5)
PDW BLD-RTO: 15.3 % (ref 11.8–14.4)
PLATELET # BLD: 274 K/UL (ref 138–453)
PLATELET ESTIMATE: ABNORMAL
PMV BLD AUTO: 11.3 FL (ref 8.1–13.5)
POTASSIUM SERPL-SCNC: 4 MMOL/L (ref 3.7–5.3)
RBC # BLD: 3.58 M/UL (ref 3.95–5.11)
RBC # BLD: ABNORMAL 10*6/UL
SEG NEUTROPHILS: 76 % (ref 36–65)
SEGMENTED NEUTROPHILS ABSOLUTE COUNT: 11.71 K/UL (ref 1.5–8.1)
SODIUM BLD-SCNC: 144 MMOL/L (ref 135–144)
WBC # BLD: 15.4 K/UL (ref 3.5–11.3)
WBC # BLD: ABNORMAL 10*3/UL

## 2020-03-18 PROCEDURE — 6370000000 HC RX 637 (ALT 250 FOR IP): Performed by: STUDENT IN AN ORGANIZED HEALTH CARE EDUCATION/TRAINING PROGRAM

## 2020-03-18 PROCEDURE — 97110 THERAPEUTIC EXERCISES: CPT

## 2020-03-18 PROCEDURE — 2580000003 HC RX 258: Performed by: STUDENT IN AN ORGANIZED HEALTH CARE EDUCATION/TRAINING PROGRAM

## 2020-03-18 PROCEDURE — 85025 COMPLETE CBC W/AUTO DIFF WBC: CPT

## 2020-03-18 PROCEDURE — 80048 BASIC METABOLIC PNL TOTAL CA: CPT

## 2020-03-18 PROCEDURE — 82947 ASSAY GLUCOSE BLOOD QUANT: CPT

## 2020-03-18 PROCEDURE — 6360000002 HC RX W HCPCS: Performed by: STUDENT IN AN ORGANIZED HEALTH CARE EDUCATION/TRAINING PROGRAM

## 2020-03-18 PROCEDURE — 2700000000 HC OXYGEN THERAPY PER DAY

## 2020-03-18 PROCEDURE — 85730 THROMBOPLASTIN TIME PARTIAL: CPT

## 2020-03-18 PROCEDURE — 2000000000 HC ICU R&B

## 2020-03-18 PROCEDURE — 6360000002 HC RX W HCPCS: Performed by: INTERNAL MEDICINE

## 2020-03-18 PROCEDURE — 94640 AIRWAY INHALATION TREATMENT: CPT

## 2020-03-18 PROCEDURE — 2580000003 HC RX 258: Performed by: INTERNAL MEDICINE

## 2020-03-18 PROCEDURE — 6370000000 HC RX 637 (ALT 250 FOR IP): Performed by: INTERNAL MEDICINE

## 2020-03-18 PROCEDURE — 94761 N-INVAS EAR/PLS OXIMETRY MLT: CPT

## 2020-03-18 PROCEDURE — 2500000003 HC RX 250 WO HCPCS: Performed by: STUDENT IN AN ORGANIZED HEALTH CARE EDUCATION/TRAINING PROGRAM

## 2020-03-18 PROCEDURE — 99291 CRITICAL CARE FIRST HOUR: CPT | Performed by: INTERNAL MEDICINE

## 2020-03-18 RX ORDER — IPRATROPIUM BROMIDE AND ALBUTEROL SULFATE 2.5; .5 MG/3ML; MG/3ML
1 SOLUTION RESPIRATORY (INHALATION) 3 TIMES DAILY
Status: DISCONTINUED | OUTPATIENT
Start: 2020-03-18 | End: 2020-03-26 | Stop reason: HOSPADM

## 2020-03-18 RX ORDER — CHLORDIAZEPOXIDE HYDROCHLORIDE 25 MG/1
25 CAPSULE, GELATIN COATED ORAL EVERY 8 HOURS
Status: DISCONTINUED | OUTPATIENT
Start: 2020-03-18 | End: 2020-03-20

## 2020-03-18 RX ORDER — FAMOTIDINE 20 MG/1
20 TABLET, FILM COATED ORAL DAILY
Status: DISCONTINUED | OUTPATIENT
Start: 2020-03-18 | End: 2020-03-19

## 2020-03-18 RX ORDER — AMLODIPINE BESYLATE 10 MG/1
10 TABLET ORAL DAILY
Status: DISCONTINUED | OUTPATIENT
Start: 2020-03-18 | End: 2020-03-26 | Stop reason: HOSPADM

## 2020-03-18 RX ADMIN — IPRATROPIUM BROMIDE AND ALBUTEROL SULFATE 1 AMPULE: .5; 3 SOLUTION RESPIRATORY (INHALATION) at 19:28

## 2020-03-18 RX ADMIN — CEFTRIAXONE SODIUM 1 G: 1 INJECTION, POWDER, FOR SOLUTION INTRAMUSCULAR; INTRAVENOUS at 00:28

## 2020-03-18 RX ADMIN — DEXMEDETOMIDINE HYDROCHLORIDE 0.8 MCG/KG/HR: 100 INJECTION, SOLUTION INTRAVENOUS at 05:00

## 2020-03-18 RX ADMIN — DESMOPRESSIN ACETATE 40 MG: 0.2 TABLET ORAL at 21:11

## 2020-03-18 RX ADMIN — DEXMEDETOMIDINE HYDROCHLORIDE 0.3 MCG/KG/HR: 100 INJECTION, SOLUTION INTRAVENOUS at 21:04

## 2020-03-18 RX ADMIN — CHLORDIAZEPOXIDE HYDROCHLORIDE 25 MG: 25 CAPSULE ORAL at 21:11

## 2020-03-18 RX ADMIN — METHYLPREDNISOLONE SODIUM SUCCINATE 40 MG: 40 INJECTION, POWDER, FOR SOLUTION INTRAMUSCULAR; INTRAVENOUS at 10:17

## 2020-03-18 RX ADMIN — METOPROLOL TARTRATE 50 MG: 50 TABLET, FILM COATED ORAL at 21:11

## 2020-03-18 RX ADMIN — IPRATROPIUM BROMIDE AND ALBUTEROL SULFATE 1 AMPULE: .5; 3 SOLUTION RESPIRATORY (INHALATION) at 14:34

## 2020-03-18 RX ADMIN — CHLORDIAZEPOXIDE HYDROCHLORIDE 25 MG: 25 CAPSULE ORAL at 13:05

## 2020-03-18 RX ADMIN — ASPIRIN 81 MG: 81 TABLET ORAL at 14:12

## 2020-03-18 RX ADMIN — HEPARIN SODIUM AND DEXTROSE 10 UNITS/KG/HR: 10000; 5 INJECTION INTRAVENOUS at 12:12

## 2020-03-18 RX ADMIN — METOPROLOL TARTRATE 50 MG: 50 TABLET, FILM COATED ORAL at 14:11

## 2020-03-18 RX ADMIN — IPRATROPIUM BROMIDE AND ALBUTEROL SULFATE 1 AMPULE: .5; 3 SOLUTION RESPIRATORY (INHALATION) at 08:28

## 2020-03-18 ASSESSMENT — PAIN SCALES - GENERAL
PAINLEVEL_OUTOF10: 0

## 2020-03-18 NOTE — PROGRESS NOTES
Nutrition Assessment    Type and Reason for Visit: Reassess    Nutrition Recommendations: Continue General diet, Start ONS. Encourage PO intake as tolerated. Will continue to monitor tolerance to diet and adequacy of intake. Nutrition Assessment: Pt was extubated yesterday. TF discontinued. Pt is currently on a General diet- minimal intake at present. Malnutrition Assessment:  · Malnutrition Status: At risk for malnutrition  · Context: Acute illness or injury  · Findings of the 6 clinical characteristics of malnutrition (Minimum of 2 out of 6 clinical characteristics is required to make the diagnosis of moderate or severe Protein Calorie Malnutrition based on AND/ASPEN Guidelines):  1. Energy Intake-Greater than 75% of estimated energy requirement, Greater than or equal to 5 days    2. Weight Loss-Unable to assess,    3. Fat Loss-No significant subcutaneous fat loss,    4. Muscle Loss-No significant muscle mass loss,    5. Fluid Accumulation-Mild to moderate fluid accumulation, Extremities, Generalized  6.  Strength-Not measured    Nutrition Risk Level:  Moderate    Nutrient Needs:  · Estimated Daily Total Kcal: 1400 kcal/day  · Estimated Daily Protein (g): 75 g pro/day    Nutrition Diagnosis:   · Problem: Inadequate oral intake  · Etiology: related to recent extubation, recent initiation of diet      Signs and symptoms:  as evidenced by Intake 0-25%    Objective Information:  · Current Nutrition Therapies:  · Oral Diet Orders: General   · Oral Diet intake: 0%, 1-25%  · Oral Nutrition Supplement (ONS) Orders: None  · Anthropometric Measures:  · Ht: 5' 2\" (157.5 cm)   · Current Body Wt: 173 lb 8 oz (78.7 kg)  · Admission Body Wt: 178 lb 9.2 oz (81 kg)  · Ideal Body Wt: 110 lb (49.9 kg), % Ideal Body 157%   · BMI Classification: BMI 30.0 - 34.9 Obese Class I    Nutrition Interventions:   Continue current diet, Start ONS  Continued Inpatient Monitoring, Education Not Indicated    Nutrition Evaluation: · Evaluation: Progressing toward goals   · Goals: meet % of estimated nutrition needs     · Monitoring: Meal Intake, Supplement Intake, Diet Tolerance, I&O, Weight, Pertinent Labs      Electronically signed by Erlin Cervantes RD, LD on 3/18/20 at 12:39 PM EDT    Contact Number: [phone]

## 2020-03-18 NOTE — PLAN OF CARE
BRONCHOSPASM/BRONCHOCONSTRICTION     [x]         IMPROVE AERATION/BREATH SOUNDS  [x]   ADMINISTER BRONCHODILATOR THERAPY AS APPROPRIATE  [x]   ASSESS BREATH SOUNDS  [x]   IMPLEMENT AEROSOL/MDI PROTOCOL  [x]   PATIENT EDUCATION AS NEEDED     PROVIDE ADEQUATE OXYGENATION WITH ACCEPTABLE SP02/ABG'S    [x]  IDENTIFY APPROPRIATE OXYGEN THERAPY  [x]   MONITOR SP02/ABG'S AS NEEDED   [x]   PATIENT EDUCATION AS NEEDED   [place] Dejesus, RCPPatient Assessment complete. Shortness of breath [R06.02] . Vitals:    03/18/20 0900   BP: (!) 95/52   Pulse: 85   Resp: 22   Temp:    SpO2: 96%   . Patients home meds are   Prior to Admission medications    Medication Sig Start Date End Date Taking? Authorizing Provider   Meloxicam 10 MG CAPS Take by mouth    Historical Provider, MD   traMADol (ULTRAM) 50 MG tablet Take 1 tablet by mouth 3 times daily as needed for Pain 10/9/17   Kostas Benitez, DO   oxyCODONE-acetaminophen (PERCOCET) 5-325 MG per tablet Take 1 tablet by mouth every 6 hours as needed for Pain . 9/18/17   Camilo Mcdonnell, DO   docusate sodium (COLACE) 100 MG capsule Take 1 capsule by mouth 2 times daily 8/24/17   [facility], [place]   Misc.  Devices (RAISED TOILET SEAT) MISC 1 Device by Does not apply route daily 8/24/17   Camilo Mcdonnell, DO   loratadine (CLARITIN) 10 MG tablet Take 10 mg by mouth daily    Historical Provider, MD   mupirocin (BACTROBAN NASAL) 2 % nasal ointment Take by Nasal route 2 times daily for 5 days 8/18/17 8/22/17  Kosats Benitez DO   aspirin 325 MG tablet Take 325 mg by mouth daily Pt to stop 7 days pre-op    Historical Provider, MD   CHANTIX STARTING MONTH JOEL 0.5 MG X 11 & 1 MG X 42 tablet On 5/20/16 states she has not yet started 2/8/16   Historical Provider, MD   COMBIVENT RESPIMAT  MCG/ACT AERS inhaler Inhale 1 puff into the lungs every 6 hours as needed  2/8/16   Historical Provider, MD   alendronate (FOSAMAX) 35 MG tablet Take 35 mg by mouth every 7 days Sunday 2/5/16   Historical Provider, MD   atenolol (TENORMIN) 50 MG tablet Take 50 mg by mouth 2 times daily  2/8/16   Historical Provider, MD   atorvastatin (LIPITOR) 40 MG tablet Take 40 mg by mouth daily  2/8/16   Historical Provider, MD   hydrochlorothiazide (HYDRODIURIL) 25 MG tablet Take 50 mg by mouth daily  2/8/16   Historical Provider, MD   .      Assessment   Pt unable to answer questions due to mental status   Pt lung sounds are stable, but still remains on steroids for COPD exacerbation. Pt to remain getting treatments until her status is more stable .  Pt to remain on level 2 despite the outcome being a level 1 due to her COPD exacerbation       RR 22  Breath Sounds: clear, diminished      Bronchodilator assessment at level  1  Hyperinflation assessment at level   Secretion Management assessment at level      [x]    Bronchodilator Assessment  BRONCHODILATOR ASSESSMENT SCORE  Score 0 1 2 3 4 5   Breath Sounds   []  Patient Baseline [x]  No Wheeze good aeration []  Faint, scattered wheezing, good aeration []  Expiratory Wheezing and or moderately diminished []  Insp/Exp wheeze and/or very diminished []  Insp/Exp and/ or marked distress   Respiratory Rate   []  Patient Baseline [x]  Less than 20 [x]  Less than 20 []  20-25 []  Greater than 25 []  Greater than 25   Peak flow % of Pred or PB []  NA   []  Greater than 90%  []  81-90% []  71-80% []  Less than or equal to 70%  or unable to perform []  Unable due to Respiratory Distress   Dyspnea re []  Patient Baseline [x]  No SOB [x]  No SOB []  SOB on exertion []  SOB min activity []  At rest/acute   e FEV% Predicted       [x]  NA []  Above 69%  []  Unable []  Above 60-69%  []  Unable []  Above 50-59%  []  Unable []  Above 35-49%  []  Unable []  Less than 35%  []  Unable                 []  Hyperinflation Assessment  Score 1 2 3   CXR and Breath Sounds   []  Clear []  No atelectasis  Basilar aeration []  Atelectasis or absent basilar breath sounds   Incentive

## 2020-03-18 NOTE — PROGRESS NOTES
350(4.4)   350(4.4)   Weight (kg) 80.2 80.2 80.2 80.2     Wt Readings from Last 3 Encounters:   03/17/20 176 lb 12.9 oz (80.2 kg)   02/17/18 171 lb (77.6 kg)   01/31/18 171 lb 6.4 oz (77.7 kg)     Body mass index is 32.34 kg/m². PHYSICAL EXAM:  Constitutional: Awake and alert. On Precedex drip. Episodes of paranoid hallucinations present  Neck: Supple, symmetrical, trachea midline, no adenopathy, thyroid symmetric, no jvd skin normal  Respiratory: Resolving crackles bilaterally. Cardiovascular: regular rate and rhythm, normal S1, S2, no murmur noted and 2+ pulses throughout  Abdomen: soft, nontender, distended, no masses or organomegaly  NEUROLOGIC Awake and alert. Episodes of disorientation and agitation, hallucinations. .  On Precedex drip   Extremities:  peripheral pulses normal, no pedal edema, no clubbing or cyanosis      Any additional physical findings:  MEDICATIONS:  Scheduled Meds:   metoclopramide  10 mg Intravenous Q6H    amLODIPine  5 mg Oral Daily    ipratropium-albuterol  1 ampule Inhalation 4x daily    methylPREDNISolone  40 mg Intravenous Daily    metoprolol tartrate  50 mg Oral BID    chlordiazePOXIDE  50 mg Oral Q8H    insulin lispro  0-12 Units Subcutaneous 4 times per day    aspirin  81 mg Oral Daily    atorvastatin  40 mg Oral Nightly    cefTRIAXone (ROCEPHIN) IV  1 g Intravenous Q24H    sodium chloride flush  10 mL Intravenous 2 times per day    nicotine  1 patch Transdermal Daily     Continuous Infusions:   dexmedetomidine (PRECEDEX) IV infusion 0.7 mcg/kg/hr (03/18/20 0540)    sodium chloride 10 mL/hr at 03/17/20 1600    heparin (porcine) 10 Units/kg/hr (03/17/20 1600)    dextrose       PRN Meds:   fentanNYL, 100 mcg, Q2H PRN  heparin (porcine), 4,000 Units, PRN  heparin (porcine), 2,000 Units, PRN  glucose, 15 g, PRN  dextrose, 12.5 g, PRN  glucagon (rDNA), 1 mg, PRN  dextrose, 100 mL/hr, PRN  sodium chloride flush, 10 mL, PRN  acetaminophen, 650 mg, Q6H PRN 105   CO2 21  --  26      Magnesium:   Lab Results   Component Value Date    MG 2.5 03/17/2020    MG 2.3 03/12/2020    MG 2.1 06/30/2016     Phosphorus:   Lab Results   Component Value Date    PHOS 2.8 03/14/2020    PHOS 1.8 08/24/2017    PHOS 4.1 06/30/2016     S. Calcium:  Recent Labs     03/17/20  0434   CALCIUM 8.4*     S. Ionized Calcium:No results for input(s): IONCA in the last 72 hours. Urinalysis:   Lab Results   Component Value Date    NITRU NEGATIVE 03/11/2020    COLORU YELLOW 03/11/2020    PHUR 6.0 03/11/2020    WBCUA 50  03/11/2020    RBCUA 10 TO 20 03/11/2020    MUCUS NOT REPORTED 03/11/2020    TRICHOMONAS NOT REPORTED 03/11/2020    YEAST NOT REPORTED 03/11/2020    BACTERIA NOT REPORTED 03/11/2020    SPECGRAV 1.014 03/11/2020    LEUKOCYTESUR NEGATIVE 03/11/2020    UROBILINOGEN Normal 03/11/2020    BILIRUBINUR NEGATIVE 03/11/2020    GLUCOSEU 1+ 03/11/2020    KETUA NEGATIVE 03/11/2020    AMORPHOUS NOT REPORTED 03/11/2020       CARDIAC ENZYMES: No results for input(s): CKMB, CKMBINDEX, TROPONINI in the last 72 hours. Invalid input(s): CKTOTAL;3  BNP: No results for input(s): BNP in the last 72 hours. LFTS  No results for input(s): ALKPHOS, ALT, AST, BILITOT, BILIDIR, LABALBU in the last 72 hours. AMYLASE/LIPASE/AMMONIA  No results for input(s): AMYLASE, LIPASE, AMMONIA in the last 72 hours. Last 3 Blood Glucose:   Recent Labs     03/16/20  0403 03/17/20  0434   GLUCOSE 157* 128*      HgBA1c:    Lab Results   Component Value Date    LABA1C 6.2 03/11/2020     TSH:  No results found for: TSH  ANEMIA STUDIES  No results for input(s): LABIRON, TIBC, FERRITIN, [id number], FOLATE, OCCULTBLD in the last 72 hours.     Cultures during this admission:   Blood cultures:                 [] None drawn      [x] Negative             []  Positive (Details:  )  Urine Culture:                   [] None drawn      [x] Negative             []  Positive (Details:  )  Sputum Culture:               [] None drawn       [] Negative             []  Positive (Details:  )   Endotracheal aspirate:     [x] None drawn       [] Negative             []  Positive (Details:  )      ASSESSMENT:     Active Problems:    Shortness of breath    Acute respiratory failure with hypoxia and hypercapnia (HCC)    COPD with acute exacerbation (HCC)    Acute cardiac pulmonary edema (HCC)    NSTEMI (non-ST elevated myocardial infarction) ([facility] [address]    Hypovolemic shock (HCC)  Resolved Problems:    * No resolved hospital problems. *    PLAN:   -Acute on chronic hypoxic and hypercapnic respiratory failure  requiring intubation. Extubated yesterday  Continued on Precedex drip. Trying to wean off. --History of alcohol abuse. Daily drinker. Monitor for signs of withdrawal.  On Librium 50 every 8. Continued on Precedex drip as well. Overnight patient had paranoid ideations. Mentioned that her  tried to kill her. Refused all medications. Consult psychiatry.    -Acute pulmonary edema likely cardiogenic- resolving. Continued breathing treatments with albuterol and DuoNeb. Continued on low-dose heparin per ACS protocol. -COPD exacerbation. continued on Rocephin and Solu-Medrol 40 daily. Breathing treatments with albuterol and DuoNeb.    -Non-ST elevation MI-on aspirin, statin, beta-blocker. Continued on low-dose heparin drip. We will follow-up further cardiology recommendations for ischemic work-up. -Mural thrombus in descending portion of graft.  H/o TEVAR with graft(2016) Vascular surgery following.  No active intervention at this point of time. On low-dose heparin drip for now.  Per vascular might do CTA when patient medically stable. -ASHLEY prerenal, oliguric type-resolved. Follow-up daily BMP. Urine output 3.1 L in 24 hours.     -Hypotension/hypovolemic shock. Resolved. Currently going hypertensive. Increase Norvasc to 10 mg. On Lopressor 50 twice daily.    -Lactic acidosis.  resolved.      -Leukocytosis likely due to discussed. Total critical care time caring for this patient with life threatening, unstable organ failure, including direct patient contact, management of life support systems, review of data including imaging and labs, discussions with other team members and physicians at least 27  Min so far today, excluding procedures. Please note that this chart was generated using voice recognition Dragon dictation software. Although every effort was made to ensure the accuracy of this automated transcription, some errors in transcription may have occurred.     Gopal Ladd MD  3/18/2020 12:56 PM

## 2020-03-18 NOTE — PLAN OF CARE
Problem: Falls - Risk of:  Goal: Will remain free from falls  Description: Will remain free from falls  3/18/2020 0555 by Galindo Edge RN  Outcome: Ongoing  3/17/2020 1834 by Reji Lewis RN  Outcome: Met This Shift  Goal: Absence of physical injury  Description: Absence of physical injury  3/18/2020 0555 by Galindo Edge RN  Outcome: Ongoing  3/17/2020 1834 by Reji Lewis RN  Outcome: Met This Shift     Problem: Anxiety:  Goal: Level of anxiety will decrease  Description: Level of anxiety will decrease  Outcome: Ongoing     Problem: Nutrition  Goal: Optimal nutrition therapy  Outcome: Ongoing     Problem: Urinary Elimination:  Goal: Signs and symptoms of infection will decrease  Description: Signs and symptoms of infection will decrease  Outcome: Ongoing  Goal: Complications related to the disease process, condition or treatment will be avoided or minimized  Description: Complications related to the disease process, condition or treatment will be avoided or minimized  Outcome: Ongoing     Problem: Infection - Central Venous Catheter-Associated Bloodstream Infection:  Goal: Will show no infection signs and symptoms  Description: Will show no infection signs and symptoms  Outcome: Ongoing

## 2020-03-19 ENCOUNTER — APPOINTMENT (OUTPATIENT)
Dept: GENERAL RADIOLOGY | Age: 70
DRG: 981 | End: 2020-03-19
Payer: MEDICARE

## 2020-03-19 LAB
ABSOLUTE EOS #: 0.04 K/UL (ref 0–0.44)
ABSOLUTE IMMATURE GRANULOCYTE: 0.44 K/UL (ref 0–0.3)
ABSOLUTE LYMPH #: 1.52 K/UL (ref 1.1–3.7)
ABSOLUTE MONO #: 1.45 K/UL (ref 0.1–1.2)
ANION GAP SERPL CALCULATED.3IONS-SCNC: 12 MMOL/L (ref 9–17)
BASOPHILS # BLD: 0 % (ref 0–2)
BASOPHILS ABSOLUTE: <0.03 K/UL (ref 0–0.2)
BUN BLDV-MCNC: 31 MG/DL (ref 8–23)
BUN/CREAT BLD: ABNORMAL (ref 9–20)
CALCIUM SERPL-MCNC: 8.3 MG/DL (ref 8.6–10.4)
CHLORIDE BLD-SCNC: 103 MMOL/L (ref 98–107)
CO2: 26 MMOL/L (ref 20–31)
CREAT SERPL-MCNC: 0.71 MG/DL (ref 0.5–0.9)
DIFFERENTIAL TYPE: ABNORMAL
DIRECT EXAM: NORMAL
EOSINOPHILS RELATIVE PERCENT: 0 % (ref 1–4)
GFR AFRICAN AMERICAN: >60 ML/MIN
GFR NON-AFRICAN AMERICAN: >60 ML/MIN
GFR SERPL CREATININE-BSD FRML MDRD: ABNORMAL ML/MIN/{1.73_M2}
GFR SERPL CREATININE-BSD FRML MDRD: ABNORMAL ML/MIN/{1.73_M2}
GLUCOSE BLD-MCNC: 112 MG/DL (ref 65–105)
GLUCOSE BLD-MCNC: 112 MG/DL (ref 70–99)
GLUCOSE BLD-MCNC: 83 MG/DL (ref 65–105)
GLUCOSE BLD-MCNC: 90 MG/DL (ref 65–105)
GLUCOSE BLD-MCNC: 97 MG/DL (ref 65–105)
HCT VFR BLD CALC: 32.1 % (ref 36.3–47.1)
HEMOGLOBIN: 10.4 G/DL (ref 11.9–15.1)
IMMATURE GRANULOCYTES: 3 %
LYMPHOCYTES # BLD: 10 % (ref 24–43)
Lab: NORMAL
MCH RBC QN AUTO: 30.6 PG (ref 25.2–33.5)
MCHC RBC AUTO-ENTMCNC: 32.4 G/DL (ref 28.4–34.8)
MCV RBC AUTO: 94.4 FL (ref 82.6–102.9)
MONOCYTES # BLD: 10 % (ref 3–12)
NRBC AUTOMATED: 0.2 PER 100 WBC
PARTIAL THROMBOPLASTIN TIME: 44.7 SEC (ref 20.5–30.5)
PARTIAL THROMBOPLASTIN TIME: 68.5 SEC (ref 20.5–30.5)
PDW BLD-RTO: 15.2 % (ref 11.8–14.4)
PLATELET # BLD: 276 K/UL (ref 138–453)
PLATELET ESTIMATE: ABNORMAL
PMV BLD AUTO: 10.9 FL (ref 8.1–13.5)
POTASSIUM SERPL-SCNC: 4.3 MMOL/L (ref 3.7–5.3)
RBC # BLD: 3.4 M/UL (ref 3.95–5.11)
RBC # BLD: ABNORMAL 10*6/UL
SEG NEUTROPHILS: 77 % (ref 36–65)
SEGMENTED NEUTROPHILS ABSOLUTE COUNT: 11.26 K/UL (ref 1.5–8.1)
SODIUM BLD-SCNC: 141 MMOL/L (ref 135–144)
SPECIMEN DESCRIPTION: NORMAL
WBC # BLD: 14.7 K/UL (ref 3.5–11.3)
WBC # BLD: ABNORMAL 10*3/UL

## 2020-03-19 PROCEDURE — 97530 THERAPEUTIC ACTIVITIES: CPT

## 2020-03-19 PROCEDURE — 6360000002 HC RX W HCPCS: Performed by: STUDENT IN AN ORGANIZED HEALTH CARE EDUCATION/TRAINING PROGRAM

## 2020-03-19 PROCEDURE — 6370000000 HC RX 637 (ALT 250 FOR IP): Performed by: STUDENT IN AN ORGANIZED HEALTH CARE EDUCATION/TRAINING PROGRAM

## 2020-03-19 PROCEDURE — 85730 THROMBOPLASTIN TIME PARTIAL: CPT

## 2020-03-19 PROCEDURE — 2700000000 HC OXYGEN THERAPY PER DAY

## 2020-03-19 PROCEDURE — 94640 AIRWAY INHALATION TREATMENT: CPT

## 2020-03-19 PROCEDURE — 94761 N-INVAS EAR/PLS OXIMETRY MLT: CPT

## 2020-03-19 PROCEDURE — 85025 COMPLETE CBC W/AUTO DIFF WBC: CPT

## 2020-03-19 PROCEDURE — 97110 THERAPEUTIC EXERCISES: CPT

## 2020-03-19 PROCEDURE — 71045 X-RAY EXAM CHEST 1 VIEW: CPT

## 2020-03-19 PROCEDURE — 82947 ASSAY GLUCOSE BLOOD QUANT: CPT

## 2020-03-19 PROCEDURE — 36415 COLL VENOUS BLD VENIPUNCTURE: CPT

## 2020-03-19 PROCEDURE — 2580000003 HC RX 258: Performed by: INTERNAL MEDICINE

## 2020-03-19 PROCEDURE — 2500000003 HC RX 250 WO HCPCS: Performed by: STUDENT IN AN ORGANIZED HEALTH CARE EDUCATION/TRAINING PROGRAM

## 2020-03-19 PROCEDURE — 2580000003 HC RX 258: Performed by: STUDENT IN AN ORGANIZED HEALTH CARE EDUCATION/TRAINING PROGRAM

## 2020-03-19 PROCEDURE — 2060000000 HC ICU INTERMEDIATE R&B

## 2020-03-19 PROCEDURE — 6370000000 HC RX 637 (ALT 250 FOR IP): Performed by: INTERNAL MEDICINE

## 2020-03-19 PROCEDURE — 80048 BASIC METABOLIC PNL TOTAL CA: CPT

## 2020-03-19 PROCEDURE — 99291 CRITICAL CARE FIRST HOUR: CPT | Performed by: INTERNAL MEDICINE

## 2020-03-19 RX ORDER — HEPARIN SODIUM 10000 [USP'U]/100ML
11.1 INJECTION, SOLUTION INTRAVENOUS CONTINUOUS
Status: DISCONTINUED | OUTPATIENT
Start: 2020-03-19 | End: 2020-03-23

## 2020-03-19 RX ORDER — PANTOPRAZOLE SODIUM 40 MG/1
40 TABLET, DELAYED RELEASE ORAL
Status: DISCONTINUED | OUTPATIENT
Start: 2020-03-20 | End: 2020-03-26 | Stop reason: HOSPADM

## 2020-03-19 RX ORDER — PREDNISONE 20 MG/1
20 TABLET ORAL DAILY
Status: COMPLETED | OUTPATIENT
Start: 2020-03-22 | End: 2020-03-24

## 2020-03-19 RX ORDER — PREDNISONE 10 MG/1
10 TABLET ORAL DAILY
Status: DISCONTINUED | OUTPATIENT
Start: 2020-03-25 | End: 2020-03-26 | Stop reason: HOSPADM

## 2020-03-19 RX ADMIN — CHLORDIAZEPOXIDE HYDROCHLORIDE 25 MG: 25 CAPSULE ORAL at 20:53

## 2020-03-19 RX ADMIN — HEPARIN SODIUM AND DEXTROSE 10 UNITS/KG/HR: 10000; 5 INJECTION INTRAVENOUS at 14:13

## 2020-03-19 RX ADMIN — ASPIRIN 81 MG: 81 TABLET ORAL at 08:50

## 2020-03-19 RX ADMIN — METOPROLOL TARTRATE 50 MG: 50 TABLET, FILM COATED ORAL at 08:50

## 2020-03-19 RX ADMIN — IPRATROPIUM BROMIDE AND ALBUTEROL SULFATE 1 AMPULE: .5; 3 SOLUTION RESPIRATORY (INHALATION) at 08:09

## 2020-03-19 RX ADMIN — PREDNISONE 30 MG: 20 TABLET ORAL at 09:09

## 2020-03-19 RX ADMIN — DEXMEDETOMIDINE HYDROCHLORIDE 0.5 MCG/KG/HR: 100 INJECTION, SOLUTION INTRAVENOUS at 05:52

## 2020-03-19 RX ADMIN — SODIUM CHLORIDE: 4.5 INJECTION, SOLUTION INTRAVENOUS at 05:45

## 2020-03-19 RX ADMIN — AMLODIPINE BESYLATE 10 MG: 10 TABLET ORAL at 08:49

## 2020-03-19 RX ADMIN — Medication 10 ML: at 20:53

## 2020-03-19 RX ADMIN — METOPROLOL TARTRATE 50 MG: 50 TABLET, FILM COATED ORAL at 20:52

## 2020-03-19 RX ADMIN — IPRATROPIUM BROMIDE AND ALBUTEROL SULFATE 1 AMPULE: .5; 3 SOLUTION RESPIRATORY (INHALATION) at 14:50

## 2020-03-19 RX ADMIN — DESMOPRESSIN ACETATE 40 MG: 0.2 TABLET ORAL at 20:53

## 2020-03-19 RX ADMIN — CHLORDIAZEPOXIDE HYDROCHLORIDE 25 MG: 25 CAPSULE ORAL at 14:12

## 2020-03-19 RX ADMIN — HEPARIN SODIUM 2000 UNITS: 1000 INJECTION, SOLUTION INTRAVENOUS; SUBCUTANEOUS at 21:58

## 2020-03-19 ASSESSMENT — PAIN SCALES - GENERAL
PAINLEVEL_OUTOF10: 0
PAINLEVEL_OUTOF10: 6
PAINLEVEL_OUTOF10: 0
PAINLEVEL_OUTOF10: 0

## 2020-03-19 ASSESSMENT — PAIN DESCRIPTION - PAIN TYPE: TYPE: ACUTE PAIN

## 2020-03-19 NOTE — PROGRESS NOTES
CO2 26 29 26      Magnesium:   Lab Results   Component Value Date    MG 2.5 03/17/2020    MG 2.3 03/12/2020    MG 2.1 06/30/2016     Phosphorus:   Lab Results   Component Value Date    PHOS 2.8 03/14/2020    PHOS 1.8 08/24/2017    PHOS 4.1 06/30/2016     S. Calcium:  Recent Labs     03/19/20  0542   CALCIUM 8.3*     S. Ionized Calcium:No results for input(s): IONCA in the last 72 hours. Urinalysis:   Lab Results   Component Value Date    NITRU NEGATIVE 03/11/2020    COLORU YELLOW 03/11/2020    PHUR 6.0 03/11/2020    WBCUA 50  03/11/2020    RBCUA 10 TO 20 03/11/2020    MUCUS NOT REPORTED 03/11/2020    TRICHOMONAS NOT REPORTED 03/11/2020    YEAST NOT REPORTED 03/11/2020    BACTERIA NOT REPORTED 03/11/2020    SPECGRAV 1.014 03/11/2020    LEUKOCYTESUR NEGATIVE 03/11/2020    UROBILINOGEN Normal 03/11/2020    BILIRUBINUR NEGATIVE 03/11/2020    GLUCOSEU 1+ 03/11/2020    KETUA NEGATIVE 03/11/2020    AMORPHOUS NOT REPORTED 03/11/2020       CARDIAC ENZYMES: No results for input(s): CKMB, CKMBINDEX, TROPONINI in the last 72 hours. Invalid input(s): CKTOTAL;3  BNP: No results for input(s): BNP in the last 72 hours. LFTS  No results for input(s): ALKPHOS, ALT, AST, BILITOT, BILIDIR, LABALBU in the last 72 hours. AMYLASE/LIPASE/AMMONIA  No results for input(s): AMYLASE, LIPASE, AMMONIA in the last 72 hours. Last 3 Blood Glucose:   Recent Labs     03/17/20  0434 03/18/20  0541 03/19/20  0542   GLUCOSE 128* 124* 112*      HgBA1c:    Lab Results   Component Value Date    LABA1C 6.2 03/11/2020     TSH:  No results found for: TSH  ANEMIA STUDIES  No results for input(s): LABIRON, TIBC, FERRITIN, [id number], FOLATE, OCCULTBLD in the last 72 hours.     Cultures during this admission:   Blood cultures:                 [] None drawn      [x] Negative             []  Positive (Details:  )  Urine Culture:                   [] None drawn      [x] Negative             []  Positive (Details:  )  Sputum Culture: resolved.      -Leukocytosis likely due to steroids. Afebrile.  Continue to monitor.     -Hypernatremia-resolved.  d/c free water flushes.     -Prediabetic last HbA1c 6.2. Continued on insulin sliding scale for now. Diet-General diet  DVT prophylaxis-on low-dose heparin drip  GI prophylaxis-oral Pepcid daily.         Greta Villarreal MD      Department of Internal Medicine  [facility], [place]         3/19/2020, 8:09 AM        Attending Physician Statement  I have discussed the care of Elliot Becker, including pertinent history and exam findings with the resident. I have reviewed the key elements of all parts of the encounter with the resident. I have seen and examined the patient with the resident. I agree with the assessment and plan and status of the problem list as documented. I have seen the patient during the round today, chart seen and medications reviewed, lab seen. She is alert and awake she is much more oriented and less confused she is not in distress she is on nasal cannula maintaining saturation not sure whether she had used noninvasive ventilation overnight. Blood pressure is better controlled she is on beta-blocker and Norvasc. She is still on Precedex drip this morning she been weaned off and currently off Precedex drip. Chest x-ray today shows mild left pleural effusion/atelectasis otherwise no pulmonary edema seen. She is on Librium 25 mg 3 times a day and will continue with Librium. She had good urine output after Lasix day before yesterday yesterday her urine output was 1245 in last 24 hours. We will continue with metoprolol and Norvasc. Cardiology planning to do cardiac cath tomorrow. Will need vascular surgery as they want to do CTA chest but if cardiology plans to cardiac cath tomorrow do not want to give contrast today and need to be done after cardiac catheterization. We will give 20 mg of Lasix today.   We will cut down the Librium and wean off slowly

## 2020-03-19 NOTE — PROGRESS NOTES
Physical Therapy  Facility/Department: [address] SICU  Daily Treatment Note  NAME: Soham Dutta  : 1950  MRN: [id number]    Date of Service: 3/19/2020    Discharge Recommendations:  Patient would benefit from continued therapy after discharge   PT Equipment Recommendations  Equipment Needed: (CTA pending progression)    Assessment   Body structures, Functions, Activity limitations: Decreased functional mobility ; Decreased endurance;Decreased strength;Decreased balance;Decreased high-level IADLs;Decreased cognition  Assessment: Pt cooperative; mildly confused. Displays significant weakness requiring maxA to complete bed mobility and Blanche to maintain seated balance. Pt will require continued PT upon discharge to maximize functional outcomes. Prognosis: Good  PT Education: Orientation;General Safety;Plan of Care;Home Exercise Program;Functional Mobility Training  Patient Education: DVT prevention  REQUIRES PT FOLLOW UP: Yes  Activity Tolerance  Activity Tolerance: Patient Tolerated treatment well  Activity Tolerance: No change in vitals. Patient Diagnosis(es): The encounter diagnosis was Respiratory arrest ([facility] [address] has a past medical history of Arthritis, Chronic back pain, COPD (chronic obstructive pulmonary disease) ([address] Hyperlipidemia, Hypertension, and Wears partial dentures. has a past surgical history that includes joint replacement (Left, ); AAA repair, endovascular (2016); Nerve Block (Right, 2017); joint replacement (Right, 2017); and Total hip arthroplasty (Right, 2017). Restrictions  Restrictions/Precautions  Required Braces or Orthoses?: No  Subjective   General  Chart Reviewed: Yes  Response To Previous Treatment: Patient with no complaints from previous session. Family / Caregiver Present: No  Subjective  Subjective: Pt alert in bed; remained cooperative. Denies pain.  Agreeable to sit EOB   General Comment  Comments: Pt returned to bed; B wrist restraints reapplied. Pain Screening  Patient Currently in Pain: Denies  Vital Signs  Patient Currently in Pain: Denies       Orientation  Orientation  Overall Orientation Status: Within Functional Limits  Orientation Level: Oriented to place;Oriented to time;Oriented to person;Disoriented to situation  Cognition      Objective   Bed mobility  Supine to Sit: Maximum assistance(completed with HOB flat; cues for sequencing with fair return; required assistance advancing LEs and with upper trunk)  Sit to Supine: Maximum assistance  Scooting: Maximal assistance  Transfers  Sit to Stand: Unable to assess(inconsisently following commands)        Balance  Posture: Fair  Sitting - Static: Fair;-(pt sat EOB 14 minutes; initially required maxA but progressed to Blanche with time- posterior lean with fatigue)  Sitting - Dynamic: Poor;+(completed ant/post trunk flexion/ext x 10 reps, good participation from pt)       Exercises  LAQs x 5 reps AROM, cues for pacing with poor return  Ankle pumps x 5 reps with max VC/TC  B bicep curls x 5 reps, fatigued quickly  Seated trunk flexion/ext x 10 reps with Blanche to maintain balance    Goals  Short term goals  Time Frame for Short term goals: 14 visits  Short term goal 1: Prevent contractures through ROM and stretching. Short term goal 2: Pt to follow some commands for active exercise. Short term goal 3: Progress with mobility as appropriate. Patient Goals   Patient goals : Unable to state    Plan    Plan  Times per week: 5x  Current Treatment Recommendations: Functional Mobility Training, ROM, Cognitive Reorientation  Safety Devices  Type of devices:  All fall risk precautions in place, Call light within reach, Patient at risk for falls, Left in bed, Nurse notified  Restraints  Initially in place: Yes  Restraints: (B wrist restraints)     Therapy Time   Individual Concurrent Group Co-treatment   Time In 0941         Time Out 1010         Minutes 29         Timed Code Treatment Minutes: 29 Minutes

## 2020-03-19 NOTE — PROGRESS NOTES
[facility]   Progress Note                    Date:   3/19/2020  Patient name:  Soham Dutta  Date of admission:  3/11/2020 11:34 AM  MRN:   [id number]  YOB: 1950  PCP:    Mark Maurer MD    Reason for Admission:  Shortness of breath [R06.02]    Subjective:       Clinical Changes / Abnormalities: The patient was seen and examined. Patient has been extubated and is off pressors. Shee remains on heparin drip. I/O last 3 completed shifts:   In: 589 [I.V.:589]  Out: 4122 [Urine:1245]  I/O this shift:  In: 137 [I.V.:137]  Out: 205 [Urine:205]      In: 525 [I.V.:525]  Out: 955 [Urine:955]      Intake/Output Summary (Last 24 hours) at 3/19/2020 1149  Last data filed at 3/19/2020 1100  Gross per 24 hour   Intake 586 ml   Output 1240 ml   Net -654 ml         I/O since admission:  liters    Medications:   Scheduled Meds:   predniSONE  30 mg Oral Daily    Followed by   Tim Carmichael ON 3/22/2020] predniSONE  20 mg Oral Daily    Followed by   Tim Carmichael ON 3/25/2020] predniSONE  10 mg Oral Daily    amLODIPine  10 mg Oral Daily    famotidine  20 mg Oral Daily    ipratropium-albuterol  1 ampule Inhalation TID    chlordiazePOXIDE  25 mg Oral Q8H    metoprolol tartrate  50 mg Oral BID    insulin lispro  0-12 Units Subcutaneous 4 times per day    aspirin  81 mg Oral Daily    atorvastatin  40 mg Oral Nightly    sodium chloride flush  10 mL Intravenous 2 times per day    nicotine  1 patch Transdermal Daily     Continuous Infusions:   dexmedetomidine (PRECEDEX) IV infusion Stopped (03/19/20 1111)    sodium chloride 10 mL/hr at 03/19/20 0545    heparin (porcine) 10 Units/kg/hr (03/19/20 0617)    dextrose       CBC:   Recent Labs     03/16/20 2036 03/17/20  0434 03/18/20  0541 03/19/20  0542   WBC 17.7*  --  15.4* 14.7*   HGB 10.5*  --  10.7* 10.4*    252 274 276     BMP:    Recent Labs     03/17/20 0434 03/18/20  0541 03/19/20  0542    144 141   K 4.6 4.0 4.3    Cardiac Problems:   1. Elevated troponin -  Type I vs Type II  MI  2. Pulmonary edema  3. Pneumonia  4. Possible PEA arrest in the ED  5. Descending aortic thrombus  6. ASHLEY  7. COPD   8. Lactic acidosis: Resolved  9. Hypertension  10. Hyperlipidemia  11. Alcohol withdrawal        Plan of Treatment:   1. Troponins peaked at 560  2. Cardiac cath in AM only through radial approach due to descending aortic mural thrombus  3. Heparin drip for Aortic thrombus  4. Repeat CTA ordered by critical care/vascular  5. Keep n.p.o. after midnight        Mine Santana MD  Fellow, [address]            Attending Physician Statement  I have discussed the case of Kami Morataya including pertinent history and exam findings with the resident. I have seen and examined the patient and the key elements of the encounter have been performed by me. I agree with the assessment, plan and orders as documented by the resident With changes made to the note.      Electronically signed by Sindhu Das MD on 3/19/2020 at 3:05 PM.    [facility]      [phone]
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
## 2020-03-19 NOTE — PLAN OF CARE
- Nonviolent/Non-Self-Destructive Behavior:  Goal: Absence of restraint indications  Description: Absence of restraint indications  3/19/2020 0055 by Justyn Reid, RN  Outcome: Not Met This Shift  3/18/2020 1150 by Cristhian Stout RN  Outcome: Met This Shift

## 2020-03-20 LAB
-: NORMAL
ABSOLUTE EOS #: 0.35 K/UL (ref 0–0.4)
ABSOLUTE IMMATURE GRANULOCYTE: 0.18 K/UL (ref 0–0.3)
ABSOLUTE LYMPH #: 3.15 K/UL (ref 1–4.8)
ABSOLUTE MONO #: 1.4 K/UL (ref 0.1–0.8)
AMORPHOUS: NORMAL
ANION GAP SERPL CALCULATED.3IONS-SCNC: 11 MMOL/L (ref 9–17)
BACTERIA: NORMAL
BASOPHILS # BLD: 0 % (ref 0–2)
BASOPHILS ABSOLUTE: 0 K/UL (ref 0–0.2)
BILIRUBIN URINE: NEGATIVE
BUN BLDV-MCNC: 25 MG/DL (ref 8–23)
BUN/CREAT BLD: ABNORMAL (ref 9–20)
CALCIUM SERPL-MCNC: 8.7 MG/DL (ref 8.6–10.4)
CASTS UA: NORMAL /LPF (ref 0–8)
CHLORIDE BLD-SCNC: 104 MMOL/L (ref 98–107)
CO2: 27 MMOL/L (ref 20–31)
COLOR: ABNORMAL
COMMENT UA: ABNORMAL
CREAT SERPL-MCNC: 0.73 MG/DL (ref 0.5–0.9)
CRYSTALS, UA: NORMAL /HPF
DIFFERENTIAL TYPE: ABNORMAL
EOSINOPHILS RELATIVE PERCENT: 2 % (ref 1–4)
EPITHELIAL CELLS UA: NORMAL /HPF (ref 0–5)
GFR AFRICAN AMERICAN: >60 ML/MIN
GFR NON-AFRICAN AMERICAN: >60 ML/MIN
GFR SERPL CREATININE-BSD FRML MDRD: ABNORMAL ML/MIN/{1.73_M2}
GFR SERPL CREATININE-BSD FRML MDRD: ABNORMAL ML/MIN/{1.73_M2}
GLUCOSE BLD-MCNC: 114 MG/DL (ref 65–105)
GLUCOSE BLD-MCNC: 118 MG/DL (ref 65–105)
GLUCOSE BLD-MCNC: 119 MG/DL (ref 65–105)
GLUCOSE BLD-MCNC: 121 MG/DL (ref 70–99)
GLUCOSE BLD-MCNC: 137 MG/DL (ref 65–105)
GLUCOSE BLD-MCNC: 145 MG/DL (ref 65–105)
GLUCOSE BLD-MCNC: 46 MG/DL (ref 65–105)
GLUCOSE BLD-MCNC: 68 MG/DL (ref 65–105)
GLUCOSE BLD-MCNC: 71 MG/DL (ref 65–105)
GLUCOSE BLD-MCNC: 76 MG/DL (ref 65–105)
GLUCOSE URINE: NEGATIVE
HCT VFR BLD CALC: 39.6 % (ref 36.3–47.1)
HEMOGLOBIN: 12.2 G/DL (ref 11.9–15.1)
IMMATURE GRANULOCYTES: 1 %
KETONES, URINE: NEGATIVE
LEUKOCYTE ESTERASE, URINE: ABNORMAL
LYMPHOCYTES # BLD: 18 % (ref 24–44)
MCH RBC QN AUTO: 30.7 PG (ref 25.2–33.5)
MCHC RBC AUTO-ENTMCNC: 30.8 G/DL (ref 28.4–34.8)
MCV RBC AUTO: 99.5 FL (ref 82.6–102.9)
MONOCYTES # BLD: 8 % (ref 1–7)
MORPHOLOGY: ABNORMAL
MUCUS: NORMAL
NITRITE, URINE: NEGATIVE
NRBC AUTOMATED: 0.2 PER 100 WBC
OTHER OBSERVATIONS UA: NORMAL
PARTIAL THROMBOPLASTIN TIME: 62.8 SEC (ref 20.5–30.5)
PARTIAL THROMBOPLASTIN TIME: 63.3 SEC (ref 20.5–30.5)
PDW BLD-RTO: 15.2 % (ref 11.8–14.4)
PH UA: 6.5 (ref 5–8)
PLATELET # BLD: 303 K/UL (ref 138–453)
PLATELET ESTIMATE: ABNORMAL
PMV BLD AUTO: 11 FL (ref 8.1–13.5)
POTASSIUM SERPL-SCNC: 4.3 MMOL/L (ref 3.7–5.3)
PROTEIN UA: ABNORMAL
RBC # BLD: 3.98 M/UL (ref 3.95–5.11)
RBC # BLD: ABNORMAL 10*6/UL
RBC UA: NORMAL /HPF (ref 0–4)
RENAL EPITHELIAL, UA: NORMAL /HPF
SEG NEUTROPHILS: 71 % (ref 36–66)
SEGMENTED NEUTROPHILS ABSOLUTE COUNT: 12.42 K/UL (ref 1.8–7.7)
SODIUM BLD-SCNC: 142 MMOL/L (ref 135–144)
SPECIFIC GRAVITY UA: 1.01 (ref 1–1.03)
TRICHOMONAS: NORMAL
TURBIDITY: CLEAR
URINE HGB: ABNORMAL
UROBILINOGEN, URINE: NORMAL
WBC # BLD: 17.5 K/UL (ref 3.5–11.3)
WBC # BLD: ABNORMAL 10*3/UL
WBC UA: NORMAL /HPF (ref 0–5)
YEAST: NORMAL

## 2020-03-20 PROCEDURE — 6370000000 HC RX 637 (ALT 250 FOR IP): Performed by: STUDENT IN AN ORGANIZED HEALTH CARE EDUCATION/TRAINING PROGRAM

## 2020-03-20 PROCEDURE — 2709999900 HC NON-CHARGEABLE SUPPLY

## 2020-03-20 PROCEDURE — 2580000003 HC RX 258: Performed by: STUDENT IN AN ORGANIZED HEALTH CARE EDUCATION/TRAINING PROGRAM

## 2020-03-20 PROCEDURE — 85730 THROMBOPLASTIN TIME PARTIAL: CPT

## 2020-03-20 PROCEDURE — 82947 ASSAY GLUCOSE BLOOD QUANT: CPT

## 2020-03-20 PROCEDURE — 6360000002 HC RX W HCPCS: Performed by: STUDENT IN AN ORGANIZED HEALTH CARE EDUCATION/TRAINING PROGRAM

## 2020-03-20 PROCEDURE — 85025 COMPLETE CBC W/AUTO DIFF WBC: CPT

## 2020-03-20 PROCEDURE — 36415 COLL VENOUS BLD VENIPUNCTURE: CPT

## 2020-03-20 PROCEDURE — 2580000003 HC RX 258

## 2020-03-20 PROCEDURE — 2500000003 HC RX 250 WO HCPCS

## 2020-03-20 PROCEDURE — C1769 GUIDE WIRE: HCPCS

## 2020-03-20 PROCEDURE — 99233 SBSQ HOSP IP/OBS HIGH 50: CPT | Performed by: INTERNAL MEDICINE

## 2020-03-20 PROCEDURE — 2060000000 HC ICU INTERMEDIATE R&B

## 2020-03-20 PROCEDURE — C1894 INTRO/SHEATH, NON-LASER: HCPCS

## 2020-03-20 PROCEDURE — 94640 AIRWAY INHALATION TREATMENT: CPT

## 2020-03-20 PROCEDURE — 2700000000 HC OXYGEN THERAPY PER DAY

## 2020-03-20 PROCEDURE — 81001 URINALYSIS AUTO W/SCOPE: CPT

## 2020-03-20 PROCEDURE — 6360000002 HC RX W HCPCS

## 2020-03-20 PROCEDURE — 94761 N-INVAS EAR/PLS OXIMETRY MLT: CPT

## 2020-03-20 PROCEDURE — 6370000000 HC RX 637 (ALT 250 FOR IP): Performed by: INTERNAL MEDICINE

## 2020-03-20 PROCEDURE — 80048 BASIC METABOLIC PNL TOTAL CA: CPT

## 2020-03-20 PROCEDURE — 6360000004 HC RX CONTRAST MEDICATION

## 2020-03-20 PROCEDURE — C1725 CATH, TRANSLUMIN NON-LASER: HCPCS

## 2020-03-20 RX ORDER — CHLORDIAZEPOXIDE HYDROCHLORIDE 5 MG/1
10 CAPSULE, GELATIN COATED ORAL EVERY 8 HOURS
Status: DISCONTINUED | OUTPATIENT
Start: 2020-03-20 | End: 2020-03-22

## 2020-03-20 RX ORDER — MULTIVITAMIN WITH FOLIC ACID 400 MCG
1 TABLET ORAL DAILY
Status: DISCONTINUED | OUTPATIENT
Start: 2020-03-21 | End: 2020-03-26 | Stop reason: HOSPADM

## 2020-03-20 RX ORDER — THIAMINE MONONITRATE (VIT B1) 100 MG
100 TABLET ORAL DAILY
Status: DISCONTINUED | OUTPATIENT
Start: 2020-03-21 | End: 2020-03-26 | Stop reason: HOSPADM

## 2020-03-20 RX ORDER — FOLIC ACID 1 MG/1
1 TABLET ORAL DAILY
Status: DISCONTINUED | OUTPATIENT
Start: 2020-03-21 | End: 2020-03-26 | Stop reason: HOSPADM

## 2020-03-20 RX ADMIN — PREDNISONE 30 MG: 20 TABLET ORAL at 08:22

## 2020-03-20 RX ADMIN — CHLORDIAZEPOXIDE HYDROCHLORIDE 25 MG: 25 CAPSULE ORAL at 06:02

## 2020-03-20 RX ADMIN — DEXTROSE MONOHYDRATE 12.5 G: 25 INJECTION, SOLUTION INTRAVENOUS at 12:27

## 2020-03-20 RX ADMIN — DEXTROSE MONOHYDRATE 12.5 G: 25 INJECTION, SOLUTION INTRAVENOUS at 05:59

## 2020-03-20 RX ADMIN — IPRATROPIUM BROMIDE AND ALBUTEROL SULFATE 1 AMPULE: .5; 3 SOLUTION RESPIRATORY (INHALATION) at 20:19

## 2020-03-20 RX ADMIN — METOPROLOL TARTRATE 50 MG: 50 TABLET, FILM COATED ORAL at 08:22

## 2020-03-20 RX ADMIN — AMLODIPINE BESYLATE 10 MG: 10 TABLET ORAL at 08:21

## 2020-03-20 RX ADMIN — DEXTROSE MONOHYDRATE 12.5 G: 25 INJECTION, SOLUTION INTRAVENOUS at 06:20

## 2020-03-20 RX ADMIN — PANTOPRAZOLE SODIUM 40 MG: 40 TABLET, DELAYED RELEASE ORAL at 08:22

## 2020-03-20 RX ADMIN — ASPIRIN 81 MG: 81 TABLET ORAL at 08:22

## 2020-03-20 RX ADMIN — HEPARIN SODIUM AND DEXTROSE 13.09 UNITS/KG/HR: 10000; 5 INJECTION INTRAVENOUS at 21:29

## 2020-03-20 RX ADMIN — METOPROLOL TARTRATE 50 MG: 50 TABLET, FILM COATED ORAL at 21:25

## 2020-03-20 RX ADMIN — DESMOPRESSIN ACETATE 40 MG: 0.2 TABLET ORAL at 21:25

## 2020-03-20 RX ADMIN — CHLORDIAZEPOXIDE HYDROCHLORIDE 10 MG: 5 CAPSULE ORAL at 21:25

## 2020-03-20 ASSESSMENT — PAIN SCALES - GENERAL
PAINLEVEL_OUTOF10: 0

## 2020-03-20 NOTE — PROGRESS NOTES
[facility]  Progress Note                   Date:   3/20/2020  Patient name: Maged Vance  Date of admission:  3/11/2020 11:34 AM  MRN:   [id number]  YOB: 1950  PCP: Linda Macias MD    Reason for Admission: Shortness of breath [R06.02]    Subjective:       Clinical Changes /Abnormalities: Pt. Seen & examined in room alone and then with RN at side. Patient initially was thought to be confused but upon questioning patient A&O x3 and asking when they were going to do the \"dye thing. \" Tele SR with occ PVCs. Review of Systems    Medications:   Scheduled Meds:   predniSONE  30 mg Oral Daily    Followed by   Noemi Viera ON 3/22/2020] predniSONE  20 mg Oral Daily    Followed by   Noemi Viera ON 3/25/2020] predniSONE  10 mg Oral Daily    pantoprazole  40 mg Oral QAM AC    amLODIPine  10 mg Oral Daily    ipratropium-albuterol  1 ampule Inhalation TID    chlordiazePOXIDE  25 mg Oral Q8H    metoprolol tartrate  50 mg Oral BID    insulin lispro  0-12 Units Subcutaneous 4 times per day    aspirin  81 mg Oral Daily    atorvastatin  40 mg Oral Nightly    sodium chloride flush  10 mL Intravenous 2 times per day    nicotine  1 patch Transdermal Daily     Continuous Infusions:   heparin (porcine) 13.088 Units/kg/hr (03/19/20 2158)    sodium chloride 10 mL/hr at 03/19/20 0545    dextrose       CBC:   Recent Labs     03/18/20  0541 03/19/20  0542 03/20/20  0742   WBC 15.4* 14.7* 17.5*   HGB 10.7* 10.4* 12.2    276 303     BMP:    Recent Labs     03/18/20  0541 03/19/20  0542 03/20/20  0742    141 142   K 4.0 4.3 4.3    103 104   CO2 29 26 27   BUN 37* 31* 25*   CREATININE 0.79 0.71 0.73   GLUCOSE 124* 112* 121*     Hepatic:No results for input(s): AST, ALT, ALB, BILITOT, ALKPHOS in the last 72 hours. Troponin: No results for input(s): TROPHS in the last 72 hours. BNP: No results for input(s): BNP in the last 72 hours.   Lipids: No results for input(s): CHOL, HDL in

## 2020-03-20 NOTE — PROGRESS NOTES
On call for Lala Friedman MD notified of cherry tinged urine in [place]. BP taken x2 in both arms. 158/83 HR 82. Awaiting further instructions regarding color of urine and high SBP     Instructed to monitor BP, and UA ordered. If increase in blood, hold heparin gtt.      Electronically signed by Bartolo Gloria RN on 3/20/2020 at 5:26 PM

## 2020-03-20 NOTE — PLAN OF CARE
Problem: Falls - Risk of:  Goal: Will remain free from falls  Description: Will remain free from falls  3/20/2020 1726 by Harry Harrell RN  Outcome: Met This Shift  3/20/2020 0807 by Harry Harrell RN  Outcome: Ongoing  Goal: Absence of physical injury  Description: Absence of physical injury  3/20/2020 1726 by Harry Harrell RN  Outcome: Met This Shift  3/20/2020 0807 by Harry Harrell RN  Outcome: Ongoing       Pt remained free of falls this shift. Bed remains in lowest position, with 2/4 side rails up and all wheels locked. Non skid socks on. Bedside table and call light within reach. Pt calls out appropriately. Will continue to monitor.     Electronically signed by Harry Harrell RN on 3/20/2020 at 5:26 PM

## 2020-03-20 NOTE — PROGRESS NOTES
Patient admitted, consent signed and questions answered. Patient ready for procedure. Call light to reach with side rails up 2 of 2. History and physical complete. Voice hoarse but pain free. Side rails up with call light to reach.

## 2020-03-20 NOTE — PROGRESS NOTES
Messaged Cardiology to see if patient should still be on heparin gtt after failed cath lab. Instructed to resume heparin gtt 4 hours after cath at previous rate and with no bolus.      Electronically signed by Tj Louise RN on 3/20/2020 at 4:48 PM

## 2020-03-20 NOTE — PROGRESS NOTES
Messaged on call for Riddhi Hawley MD to make them aware that [facility] are trending up. Yesterday 14.7, today 17.5    Pt afebrile. Awaiting further instructions. Will continue to monitor.     Electronically signed by Alberto Pelayo RN on 3/20/2020 at 8:13 AM

## 2020-03-20 NOTE — PROGRESS NOTES
[facility]  Internal Medicine Teaching Residency Program  Inpatient Daily Progress Note  ______________________________________________________________________________    Patient: Juan Nunez  YOB: 1950   [facility]:[id number]    Acct: [de-identified]     Room: [address] [place] [address]  Admit date: 3/11/2020  Today's date: 03/20/20  Number of days in the hospital: 9    SUBJECTIVE   Admitting Diagnosis: Acute respiratory failure with hypoxia and hypercapnia ([address]  CC: Respiratory arrest  Pt examined at bedside. Chart & results reviewed. Patient seen and examined at bedside. Alert and oriented. No acute issues overnight. Vitals be stable. On heparin drip this morning. WBCs have been running high probably because of steroids. The patient has been afebrile. ROS:  Constitutional:  negative for chills, fevers, sweats  Respiratory:  negative for cough, dyspnea on exertion, hemoptysis, shortness of breath, wheezing  Cardiovascular:  negative for chest pain, chest pressure/discomfort, lower extremity edema, palpitations  Gastrointestinal:  negative for abdominal pain, constipation, diarrhea, nausea, vomiting  Neurological:  negative for dizziness, headache  BRIEF HISTORY     The patient is a 65-year-old female with past medical history of COPD, essential hypertension, descending thoracic aortic aneurysm brought into the emergency department for evaluation of acute onset respiratory arrest and pulselessness. She was given CPR and 1 dose of atropine. She was intubated, sedated on Versed and fentanyl. She was started on Rocephin and azithromycin in the ER for pneumonia. CT PE was negative for pulmonary embolism but showed bilateral pulmonary infiltrates, pleural effusion but showed mural thrombus in descending allograft. Vascular surgery recommended no intervention at this time. Patient was started on heparin drip. Troponins were trending up. Cardiology was consulted. Portable    Result Date: 3/19/2020  Interval extubation. Findings consistent with mild interstitial edema with small effusions. Xr Chest Portable    Result Date: 3/17/2020  1. Much improved pneumatization bilateral lungs with nearly resolved sequela of congestive heart failure seen on yesterday's study. 2. Low endotracheal tube positioning only about 11 mm superior to the gautam; this could be pulled back 2 cm to be at the level of the aortic knob. 3.  NG tube extends below the left hemidiaphragm, into the upper abdomen, tip overlying the expected level of the stomach, left upper quadrant. 4. Mild cardiomegaly. 5. Stable thoracic aorta aneurysm status post wall stent. Xr Chest Portable    Result Date: 3/16/2020  Mild interval increase interstitial edema. Xr Chest Portable    Result Date: 3/15/2020  Endotracheal tube is slightly low in position. Consider minimally retracting. Small right pleural effusion is suspected. Mild pulmonary edema. Xr Chest Portable    Result Date: 3/14/2020  Partial improved aeration of the left lung which may represent decreasing pulmonary edema and decreasing pleural effusion. ASSESSMENT & PLAN     ASSESSMENT / PLAN:     Principal Problem:    Acute respiratory failure with hypoxia and hypercapnia (HCC)  Active Problems:    Shortness of breath    COPD with acute exacerbation (HCC)    Acute pulmonary edema (HCC)    NSTEMI (non-ST elevated myocardial infarction) (HCC)    Shock circulatory (HCC)    Thrombus of aorta (HCC)    Lactic acidosis    Pneumonia of both upper lobes due to infectious organism [facility])  Resolved Problems:    * No resolved hospital problems. *      · Continue aspirin, statin. · Continue Lopressor and Norvasc for blood pressure. · The patient is on Librium 25 mg every 8 hours. Will taper Librium. · The patient is already on prednisone taper. · Continue heparin drip. Plan for cardiac catheterization today.   · We will keep the patient

## 2020-03-20 NOTE — PROGRESS NOTES
Roddy Alfred, PPatient Assessment complete. Shortness of breath [R06.02] . Vitals:    03/20/20 0806   BP: (!) 144/60   Pulse: 79   Resp: 20   Temp: 98.6 °F (37 °C)   SpO2: 100%   . Patients home meds are   Prior to Admission medications    Medication Sig Start Date End Date Taking? Authorizing Provider   Meloxicam 10 MG CAPS Take by mouth    Historical Provider, MD   traMADol (ULTRAM) 50 MG tablet Take 1 tablet by mouth 3 times daily as needed for Pain 10/9/17   Ba Uriarte DO   oxyCODONE-acetaminophen (PERCOCET) 5-325 MG per tablet Take 1 tablet by mouth every 6 hours as needed for Pain . 9/18/17   Camilo Lopez DO   docusate sodium (COLACE) 100 MG capsule Take 1 capsule by mouth 2 times daily 8/24/17   [facility], DO   Misc.  Devices (RAISED TOILET SEAT) MISC 1 Device by Does not apply route daily 8/24/17   Camilo Lopez DO   loratadine (CLARITIN) 10 MG tablet Take 10 mg by mouth daily    Historical Provider, MD   mupirocin (BACTROBAN NASAL) 2 % nasal ointment Take by Nasal route 2 times daily for 5 days 8/18/17 8/22/17  Ba Uriarte DO   aspirin 325 MG tablet Take 325 mg by mouth daily Pt to stop 7 days pre-op    Historical Provider, MD   CHANTIX STARTING MONTH JOEL 0.5 MG X 11 & 1 MG X 42 tablet On 5/20/16 states she has not yet started 2/8/16   Historical Provider, MD   COMBIVENT RESPIMAT  MCG/ACT AERS inhaler Inhale 1 puff into the lungs every 6 hours as needed  2/8/16   Historical Provider, MD   alendronate (FOSAMAX) 35 MG tablet Take 35 mg by mouth every 7 days Sunday 2/5/16   Historical Provider, MD   atenolol (TENORMIN) 50 MG tablet Take 50 mg by mouth 2 times daily  2/8/16   Historical Provider, MD   atorvastatin (LIPITOR) 40 MG tablet Take 40 mg by mouth daily  2/8/16   Historical Provider, MD   hydrochlorothiazide (HYDRODIURIL) 25 MG tablet Take 50 mg by mouth daily  2/8/16   Historical Provider, MD   .    Assessment: Patient has a history of COPD,

## 2020-03-21 LAB
ABSOLUTE EOS #: 0 K/UL (ref 0–0.4)
ABSOLUTE IMMATURE GRANULOCYTE: 0.15 K/UL (ref 0–0.3)
ABSOLUTE LYMPH #: 1.06 K/UL (ref 1–4.8)
ABSOLUTE MONO #: 1.36 K/UL (ref 0.1–0.8)
ANION GAP SERPL CALCULATED.3IONS-SCNC: 11 MMOL/L (ref 9–17)
BASOPHILS # BLD: 0 % (ref 0–2)
BASOPHILS ABSOLUTE: 0 K/UL (ref 0–0.2)
BUN BLDV-MCNC: 20 MG/DL (ref 8–23)
BUN/CREAT BLD: ABNORMAL (ref 9–20)
CALCIUM SERPL-MCNC: 8.1 MG/DL (ref 8.6–10.4)
CHLORIDE BLD-SCNC: 105 MMOL/L (ref 98–107)
CO2: 26 MMOL/L (ref 20–31)
CREAT SERPL-MCNC: 0.74 MG/DL (ref 0.5–0.9)
DIFFERENTIAL TYPE: ABNORMAL
EOSINOPHILS RELATIVE PERCENT: 0 % (ref 1–4)
GFR AFRICAN AMERICAN: >60 ML/MIN
GFR NON-AFRICAN AMERICAN: >60 ML/MIN
GFR SERPL CREATININE-BSD FRML MDRD: ABNORMAL ML/MIN/{1.73_M2}
GFR SERPL CREATININE-BSD FRML MDRD: ABNORMAL ML/MIN/{1.73_M2}
GLUCOSE BLD-MCNC: 112 MG/DL (ref 70–99)
GLUCOSE BLD-MCNC: 117 MG/DL (ref 65–105)
GLUCOSE BLD-MCNC: 120 MG/DL (ref 65–105)
GLUCOSE BLD-MCNC: 146 MG/DL (ref 65–105)
GLUCOSE BLD-MCNC: 147 MG/DL (ref 65–105)
GLUCOSE BLD-MCNC: 92 MG/DL (ref 65–105)
HCT VFR BLD CALC: 36.2 % (ref 36.3–47.1)
HEMOGLOBIN: 10.7 G/DL (ref 11.9–15.1)
IMMATURE GRANULOCYTES: 1 %
LYMPHOCYTES # BLD: 7 % (ref 24–44)
MCH RBC QN AUTO: 29.3 PG (ref 25.2–33.5)
MCHC RBC AUTO-ENTMCNC: 29.6 G/DL (ref 28.4–34.8)
MCV RBC AUTO: 99.2 FL (ref 82.6–102.9)
MONOCYTES # BLD: 9 % (ref 1–7)
MORPHOLOGY: ABNORMAL
NRBC AUTOMATED: 0.2 PER 100 WBC
PARTIAL THROMBOPLASTIN TIME: 47.5 SEC (ref 20.5–30.5)
PARTIAL THROMBOPLASTIN TIME: 50 SEC (ref 20.5–30.5)
PARTIAL THROMBOPLASTIN TIME: 64.8 SEC (ref 20.5–30.5)
PDW BLD-RTO: 14.9 % (ref 11.8–14.4)
PLATELET # BLD: 270 K/UL (ref 138–453)
PLATELET ESTIMATE: ABNORMAL
PMV BLD AUTO: 10.7 FL (ref 8.1–13.5)
POTASSIUM SERPL-SCNC: 4 MMOL/L (ref 3.7–5.3)
RBC # BLD: 3.65 M/UL (ref 3.95–5.11)
RBC # BLD: ABNORMAL 10*6/UL
SEG NEUTROPHILS: 83 % (ref 36–66)
SEGMENTED NEUTROPHILS ABSOLUTE COUNT: 12.53 K/UL (ref 1.8–7.7)
SODIUM BLD-SCNC: 142 MMOL/L (ref 135–144)
WBC # BLD: 15.1 K/UL (ref 3.5–11.3)
WBC # BLD: ABNORMAL 10*3/UL

## 2020-03-21 PROCEDURE — 80048 BASIC METABOLIC PNL TOTAL CA: CPT

## 2020-03-21 PROCEDURE — 6370000000 HC RX 637 (ALT 250 FOR IP): Performed by: STUDENT IN AN ORGANIZED HEALTH CARE EDUCATION/TRAINING PROGRAM

## 2020-03-21 PROCEDURE — 94761 N-INVAS EAR/PLS OXIMETRY MLT: CPT

## 2020-03-21 PROCEDURE — 94640 AIRWAY INHALATION TREATMENT: CPT

## 2020-03-21 PROCEDURE — 99233 SBSQ HOSP IP/OBS HIGH 50: CPT | Performed by: INTERNAL MEDICINE

## 2020-03-21 PROCEDURE — 36415 COLL VENOUS BLD VENIPUNCTURE: CPT

## 2020-03-21 PROCEDURE — 2060000000 HC ICU INTERMEDIATE R&B

## 2020-03-21 PROCEDURE — 85025 COMPLETE CBC W/AUTO DIFF WBC: CPT

## 2020-03-21 PROCEDURE — 82947 ASSAY GLUCOSE BLOOD QUANT: CPT

## 2020-03-21 PROCEDURE — 6370000000 HC RX 637 (ALT 250 FOR IP): Performed by: INTERNAL MEDICINE

## 2020-03-21 PROCEDURE — 2580000003 HC RX 258: Performed by: INTERNAL MEDICINE

## 2020-03-21 PROCEDURE — 6360000002 HC RX W HCPCS: Performed by: STUDENT IN AN ORGANIZED HEALTH CARE EDUCATION/TRAINING PROGRAM

## 2020-03-21 PROCEDURE — 85730 THROMBOPLASTIN TIME PARTIAL: CPT

## 2020-03-21 RX ORDER — LIDOCAINE 50 MG/G
1 PATCH TOPICAL DAILY
Status: DISCONTINUED | OUTPATIENT
Start: 2020-03-21 | End: 2020-03-21

## 2020-03-21 RX ORDER — LIDOCAINE 4 G/G
1 PATCH TOPICAL DAILY
Status: DISCONTINUED | OUTPATIENT
Start: 2020-03-21 | End: 2020-03-26 | Stop reason: HOSPADM

## 2020-03-21 RX ORDER — BENZONATATE 100 MG/1
100 CAPSULE ORAL 3 TIMES DAILY PRN
Status: DISCONTINUED | OUTPATIENT
Start: 2020-03-21 | End: 2020-03-26 | Stop reason: HOSPADM

## 2020-03-21 RX ADMIN — METOPROLOL TARTRATE 50 MG: 50 TABLET, FILM COATED ORAL at 09:50

## 2020-03-21 RX ADMIN — Medication 100 MG: at 09:49

## 2020-03-21 RX ADMIN — CHLORDIAZEPOXIDE HYDROCHLORIDE 10 MG: 5 CAPSULE ORAL at 05:40

## 2020-03-21 RX ADMIN — PANTOPRAZOLE SODIUM 40 MG: 40 TABLET, DELAYED RELEASE ORAL at 05:37

## 2020-03-21 RX ADMIN — INSULIN LISPRO 2 UNITS: 100 INJECTION, SOLUTION INTRAVENOUS; SUBCUTANEOUS at 12:48

## 2020-03-21 RX ADMIN — IPRATROPIUM BROMIDE AND ALBUTEROL SULFATE 1 AMPULE: .5; 3 SOLUTION RESPIRATORY (INHALATION) at 20:09

## 2020-03-21 RX ADMIN — PREDNISONE 30 MG: 20 TABLET ORAL at 09:49

## 2020-03-21 RX ADMIN — AMLODIPINE BESYLATE 10 MG: 10 TABLET ORAL at 09:49

## 2020-03-21 RX ADMIN — HEPARIN SODIUM AND DEXTROSE 13.09 UNITS/KG/HR: 10000; 5 INJECTION INTRAVENOUS at 21:02

## 2020-03-21 RX ADMIN — BENZONATATE 100 MG: 100 CAPSULE ORAL at 02:26

## 2020-03-21 RX ADMIN — IPRATROPIUM BROMIDE AND ALBUTEROL SULFATE 1 AMPULE: .5; 3 SOLUTION RESPIRATORY (INHALATION) at 08:46

## 2020-03-21 RX ADMIN — CHLORDIAZEPOXIDE HYDROCHLORIDE 10 MG: 5 CAPSULE ORAL at 13:31

## 2020-03-21 RX ADMIN — DESMOPRESSIN ACETATE 40 MG: 0.2 TABLET ORAL at 21:00

## 2020-03-21 RX ADMIN — INSULIN LISPRO 2 UNITS: 100 INJECTION, SOLUTION INTRAVENOUS; SUBCUTANEOUS at 19:22

## 2020-03-21 RX ADMIN — METOPROLOL TARTRATE 50 MG: 50 TABLET, FILM COATED ORAL at 21:00

## 2020-03-21 RX ADMIN — BENZONATATE 100 MG: 100 CAPSULE ORAL at 09:50

## 2020-03-21 RX ADMIN — CHLORDIAZEPOXIDE HYDROCHLORIDE 10 MG: 5 CAPSULE ORAL at 21:00

## 2020-03-21 RX ADMIN — FOLIC ACID 1 MG: 1 TABLET ORAL at 09:49

## 2020-03-21 RX ADMIN — ASPIRIN 81 MG: 81 TABLET ORAL at 09:50

## 2020-03-21 RX ADMIN — SODIUM CHLORIDE: 4.5 INJECTION, SOLUTION INTRAVENOUS at 15:37

## 2020-03-21 RX ADMIN — IPRATROPIUM BROMIDE AND ALBUTEROL SULFATE 1 AMPULE: .5; 3 SOLUTION RESPIRATORY (INHALATION) at 14:31

## 2020-03-21 RX ADMIN — THERA TABS 1 TABLET: TAB at 09:49

## 2020-03-21 ASSESSMENT — PAIN SCALES - GENERAL
PAINLEVEL_OUTOF10: 0

## 2020-03-21 NOTE — CARE COORDINATION
Transition planning called admissions dept @ [facility] left vm for elvira to discuss referral. Will need precert.  plan heart cath monday

## 2020-03-21 NOTE — PROGRESS NOTES
[facility]  Internal Medicine Teaching Residency Program  Inpatient Daily Progress Note  ______________________________________________________________________________    Patient: Benton Gonzalez  YOB: 1950   NLF:[id number]    Acct: [de-identified]     Room: [facility]3002-01  Admit date: 3/11/2020  Today's date: 03/21/20  Number of days in the hospital: 10    SUBJECTIVE   Admitting Diagnosis: Acute respiratory failure with hypoxia and hypercapnia ([facility] [address]  CC: Respiratory arrest  Pt examined at bedside. Chart & results reviewed. Patient seen and examined at bedside. Alert and oriented. Appetite is good  Vitals are stable  Patient on room air, saturating fine   No acute issues overnight. Patient's heart cath was canceled yesterday because of drop in blood pressure on giving nitro. Cath is rescheduled for Monday    ROS:  Constitutional:  negative for chills, fevers, sweats  Respiratory:  negative for cough, dyspnea on exertion, hemoptysis, shortness of breath, wheezing  Cardiovascular:  negative for chest pain, chest pressure/discomfort, lower extremity edema, palpitations  Gastrointestinal:  negative for abdominal pain, constipation, diarrhea, nausea, vomiting  Neurological:  negative for dizziness, headache  BRIEF HISTORY     The patient is a 50-year-old female with past medical history of COPD, essential hypertension, descending thoracic aortic aneurysm brought into the emergency department for evaluation of acute onset respiratory arrest and pulselessness. She was given CPR and 1 dose of atropine. She was intubated, sedated on Versed and fentanyl. She was started on Rocephin and azithromycin in the ER for pneumonia. CT PE was negative for pulmonary embolism but showed bilateral pulmonary infiltrates, pleural effusion but showed mural thrombus in descending allograft. Vascular surgery recommended no intervention at this time.   Patient was started on heparin drip. Troponins were trending up. Cardiology was consulted. The patient was aspirin, statin and beta-blocker. Echogram shows preserved ejection fraction. She was treated with Solu-Medrol, breathing treatments and antibiotics. She was continued on supportive care while being intubated. She had ASHLEY initially which improved over time. The patient also required pressor support for hypotension. Her sodium levels improved. For pulmonary edema, she was given intermittent doses of Lasix. The patient is extubated. Cardiology had a plan to do cardiac cath once the patient has been stabilized. The patient also has a history of alcohol intoxication and alcohol withdrawals. As per the , the patient is a daily drinker and drinks 1 pint of beer every day.     OBJECTIVE     Vital Signs:  BP (!) 148/65   Pulse 92   Temp 97.7 °F (36.5 °C) (Oral)   Resp 28   Ht 5' 2\" (1.575 m)   Wt 170 lb (77.1 kg)   SpO2 95%   BMI 31.09 kg/m²     Temp (24hrs), Av.5 °F (36.9 °C), Min:97.7 °F (36.5 °C), Max:98.7 °F (37.1 °C)    In: 1390.8   Out:  [Urine:]    Physical Exam:  General appearance - alert, well appearing, and in no distress  Chest - clear to auscultation, no wheezes, rales or rhonchi, symmetric air entry  Heart - normal rate, regular rhythm, normal S1, S2, no murmurs, rubs, clicks or gallops  Abdomen - soft, nontender, nondistended, no masses or organomegaly  Neurological - alert, oriented, normal speech, no focal findings or movement disorder noted  Musculoskeletal - no joint tenderness, deformity or swelling  Extremities - peripheral pulses normal, no pedal edema, no clubbing or cyanosis  Skin - normal coloration and turgor, no rashes, no suspicious skin lesions noted    Medications:  Scheduled Medications:    lidocaine  1 patch Transdermal Daily    chlordiazePOXIDE  10 mg Oral J0B    folic acid  1 mg Oral Daily    multivitamin  1 tablet Oral Daily    thiamine  100 mg Oral Daily    (single Ap View)    Result Date: 3/17/2020  Nonspecific, nonobstructive bowel gas pattern NG tube in place, tip and proximal port in the upper body of the stomach     Xr Chest Portable    Result Date: 3/19/2020  Interval extubation. Findings consistent with mild interstitial edema with small effusions. Xr Chest Portable    Result Date: 3/17/2020  1. Much improved pneumatization bilateral lungs with nearly resolved sequela of congestive heart failure seen on yesterday's study. 2. Low endotracheal tube positioning only about 11 mm superior to the gautam; this could be pulled back 2 cm to be at the level of the aortic knob. 3.  NG tube extends below the left hemidiaphragm, into the upper abdomen, tip overlying the expected level of the stomach, left upper quadrant. 4. Mild cardiomegaly. 5. Stable thoracic aorta aneurysm status post wall stent. Xr Chest Portable    Result Date: 3/16/2020  Mild interval increase interstitial edema. Xr Chest Portable    Result Date: 3/15/2020  Endotracheal tube is slightly low in position. Consider minimally retracting. Small right pleural effusion is suspected. Mild pulmonary edema. Xr Chest Portable    Result Date: 3/14/2020  Partial improved aeration of the left lung which may represent decreasing pulmonary edema and decreasing pleural effusion. ASSESSMENT & PLAN     ASSESSMENT / PLAN:     Principal Problem:    Acute respiratory failure with hypoxia and hypercapnia (HCC)  Active Problems:    Shortness of breath    COPD with acute exacerbation (HCC)    Acute pulmonary edema (HCC)    NSTEMI (non-ST elevated myocardial infarction) (HCC)    Shock circulatory (HCC)    Thrombus of aorta (HCC)    Lactic acidosis    Pneumonia of both upper lobes due to infectious organism ([address]    Respiratory arrest ([address]  Resolved Problems:    * No resolved hospital problems.  *    Acute respiratory failure with hypoxia secondary to pneumonia and COPD exacerbation-resolved  Patient on prednisone taper  Completed antibiotics  On room air currently  Start incentive spirometry  Continue breathing treatments    Possible PEA arrest in ED with elevated troponins-currently on heparin  complaining of chest pain status post CPR  We will give lidocaine patch and encourage incentive spirometry  Plan for heart cath on Monday  Continue aspirin    Descending Aortic thrombus  Currently on heparin drip, continue for now    Alcohol withdrawal-patient currently on Librium taper  Continue thiamine and folate and multivitamin    Hypertension- continue Lopressor and Norvasc    Tobacco abuse-continue NicoDerm patch      Yonis Novoa MD  INTERNAL MEDICINE RESIDENT, PGY-2  [address] [place] [address]  3/21/2020,11:56 AM

## 2020-03-21 NOTE — PROGRESS NOTES
[facility]   Progress Note                    Date:   3/21/2020  Patient name:  Neelima Jordan  Date of admission:  3/11/2020 11:34 AM  MRN:   [id number]  YOB: 1950  PCP:    Kathryn Garcia MD    Reason for Admission:  Shortness of breath [R06.02]    Subjective:       Clinical Changes / Abnormalities: The patient was seen and examined. She is doing better. Patient`s SBP dropped after nitro and likely secondary to vagal effect to 70/40 and administered IV fluid and BP picked up to 98/62. I/O last 3 completed shifts: In: 1390.8 [P.O.:120; I.V.:1270.8]  Out: 2075 [Urine:2075]  No intake/output data recorded. In: 1390.8 [P.O.:120;  I.V.:1270.8]  Out: 2075 [Urine:2075]      Intake/Output Summary (Last 24 hours) at 3/21/2020 0834  Last data filed at 3/21/2020 0541  Gross per 24 hour   Intake 1390.82 ml   Output 2075 ml   Net -684.18 ml         I/O since admission:  liters    Medications:   Scheduled Meds:   chlordiazePOXIDE  10 mg Oral M4C    folic acid  1 mg Oral Daily    multivitamin  1 tablet Oral Daily    thiamine  100 mg Oral Daily    predniSONE  30 mg Oral Daily    Followed by   Susan Pollard ON 3/22/2020] predniSONE  20 mg Oral Daily    Followed by   Susan Pollard ON 3/25/2020] predniSONE  10 mg Oral Daily    pantoprazole  40 mg Oral QAM AC    amLODIPine  10 mg Oral Daily    ipratropium-albuterol  1 ampule Inhalation TID    metoprolol tartrate  50 mg Oral BID    insulin lispro  0-12 Units Subcutaneous 4 times per day    aspirin  81 mg Oral Daily    atorvastatin  40 mg Oral Nightly    sodium chloride flush  10 mL Intravenous 2 times per day    nicotine  1 patch Transdermal Daily     Continuous Infusions:   heparin (porcine) 13.088 Units/kg/hr (03/20/20 2129)    sodium chloride 10 mL/hr at 03/19/20 0545    dextrose       CBC:   Recent Labs     03/19/20  0542 03/20/20  0742 03/21/20  0300   WBC 14.7* 17.5* 15.1*   HGB 10.4* 12.2 10.7*    303 270     BMP: Recent Labs     03/19/20  0542 03/20/20  0742 03/21/20  0300    142 142   K 4.3 4.3 4.0    104 105   CO2 26 27 26   BUN 31* 25* 20   CREATININE 0.71 0.73 0.74   GLUCOSE 112* 121* 112*         Objective:   Vitals: /60   Pulse 72   Temp 98.7 °F (37.1 °C) (Oral)   Resp 25   Ht 5' 2\" (1.575 m)   Wt 170 lb (77.1 kg)   SpO2 96%   BMI 31.09 kg/m²      Constitutional and General Appearance: Intubated and sedated   HEENT: PERRL, no cervical lymphadenopathy. No masses palpable. Normal oral mucosa  Respiratory:  · Normal excursion and expansion without use of accessory muscles  · Resp Auscultation: Good respiratory effort. No for increased work of breathing. On auscultation: clear to auscultation bilaterally  Cardiovascular:  · The apical impulse is not displaced  · Heart tones are crisp and normal. regular S1 and S2.       Abdomen:   · No masses or tenderness  · Bowel sounds present  Extremities:  ·  No Cyanosis or Clubbing  ·  Lower extremity edema: No  ·  Skin: Warm and dry  Neurological:  Intubated and sedated    Diagnostic Studies:     EKG: Sinus tachycardia, PVCs, ST depression in lateral leads        ECHO:  3/12/2020  Left ventricle is normal in size with normal systolic function globally. Calculated ejection fraction is 54%. Mild mitral regurgitation. Mild tricuspid regurgitation. Estimated right ventricular systolic pressure  is 31 mmHg.        Patient's Active Problem List   Principal Problem:    Acute respiratory failure with hypoxia and hypercapnia (HCC)  Active Problems:    Shortness of breath    COPD with acute exacerbation (HCC)    Acute pulmonary edema (HCC)    NSTEMI (non-ST elevated myocardial infarction) (HCC)    Shock circulatory (HCC)    Thrombus of aorta (HCC)    Lactic acidosis    Pneumonia of both upper lobes due to infectious organism ([address]    Respiratory arrest ([address]  Resolved Problems:    * No resolved hospital problems.  *        Assessment / Acute Cardiac Problems: 1. Elevated troponin -  Type I vs Type II  MI  2. Pulmonary edema  3. Pneumonia  4. Possible PEA arrest in the ED  5. Descending aortic thrombus  6. ASHLEY  7. COPD   8. Lactic acidosis: Resolved  9. Hypertension  10. Hyperlipidemia  11. Alcohol withdrawal        Plan of Treatment:   1. Troponins peaked at 560  2. Cardiac cath on monday only through radial approach due to descending aortic mural thrombus  3. Heparin drip for Aortic thrombus  4. Repeat CTA ordered by critical care/vascular  5. Keep n.p.o. after midnight on Meg Lackey MD  Fellow, [address]        Attending Physician Statement  I have discussed the care of Molly Florian, including pertinent history and exam findings,  with the resident. I have seen and examined the patient and the key elements of all parts of the encounter have been performed by me. I agree with the assessment, plan and orders as documented by the resident.

## 2020-03-22 LAB
ABSOLUTE EOS #: 0.04 K/UL (ref 0–0.44)
ABSOLUTE IMMATURE GRANULOCYTE: 0.32 K/UL (ref 0–0.3)
ABSOLUTE LYMPH #: 2.55 K/UL (ref 1.1–3.7)
ABSOLUTE MONO #: 1.49 K/UL (ref 0.1–1.2)
ANION GAP SERPL CALCULATED.3IONS-SCNC: 10 MMOL/L (ref 9–17)
BASOPHILS # BLD: 0 % (ref 0–2)
BASOPHILS ABSOLUTE: 0.06 K/UL (ref 0–0.2)
BUN BLDV-MCNC: 17 MG/DL (ref 8–23)
BUN/CREAT BLD: ABNORMAL (ref 9–20)
CALCIUM SERPL-MCNC: 8.6 MG/DL (ref 8.6–10.4)
CHLORIDE BLD-SCNC: 105 MMOL/L (ref 98–107)
CO2: 24 MMOL/L (ref 20–31)
CREAT SERPL-MCNC: 0.67 MG/DL (ref 0.5–0.9)
DIFFERENTIAL TYPE: ABNORMAL
EOSINOPHILS RELATIVE PERCENT: 0 % (ref 1–4)
GFR AFRICAN AMERICAN: >60 ML/MIN
GFR NON-AFRICAN AMERICAN: >60 ML/MIN
GFR SERPL CREATININE-BSD FRML MDRD: ABNORMAL ML/MIN/{1.73_M2}
GFR SERPL CREATININE-BSD FRML MDRD: ABNORMAL ML/MIN/{1.73_M2}
GLUCOSE BLD-MCNC: 118 MG/DL (ref 70–99)
GLUCOSE BLD-MCNC: 145 MG/DL (ref 65–105)
GLUCOSE BLD-MCNC: 166 MG/DL (ref 65–105)
GLUCOSE BLD-MCNC: 188 MG/DL (ref 65–105)
GLUCOSE BLD-MCNC: 99 MG/DL (ref 65–105)
HCT VFR BLD CALC: 40 % (ref 36.3–47.1)
HEMOGLOBIN: 11.6 G/DL (ref 11.9–15.1)
IMMATURE GRANULOCYTES: 2 %
LYMPHOCYTES # BLD: 14 % (ref 24–43)
MAGNESIUM: 2.1 MG/DL (ref 1.6–2.6)
MCH RBC QN AUTO: 31 PG (ref 25.2–33.5)
MCHC RBC AUTO-ENTMCNC: 29 G/DL (ref 28.4–34.8)
MCV RBC AUTO: 107 FL (ref 82.6–102.9)
MONOCYTES # BLD: 8 % (ref 3–12)
NRBC AUTOMATED: 0.2 PER 100 WBC
PARTIAL THROMBOPLASTIN TIME: 46 SEC (ref 20.5–30.5)
PARTIAL THROMBOPLASTIN TIME: 53.6 SEC (ref 20.5–30.5)
PARTIAL THROMBOPLASTIN TIME: 61.6 SEC (ref 20.5–30.5)
PARTIAL THROMBOPLASTIN TIME: 73.8 SEC (ref 20.5–30.5)
PDW BLD-RTO: 15.3 % (ref 11.8–14.4)
PLATELET # BLD: 297 K/UL (ref 138–453)
PLATELET ESTIMATE: ABNORMAL
PMV BLD AUTO: 10.5 FL (ref 8.1–13.5)
POTASSIUM SERPL-SCNC: 3.5 MMOL/L (ref 3.7–5.3)
RBC # BLD: 3.74 M/UL (ref 3.95–5.11)
RBC # BLD: ABNORMAL 10*6/UL
SEG NEUTROPHILS: 76 % (ref 36–65)
SEGMENTED NEUTROPHILS ABSOLUTE COUNT: 13.82 K/UL (ref 1.5–8.1)
SODIUM BLD-SCNC: 139 MMOL/L (ref 135–144)
WBC # BLD: 18.3 K/UL (ref 3.5–11.3)
WBC # BLD: ABNORMAL 10*3/UL

## 2020-03-22 PROCEDURE — 85730 THROMBOPLASTIN TIME PARTIAL: CPT

## 2020-03-22 PROCEDURE — 83735 ASSAY OF MAGNESIUM: CPT

## 2020-03-22 PROCEDURE — 94640 AIRWAY INHALATION TREATMENT: CPT

## 2020-03-22 PROCEDURE — 2060000000 HC ICU INTERMEDIATE R&B

## 2020-03-22 PROCEDURE — 80048 BASIC METABOLIC PNL TOTAL CA: CPT

## 2020-03-22 PROCEDURE — 6370000000 HC RX 637 (ALT 250 FOR IP): Performed by: STUDENT IN AN ORGANIZED HEALTH CARE EDUCATION/TRAINING PROGRAM

## 2020-03-22 PROCEDURE — 6370000000 HC RX 637 (ALT 250 FOR IP): Performed by: INTERNAL MEDICINE

## 2020-03-22 PROCEDURE — 99233 SBSQ HOSP IP/OBS HIGH 50: CPT | Performed by: INTERNAL MEDICINE

## 2020-03-22 PROCEDURE — 36415 COLL VENOUS BLD VENIPUNCTURE: CPT

## 2020-03-22 PROCEDURE — 94761 N-INVAS EAR/PLS OXIMETRY MLT: CPT

## 2020-03-22 PROCEDURE — 85025 COMPLETE CBC W/AUTO DIFF WBC: CPT

## 2020-03-22 PROCEDURE — 82947 ASSAY GLUCOSE BLOOD QUANT: CPT

## 2020-03-22 PROCEDURE — 6360000002 HC RX W HCPCS: Performed by: STUDENT IN AN ORGANIZED HEALTH CARE EDUCATION/TRAINING PROGRAM

## 2020-03-22 PROCEDURE — 2580000003 HC RX 258: Performed by: STUDENT IN AN ORGANIZED HEALTH CARE EDUCATION/TRAINING PROGRAM

## 2020-03-22 RX ORDER — CHLORDIAZEPOXIDE HYDROCHLORIDE 5 MG/1
5 CAPSULE, GELATIN COATED ORAL EVERY 8 HOURS
Status: DISCONTINUED | OUTPATIENT
Start: 2020-03-22 | End: 2020-03-25

## 2020-03-22 RX ORDER — KETOROLAC TROMETHAMINE 15 MG/ML
15 INJECTION, SOLUTION INTRAMUSCULAR; INTRAVENOUS ONCE
Status: COMPLETED | OUTPATIENT
Start: 2020-03-22 | End: 2020-03-22

## 2020-03-22 RX ORDER — SODIUM CHLORIDE 450 MG/100ML
INJECTION, SOLUTION INTRAVENOUS CONTINUOUS
Status: DISCONTINUED | OUTPATIENT
Start: 2020-03-23 | End: 2020-03-26

## 2020-03-22 RX ADMIN — THERA TABS 1 TABLET: TAB at 10:00

## 2020-03-22 RX ADMIN — DESMOPRESSIN ACETATE 40 MG: 0.2 TABLET ORAL at 20:40

## 2020-03-22 RX ADMIN — INSULIN LISPRO 2 UNITS: 100 INJECTION, SOLUTION INTRAVENOUS; SUBCUTANEOUS at 11:39

## 2020-03-22 RX ADMIN — IPRATROPIUM BROMIDE AND ALBUTEROL SULFATE 1 AMPULE: .5; 3 SOLUTION RESPIRATORY (INHALATION) at 08:53

## 2020-03-22 RX ADMIN — PREDNISONE 20 MG: 20 TABLET ORAL at 10:00

## 2020-03-22 RX ADMIN — Medication 100 MG: at 09:59

## 2020-03-22 RX ADMIN — Medication 10 ML: at 11:39

## 2020-03-22 RX ADMIN — AMLODIPINE BESYLATE 10 MG: 10 TABLET ORAL at 10:00

## 2020-03-22 RX ADMIN — METOPROLOL TARTRATE 50 MG: 50 TABLET, FILM COATED ORAL at 10:00

## 2020-03-22 RX ADMIN — ASPIRIN 81 MG: 81 TABLET ORAL at 09:59

## 2020-03-22 RX ADMIN — PANTOPRAZOLE SODIUM 40 MG: 40 TABLET, DELAYED RELEASE ORAL at 06:16

## 2020-03-22 RX ADMIN — IPRATROPIUM BROMIDE AND ALBUTEROL SULFATE 1 AMPULE: .5; 3 SOLUTION RESPIRATORY (INHALATION) at 19:42

## 2020-03-22 RX ADMIN — HEPARIN SODIUM AND DEXTROSE 11.1 UNITS/KG/HR: 10000; 5 INJECTION INTRAVENOUS at 23:16

## 2020-03-22 RX ADMIN — ACETAMINOPHEN 650 MG: 325 TABLET ORAL at 23:16

## 2020-03-22 RX ADMIN — KETOROLAC TROMETHAMINE 15 MG: 15 INJECTION, SOLUTION INTRAMUSCULAR; INTRAVENOUS at 11:39

## 2020-03-22 RX ADMIN — FOLIC ACID 1 MG: 1 TABLET ORAL at 09:59

## 2020-03-22 RX ADMIN — CHLORDIAZEPOXIDE HYDROCHLORIDE 5 MG: 5 CAPSULE ORAL at 13:22

## 2020-03-22 RX ADMIN — CHLORDIAZEPOXIDE HYDROCHLORIDE 10 MG: 5 CAPSULE ORAL at 06:16

## 2020-03-22 RX ADMIN — CHLORDIAZEPOXIDE HYDROCHLORIDE 5 MG: 5 CAPSULE ORAL at 20:40

## 2020-03-22 RX ADMIN — METOPROLOL TARTRATE 50 MG: 50 TABLET, FILM COATED ORAL at 20:40

## 2020-03-22 RX ADMIN — BENZOCAINE AND MENTHOL 1 LOZENGE: 15; 3.6 LOZENGE ORAL at 11:38

## 2020-03-22 RX ADMIN — ACETAMINOPHEN 650 MG: 325 TABLET ORAL at 04:48

## 2020-03-22 ASSESSMENT — PAIN SCALES - GENERAL
PAINLEVEL_OUTOF10: 8
PAINLEVEL_OUTOF10: 3
PAINLEVEL_OUTOF10: 3

## 2020-03-22 NOTE — PLAN OF CARE
Problem: Falls - Risk of:  Goal: Will remain free from falls  Description: Will remain free from falls  Outcome: Ongoing  Goal: Absence of physical injury  Description: Absence of physical injury  Outcome: Ongoing     Problem: Anxiety:  Goal: Level of anxiety will decrease  Description: Level of anxiety will decrease  Outcome: Ongoing     Problem: Nutrition  Goal: Optimal nutrition therapy  Outcome: Ongoing     Problem: Urinary Elimination:  Goal: Signs and symptoms of infection will decrease  Description: Signs and symptoms of infection will decrease  Outcome: Ongoing  Goal: Complications related to the disease process, condition or treatment will be avoided or minimized  Description: Complications related to the disease process, condition or treatment will be avoided or minimized  Outcome: Ongoing     Problem: Infection - Central Venous Catheter-Associated Bloodstream Infection:  Goal: Will show no infection signs and symptoms  Description: Will show no infection signs and symptoms  Outcome: Ongoing     Problem: Pain:  Goal: Pain level will decrease  Description: Pain level will decrease  Outcome: Ongoing  Goal: Control of acute pain  Description: Control of acute pain  Outcome: Ongoing  Goal: Control of chronic pain  Description: Control of chronic pain  Outcome: Ongoing

## 2020-03-22 NOTE — PROGRESS NOTES
03/20/20  0742 03/21/20  0300 03/22/20  0756   WBC 17.5* 15.1* 18.3*   HGB 12.2 10.7* 11.6*    270 297     BMP:    Recent Labs     03/20/20  0742 03/21/20  0300 03/22/20  0756    142 139   K 4.3 4.0 3.5*    105 105   CO2 27 26 24   BUN 25* 20 17   CREATININE 0.73 0.74 0.67   GLUCOSE 121* 112* 118*         Objective:   Vitals: BP (!) 155/78   Pulse 77   Temp 98 °F (36.7 °C) (Oral)   Resp 29   Ht 5' 2\" (1.575 m)   Wt 169 lb 3.2 oz (76.7 kg)   SpO2 95%   BMI 30.95 kg/m²      Constitutional and General Appearance: drowsy   HEENT: PERRL, no cervical lymphadenopathy. No masses palpable. Normal oral mucosa  Respiratory:  · Normal excursion and expansion without use of accessory muscles  · Resp Auscultation: Good respiratory effort. No for increased work of breathing. On auscultation: clear to auscultation bilaterally  Cardiovascular:  · The apical impulse is not displaced  · Heart tones are crisp and normal. regular S1 and S2.       Abdomen:   · No masses or tenderness  · Bowel sounds present  Extremities:  ·  No Cyanosis or Clubbing  ·  Lower extremity edema: No  ·  Skin: Warm and dry  Neurological:  Restless overnight . Sitter in place    Diagnostic Studies:     EKG: Sinus tachycardia, PVCs, ST depression in lateral leads        ECHO:  3/12/2020  Left ventricle is normal in size with normal systolic function globally. Calculated ejection fraction is 54%. Mild mitral regurgitation. Mild tricuspid regurgitation.  Estimated right ventricular systolic pressure  is 31 mmHg.        Patient's Active Problem List   Principal Problem:    Acute respiratory failure with hypoxia and hypercapnia (HCC)  Active Problems:    Shortness of breath    COPD with acute exacerbation (HCC)    Acute pulmonary edema (HCC)    NSTEMI (non-ST elevated myocardial infarction) (HCC)    Shock circulatory (HCC)    Thrombus of aorta (HCC)    Lactic acidosis    Pneumonia of both upper lobes due to infectious organism ([facility] Respiratory arrest ([facility]  Resolved Problems:    * No resolved hospital problems. *        Assessment / Acute Cardiac Problems:   1. Elevated troponin -  Type I vs Type II  MI  2. Pulmonary edema  3. Pneumonia  4. Possible PEA arrest in the ED  5. Descending aortic thrombus  6. ASHLEY  7. COPD   8. Lactic acidosis: Resolved  9. Hypertension  10. Hyperlipidemia  11. Alcohol withdrawal        Plan of Treatment:   1. Troponins peaked at 560  2. Cardiac cath on monday only through radial/brachial approach due to descending aortic mural thrombus  3. Heparin drip for Aortic thrombus  4. Keep n.p.o. after midnight on Van Strickland MD  Fellow, [address]          Attending Physician Statement  I have discussed the care of Ean Mtz, including pertinent history and exam findings,  with the resident. I have seen and examined the patient and the key elements of all parts of the encounter have been performed by me. I agree with the assessment, plan and orders as documented by the resident.

## 2020-03-22 NOTE — PROGRESS NOTES
amount of thin secretions []  Moderate amount of viscous secretions []  Copius, Viscious Yellow/ Secretions   CXR as reported by physician []  clear  []  Unavailable []  Infiltrates and/or consolidation  []  Unavailable []  Mucus Plugging and or lobar consolidation  []  Unavailable   Cough []  Strong, productive cough []  Weak productive cough []  No cough or weak non-productive cough   Yuly Julien  3:52 PM                            FEMALE                                  MALE                            FEV1 Predicted Normal Values                        FEV1 Predicted Normal Values          Age                                     Height in Feet and Inches       Age                                     Height in Feet and Inches       4' 11\" 5' 1\" 5' 3\" 5' 5\" 5' 7\" 5' 9\" 5' 11\" 6' 1\"  4' 11\" 5' 1\" 5' 3\" 5' 5\" 5' 7\" 5' 9\" 5' 11\" 6' 1\"   42 - 45 2.49 2.66 2.84 3.03 3.22 3.42 3.62 3.83 42 - 45 2.82 3.03 3.26 3.49 3.72 3.96 4.22 4.47   46 - 49 2.40 2.57 2.76 2.94 3.14 3.33 3.54 3.75 46 - 49 2.70 2.92 3.14 3.37 3.61 3.85 4.10 4.36   50 - 53 2.31 2.48 2.66 2.85 3.04 3.24 3.45 3.66 50 - 53 2.58 2.80 3.02 3.25 3.49 3.73 3.98 4.24   54 - 57 2.21 2.38 2.57 2.75 2.95 3.14 3.35 3.56 54 - 57 2.46 2.67 2.89 3.12 3.36 3.60 3.85 4.11   58 - 61 2.10 2.28 2.46 2.65 2.84 3.04 3.24 3.45 58 - 61 2.32 2.54 2.76 2.99 3.23 3.47 3.72 3.98   62 - 65 1.99 2.17 2.35 2.54 2.73 2.93 3.13 3.34 62 - 65 2.19 2.40 2.62 2.85 3.09 3.33 3.58 3.84   66 - 69 1.88 2.05 2.23 2.42 2.61 2.81 3.02 3.23 66 - 69 2.04 2.26 2.48 2.71 2.95 3.19 3.44 3.70   70+ 1.82 1.99 2.17 2.36 2.55 2.75 2.95 3.16 70+ 1.97 2.19 2.41 2.64 2.87 3.12 3.37 3.62             Predicted Peak Expiratory Flow Rate                                       Height (in)  Female       Height (in) Male           Age 64 62 64 63 57 71 78 74 Age            81 137 673 837 427 427 [phone]  39 03 45 66 68 70 72 74 76   25 337 352 366 381 396 411 426 441 25 447 476 505 533 562 [phone]   30

## 2020-03-22 NOTE — PROGRESS NOTES
[facility]  Internal Medicine Teaching Residency Program  Inpatient Daily Progress Note  ______________________________________________________________________________    Patient: Juan Antonio Bueno  YOB: 1950   OEY:[id number]    Acct: [de-identified]     Room: [address] [place] [address]  Admit date: 3/11/2020  Today's date: 03/22/20  Number of days in the hospital: 11    SUBJECTIVE   Admitting Diagnosis: Acute respiratory failure with hypoxia and hypercapnia ([address]  CC: Respiratory arrest  Pt examined at bedside. Chart & results reviewed. Patient seen and examined at bedside. Alert and oriented. Appetite is good  Vitals are stable  Patient still complaining of pain in the chest post CPR, the lidocaine patch didn't help. ROS:  Constitutional:  negative for chills, fevers, sweats  Respiratory:  negative for cough, dyspnea on exertion, hemoptysis, shortness of breath, wheezing  Cardiovascular:  negative for chest pain, chest pressure/discomfort, lower extremity edema, palpitations  Gastrointestinal:  negative for abdominal pain, constipation, diarrhea, nausea, vomiting  Neurological:  negative for dizziness, headache  BRIEF HISTORY     The patient is a 76-year-old female with past medical history of COPD, essential hypertension, descending thoracic aortic aneurysm brought into the emergency department for evaluation of acute onset respiratory arrest and pulselessness. She was given CPR and 1 dose of atropine. She was intubated, sedated on Versed and fentanyl. She was started on Rocephin and azithromycin in the ER for pneumonia. CT PE was negative for pulmonary embolism but showed bilateral pulmonary infiltrates, pleural effusion but showed mural thrombus in descending allograft. Vascular surgery recommended no intervention at this time. Patient was started on heparin drip. Troponins were trending up. Cardiology was consulted.   The patient was aspirin, statin and predniSONE  10 mg Oral Daily    pantoprazole  40 mg Oral QAM AC    amLODIPine  10 mg Oral Daily    ipratropium-albuterol  1 ampule Inhalation TID    metoprolol tartrate  50 mg Oral BID    insulin lispro  0-12 Units Subcutaneous 4 times per day    aspirin  81 mg Oral Daily    atorvastatin  40 mg Oral Nightly    sodium chloride flush  10 mL Intravenous 2 times per day    nicotine  1 patch Transdermal Daily     Continuous Infusions:    [START ON 3/23/2020] sodium chloride      heparin (porcine) 11.1 Units/kg/hr (03/22/20 0844)    dextrose       PRN Medicationsbenzonatate, 100 mg, TID PRN  benzocaine-menthol, 1 lozenge, Q2H PRN  heparin (porcine), 4,000 Units, PRN  heparin (porcine), 2,000 Units, PRN  glucose, 15 g, PRN  dextrose, 12.5 g, PRN  glucagon (rDNA), 1 mg, PRN  dextrose, 100 mL/hr, PRN  sodium chloride flush, 10 mL, PRN  acetaminophen, 650 mg, Q6H PRN    Or  acetaminophen, 650 mg, Q6H PRN  polyethylene glycol, 17 g, Daily PRN  promethazine, 12.5 mg, Q6H PRN    Or  ondansetron, 4 mg, Q6H PRN  potassium chloride, 10 mEq, PRN  potassium chloride, 20 mEq, PRN  magnesium sulfate, 2 g, PRN  albuterol, 2.5 mg, As Directed RT PRN        Diagnostic Labs:  CBC:   Recent Labs     03/20/20  0742 03/21/20  0300 03/22/20  0756   WBC 17.5* 15.1* 18.3*   RBC 3.98 3.65* 3.74*   HGB 12.2 10.7* 11.6*   HCT 39.6 36.2* 40.0   MCV 99.5 99.2 107.0*   RDW 15.2* 14.9* 15.3*    270 297     BMP:   Recent Labs     03/20/20  0742 03/21/20  0300 03/22/20  0756    142 139   K 4.3 4.0 3.5*    105 105   CO2 27 26 24   BUN 25* 20 17   CREATININE 0.73 0.74 0.67     APTT:   Recent Labs     03/21/20  1704 03/21/20  2341 03/22/20  0756   APTT 47.5* 61.6* 73.8*       MICROBIOLOGY:   Lab Results   Component Value Date/Time    CULTURE NORMAL RESPIRATORY JOHN LIGHT GROWTH 03/11/2020 05:29 PM       Imaging:    Xr Abdomen (kub) (single Ap View)    Result Date: 3/17/2020  Nonspecific, nonobstructive bowel gas pattern NG

## 2020-03-23 ENCOUNTER — APPOINTMENT (OUTPATIENT)
Dept: CARDIAC CATH/INVASIVE PROCEDURES | Age: 70
DRG: 981 | End: 2020-03-23
Payer: MEDICARE

## 2020-03-23 LAB
ABSOLUTE EOS #: 0.05 K/UL (ref 0–0.44)
ABSOLUTE IMMATURE GRANULOCYTE: 0.41 K/UL (ref 0–0.3)
ABSOLUTE LYMPH #: 2.89 K/UL (ref 1.1–3.7)
ABSOLUTE MONO #: 1.31 K/UL (ref 0.1–1.2)
ACTIVATED CLOTTING TIME: 275 SEC (ref 79–149)
ANION GAP SERPL CALCULATED.3IONS-SCNC: 12 MMOL/L (ref 9–17)
BASOPHILS # BLD: 0 % (ref 0–2)
BASOPHILS ABSOLUTE: 0.05 K/UL (ref 0–0.2)
BUN BLDV-MCNC: 22 MG/DL (ref 8–23)
BUN/CREAT BLD: ABNORMAL (ref 9–20)
CALCIUM SERPL-MCNC: 8.3 MG/DL (ref 8.6–10.4)
CHLORIDE BLD-SCNC: 106 MMOL/L (ref 98–107)
CO2: 27 MMOL/L (ref 20–31)
CREAT SERPL-MCNC: 0.74 MG/DL (ref 0.5–0.9)
DIFFERENTIAL TYPE: ABNORMAL
EOSINOPHILS RELATIVE PERCENT: 0 % (ref 1–4)
GFR AFRICAN AMERICAN: >60 ML/MIN
GFR NON-AFRICAN AMERICAN: >60 ML/MIN
GFR SERPL CREATININE-BSD FRML MDRD: ABNORMAL ML/MIN/{1.73_M2}
GFR SERPL CREATININE-BSD FRML MDRD: ABNORMAL ML/MIN/{1.73_M2}
GLUCOSE BLD-MCNC: 101 MG/DL (ref 65–105)
GLUCOSE BLD-MCNC: 139 MG/DL (ref 65–105)
GLUCOSE BLD-MCNC: 139 MG/DL (ref 65–105)
GLUCOSE BLD-MCNC: 96 MG/DL (ref 65–105)
GLUCOSE BLD-MCNC: 98 MG/DL (ref 70–99)
HCT VFR BLD CALC: 32.4 % (ref 36.3–47.1)
HEMOGLOBIN: 9.7 G/DL (ref 11.9–15.1)
IMMATURE GRANULOCYTES: 2 %
LYMPHOCYTES # BLD: 15 % (ref 24–43)
MCH RBC QN AUTO: 30 PG (ref 25.2–33.5)
MCHC RBC AUTO-ENTMCNC: 29.9 G/DL (ref 28.4–34.8)
MCV RBC AUTO: 100.3 FL (ref 82.6–102.9)
MONOCYTES # BLD: 7 % (ref 3–12)
NRBC AUTOMATED: 0.3 PER 100 WBC
PARTIAL THROMBOPLASTIN TIME: 40.4 SEC (ref 20.5–30.5)
PARTIAL THROMBOPLASTIN TIME: 46.7 SEC (ref 20.5–30.5)
PARTIAL THROMBOPLASTIN TIME: 89.1 SEC (ref 20.5–30.5)
PDW BLD-RTO: 15.5 % (ref 11.8–14.4)
PLATELET # BLD: 252 K/UL (ref 138–453)
PLATELET ESTIMATE: ABNORMAL
PMV BLD AUTO: 10.8 FL (ref 8.1–13.5)
POTASSIUM SERPL-SCNC: 4.1 MMOL/L (ref 3.7–5.3)
RBC # BLD: 3.23 M/UL (ref 3.95–5.11)
RBC # BLD: ABNORMAL 10*6/UL
SEG NEUTROPHILS: 76 % (ref 36–65)
SEGMENTED NEUTROPHILS ABSOLUTE COUNT: 14.71 K/UL (ref 1.5–8.1)
SODIUM BLD-SCNC: 145 MMOL/L (ref 135–144)
WBC # BLD: 19.4 K/UL (ref 3.5–11.3)
WBC # BLD: ABNORMAL 10*3/UL

## 2020-03-23 PROCEDURE — 2500000003 HC RX 250 WO HCPCS

## 2020-03-23 PROCEDURE — B2111ZZ FLUOROSCOPY OF MULTIPLE CORONARY ARTERIES USING LOW OSMOLAR CONTRAST: ICD-10-PCS | Performed by: INTERNAL MEDICINE

## 2020-03-23 PROCEDURE — 6370000000 HC RX 637 (ALT 250 FOR IP): Performed by: STUDENT IN AN ORGANIZED HEALTH CARE EDUCATION/TRAINING PROGRAM

## 2020-03-23 PROCEDURE — 2709999900 HC NON-CHARGEABLE SUPPLY

## 2020-03-23 PROCEDURE — 36415 COLL VENOUS BLD VENIPUNCTURE: CPT

## 2020-03-23 PROCEDURE — 4A023N7 MEASUREMENT OF CARDIAC SAMPLING AND PRESSURE, LEFT HEART, PERCUTANEOUS APPROACH: ICD-10-PCS | Performed by: INTERNAL MEDICINE

## 2020-03-23 PROCEDURE — 82947 ASSAY GLUCOSE BLOOD QUANT: CPT

## 2020-03-23 PROCEDURE — C1887 CATHETER, GUIDING: HCPCS

## 2020-03-23 PROCEDURE — 6360000002 HC RX W HCPCS: Performed by: STUDENT IN AN ORGANIZED HEALTH CARE EDUCATION/TRAINING PROGRAM

## 2020-03-23 PROCEDURE — 6370000000 HC RX 637 (ALT 250 FOR IP): Performed by: INTERNAL MEDICINE

## 2020-03-23 PROCEDURE — 85347 COAGULATION TIME ACTIVATED: CPT

## 2020-03-23 PROCEDURE — 6360000004 HC RX CONTRAST MEDICATION

## 2020-03-23 PROCEDURE — 2580000003 HC RX 258: Performed by: INTERNAL MEDICINE

## 2020-03-23 PROCEDURE — C1874 STENT, COATED/COV W/DEL SYS: HCPCS

## 2020-03-23 PROCEDURE — C1769 GUIDE WIRE: HCPCS

## 2020-03-23 PROCEDURE — C1894 INTRO/SHEATH, NON-LASER: HCPCS

## 2020-03-23 PROCEDURE — 93458 L HRT ARTERY/VENTRICLE ANGIO: CPT | Performed by: INTERNAL MEDICINE

## 2020-03-23 PROCEDURE — 6360000002 HC RX W HCPCS

## 2020-03-23 PROCEDURE — 2060000000 HC ICU INTERMEDIATE R&B

## 2020-03-23 PROCEDURE — 85025 COMPLETE CBC W/AUTO DIFF WBC: CPT

## 2020-03-23 PROCEDURE — C1725 CATH, TRANSLUMIN NON-LASER: HCPCS

## 2020-03-23 PROCEDURE — 94640 AIRWAY INHALATION TREATMENT: CPT

## 2020-03-23 PROCEDURE — 027036Z DILATION OF CORONARY ARTERY, ONE ARTERY WITH THREE DRUG-ELUTING INTRALUMINAL DEVICES, PERCUTANEOUS APPROACH: ICD-10-PCS | Performed by: INTERNAL MEDICINE

## 2020-03-23 PROCEDURE — 80048 BASIC METABOLIC PNL TOTAL CA: CPT

## 2020-03-23 PROCEDURE — 6370000000 HC RX 637 (ALT 250 FOR IP)

## 2020-03-23 PROCEDURE — 94761 N-INVAS EAR/PLS OXIMETRY MLT: CPT

## 2020-03-23 PROCEDURE — 99233 SBSQ HOSP IP/OBS HIGH 50: CPT | Performed by: INTERNAL MEDICINE

## 2020-03-23 PROCEDURE — B2151ZZ FLUOROSCOPY OF LEFT HEART USING LOW OSMOLAR CONTRAST: ICD-10-PCS | Performed by: INTERNAL MEDICINE

## 2020-03-23 PROCEDURE — 85730 THROMBOPLASTIN TIME PARTIAL: CPT

## 2020-03-23 PROCEDURE — 2580000003 HC RX 258: Performed by: STUDENT IN AN ORGANIZED HEALTH CARE EDUCATION/TRAINING PROGRAM

## 2020-03-23 PROCEDURE — C9600 PERC DRUG-EL COR STENT SING: HCPCS | Performed by: INTERNAL MEDICINE

## 2020-03-23 RX ORDER — SODIUM CHLORIDE 9 MG/ML
INJECTION, SOLUTION INTRAVENOUS CONTINUOUS
Status: DISCONTINUED | OUTPATIENT
Start: 2020-03-23 | End: 2020-03-26

## 2020-03-23 RX ORDER — LABETALOL HYDROCHLORIDE 5 MG/ML
10 INJECTION, SOLUTION INTRAVENOUS EVERY 30 MIN PRN
Status: CANCELLED | OUTPATIENT
Start: 2020-03-23

## 2020-03-23 RX ORDER — SODIUM CHLORIDE 0.9 % (FLUSH) 0.9 %
10 SYRINGE (ML) INJECTION EVERY 12 HOURS SCHEDULED
Status: CANCELLED | OUTPATIENT
Start: 2020-03-23

## 2020-03-23 RX ORDER — IBUPROFEN 400 MG/1
400 TABLET ORAL ONCE
Status: COMPLETED | OUTPATIENT
Start: 2020-03-23 | End: 2020-03-23

## 2020-03-23 RX ORDER — ACETAMINOPHEN 325 MG/1
650 TABLET ORAL EVERY 4 HOURS PRN
Status: CANCELLED | OUTPATIENT
Start: 2020-03-23

## 2020-03-23 RX ORDER — SODIUM CHLORIDE 0.9 % (FLUSH) 0.9 %
10 SYRINGE (ML) INJECTION PRN
Status: CANCELLED | OUTPATIENT
Start: 2020-03-23

## 2020-03-23 RX ORDER — SODIUM CHLORIDE 9 MG/ML
INJECTION, SOLUTION INTRAVENOUS CONTINUOUS
Status: CANCELLED | OUTPATIENT
Start: 2020-03-23

## 2020-03-23 RX ADMIN — FOLIC ACID 1 MG: 1 TABLET ORAL at 08:52

## 2020-03-23 RX ADMIN — ASPIRIN 81 MG: 81 TABLET ORAL at 08:52

## 2020-03-23 RX ADMIN — PREDNISONE 20 MG: 20 TABLET ORAL at 08:52

## 2020-03-23 RX ADMIN — IBUPROFEN 400 MG: 400 TABLET, FILM COATED ORAL at 22:51

## 2020-03-23 RX ADMIN — METOPROLOL TARTRATE 50 MG: 50 TABLET, FILM COATED ORAL at 22:50

## 2020-03-23 RX ADMIN — Medication 100 MG: at 08:52

## 2020-03-23 RX ADMIN — ACETAMINOPHEN 650 MG: 325 TABLET ORAL at 08:51

## 2020-03-23 RX ADMIN — TICAGRELOR 90 MG: 90 TABLET ORAL at 22:51

## 2020-03-23 RX ADMIN — AMLODIPINE BESYLATE 10 MG: 10 TABLET ORAL at 08:52

## 2020-03-23 RX ADMIN — DESMOPRESSIN ACETATE 40 MG: 0.2 TABLET ORAL at 22:51

## 2020-03-23 RX ADMIN — METOPROLOL TARTRATE 50 MG: 50 TABLET, FILM COATED ORAL at 08:51

## 2020-03-23 RX ADMIN — SODIUM CHLORIDE: 4.5 INJECTION, SOLUTION INTRAVENOUS at 00:17

## 2020-03-23 RX ADMIN — IPRATROPIUM BROMIDE AND ALBUTEROL SULFATE 1 AMPULE: .5; 3 SOLUTION RESPIRATORY (INHALATION) at 08:01

## 2020-03-23 RX ADMIN — CHLORDIAZEPOXIDE HYDROCHLORIDE 5 MG: 5 CAPSULE ORAL at 18:53

## 2020-03-23 RX ADMIN — THERA TABS 1 TABLET: TAB at 08:51

## 2020-03-23 RX ADMIN — IPRATROPIUM BROMIDE AND ALBUTEROL SULFATE 1 AMPULE: .5; 3 SOLUTION RESPIRATORY (INHALATION) at 19:31

## 2020-03-23 RX ADMIN — SODIUM CHLORIDE: 9 INJECTION, SOLUTION INTRAVENOUS at 18:44

## 2020-03-23 RX ADMIN — HEPARIN SODIUM 2000 UNITS: 1000 INJECTION, SOLUTION INTRAVENOUS; SUBCUTANEOUS at 00:18

## 2020-03-23 RX ADMIN — BENZONATATE 100 MG: 100 CAPSULE ORAL at 08:51

## 2020-03-23 RX ADMIN — ACETAMINOPHEN 650 MG: 325 TABLET ORAL at 18:53

## 2020-03-23 ASSESSMENT — PAIN DESCRIPTION - LOCATION: LOCATION: CHEST

## 2020-03-23 ASSESSMENT — PAIN SCALES - GENERAL
PAINLEVEL_OUTOF10: 10
PAINLEVEL_OUTOF10: 3
PAINLEVEL_OUTOF10: 8
PAINLEVEL_OUTOF10: 8

## 2020-03-23 NOTE — PROGRESS NOTES
Nutrition Assessment    Type and Reason for Visit: Reassess    Nutrition Recommendations:   -Continue NPO status  -Restart diet as able   -Restart ensure enlive supplements TID as able  -Will monitor nutrition progression     Nutrition Assessment: Pt declining from a nutritional standpoint aeb NPO status 2/2 cardiac cath today. Prior to NPO status pt has had variable po intake from consuming 1-50% of her meals and % of her supplements. Will monitor for restart of diet and add supplements as able. Malnutrition Assessment:  · Malnutrition Status: At risk for malnutrition  · Context: Acute illness or injury  · Findings of the 6 clinical characteristics of malnutrition (Minimum of 2 out of 6 clinical characteristics is required to make the diagnosis of moderate or severe Protein Calorie Malnutrition based on AND/ASPEN Guidelines):  1. Energy Intake-(variable po intake ), Greater than or equal to 5 days    2. Weight Loss-Unable to assess,    3. Fat Loss-No significant subcutaneous fat loss,    4. Muscle Loss-No significant muscle mass loss,    5. Fluid Accumulation-Mild fluid accumulation, Extremities, Generalized  6.  Strength-Not measured    Nutrition Risk Level:  Moderate    Nutrient Needs:  · Estimated Daily Total Kcal: 1.2-1.3 ~>3685-6276 kcals/d   · Estimated Daily Protein (g): 1.2-1.3 gm/kg ~>60-65 gms/d     Nutrition Diagnosis:   · Problem: Inadequate oral intake  · Etiology: related to (procedure today )     Signs and symptoms:  as evidenced by NPO status due to medical condition    Objective Information:  · Wound Type: None  · Current Nutrition Therapies:  · Oral Diet Orders: NPO   · Oral Diet intake: NPO  · Oral Nutrition Supplement (ONS) Orders: None  · Anthropometric Measures:  · Ht: 5' 2\" (157.5 cm)   · Current Body Wt: 169 lb (76.7 kg)  · Admission Body Wt: 171 lb (77.6 kg)  · % Weight Change:  ,  Wt flux since admission   · Ideal Body Wt: 110 lb (49.9 kg), % Ideal Body 155%

## 2020-03-23 NOTE — PROGRESS NOTES
Patient was started on heparin drip. Troponins were trending up. Cardiology was consulted. The patient was aspirin, statin and beta-blocker. Echogram shows preserved ejection fraction. She was treated with Solu-Medrol, breathing treatments and antibiotics. She was continued on supportive care while being intubated. She had ASHLEY initially which improved over time. The patient also required pressor support for hypotension. Her sodium levels improved. For pulmonary edema, she was given intermittent doses of Lasix. The patient is extubated. Cardiology had a plan to do cardiac cath once the patient has been stabilized. The patient also has a history of alcohol intoxication and alcohol withdrawals. As per the , the patient is a daily drinker and drinks 1 pint of beer every day.     OBJECTIVE     Vital Signs:  BP (!) 143/69   Pulse 74   Temp 98.4 °F (36.9 °C) (Oral)   Resp 26   Ht 5' 2\" (1.575 m)   Wt 169 lb 3.2 oz (76.7 kg)   SpO2 97%   BMI 30.95 kg/m²     Temp (24hrs), Av.6 °F (37 °C), Min:98.3 °F (36.8 °C), Max:99.1 °F (37.3 °C)    In: 580   Out: -     Physical Exam:  General appearance - alert, well appearing, and in no distress  Chest - clear to auscultation, no wheezes, rales or rhonchi, symmetric air entry  Heart - normal rate, regular rhythm, normal S1, S2, no murmurs, rubs, clicks or gallops  Abdomen - soft, nontender, nondistended, no masses or organomegaly  Neurological - alert, oriented, normal speech, no focal findings or movement disorder noted  Musculoskeletal - no joint tenderness, deformity or swelling  Extremities - peripheral pulses normal, no pedal edema, no clubbing or cyanosis  Skin - normal coloration and turgor, no rashes, no suspicious skin lesions noted    Medications:  Scheduled Medications:    chlordiazePOXIDE  5 mg Oral Q8H    lidocaine  1 patch Transdermal Daily    folic acid  1 mg Oral Daily    multivitamin  1 tablet Oral Daily    thiamine  100 mg Oral Daily

## 2020-03-23 NOTE — OP NOTE
PCI.     [] Currently on Dialysis. [] Prior CABG. [] Currently smoker. [] Cardiac Arrest outside of healthcare facility. [x] Yes    [x] No        Witnessed     [] Yes   [] No     Arrest after arrival of EMS  [] Yes   [] No     [] Cardiac Arrest at other Facility. [x] Yes   [] No    Pre-Procedure Information. Heart Failure       [] Yes    [x] No        Class  [] I      [] II  [] III    [] IV. New Diagnosis    [] Yes  [] No    HF Type      [] Systolic   [] Diastolic          [] Unknown. Diagnostic Test:   EKG       [] Normal   [x] Abnormal    New antiarrhythmia medications:    [] Yes   [] No   New onset atrial fibrillation / Flutter     [] Yes   [] No   ECG Abnormalities:      [] V. Fib   [] Joyce V. Tach           [] NS V. T   [] New LBBB           [] T. Inv  []  ST dev > 0.5 mm         [] PVC's freq  [] PVC's infrequent    Stress Test Performed:   [] Yes    [x] No     Type:     [] Stress Echo   [] Exercise Stress Test (no imaging)      [] Stress Nuclear  [] Stress Imaging     Results   [] Negative   [] Positive        [] Indeterminate  [] Unavailable     If Positive/ Risk / Extent of Ischemia:       [] Low  [] Intermediate         [] High  [] Unavailable      Cardiac CTA Performed:  [x] Yes    [] No      Results   [] CAD   [] Non obstructive CAD      [] No CAD   [] Uncertain      [] Unknown   [] Structural Disease.      Pre Procedure Medications: [x] Yes    [] No         [x] ASA  [] Beta Blockers      [] Nitrate  [] Ca Channel Blockers      [] Ranolazine  [x] Statin       [] Plavix/Others antiplatelets        Electronically signed by Lawrence Garcia MD on 3/23/2020 at [address] [place] [address] [facility]  [phone]

## 2020-03-23 NOTE — PROGRESS NOTES
[facility]  Progress Note                   Date:   3/23/2020  Patient name: La Simmons  Date of admission:  3/11/2020 11:34 AM  MRN:   [id number]  YOB: 1950  PCP: Jewel Ruvalcaba MD    Reason for Admission: Shortness of breath [R06.02]    Subjective:       Clinical Changes /Abnormalities: Pt. Seen & examined in room resting in bedside chair with guard at bedside. Pt. Denies CP or SOB. Sleepy but once aroused A&O x3. Asking if \"dye procedure\" will be done today. BP has been stable. Remains on IVF & Heparin gtt. Tele SR     Review of Systems    Medications:   Scheduled Meds:   chlordiazePOXIDE  5 mg Oral Q8H    lidocaine  1 patch Transdermal Daily    folic acid  1 mg Oral Daily    multivitamin  1 tablet Oral Daily    thiamine  100 mg Oral Daily    predniSONE  20 mg Oral Daily    Followed by   Nohemi Whitten ON 3/25/2020] predniSONE  10 mg Oral Daily    pantoprazole  40 mg Oral QAM AC    amLODIPine  10 mg Oral Daily    ipratropium-albuterol  1 ampule Inhalation TID    metoprolol tartrate  50 mg Oral BID    insulin lispro  0-12 Units Subcutaneous 4 times per day    aspirin  81 mg Oral Daily    atorvastatin  40 mg Oral Nightly    sodium chloride flush  10 mL Intravenous 2 times per day    nicotine  1 patch Transdermal Daily     Continuous Infusions:   sodium chloride 50 mL/hr at 03/23/20 0017    heparin (porcine) 10.088 Units/kg/hr (03/23/20 0747)    dextrose       CBC:   Recent Labs     03/21/20  0300 03/22/20  0756 03/23/20  0532   WBC 15.1* 18.3* 19.4*   HGB 10.7* 11.6* 9.7*    297 252     BMP:    Recent Labs     03/21/20  0300 03/22/20  0756 03/23/20  0532    139 145*   K 4.0 3.5* 4.1    105 106   CO2 26 24 27   BUN 20 17 22   CREATININE 0.74 0.67 0.74   GLUCOSE 112* 118* 98     Hepatic:No results for input(s): AST, ALT, ALB, BILITOT, ALKPHOS in the last 72 hours. Troponin: No results for input(s): TROPHS in the last 72 hours.   BNP: No results [facility])      Plan of Treatment:   1. Stable this AM. NO further hypotension which was after NTG and likely from vagal effect. Discussed again with patient plans for cardiac cath. Verb understanding & satisfaction. Will proceed today.    2. Continue Heparin gtt for Aortic thrombus    Electronically signed by YOMI Cordero CNP on 3/23/2020 at [address]  [phone]

## 2020-03-23 NOTE — PROGRESS NOTES
Occupational Therapy Not Seen Note    DATE: 3/23/2020  Name: Gonzalo Hope  : 1950  MRN: [id number]    Patient not available for Occupational Therapy due to: Other: Case management requesting pt be seen today.  Will check after cardiac cath    Next Scheduled Treatment: chk PM or 2020    Electronically signed by ROSELINE Wen on 3/23/2020 at 10:46 AM

## 2020-03-24 LAB
ABSOLUTE EOS #: 0.04 K/UL (ref 0–0.44)
ABSOLUTE IMMATURE GRANULOCYTE: 0.17 K/UL (ref 0–0.3)
ABSOLUTE LYMPH #: 1.68 K/UL (ref 1.1–3.7)
ABSOLUTE MONO #: 1.12 K/UL (ref 0.1–1.2)
ANION GAP SERPL CALCULATED.3IONS-SCNC: 10 MMOL/L (ref 9–17)
BASOPHILS # BLD: 0 % (ref 0–2)
BASOPHILS ABSOLUTE: <0.03 K/UL (ref 0–0.2)
BUN BLDV-MCNC: 15 MG/DL (ref 8–23)
BUN/CREAT BLD: ABNORMAL (ref 9–20)
CALCIUM SERPL-MCNC: 8.1 MG/DL (ref 8.6–10.4)
CHLORIDE BLD-SCNC: 109 MMOL/L (ref 98–107)
CO2: 26 MMOL/L (ref 20–31)
CREAT SERPL-MCNC: 0.7 MG/DL (ref 0.5–0.9)
DIFFERENTIAL TYPE: ABNORMAL
EOSINOPHILS RELATIVE PERCENT: 0 % (ref 1–4)
GFR AFRICAN AMERICAN: >60 ML/MIN
GFR NON-AFRICAN AMERICAN: >60 ML/MIN
GFR SERPL CREATININE-BSD FRML MDRD: ABNORMAL ML/MIN/{1.73_M2}
GFR SERPL CREATININE-BSD FRML MDRD: ABNORMAL ML/MIN/{1.73_M2}
GLUCOSE BLD-MCNC: 103 MG/DL (ref 65–105)
GLUCOSE BLD-MCNC: 117 MG/DL (ref 65–105)
GLUCOSE BLD-MCNC: 143 MG/DL (ref 65–105)
GLUCOSE BLD-MCNC: 176 MG/DL (ref 65–105)
GLUCOSE BLD-MCNC: 92 MG/DL (ref 70–99)
HCT VFR BLD CALC: 29.7 % (ref 36.3–47.1)
HEMOGLOBIN: 9.4 G/DL (ref 11.9–15.1)
IMMATURE GRANULOCYTES: 1 %
LYMPHOCYTES # BLD: 11 % (ref 24–43)
MAGNESIUM: 1.8 MG/DL (ref 1.6–2.6)
MCH RBC QN AUTO: 30.7 PG (ref 25.2–33.5)
MCHC RBC AUTO-ENTMCNC: 31.6 G/DL (ref 28.4–34.8)
MCV RBC AUTO: 97.1 FL (ref 82.6–102.9)
MONOCYTES # BLD: 8 % (ref 3–12)
NRBC AUTOMATED: 0.4 PER 100 WBC
PDW BLD-RTO: 15.7 % (ref 11.8–14.4)
PLATELET # BLD: 279 K/UL (ref 138–453)
PLATELET ESTIMATE: ABNORMAL
PMV BLD AUTO: 11 FL (ref 8.1–13.5)
POTASSIUM SERPL-SCNC: 3.5 MMOL/L (ref 3.7–5.3)
RBC # BLD: 3.06 M/UL (ref 3.95–5.11)
RBC # BLD: ABNORMAL 10*6/UL
SEG NEUTROPHILS: 80 % (ref 36–65)
SEGMENTED NEUTROPHILS ABSOLUTE COUNT: 11.67 K/UL (ref 1.5–8.1)
SODIUM BLD-SCNC: 145 MMOL/L (ref 135–144)
WBC # BLD: 14.7 K/UL (ref 3.5–11.3)
WBC # BLD: ABNORMAL 10*3/UL

## 2020-03-24 PROCEDURE — 97164 PT RE-EVAL EST PLAN CARE: CPT

## 2020-03-24 PROCEDURE — 6370000000 HC RX 637 (ALT 250 FOR IP): Performed by: STUDENT IN AN ORGANIZED HEALTH CARE EDUCATION/TRAINING PROGRAM

## 2020-03-24 PROCEDURE — 2060000000 HC ICU INTERMEDIATE R&B

## 2020-03-24 PROCEDURE — 83735 ASSAY OF MAGNESIUM: CPT

## 2020-03-24 PROCEDURE — 97535 SELF CARE MNGMENT TRAINING: CPT

## 2020-03-24 PROCEDURE — 80048 BASIC METABOLIC PNL TOTAL CA: CPT

## 2020-03-24 PROCEDURE — 94761 N-INVAS EAR/PLS OXIMETRY MLT: CPT

## 2020-03-24 PROCEDURE — 97166 OT EVAL MOD COMPLEX 45 MIN: CPT

## 2020-03-24 PROCEDURE — 6370000000 HC RX 637 (ALT 250 FOR IP): Performed by: INTERNAL MEDICINE

## 2020-03-24 PROCEDURE — 36415 COLL VENOUS BLD VENIPUNCTURE: CPT

## 2020-03-24 PROCEDURE — 94640 AIRWAY INHALATION TREATMENT: CPT

## 2020-03-24 PROCEDURE — 97530 THERAPEUTIC ACTIVITIES: CPT

## 2020-03-24 PROCEDURE — 2580000003 HC RX 258: Performed by: STUDENT IN AN ORGANIZED HEALTH CARE EDUCATION/TRAINING PROGRAM

## 2020-03-24 PROCEDURE — 99232 SBSQ HOSP IP/OBS MODERATE 35: CPT | Performed by: INTERNAL MEDICINE

## 2020-03-24 PROCEDURE — 85025 COMPLETE CBC W/AUTO DIFF WBC: CPT

## 2020-03-24 RX ORDER — METOPROLOL TARTRATE 50 MG/1
50 TABLET, FILM COATED ORAL 2 TIMES DAILY
Qty: 60 TABLET | Refills: 3 | Status: SHIPPED | OUTPATIENT
Start: 2020-03-24 | End: 2021-03-10

## 2020-03-24 RX ORDER — AMLODIPINE BESYLATE 10 MG/1
10 TABLET ORAL DAILY
Qty: 30 TABLET | Refills: 3 | Status: SHIPPED | OUTPATIENT
Start: 2020-03-25 | End: 2021-09-14

## 2020-03-24 RX ORDER — POTASSIUM CHLORIDE 20 MEQ/1
40 TABLET, EXTENDED RELEASE ORAL PRN
Status: DISCONTINUED | OUTPATIENT
Start: 2020-03-24 | End: 2020-03-26 | Stop reason: HOSPADM

## 2020-03-24 RX ORDER — IPRATROPIUM BROMIDE AND ALBUTEROL SULFATE 2.5; .5 MG/3ML; MG/3ML
3 SOLUTION RESPIRATORY (INHALATION) 3 TIMES DAILY
Qty: 360 ML | Refills: 2 | Status: SHIPPED | OUTPATIENT
Start: 2020-03-24 | End: 2020-03-26

## 2020-03-24 RX ORDER — POTASSIUM CHLORIDE 7.45 MG/ML
10 INJECTION INTRAVENOUS PRN
Status: DISCONTINUED | OUTPATIENT
Start: 2020-03-24 | End: 2020-03-26 | Stop reason: HOSPADM

## 2020-03-24 RX ORDER — LANOLIN ALCOHOL/MO/W.PET/CERES
100 CREAM (GRAM) TOPICAL DAILY
Qty: 30 TABLET | Refills: 3 | Status: SHIPPED | OUTPATIENT
Start: 2020-03-24 | End: 2020-03-24 | Stop reason: HOSPADM

## 2020-03-24 RX ORDER — LIDOCAINE 4 G/G
1 PATCH TOPICAL DAILY
Status: DISCONTINUED | OUTPATIENT
Start: 2020-03-24 | End: 2020-03-24

## 2020-03-24 RX ORDER — CHLORDIAZEPOXIDE HYDROCHLORIDE 5 MG/1
5 CAPSULE, GELATIN COATED ORAL 2 TIMES DAILY
Qty: 6 CAPSULE | Refills: 0 | Status: SHIPPED | OUTPATIENT
Start: 2020-03-24 | End: 2020-03-25 | Stop reason: HOSPADM

## 2020-03-24 RX ORDER — LISINOPRIL 5 MG/1
2.5 TABLET ORAL DAILY
Qty: 45 TABLET | Refills: 1 | Status: SHIPPED | OUTPATIENT
Start: 2020-03-24 | End: 2020-03-24 | Stop reason: HOSPADM

## 2020-03-24 RX ORDER — ASPIRIN 81 MG/1
81 TABLET, CHEWABLE ORAL DAILY
Qty: 30 TABLET | Refills: 3 | Status: SHIPPED | OUTPATIENT
Start: 2020-03-24

## 2020-03-24 RX ORDER — PREDNISONE 10 MG/1
10 TABLET ORAL DAILY
Qty: 3 TABLET | Refills: 0 | Status: SHIPPED | OUTPATIENT
Start: 2020-03-25 | End: 2020-03-28

## 2020-03-24 RX ORDER — FOLIC ACID 1 MG/1
1 TABLET ORAL DAILY
Qty: 30 TABLET | Refills: 3 | Status: SHIPPED | OUTPATIENT
Start: 2020-03-24 | End: 2020-03-24 | Stop reason: HOSPADM

## 2020-03-24 RX ORDER — MULTIVITAMIN WITH FOLIC ACID 400 MCG
1 TABLET ORAL DAILY
Qty: 40 TABLET | Refills: 2 | Status: SHIPPED | OUTPATIENT
Start: 2020-03-24

## 2020-03-24 RX ADMIN — PANTOPRAZOLE SODIUM 40 MG: 40 TABLET, DELAYED RELEASE ORAL at 06:39

## 2020-03-24 RX ADMIN — THERA TABS 1 TABLET: TAB at 09:24

## 2020-03-24 RX ADMIN — ASPIRIN 81 MG: 81 TABLET ORAL at 09:23

## 2020-03-24 RX ADMIN — Medication 10 ML: at 09:24

## 2020-03-24 RX ADMIN — ACETAMINOPHEN 650 MG: 325 TABLET ORAL at 03:21

## 2020-03-24 RX ADMIN — POTASSIUM CHLORIDE 40 MEQ: 1500 TABLET, EXTENDED RELEASE ORAL at 10:27

## 2020-03-24 RX ADMIN — TICAGRELOR 90 MG: 90 TABLET ORAL at 09:23

## 2020-03-24 RX ADMIN — ACETAMINOPHEN 650 MG: 325 TABLET ORAL at 23:41

## 2020-03-24 RX ADMIN — CHLORDIAZEPOXIDE HYDROCHLORIDE 5 MG: 5 CAPSULE ORAL at 21:32

## 2020-03-24 RX ADMIN — PREDNISONE 20 MG: 20 TABLET ORAL at 09:24

## 2020-03-24 RX ADMIN — AMLODIPINE BESYLATE 10 MG: 10 TABLET ORAL at 09:24

## 2020-03-24 RX ADMIN — CHLORDIAZEPOXIDE HYDROCHLORIDE 5 MG: 5 CAPSULE ORAL at 10:26

## 2020-03-24 RX ADMIN — ACETAMINOPHEN 650 MG: 325 TABLET ORAL at 09:23

## 2020-03-24 RX ADMIN — DESMOPRESSIN ACETATE 40 MG: 0.2 TABLET ORAL at 21:33

## 2020-03-24 RX ADMIN — Medication 10 ML: at 21:33

## 2020-03-24 RX ADMIN — IPRATROPIUM BROMIDE AND ALBUTEROL SULFATE 1 AMPULE: .5; 3 SOLUTION RESPIRATORY (INHALATION) at 19:34

## 2020-03-24 RX ADMIN — IPRATROPIUM BROMIDE AND ALBUTEROL SULFATE 1 AMPULE: .5; 3 SOLUTION RESPIRATORY (INHALATION) at 14:15

## 2020-03-24 RX ADMIN — ACETAMINOPHEN 650 MG: 325 TABLET ORAL at 16:11

## 2020-03-24 RX ADMIN — TICAGRELOR 90 MG: 90 TABLET ORAL at 21:33

## 2020-03-24 RX ADMIN — CHLORDIAZEPOXIDE HYDROCHLORIDE 5 MG: 5 CAPSULE ORAL at 03:20

## 2020-03-24 RX ADMIN — METOPROLOL TARTRATE 50 MG: 50 TABLET, FILM COATED ORAL at 09:24

## 2020-03-24 RX ADMIN — FOLIC ACID 1 MG: 1 TABLET ORAL at 09:24

## 2020-03-24 RX ADMIN — Medication 100 MG: at 09:24

## 2020-03-24 RX ADMIN — METOPROLOL TARTRATE 50 MG: 50 TABLET, FILM COATED ORAL at 21:32

## 2020-03-24 ASSESSMENT — PAIN - FUNCTIONAL ASSESSMENT: PAIN_FUNCTIONAL_ASSESSMENT: ACTIVITIES ARE NOT PREVENTED

## 2020-03-24 ASSESSMENT — PAIN SCALES - GENERAL
PAINLEVEL_OUTOF10: 9
PAINLEVEL_OUTOF10: 4
PAINLEVEL_OUTOF10: 3
PAINLEVEL_OUTOF10: 4
PAINLEVEL_OUTOF10: 3
PAINLEVEL_OUTOF10: 9
PAINLEVEL_OUTOF10: 8

## 2020-03-24 ASSESSMENT — PAIN DESCRIPTION - DESCRIPTORS
DESCRIPTORS: ACHING
DESCRIPTORS: ACHING

## 2020-03-24 ASSESSMENT — PAIN DESCRIPTION - ONSET: ONSET: ON-GOING

## 2020-03-24 ASSESSMENT — PAIN DESCRIPTION - PAIN TYPE
TYPE: ACUTE PAIN

## 2020-03-24 ASSESSMENT — PAIN DESCRIPTION - LOCATION
LOCATION: CHEST

## 2020-03-24 ASSESSMENT — PAIN DESCRIPTION - FREQUENCY
FREQUENCY: CONTINUOUS
FREQUENCY: CONTINUOUS

## 2020-03-24 ASSESSMENT — PAIN DESCRIPTION - PROGRESSION: CLINICAL_PROGRESSION: NOT CHANGED

## 2020-03-24 NOTE — PROGRESS NOTES
[facility]  Progress Note                   Date:   3/24/2020  Patient name: Gonzalo Hope  Date of admission:  3/11/2020 11:34 AM  MRN:   [id number]  YOB: 1950  PCP: Hardy Gonzales MD    Reason for Admission: Shortness of breath [R06.02]    Subjective:       Clinical Changes /Abnormalities: Patient seen and examined in bed in room. Denies chest pain or SOB. SR on tele HR 82    Review of Systems    Medications:   Scheduled Meds:   ticagrelor  90 mg Oral BID    chlordiazePOXIDE  5 mg Oral Q8H    lidocaine  1 patch Transdermal Daily    folic acid  1 mg Oral Daily    multivitamin  1 tablet Oral Daily    thiamine  100 mg Oral Daily    [START ON 3/25/2020] predniSONE  10 mg Oral Daily    pantoprazole  40 mg Oral QAM AC    amLODIPine  10 mg Oral Daily    ipratropium-albuterol  1 ampule Inhalation TID    metoprolol tartrate  50 mg Oral BID    insulin lispro  0-12 Units Subcutaneous 4 times per day    aspirin  81 mg Oral Daily    atorvastatin  40 mg Oral Nightly    sodium chloride flush  10 mL Intravenous 2 times per day    nicotine  1 patch Transdermal Daily     Continuous Infusions:   sodium chloride Stopped (03/23/20 2255)    sodium chloride 50 mL/hr at 03/23/20 0017    dextrose       CBC:   Recent Labs     03/22/20  0756 03/23/20  0532 03/24/20  0553   WBC 18.3* 19.4* 14.7*   HGB 11.6* 9.7* 9.4*    252 279     BMP:    Recent Labs     03/22/20  0756 03/23/20  0532 03/24/20  0553    145* 145*   K 3.5* 4.1 3.5*    106 109*   CO2 24 27 26   BUN 17 22 15   CREATININE 0.67 0.74 0.70   GLUCOSE 118* 98 92     Hepatic:No results for input(s): AST, ALT, ALB, BILITOT, ALKPHOS in the last 72 hours. Troponin: No results for input(s): TROPHS in the last 72 hours. BNP: No results for input(s): BNP in the last 72 hours. Lipids: No results for input(s): CHOL, HDL in the last 72 hours.     Invalid input(s): LDLCALCU  INR: No results for input(s): INR in the last 72 hours. Diagnostic Data  CATH 3/23/2020  Findings:  Left main: NL  LAD: Minimal 20% stenosis  LCX: Dominant vessel with very heavy calvificati with two lesions, proximal and mid 99% stenosis. Both complex lesions required PTCA -CUONG, reduing stenosis to 10%  RCA: NL  The LV gram was performed in the CARVALHO 30 position. LVEF: 50%. LV Wall Motion: Inferior wall hypokinesia     Conclusions:  1. Severe  CAD in LCX, very calcified. 2. Complex PTCA -CUONG of both LCX stenosis  3. Overall preserved LV function     Recommendation:  1. Post stent protocol    ECHO:  3/12/2020  Left ventricle is normal in size with normal systolic function globally. Calculated ejection fraction is 54%. Mild mitral regurgitation. Mild tricuspid regurgitation. Estimated right ventricular systolic pressure  is 31 mmHg. Objective:   Vitals: /73   Pulse 84   Temp 97.8 °F (36.6 °C) (Oral)   Resp 18   Ht 5' 2\" (1.575 m)   Wt 169 lb 3.2 oz (76.7 kg)   SpO2 94%   BMI 30.95 kg/m²   General appearance: alert and cooperative with exam  HEENT: Head: Normocephalic, no lesions, without obvious abnormality. Neck:no JVD, trachea midline, no adenopathy  Lungs: Course to auscultation   Heart: Regular rate and rhythm, s1/s2 auscultated, no murmurs. SR on tele HR 82  Abdomen: soft, non-tender, bowel sounds active  Extremities: no edema  Neurologic: not done  Left Radial Artery site: CDI without ecchymosis or hematoma. Coronary Discharge Core Measure: Please indicate the medication given by X, and if not the reasons not given:    Not Given Reason  Given      Beta Blockers X      ACE-I X      Statins X      ASA X      OAP (Plavix/Effient/Brilinta) Brilinta samples and Rx discount card given. escript to pharmacy sent    SL Nitro X           Assessment / Acute Cardiac Problems:   1. Type I vs Type II MI  2. Pulmonary edema - resolved  3. PNA  4. Possible PEA arrest  5. ASHLEY  6. COPD  7. HTN  8. HLP  9.  ETOH withdrawal    Patient Active

## 2020-03-24 NOTE — PROGRESS NOTES
reminders to not get up on own, to call out for assistance. Pt retired supine in bed with call light and phone in reach, bed alarm activated. All needs met upon exit. Performance deficits / Impairments: Decreased functional mobility ; Decreased strength;Decreased endurance;Decreased ADL status; Decreased safe awareness;Decreased high-level IADLs  Treatment Diagnosis: SOB  Prognosis: Fair  Decision Making: Medium Complexity  OT Education: OT Role;Plan of Care  Patient Education: t ed on OT POC, safety awareness tech, proper hand placement for transfers, and energy conservation tech with proper breathing tech with fair return. Safety Devices  Safety Devices in place: Yes  Type of devices: Bed alarm in place; Left in bed;Call light within reach         Patient Diagnosis(es): The primary encounter diagnosis was Respiratory arrest ([facility] [address] A diagnosis of Alcohol abuse was also pertinent to this visit. has a past medical history of Arthritis, Chronic back pain, COPD (chronic obstructive pulmonary disease) ([address] Elevated troponin level, Hyperlipidemia, Hypertension, Respiratory arrest ([facility] [address] Thrombus, and Wears partial dentures. has a past surgical history that includes joint replacement (Left, 2011); AAA repair, endovascular (06/29/2016); Nerve Block (Right, 03/08/2017); joint replacement (Right, 08/22/2017); Total hip arthroplasty (Right, 8/22/2017); and Cardiac catheterization (03/20/2020).     Treatment Diagnosis: SOB      Restrictions  Restrictions/Precautions  Restrictions/Precautions: Up as Tolerated, Fall Risk  Required Braces or Orthoses?: No    Subjective   General  Patient assessed for rehabilitation services?: Yes  Family / Caregiver Present: No  Patient Currently in Pain: Yes  Pain Assessment  Pain Assessment: 0-10  Pain Level: 9  Pain Type: Acute pain  Pain Location: Chest(pt attributes to chest compressions)  Pain Descriptors: Aching  Pain Frequency: Continuous  Non-Pharmaceutical Pain Intervention(s): - Mobility Device: Standard Walker  Activity: To/from bathroom  Assist Level: Contact guard assistance  Toilet Transfers  Toilet - Technique: Ambulating(st walker utilized)  Equipment Used: Grab bars  Toilet Transfer: Minimal assistance  ADL  Feeding: Independent;Setup  Grooming: Independent;Setup  UE Bathing: Minimal assistance;Setup(assist for back)  LE Bathing: Contact guard assistance;Setup; Increased time to complete  UE Dressing: Minimal assistance;Setup(assist to tie gown in back)  LE Dressing: Contact guard assistance;Setup; Increased time to complete  Toileting: Contact guard assistance  Tone RUE  RUE Tone: Normotonic  Tone LUE  LUE Tone: Normotonic  Coordination  Movements Are Fluid And Coordinated: Yes     Bed mobility  Rolling to Right: Contact guard assistance  Supine to Sit: Contact guard assistance  Sit to Supine: Contact guard assistance  Scooting: (pt with increased difficulty scooting up in bed due to chest discomfort)  Transfers  Stand Step Transfers: Contact guard assistance  Sit to stand: Minimal assistance  Stand to sit: Contact guard assistance     Cognition  Overall Cognitive Status: Exceptions  Arousal/Alertness: Delayed responses to stimuli  Following Commands:  Follows one step commands with increased time  Attention Span: Attends with cues to redirect  Safety Judgement: Decreased awareness of need for assistance;Decreased awareness of need for safety  Problem Solving: Decreased awareness of errors  Insights: Decreased awareness of deficits  Initiation: Requires cues for some  Sequencing: Requires cues for some        Sensation  Overall Sensation Status: WFL( (Pt denies numbness and tingling))        LUE PROM (degrees)  LUE PROM: WFL  LUE AROM (degrees)  LUE AROM : WFL  Left Hand PROM (degrees)  Left Hand PROM: WFL  Left Hand AROM (degrees)  Left Hand AROM: WFL  RUE PROM (degrees)  RUE PROM: WFL  RUE AROM (degrees)  RUE AROM : WFL  Right Hand PROM (degrees)  Right Hand PROM: WFL  Right Hand AROM (degrees)  Right Hand AROM: WFL  LUE Strength  Gross LUE Strength: WFL  L Hand General: 4-/5  LUE Strength Comment: 4-/5 overall muscle strength  RUE Strength  Gross RUE Strength: WFL  R Hand General: 4-/5  RUE Strength Comment: 4-/5 overall muscle strength     Plan   Plan  Times per week: 3-4 x week    AM-PAC Score  AM-PAC Inpatient Daily Activity Raw Score: 20 (03/24/20 1043)  AM-PAC Inpatient ADL T-Scale Score : 42.03 (03/24/20 1043)  ADL Inpatient CMS 0-100% Score: 38.32 (03/24/20 1043)  ADL Inpatient CMS G-Code Modifier : CJ (03/24/20 1043)    Goals  Short term goals  Time Frame for Short term goals: Pt will, by discharge:   Short term goal 1: Dem I with adl performance utilizing energy conservation tech  Short term goal 2: Dem I with functional transfers  Short term goal 3: Dem I with dynamic mobility with least restricted device  Short term goal 4: Dem good safety awareness tech for all transfers/mobility  Short term goal 5: Dem a 15 min dynamic task with sba to increase activity tolerance       Therapy Time   Individual Concurrent Group Co-treatment   Time In 0844         Time Out 0916         Minutes 32         Timed Code Treatment Minutes: 23 Minutes   Co Eval with PT    JOHNNA Chacon/EVANGELIST

## 2020-03-24 NOTE — PROGRESS NOTES
Impaired  Vision Exceptions: Wears glasses for reading(currently not in hospital)  Hearing: Within functional limits     Subjective  General  Patient assessed for rehabilitation services?: Yes  Response To Previous Treatment: Patient with no complaints from previous session. Family / Caregiver Present: No  Follows Commands: Within Functional Limits  Subjective  Subjective: RN and pt agreeable to PT. Pt sitting EOB with OT upon arrival, pleasant and cooperative throughout.   Pain Screening  Patient Currently in Pain: Yes  Pain Assessment  Pain Assessment: 0-10  Pain Level: 9  Pain Type: Acute pain  Pain Location: Chest(pt attributes to chest compressions)  Pain Descriptors: Aching  Pain Frequency: Continuous  Non-Pharmaceutical Pain Intervention(s): Ambulation/Increased Activity  Response to Pain Intervention: Patient Satisfied  Vital Signs  Patient Currently in Pain: Yes       Orientation  Orientation  Overall Orientation Status: Within Functional Limits  Social/Functional History  Social/Functional History  Lives With: Spouse  Type of Home: House  Home Layout: Two level, Bed/Bath upstairs, Laundry in basement((bath on main and second, bedroom on second))  Home Access: Stairs to enter with rails  Entrance Stairs - Number of Steps:  6 steps to enter  Entrance Stairs - Rails: Left  Bathroom Shower/Tub: Tub only  Bathroom Toilet: Standard( (toilet on the main is handicap height, toilet on second is standard))  Home Equipment: Rolling walker, Cane, Wheelchair-manual(? (Pt ambulates without AD baseline))  Receives Help From: Family(supportive spouse who is retired)  ADL Assistance: Independent  Homemaking Assistance: Needs assistance(( completes laundry, cleaning, cooking and yardwork))  [employer] Responsibilities: Yes  Meal Prep Responsibility: No(spouse completes)  Laundry Responsibility: No(spouse completes)  Cleaning Responsibility: No(spouse completes)  Shopping Responsibility: Secondary(shared with posture, decreased zeb, mildly unsteady when turning  Distance: 30ft  Comments: Pt declined further ambulation due to fatigue  Stairs/Curb  Stairs?: No     Balance  Posture: Fair  Sitting - Dynamic: Good;-  Standing - Static: Fair;+  Standing - Dynamic: Fair;+  Comments: standing balance assessed with RW        Plan   Plan  Times per week: 5-6x/wk  Current Treatment Recommendations: Strengthening, Balance Training, Functional Mobility Training, Transfer Training, ROM, Endurance Training, Safety Education & Training, Home Exercise Program, Patient/Caregiver Education & Training, Gait Training, Stair training  Safety Devices  Type of devices: Left in bed, Gait belt, Call light within reach, Nurse notified, Bed alarm in place  Restraints  Initially in place: No  Restraints: (B wrist restraints)    AM-PAC Score  AM-PAC Inpatient Mobility Raw Score : 17 (03/24/20 1049)  AM-PAC Inpatient T-Scale Score : 42.13 (03/24/20 1049)  Mobility Inpatient CMS 0-100% Score: 50.57 (03/24/20 1049)  Mobility Inpatient CMS G-Code Modifier : CK (03/24/20 1049)          Goals  Short term goals  Time Frame for Short term goals: 14 visits  Short term goal 1: Perform bed mobility and functional transfers independently  Short term goal 2: Ambulate 300ft with least restrictive AD and supervision  Short term goal 3: Ascend/descend 6 steps with L HR and CGA  Short term goal 4: Participate in 30 minutes of therapy to demo increased endurance  Short term goal 5: Demo Good- dynamic standing balance to decrease risk of falls  Patient Goals   Patient goals : Unable to state       Therapy Time   Individual Concurrent Group Co-treatment   Time In 0849         Time Out 0914         Minutes 25         Timed Code Treatment Minutes: 8 Minutes       Yoav Cummings, PT

## 2020-03-24 NOTE — PROGRESS NOTES
Med rec done and discharge order placed. Patient is medically clear for discharge.  Case management to assist with SNF    Kaden Pierson MD, PGY-3  Internal Medicine  [address]  3/24/20  1:42 PM

## 2020-03-24 NOTE — PROGRESS NOTES
study. 2. Low endotracheal tube positioning only about 11 mm superior to the gautam; this could be pulled back 2 cm to be at the level of the aortic knob. 3.  NG tube extends below the left hemidiaphragm, into the upper abdomen, tip overlying the expected level of the stomach, left upper quadrant. 4. Mild cardiomegaly. 5. Stable thoracic aorta aneurysm status post wall stent. Xr Chest Portable    Result Date: 3/16/2020  Mild interval increase interstitial edema. Xr Chest Portable    Result Date: 3/15/2020  Endotracheal tube is slightly low in position. Consider minimally retracting. Small right pleural effusion is suspected. Mild pulmonary edema. Xr Chest Portable    Result Date: 3/14/2020  Partial improved aeration of the left lung which may represent decreasing pulmonary edema and decreasing pleural effusion. ASSESSMENT & PLAN     ASSESSMENT / PLAN:     Principal Problem:    Acute respiratory failure with hypoxia and hypercapnia (HCC)  Active Problems:    Shortness of breath    COPD with acute exacerbation (HCC)    Acute pulmonary edema (HCC)    NSTEMI (non-ST elevated myocardial infarction) (HCC)    Shock circulatory (HCC)    Thrombus of aorta (HCC)    Lactic acidosis    Pneumonia of both upper lobes due to infectious organism ([address]    Respiratory arrest ([facility] [address]  Resolved Problems:    * No resolved hospital problems. *    Acute respiratory failure with hypoxia secondary to pneumonia and COPD exacerbation-resolved  Last day of prednisone taper. Completed antibiotics  On room air currently  Continue incentive spirometry  Continue breathing treatments    PEA arrest in ED with elevated troponins  S/P CPR for 1 minute. ROSC obtained. Off heparin drip currently. Lidocaine patch and toradol as needed  Encourage incentive spirometry    Coronary artery disease  S/P coronary angiogram with PCI yesterday 3/23/2020. With PTCA CUONG stents x2 in proximal and mid LCx.   Continue aspirin and Brilinta. Follow-up with cardiology as outpatient. Outpatient cardiac rehab. Descending Aortic mural thrombus  Off anticoagulation per vascular surgery. Follow-up with CTA chest with vascular surgery/cardiology as outpatient. Alcohol withdrawal  Librium 5 mg twice daily for 3 days. Strongly encourage alcohol cessation. Hypertension  Continue Lopressor and Norvasc. Okay with cardiology. Tobacco abuse  Continue NicoDerm patch. Strongly encouraged tobacco cessation. PT, OT evaluation--PT and OT on board. Patient is ambulating with the help of a walker  discharge plan--discharge today. Likely to [facility].  to assist with discharge planning. Taryn Zapata MD  INTERNAL MEDICINE RESIDENT, PGY-1  [address] [place] [address]  6/14/7646,00:67 AM   Attending Physician Statement  I have discussed the care of Elliot Becker, including pertinent history and exam findings,  with the resident. I have seen and examined the patient and the key elements of all parts of the encounter have been performed by me. I agree with the assessment, plan and orders as documented by the resident with additions . Dc today     Treatment plan Discussed with nursing staff in detail , all questions answered . Electronically signed by Blanche Leon MD on   3/24/20 at 4:29 PM EDT    Please note that this chart was generated using voice recognition Dragon dictation software. Although every effort was made to ensure the accuracy of this automated transcription, some errors in transcription may have occurred.

## 2020-03-24 NOTE — DISCHARGE INSTR - COC
acute exacerbation ([facility]) J44.1    Acute pulmonary edema ([facility]) J81.0    NSTEMI (non-ST elevated myocardial infarction) ([facility]) I21.4    Shock circulatory ([facility]) R57.9    Thrombus of aorta ([facility]) I74.10    Lactic acidosis E87.2    Pneumonia of both upper lobes due to infectious organism ([facility] [address] J18.1    Respiratory arrest ([facility] [address] R09.2       Isolation/Infection:   Isolation          No Isolation        Patient Infection Status     None to display          Nurse Assessment:  Last Vital Signs: /67   Pulse 98   Temp 98.9 °F (37.2 °C) (Temporal)   Resp 19   Ht 5' 2\" (1.575 m)   Wt 163 lb 2.3 oz (74 kg)   SpO2 98%   BMI 29.84 kg/m²     Last documented pain score (0-10 scale): Pain Level: 9  Last Weight:   Wt Readings from Last 1 Encounters:   03/26/20 163 lb 2.3 oz (74 kg)     Mental Status:  oriented and alert    IV Access:  - None    Nursing Mobility/ADLs:  Walking   Assisted  Transfer  Assisted  Bathing  Assisted  Dressing  Assisted  Toileting  Assisted  Feeding  Independent  Med Admin  Independent  Med Delivery   whole    Wound Care Documentation and Therapy:        Elimination:  Continence:   · Bowel: Yes  · Bladder: Yes  Urinary Catheter: None   Colostomy/Ileostomy/Ileal Conduit: No  [REMOVED] Rectal Tube With balloon-Stool Appearance: Loose  [REMOVED] Rectal Tube With balloon-Stool Color: Brown  [REMOVED] Rectal Tube With balloon-Stool Amount: Small    Date of Last BM: 3/24/2020    Intake/Output Summary (Last 24 hours) at 3/26/2020 1512  Last data filed at 3/26/2020 0824  Gross per 24 hour   Intake 640 ml   Output --   Net 640 ml     I/O last 3 completed shifts: In: 0 [P.O.:640]  Out: -     Safety Concerns:      At Risk for Falls    Impairments/Disabilities:      None    Nutrition Therapy:  Current Nutrition Therapy:   - Oral Diet:  Cardiac    Routes of Feeding: Oral  Liquids: No Restrictions  Daily Fluid Restriction: no  Last Modified Barium Swallow with Video (Video Swallowing Test): not done    Treatments at the Time of Hospital Discharge:   Respiratory Treatments: ***  Oxygen Therapy:  is not on home oxygen therapy. Ventilator:    - No ventilator support    Rehab Therapies: Physical Therapy and Occupational Therapy  Weight Bearing Status/Restrictions: No weight bearing restirctions  Other Medical Equipment (for information only, NOT a DME order):  walker  Other Treatments: ***    Patient's personal belongings (please select all that are sent with patient):  Dentures upper, Jewelry, phone, phone , clothes, and shoes. RN SIGNATURE:  Electronically signed by Randy Ballesteros RN on 3/24/20 at 12:36 PM EDT    CASE MANAGEMENT/SOCIAL WORK SECTION    Inpatient Status Date: 3/11/2020    Readmission Risk Assessment Score:  Readmission Risk              Risk of Unplanned Readmission:        22           Discharging to Facility/ Agency   · Name:     [facility]  · Address:   [address] [place] [address]  · Phone:     [phone]  · Fax:     [phone]    Dialysis Facility (if applicable)   · Name:  · Address:  · Dialysis Schedule:  · Phone:  · Fax:    / signature: Electronically signed by Maria M Dean RN on 3/25/20 at 12:34 PM EDT    PHYSICIAN SECTION    Prognosis: Good    Condition at Discharge: Stable    Rehab Potential (if transferring to Rehab): Fair    Recommended Labs or Other Treatments After Discharge: cardiac rehab    Physician Certification: I certify the above information and transfer of Jose Montemayor  is necessary for the continuing treatment of the diagnosis listed and that she requires [facility] for greater 30 days.      Update Admission H&P: Changes in H&P as follows - discharge summary to follow    PHYSICIAN SIGNATURE:  Electronically signed by Ailyn Ponce MD on 3/25/2020 at 2:33 PM

## 2020-03-24 NOTE — PLAN OF CARE
Problem: Falls - Risk of:  Goal: Will remain free from falls  Description: Will remain free from falls  3/24/2020 0951 by Thais Grigsby RN  Outcome: Ongoing  Note: Bed alarm on, bed locked in lowest position, call light and personal belongings within reach, non-skid footwear on, side rails x3, adequate lighting maintained, floor kept clear of clutter, and hourly rounding continued. 3/24/2020 0522 by Ama Pandey RN  Outcome: Met This Shift  Goal: Absence of physical injury  Description: Absence of physical injury  Outcome: Ongoing     Problem: Anxiety:  Goal: Level of anxiety will decrease  Description: Level of anxiety will decrease  3/24/2020 0951 by Thais Grigsby RN  Outcome: Ongoing  3/24/2020 0522 by Ama Pandey RN  Outcome: Met This Shift     Problem: Nutrition  Goal: Optimal nutrition therapy  Outcome: Ongoing     Problem: Urinary Elimination:  Goal: Signs and symptoms of infection will decrease  Description: Signs and symptoms of infection will decrease  Outcome: Ongoing  Goal: Complications related to the disease process, condition or treatment will be avoided or minimized  Description: Complications related to the disease process, condition or treatment will be avoided or minimized  Outcome: Ongoing     Problem: Infection - Central Venous Catheter-Associated Bloodstream Infection:  Goal: Will show no infection signs and symptoms  Description: Will show no infection signs and symptoms  Outcome: Ongoing     Problem: Pain:  Description: Pain management should include both nonpharmacologic and pharmacologic interventions. Goal: Pain level will decrease  Description: Pain level will decrease  Outcome: Ongoing  Note: Lidocaine applied to middle chest per patient, PRN pain medication given per written order, and non-pharmacological methods encouraged.    Goal: Control of acute pain  Description: Control of acute pain  Outcome: Ongoing  Goal: Control of chronic pain  Description: Control of chronic

## 2020-03-25 LAB
ABSOLUTE EOS #: 0.03 K/UL (ref 0–0.44)
ABSOLUTE IMMATURE GRANULOCYTE: 0.1 K/UL (ref 0–0.3)
ABSOLUTE LYMPH #: 1.65 K/UL (ref 1.1–3.7)
ABSOLUTE MONO #: 0.96 K/UL (ref 0.1–1.2)
ANION GAP SERPL CALCULATED.3IONS-SCNC: 12 MMOL/L (ref 9–17)
BASOPHILS # BLD: 0 % (ref 0–2)
BASOPHILS ABSOLUTE: <0.03 K/UL (ref 0–0.2)
BUN BLDV-MCNC: 14 MG/DL (ref 8–23)
BUN/CREAT BLD: ABNORMAL (ref 9–20)
CALCIUM SERPL-MCNC: 8.7 MG/DL (ref 8.6–10.4)
CHLORIDE BLD-SCNC: 109 MMOL/L (ref 98–107)
CO2: 21 MMOL/L (ref 20–31)
CREAT SERPL-MCNC: 0.74 MG/DL (ref 0.5–0.9)
DIFFERENTIAL TYPE: ABNORMAL
EOSINOPHILS RELATIVE PERCENT: 0 % (ref 1–4)
GFR AFRICAN AMERICAN: >60 ML/MIN
GFR NON-AFRICAN AMERICAN: >60 ML/MIN
GFR SERPL CREATININE-BSD FRML MDRD: ABNORMAL ML/MIN/{1.73_M2}
GFR SERPL CREATININE-BSD FRML MDRD: ABNORMAL ML/MIN/{1.73_M2}
GLUCOSE BLD-MCNC: 106 MG/DL (ref 65–105)
GLUCOSE BLD-MCNC: 123 MG/DL (ref 65–105)
GLUCOSE BLD-MCNC: 151 MG/DL (ref 70–99)
GLUCOSE BLD-MCNC: 98 MG/DL (ref 65–105)
HCT VFR BLD CALC: 35.2 % (ref 36.3–47.1)
HEMOGLOBIN: 10.8 G/DL (ref 11.9–15.1)
IMMATURE GRANULOCYTES: 1 %
LYMPHOCYTES # BLD: 10 % (ref 24–43)
MCH RBC QN AUTO: 30.7 PG (ref 25.2–33.5)
MCHC RBC AUTO-ENTMCNC: 30.7 G/DL (ref 28.4–34.8)
MCV RBC AUTO: 100 FL (ref 82.6–102.9)
MONOCYTES # BLD: 6 % (ref 3–12)
NRBC AUTOMATED: 0.1 PER 100 WBC
PDW BLD-RTO: 16.3 % (ref 11.8–14.4)
PLATELET # BLD: 325 K/UL (ref 138–453)
PLATELET ESTIMATE: ABNORMAL
PMV BLD AUTO: 10.6 FL (ref 8.1–13.5)
POTASSIUM SERPL-SCNC: 3.6 MMOL/L (ref 3.7–5.3)
RBC # BLD: 3.52 M/UL (ref 3.95–5.11)
RBC # BLD: ABNORMAL 10*6/UL
SEG NEUTROPHILS: 83 % (ref 36–65)
SEGMENTED NEUTROPHILS ABSOLUTE COUNT: 13.2 K/UL (ref 1.5–8.1)
SODIUM BLD-SCNC: 142 MMOL/L (ref 135–144)
WBC # BLD: 16 K/UL (ref 3.5–11.3)
WBC # BLD: ABNORMAL 10*3/UL

## 2020-03-25 PROCEDURE — 6370000000 HC RX 637 (ALT 250 FOR IP): Performed by: STUDENT IN AN ORGANIZED HEALTH CARE EDUCATION/TRAINING PROGRAM

## 2020-03-25 PROCEDURE — 2580000003 HC RX 258: Performed by: STUDENT IN AN ORGANIZED HEALTH CARE EDUCATION/TRAINING PROGRAM

## 2020-03-25 PROCEDURE — 80048 BASIC METABOLIC PNL TOTAL CA: CPT

## 2020-03-25 PROCEDURE — 2060000000 HC ICU INTERMEDIATE R&B

## 2020-03-25 PROCEDURE — 94640 AIRWAY INHALATION TREATMENT: CPT

## 2020-03-25 PROCEDURE — 85025 COMPLETE CBC W/AUTO DIFF WBC: CPT

## 2020-03-25 PROCEDURE — 36415 COLL VENOUS BLD VENIPUNCTURE: CPT

## 2020-03-25 PROCEDURE — 82947 ASSAY GLUCOSE BLOOD QUANT: CPT

## 2020-03-25 PROCEDURE — 6370000000 HC RX 637 (ALT 250 FOR IP): Performed by: INTERNAL MEDICINE

## 2020-03-25 PROCEDURE — 99232 SBSQ HOSP IP/OBS MODERATE 35: CPT | Performed by: INTERNAL MEDICINE

## 2020-03-25 RX ORDER — UREA 10 %
3 LOTION (ML) TOPICAL NIGHTLY PRN
Status: DISCONTINUED | OUTPATIENT
Start: 2020-03-25 | End: 2020-03-26 | Stop reason: HOSPADM

## 2020-03-25 RX ORDER — CHLORDIAZEPOXIDE HYDROCHLORIDE 5 MG/1
5 CAPSULE, GELATIN COATED ORAL 2 TIMES DAILY
Status: DISCONTINUED | OUTPATIENT
Start: 2020-03-25 | End: 2020-03-26 | Stop reason: HOSPADM

## 2020-03-25 RX ADMIN — TICAGRELOR 90 MG: 90 TABLET ORAL at 08:43

## 2020-03-25 RX ADMIN — CHLORDIAZEPOXIDE HYDROCHLORIDE 5 MG: 5 CAPSULE ORAL at 20:35

## 2020-03-25 RX ADMIN — ASPIRIN 81 MG: 81 TABLET ORAL at 08:43

## 2020-03-25 RX ADMIN — Medication 10 ML: at 08:43

## 2020-03-25 RX ADMIN — PANTOPRAZOLE SODIUM 40 MG: 40 TABLET, DELAYED RELEASE ORAL at 05:26

## 2020-03-25 RX ADMIN — FOLIC ACID 1 MG: 1 TABLET ORAL at 08:43

## 2020-03-25 RX ADMIN — THERA TABS 1 TABLET: TAB at 08:43

## 2020-03-25 RX ADMIN — PREDNISONE 10 MG: 10 TABLET ORAL at 08:43

## 2020-03-25 RX ADMIN — ACETAMINOPHEN 650 MG: 325 TABLET ORAL at 23:55

## 2020-03-25 RX ADMIN — Medication 3 MG: at 23:55

## 2020-03-25 RX ADMIN — IPRATROPIUM BROMIDE AND ALBUTEROL SULFATE 1 AMPULE: .5; 3 SOLUTION RESPIRATORY (INHALATION) at 10:31

## 2020-03-25 RX ADMIN — AMLODIPINE BESYLATE 10 MG: 10 TABLET ORAL at 08:43

## 2020-03-25 RX ADMIN — Medication 10 ML: at 20:34

## 2020-03-25 RX ADMIN — TICAGRELOR 90 MG: 90 TABLET ORAL at 20:35

## 2020-03-25 RX ADMIN — IPRATROPIUM BROMIDE AND ALBUTEROL SULFATE 1 AMPULE: .5; 3 SOLUTION RESPIRATORY (INHALATION) at 14:35

## 2020-03-25 RX ADMIN — ACETAMINOPHEN 650 MG: 325 TABLET ORAL at 08:53

## 2020-03-25 RX ADMIN — METOPROLOL TARTRATE 50 MG: 50 TABLET, FILM COATED ORAL at 20:35

## 2020-03-25 RX ADMIN — CHLORDIAZEPOXIDE HYDROCHLORIDE 5 MG: 5 CAPSULE ORAL at 04:02

## 2020-03-25 RX ADMIN — DESMOPRESSIN ACETATE 40 MG: 0.2 TABLET ORAL at 20:35

## 2020-03-25 RX ADMIN — METOPROLOL TARTRATE 50 MG: 50 TABLET, FILM COATED ORAL at 08:43

## 2020-03-25 RX ADMIN — Medication 100 MG: at 08:43

## 2020-03-25 ASSESSMENT — PAIN DESCRIPTION - LOCATION: LOCATION: CHEST

## 2020-03-25 ASSESSMENT — PAIN DESCRIPTION - PROGRESSION

## 2020-03-25 ASSESSMENT — PAIN DESCRIPTION - DESCRIPTORS: DESCRIPTORS: ACHING

## 2020-03-25 ASSESSMENT — PAIN DESCRIPTION - FREQUENCY: FREQUENCY: CONTINUOUS

## 2020-03-25 ASSESSMENT — PAIN SCALES - GENERAL
PAINLEVEL_OUTOF10: 3
PAINLEVEL_OUTOF10: 8
PAINLEVEL_OUTOF10: 3
PAINLEVEL_OUTOF10: 8
PAINLEVEL_OUTOF10: 0
PAINLEVEL_OUTOF10: 8
PAINLEVEL_OUTOF10: 3

## 2020-03-25 ASSESSMENT — PAIN DESCRIPTION - ONSET: ONSET: ON-GOING

## 2020-03-25 ASSESSMENT — PAIN DESCRIPTION - PAIN TYPE: TYPE: ACUTE PAIN

## 2020-03-25 ASSESSMENT — PAIN - FUNCTIONAL ASSESSMENT: PAIN_FUNCTIONAL_ASSESSMENT: ACTIVITIES ARE NOT PREVENTED

## 2020-03-25 NOTE — PLAN OF CARE
Problem: Falls - Risk of:  Goal: Will remain free from falls  Description: Will remain free from falls  Outcome: Met This Shift  Goal: Absence of physical injury  Description: Absence of physical injury  Outcome: Met This Shift     Problem: Anxiety:  Goal: Level of anxiety will decrease  Description: Level of anxiety will decrease  Outcome: Ongoing     Problem: Nutrition  Goal: Optimal nutrition therapy  Outcome: Ongoing     Problem: Urinary Elimination:  Goal: Signs and symptoms of infection will decrease  Description: Signs and symptoms of infection will decrease  Outcome: Ongoing  Goal: Complications related to the disease process, condition or treatment will be avoided or minimized  Description: Complications related to the disease process, condition or treatment will be avoided or minimized  Outcome: Ongoing     Problem: Infection - Central Venous Catheter-Associated Bloodstream Infection:  Goal: Will show no infection signs and symptoms  Description: Will show no infection signs and symptoms  Outcome: Ongoing     Problem: Pain:  Goal: Pain level will decrease  Description: Pain level will decrease  Outcome: Ongoing  Goal: Control of acute pain  Description: Control of acute pain  Outcome: Ongoing  Goal: Control of chronic pain  Description: Control of chronic pain  Outcome: Ongoing

## 2020-03-25 NOTE — PROGRESS NOTES
The patient is extubated. Cardiology had a plan to do cardiac cath once the patient has been stabilized. The patient also has a history of alcohol intoxication and alcohol withdrawals. As per the , the patient is a daily drinker and drinks 1 pint of beer every day.     OBJECTIVE     Vital Signs:  /74   Pulse 76   Temp 98.3 °F (36.8 °C) (Oral)   Resp 22   Ht 5' 2\" (1.575 m)   Wt 160 lb 15 oz (73 kg)   SpO2 98%   BMI 29.44 kg/m²     Temp (24hrs), Av.4 °F (36.9 °C), Min:98.1 °F (36.7 °C), Max:98.6 °F (37 °C)    In: 480   Out: -     Physical Exam:  General appearance - alert, well appearing, and in no distress  Chest - clear to auscultation, no wheezes, rales or rhonchi, symmetric air entry  Heart - normal rate, regular rhythm, normal S1, S2, no murmurs, rubs, clicks or gallops  Abdomen - soft, nontender, nondistended, no masses or organomegaly  Neurological - alert, oriented, normal speech, no focal findings or movement disorder noted  Musculoskeletal - no joint tenderness, deformity or swelling  Extremities - peripheral pulses normal, no pedal edema, no clubbing or cyanosis  Skin - normal coloration and turgor, no rashes, no suspicious skin lesions noted    Medications:  Scheduled Medications:    chlordiazePOXIDE  5 mg Oral BID    ticagrelor  90 mg Oral BID    lidocaine  1 patch Transdermal Daily    folic acid  1 mg Oral Daily    multivitamin  1 tablet Oral Daily    thiamine  100 mg Oral Daily    predniSONE  10 mg Oral Daily    pantoprazole  40 mg Oral QAM AC    amLODIPine  10 mg Oral Daily    ipratropium-albuterol  1 ampule Inhalation TID    metoprolol tartrate  50 mg Oral BID    insulin lispro  0-12 Units Subcutaneous 4 times per day    aspirin  81 mg Oral Daily    atorvastatin  40 mg Oral Nightly    sodium chloride flush  10 mL Intravenous 2 times per day    nicotine  1 patch Transdermal Daily     Continuous Infusions:    sodium chloride Stopped (20 2312)    sodium chloride 50 mL/hr at 03/23/20 0017    dextrose       PRN Medicationspotassium chloride, 40 mEq, PRN    Or  potassium alternative oral replacement, 40 mEq, PRN    Or  potassium chloride, 10 mEq, PRN  benzonatate, 100 mg, TID PRN  benzocaine-menthol, 1 lozenge, Q2H PRN  glucose, 15 g, PRN  dextrose, 12.5 g, PRN  glucagon (rDNA), 1 mg, PRN  dextrose, 100 mL/hr, PRN  sodium chloride flush, 10 mL, PRN  acetaminophen, 650 mg, Q6H PRN    Or  acetaminophen, 650 mg, Q6H PRN  polyethylene glycol, 17 g, Daily PRN  promethazine, 12.5 mg, Q6H PRN    Or  ondansetron, 4 mg, Q6H PRN  magnesium sulfate, 2 g, PRN  albuterol, 2.5 mg, As Directed RT PRN        Diagnostic Labs:  CBC:   Recent Labs     03/23/20  0532 03/24/20  0553 03/25/20  0822   WBC 19.4* 14.7* 16.0*   RBC 3.23* 3.06* 3.52*   HGB 9.7* 9.4* 10.8*   HCT 32.4* 29.7* 35.2*   .3 97.1 100.0   RDW 15.5* 15.7* 16.3*    279 325     BMP:   Recent Labs     03/23/20  0532 03/24/20  0553 03/25/20  0822   * 145* 142   K 4.1 3.5* 3.6*    109* 109*   CO2 27 26 21   BUN 22 15 14   CREATININE 0.74 0.70 0.74     APTT:   Recent Labs     03/23/20  0532 03/23/20  1239 03/23/20  1524   APTT 89.1* 46.7* 40.4*       MICROBIOLOGY:   Lab Results   Component Value Date/Time    CULTURE NORMAL RESPIRATORY [facility] 03/11/2020 05:29 PM       Imaging:    Xr Abdomen (kub) (single Ap View)    Result Date: 3/17/2020  Nonspecific, nonobstructive bowel gas pattern NG tube in place, tip and proximal port in the upper body of the stomach     Xr Chest Portable    Result Date: 3/19/2020  Interval extubation. Findings consistent with mild interstitial edema with small effusions. Xr Chest Portable    Result Date: 3/17/2020  1. Much improved pneumatization bilateral lungs with nearly resolved sequela of congestive heart failure seen on yesterday's study.  2. Low endotracheal tube positioning only about 11 mm superior to the gautam; this could be pulled back 2 cm to be at the level of the aortic knob. 3.  NG tube extends below the left hemidiaphragm, into the upper abdomen, tip overlying the expected level of the stomach, left upper quadrant. 4. Mild cardiomegaly. 5. Stable thoracic aorta aneurysm status post wall stent. Xr Chest Portable    Result Date: 3/16/2020  Mild interval increase interstitial edema. Xr Chest Portable    Result Date: 3/15/2020  Endotracheal tube is slightly low in position. Consider minimally retracting. Small right pleural effusion is suspected. Mild pulmonary edema. Xr Chest Portable    Result Date: 3/14/2020  Partial improved aeration of the left lung which may represent decreasing pulmonary edema and decreasing pleural effusion. ASSESSMENT & PLAN     ASSESSMENT / PLAN:     Principal Problem:    Acute respiratory failure with hypoxia and hypercapnia ([facility])  Active Problems:    Shortness of breath    COPD with acute exacerbation (HCC)    Acute pulmonary edema (HCC)    NSTEMI (non-ST elevated myocardial infarction) (HCC)    Shock circulatory (HCC)    Thrombus of aorta (HCC)    Lactic acidosis    Pneumonia of both upper lobes due to infectious organism ([address]    Respiratory arrest ([address]  Resolved Problems:    * No resolved hospital problems. *    Acute respiratory failure with hypoxia secondary to pneumonia and COPD exacerbation-resolved  Completed antibiotics  On room air currently  Continue prednisone taper. Continue incentive spirometry  Continue breathing treatments    PEA arrest in ED with elevated troponins  S/P CPR for 1 minute. ROSC obtained. Off heparin drip currently. Lidocaine patch and toradol as needed  Encourage incentive spirometry    Coronary artery disease  S/P coronary angiogram with PCI yesterday 3/23/2020. With PTCA CUONG stents x2 in proximal and mid LCx. Continue aspirin and Brilinta. Follow-up with cardiology as outpatient. Outpatient cardiac rehab.     Descending Aortic mural thrombus  Off anticoagulation per vascular surgery. Follow-up with CTA chest with vascular surgery/cardiology as outpatient. Alcohol withdrawal  Continue Librium 5 mg to twice daily. Strongly encouraged to quit alcoholism. Patient voiced understanding and is willing to quit. Hypertension  Continue Lopressor and Norvasc. Okay with cardiology. Tobacco abuse  Continue NicoDerm patch. Strongly encouraged tobacco cessation. Patient willing to quit smoking. PT, OT evaluation--PT and OT on board. Patient is ambulating with the help of a walker  discharge plan--patient is medically clear for discharge. Discharge order placed on 3/24/2020.  to assist with discharge planning. Courtney Watts MD  INTERNAL MEDICINE RESIDENT, PGY-1  [address] [place], [place]  1/21/6253,7:33 PM   Attending Physician Statement  I have discussed the care of Pawan Anaya, including pertinent history and exam findings,  with the resident. I have seen and examined the patient and the key elements of all parts of the encounter have been performed by me. I agree with the assessment, plan and orders as documented by the resident with additions . Treatment plan Discussed with nursing staff in detail , all questions answered . Electronically signed by Jeanine Lucero MD on   3/25/20 at 2:33 PM EDT    Please note that this chart was generated using voice recognition Dragon dictation software. Although every effort was made to ensure the accuracy of this automated transcription, some errors in transcription may have occurred.

## 2020-03-25 NOTE — PROGRESS NOTES
Pt arrived to room 424. Oriented to call light and bathroom. Pt alert and oriented x 3 Pt denies pain.

## 2020-03-25 NOTE — PLAN OF CARE
Problem: Falls - Risk of:  Goal: Will remain free from falls  Description: Will remain free from falls  3/25/2020 1131 by Ema Dominguez RN  Outcome: Ongoing  Note: Bed alarm on, bed locked and in lowest position, call light and personal belongings within reach, non-skid footwear on, side rails x2, adequate lighting maintained, floor kept clear of clutter, and hourly rounding continued.    3/25/2020 0458 by Tan Maynard RN  Outcome: Met This Shift  Goal: Absence of physical injury  Description: Absence of physical injury  3/25/2020 1131 by Ema Dominguez RN  Outcome: Ongoing  3/25/2020 0458 by Tan Maynard RN  Outcome: Met This Shift     Problem: Anxiety:  Goal: Level of anxiety will decrease  Description: Level of anxiety will decrease  3/25/2020 1131 by Ema Dominguez RN  Outcome: Ongoing  3/25/2020 0458 by Tan Maynard RN  Outcome: Ongoing     Problem: Nutrition  Goal: Optimal nutrition therapy  3/25/2020 1131 by Ema Dominguez RN  Outcome: Ongoing  3/25/2020 0458 by Tan Maynard RN  Outcome: Ongoing     Problem: Urinary Elimination:  Goal: Signs and symptoms of infection will decrease  Description: Signs and symptoms of infection will decrease  3/25/2020 1131 by Ema Dominguez RN  Outcome: Ongoing  3/25/2020 0458 by Tan Maynard RN  Outcome: Ongoing  Goal: Complications related to the disease process, condition or treatment will be avoided or minimized  Description: Complications related to the disease process, condition or treatment will be avoided or minimized  3/25/2020 1131 by Ema Dominguez RN  Outcome: Ongoing  3/25/2020 0458 by Tan Maynard RN  Outcome: Ongoing     Problem: Infection - Central Venous Catheter-Associated Bloodstream Infection:  Goal: Will show no infection signs and symptoms  Description: Will show no infection signs and symptoms  3/25/2020 1131 by Ema Dominguez RN  Outcome: Ongoing  3/25/2020 0458 by Tan Maynard RN  Outcome: Ongoing     Problem: Pain:  Description: Pain management should include both nonpharmacologic and pharmacologic interventions. Goal: Pain level will decrease  Description: Pain level will decrease  3/25/2020 1131 by Rosemary Jerry RN  Outcome: Ongoing  Note: Lidocaine applied to Left shoulder, PRN pain medication given per written order, and non-pharmacological methods encouraged.    3/25/2020 0458 by Sofia Goldberg RN  Outcome: Ongoing  Goal: Control of acute pain  Description: Control of acute pain  3/25/2020 1131 by Rosemary Jerry RN  Outcome: Ongoing  3/25/2020 0458 by Sofia Goldberg RN  Outcome: Ongoing  Goal: Control of chronic pain  Description: Control of chronic pain  3/25/2020 1131 by Rosemary Jerry RN  Outcome: Ongoing  3/25/2020 0458 by Sofia Goldberg RN  Outcome: Ongoing

## 2020-03-25 NOTE — PLAN OF CARE
[facility] Shortness [facility] breath [R06.02] . Vitals:    03/25/20 0841   BP: (!) 159/74   Pulse: 96   Resp: 20   Temp: 98.1 °F (36.7 °C)   SpO2:    . Patients home meds are   Prior to Admission medications    Medication Sig Start Date End Date Taking? Authorizing Provider   aspirin 81 MG chewable tablet Take 1 tablet by mouth daily 3/24/20  Yes Kinjal Titus MD   metoprolol tartrate (LOPRESSOR) 50 MG tablet Take 1 tablet by mouth 2 times daily 3/24/20  Yes Kinjal Titus MD   chlordiazePOXIDE (LIBRIUM) 5 MG capsule Take 1 capsule by mouth 2 times daily for 3 days. 3/24/20 3/27/20 Yes Kinjal Titus MD   ticagrelor (BRILINTA) 90 MG TABS tablet Take 1 tablet by mouth 2 times daily 3/24/20  Yes Kinjal Titus MD   Multiple Vitamin (MULTIVITAMIN) tablet Take 1 tablet by mouth daily 3/24/20  Yes Kinjal Titus MD   ipratropium-albuterol (DUONEB) 0.5-2.5 (3) MG/3ML SOLN nebulizer solution Inhale 3 mLs into the lungs three times daily 3/24/20  Yes Kinjal Titus MD   amLODIPine (NORVASC) 10 MG tablet Take 1 tablet by mouth daily 3/25/20  Yes Kinjal Titus MD   predniSONE (DELTASONE) 10 MG tablet Take 1 tablet by mouth daily for 3 days 3/25/20 3/28/20 Yes Raymond Dang MD   Meloxicam 10 MG CAPS Take by mouth    Historical Provider, MD   traMADol Satira Clapper) 50 MG tablet Take 1 tablet by mouth 3 times daily as needed for Pain 10/9/17   Linda Chambers, DO   oxyCODONE-acetaminophen (PERCOCET) 5-325 MG per tablet Take 1 tablet by mouth every 6 hours as needed for Pain . 9/18/17   Camilo Carpenter, DO   docusate sodium (COLACE) 100 MG capsule Take 1 capsule by mouth 2 times daily 8/24/17   [facility], DO   Misc.  Devices (RAISED TOILET SEAT) MISC 1 Device by Does not apply route daily 8/24/17   Camilo Wheeler Ao, DO   loratadine (CLARITIN) 10 MG tablet Take 10 mg by mouth daily    Historical Provider, MD   mupirocin [facility] NASAL) 2 % nasal ointment Take by Nasal route 2 times daily for 5 days 8/18/17 8/22/17  Camilo Cantu, DO   CHANTIX STARTING MONTH JOEL 0.5 MG X 11 & 1 MG X 42 tablet On 5/20/16 states she has not yet started 2/8/16   Historical Provider, MD   alendronate (FOSAMAX) 35 MG tablet Take 35 mg by mouth every 7 days Sunday 2/5/16   Historical Provider, MD   atorvastatin (LIPITOR) 40 MG tablet Take 40 mg by mouth daily  2/8/16   Historical Provider, MD   .  Recent Surgical History: None = 0     Assessment     Peak Flow (asthma only)    Predicted: 314  Personal Best: NA  PEF   % Predicted   Peak Flow :     FEV1/FVC    FEV1 Predicted 1.99      FEV1 NA    FEV1 % Predicted   FVC   IS volume   IBW 50.1 kg    RR 16  Breath Sounds: clear      Bronchodilator assessment at level  0  Hyperinflation assessment at level   Secretion Management assessment at level      [x]    Bronchodilator Assessment  BRONCHODILATOR ASSESSMENT SCORE  Score 0 1 2 3 4 5   Breath Sounds   [x]  Patient Baseline []  No Wheeze good aeration []  Faint, scattered wheezing, good aeration []  Expiratory Wheezing and or moderately diminished []  Insp/Exp wheeze and/or very diminished []  Insp/Exp and/ or marked distress   Respiratory Rate   [x]  Patient Baseline []  Less than 20 []  Less than 20 []  20-25 []  Greater than 25 []  Greater than 25   Peak flow % of Pred or PB [x]  NA   []  Greater than 90%  []  81-90% []  71-80% []  Less than or equal to 70%  or unable to perform []  Unable due to Respiratory Distress   Dyspnea re [x]  Patient Baseline []  No SOB []  No SOB []  SOB on exertion []  SOB min activity []  At rest/acute   e FEV% Predicted       [x]  NA []  Above 69%  []  Unable []  Above 60-69%  []  Unable []  Above 50-59%  []  Unable []  Above 35-49%  []  Unable []  Less than 35%  []  Unable                 []  Hyperinflation Assessment  Score 1 2 3   CXR and Breath Sounds []  Clear []  No atelectasis  Basilar aeration []  Atelectasis or absent basilar breath sounds   Incentive Spirometry Volume  (Per IBW)   []  Greater than or equal to 15ml/Kg []  less than 15ml/Kg []  less than 15ml/Kg   Surgery within last 2 weeks []  None or general   []  Abdominal or thoracic surgery  []  Abdominal or thoracic   Chronic Pulmonary Historyre []  No []  Yes []  Yes     []  Secretion Management Assessment  Score 1 2 3   Bilateral Breath Sounds   []  Occasional Rhonchi []  Scattered Rhonchi []  Course Rhonchi and/or poor aeration   Sputum    []  Small amount of thin secretions []  Moderate amount of viscous secretions []  Copius, Viscious Yellow/ Secretions   CXR as reported by physician []  clear  []  Unavailable []  Infiltrates and/or consolidation  []  Unavailable []  Mucus Plugging and or lobar consolidation  []  Unavailable   Cough []  Strong, productive cough []  Weak productive cough []  No cough or weak non-productive cough   MU LOJA  10:34 AM                            FEMALE                                  MALE                            FEV1 Predicted Normal Values                        FEV1 Predicted Normal Values          Age                                     Height in Feet and Inches       Age                                     Height in Feet and Inches       4' 11\" 5' 1\" 5' 3\" 5' 5\" 5' 7\" 5' 9\" 5' 11\" 6' 1\"  4' 11\" 5' 1\" 5' 3\" 5' 5\" 5' 7\" 5' 9\" 5' 11\" 6' 1\"   42 - 45 2.49 2.66 2.84 3.03 3.22 3.42 3.62 3.83 42 - 45 2.82 3.03 3.26 3.49 3.72 3.96 4.22 4.47   46 - 49 2.40 2.57 2.76 2.94 3.14 3.33 3.54 3.75 46 - 49 2.70 2.92 3.14 3.37 3.61 3.85 4.10 4.36   50 - 53 2.31 2.48 2.66 2.85 3.04 3.24 3.45 3.66 50 - 53 2.58 2.80 3.02 3.25 3.49 3.73 3.98 4.24   54 - 57 2.21 2.38 2.57 2.75 2.95 3.14 3.35 3.56 54 - 57 2.46 2.67 2.89 3.12 3.36 3.60 3.85 4.11   58 - 61 2.10 2.28 2.46 2.65 2.84 3.04 3.24 3.45 58 - 61 2.32 2.54 2.76 2.99 3.23 3.47 3.72 3.98   62 - 65 1.99 2.17 2.35 2.54

## 2020-03-26 VITALS
TEMPERATURE: 99 F | BODY MASS INDEX: 30.02 KG/M2 | HEIGHT: 62 IN | HEART RATE: 94 BPM | WEIGHT: 163.14 LBS | SYSTOLIC BLOOD PRESSURE: 129 MMHG | OXYGEN SATURATION: 95 % | DIASTOLIC BLOOD PRESSURE: 65 MMHG | RESPIRATION RATE: 24 BRPM

## 2020-03-26 LAB
ABSOLUTE EOS #: 0.04 K/UL (ref 0–0.44)
ABSOLUTE IMMATURE GRANULOCYTE: 0.07 K/UL (ref 0–0.3)
ABSOLUTE LYMPH #: 1.92 K/UL (ref 1.1–3.7)
ABSOLUTE MONO #: 0.89 K/UL (ref 0.1–1.2)
ANION GAP SERPL CALCULATED.3IONS-SCNC: 13 MMOL/L (ref 9–17)
BASOPHILS # BLD: 0 % (ref 0–2)
BASOPHILS ABSOLUTE: <0.03 K/UL (ref 0–0.2)
BUN BLDV-MCNC: 21 MG/DL (ref 8–23)
BUN/CREAT BLD: ABNORMAL (ref 9–20)
CALCIUM SERPL-MCNC: 8.5 MG/DL (ref 8.6–10.4)
CHLORIDE BLD-SCNC: 110 MMOL/L (ref 98–107)
CO2: 22 MMOL/L (ref 20–31)
CREAT SERPL-MCNC: 0.83 MG/DL (ref 0.5–0.9)
DIFFERENTIAL TYPE: ABNORMAL
EOSINOPHILS RELATIVE PERCENT: 0 % (ref 1–4)
GFR AFRICAN AMERICAN: >60 ML/MIN
GFR NON-AFRICAN AMERICAN: >60 ML/MIN
GFR SERPL CREATININE-BSD FRML MDRD: ABNORMAL ML/MIN/{1.73_M2}
GFR SERPL CREATININE-BSD FRML MDRD: ABNORMAL ML/MIN/{1.73_M2}
GLUCOSE BLD-MCNC: 102 MG/DL (ref 65–105)
GLUCOSE BLD-MCNC: 110 MG/DL (ref 65–105)
GLUCOSE BLD-MCNC: 113 MG/DL (ref 65–105)
GLUCOSE BLD-MCNC: 130 MG/DL (ref 65–105)
GLUCOSE BLD-MCNC: 92 MG/DL (ref 65–105)
GLUCOSE BLD-MCNC: 97 MG/DL (ref 70–99)
HCT VFR BLD CALC: 32.3 % (ref 36.3–47.1)
HEMOGLOBIN: 9.8 G/DL (ref 11.9–15.1)
IMMATURE GRANULOCYTES: 1 %
LYMPHOCYTES # BLD: 15 % (ref 24–43)
MCH RBC QN AUTO: 30.2 PG (ref 25.2–33.5)
MCHC RBC AUTO-ENTMCNC: 30.3 G/DL (ref 28.4–34.8)
MCV RBC AUTO: 99.7 FL (ref 82.6–102.9)
MONOCYTES # BLD: 7 % (ref 3–12)
NRBC AUTOMATED: 0 PER 100 WBC
PDW BLD-RTO: 16.4 % (ref 11.8–14.4)
PLATELET # BLD: 301 K/UL (ref 138–453)
PLATELET ESTIMATE: ABNORMAL
PMV BLD AUTO: 10.2 FL (ref 8.1–13.5)
POTASSIUM SERPL-SCNC: 3.8 MMOL/L (ref 3.7–5.3)
RBC # BLD: 3.24 M/UL (ref 3.95–5.11)
RBC # BLD: ABNORMAL 10*6/UL
SEG NEUTROPHILS: 77 % (ref 36–65)
SEGMENTED NEUTROPHILS ABSOLUTE COUNT: 10.12 K/UL (ref 1.5–8.1)
SODIUM BLD-SCNC: 145 MMOL/L (ref 135–144)
WBC # BLD: 13.1 K/UL (ref 3.5–11.3)
WBC # BLD: ABNORMAL 10*3/UL

## 2020-03-26 PROCEDURE — 94640 AIRWAY INHALATION TREATMENT: CPT

## 2020-03-26 PROCEDURE — 2580000003 HC RX 258: Performed by: STUDENT IN AN ORGANIZED HEALTH CARE EDUCATION/TRAINING PROGRAM

## 2020-03-26 PROCEDURE — 82947 ASSAY GLUCOSE BLOOD QUANT: CPT

## 2020-03-26 PROCEDURE — 97116 GAIT TRAINING THERAPY: CPT

## 2020-03-26 PROCEDURE — 6370000000 HC RX 637 (ALT 250 FOR IP): Performed by: STUDENT IN AN ORGANIZED HEALTH CARE EDUCATION/TRAINING PROGRAM

## 2020-03-26 PROCEDURE — 97530 THERAPEUTIC ACTIVITIES: CPT

## 2020-03-26 PROCEDURE — 80048 BASIC METABOLIC PNL TOTAL CA: CPT

## 2020-03-26 PROCEDURE — 99232 SBSQ HOSP IP/OBS MODERATE 35: CPT | Performed by: INTERNAL MEDICINE

## 2020-03-26 PROCEDURE — 94761 N-INVAS EAR/PLS OXIMETRY MLT: CPT

## 2020-03-26 PROCEDURE — 85025 COMPLETE CBC W/AUTO DIFF WBC: CPT

## 2020-03-26 PROCEDURE — 97110 THERAPEUTIC EXERCISES: CPT

## 2020-03-26 PROCEDURE — 6370000000 HC RX 637 (ALT 250 FOR IP): Performed by: INTERNAL MEDICINE

## 2020-03-26 PROCEDURE — 97535 SELF CARE MNGMENT TRAINING: CPT

## 2020-03-26 PROCEDURE — 36415 COLL VENOUS BLD VENIPUNCTURE: CPT

## 2020-03-26 RX ORDER — FUROSEMIDE 20 MG/1
20 TABLET ORAL ONCE
Status: COMPLETED | OUTPATIENT
Start: 2020-03-26 | End: 2020-03-26

## 2020-03-26 RX ORDER — LISINOPRIL 5 MG/1
5 TABLET ORAL DAILY
Status: DISCONTINUED | OUTPATIENT
Start: 2020-03-26 | End: 2020-03-26 | Stop reason: HOSPADM

## 2020-03-26 RX ORDER — LIDOCAINE 4 G/G
1 PATCH TOPICAL DAILY
Qty: 15 PATCH | Refills: 1 | Status: ON HOLD | OUTPATIENT
Start: 2020-03-27 | End: 2022-06-15

## 2020-03-26 RX ORDER — IPRATROPIUM BROMIDE AND ALBUTEROL SULFATE 2.5; .5 MG/3ML; MG/3ML
3 SOLUTION RESPIRATORY (INHALATION) EVERY 4 HOURS PRN
Qty: 360 ML | Refills: 2 | Status: SHIPPED | OUTPATIENT
Start: 2020-03-26 | End: 2021-03-01

## 2020-03-26 RX ORDER — LISINOPRIL 10 MG/1
10 TABLET ORAL DAILY
Qty: 30 TABLET | Refills: 2 | Status: SHIPPED | OUTPATIENT
Start: 2020-03-26 | End: 2021-09-14

## 2020-03-26 RX ORDER — TIOTROPIUM BROMIDE 18 UG/1
18 CAPSULE ORAL; RESPIRATORY (INHALATION) DAILY
Qty: 30 CAPSULE | Refills: 3 | Status: SHIPPED | OUTPATIENT
Start: 2020-03-26 | End: 2021-03-01

## 2020-03-26 RX ORDER — SENNOSIDES 8.6 MG
650 CAPSULE ORAL EVERY 8 HOURS PRN
Qty: 60 TABLET | Refills: 3 | Status: SHIPPED | OUTPATIENT
Start: 2020-03-26

## 2020-03-26 RX ORDER — ALBUTEROL SULFATE 90 UG/1
2 AEROSOL, METERED RESPIRATORY (INHALATION) 4 TIMES DAILY PRN
Qty: 3 INHALER | Refills: 1 | Status: SHIPPED | OUTPATIENT
Start: 2020-03-26

## 2020-03-26 RX ORDER — KETOROLAC TROMETHAMINE 15 MG/ML
15 INJECTION, SOLUTION INTRAMUSCULAR; INTRAVENOUS EVERY 6 HOURS PRN
Status: DISCONTINUED | OUTPATIENT
Start: 2020-03-26 | End: 2020-03-26 | Stop reason: HOSPADM

## 2020-03-26 RX ADMIN — FOLIC ACID 1 MG: 1 TABLET ORAL at 08:24

## 2020-03-26 RX ADMIN — Medication 10 ML: at 08:28

## 2020-03-26 RX ADMIN — CHLORDIAZEPOXIDE HYDROCHLORIDE 5 MG: 5 CAPSULE ORAL at 08:33

## 2020-03-26 RX ADMIN — IPRATROPIUM BROMIDE AND ALBUTEROL SULFATE 1 AMPULE: .5; 3 SOLUTION RESPIRATORY (INHALATION) at 09:13

## 2020-03-26 RX ADMIN — IPRATROPIUM BROMIDE AND ALBUTEROL SULFATE 1 AMPULE: .5; 3 SOLUTION RESPIRATORY (INHALATION) at 14:37

## 2020-03-26 RX ADMIN — TICAGRELOR 90 MG: 90 TABLET ORAL at 08:24

## 2020-03-26 RX ADMIN — ASPIRIN 81 MG: 81 TABLET ORAL at 08:24

## 2020-03-26 RX ADMIN — PREDNISONE 10 MG: 10 TABLET ORAL at 08:24

## 2020-03-26 RX ADMIN — ACETAMINOPHEN 650 MG: 325 TABLET ORAL at 06:17

## 2020-03-26 RX ADMIN — LISINOPRIL 5 MG: 5 TABLET ORAL at 10:13

## 2020-03-26 RX ADMIN — Medication 100 MG: at 08:24

## 2020-03-26 RX ADMIN — PANTOPRAZOLE SODIUM 40 MG: 40 TABLET, DELAYED RELEASE ORAL at 05:00

## 2020-03-26 RX ADMIN — AMLODIPINE BESYLATE 10 MG: 10 TABLET ORAL at 08:24

## 2020-03-26 RX ADMIN — THERA TABS 1 TABLET: TAB at 08:24

## 2020-03-26 RX ADMIN — METOPROLOL TARTRATE 50 MG: 50 TABLET, FILM COATED ORAL at 08:24

## 2020-03-26 RX ADMIN — FUROSEMIDE 20 MG: 20 TABLET ORAL at 10:13

## 2020-03-26 ASSESSMENT — PAIN DESCRIPTION - LOCATION
LOCATION: CHEST
LOCATION: CHEST
LOCATION_2: ARM

## 2020-03-26 ASSESSMENT — PAIN DESCRIPTION - INTENSITY: RATING_2: 9

## 2020-03-26 ASSESSMENT — PAIN DESCRIPTION - PAIN TYPE
TYPE: ACUTE PAIN
TYPE: ACUTE PAIN

## 2020-03-26 ASSESSMENT — PAIN SCALES - GENERAL
PAINLEVEL_OUTOF10: 3
PAINLEVEL_OUTOF10: 0
PAINLEVEL_OUTOF10: 2
PAINLEVEL_OUTOF10: 2
PAINLEVEL_OUTOF10: 9

## 2020-03-26 ASSESSMENT — PAIN DESCRIPTION - ORIENTATION: ORIENTATION: MID;ANTERIOR

## 2020-03-26 ASSESSMENT — PAIN DESCRIPTION - FREQUENCY: FREQUENCY: CONTINUOUS

## 2020-03-26 NOTE — PROGRESS NOTES
Occupational Therapy  Facility/Department: [facility] 4B STEPDOWN  Daily Treatment Note  NAME: Keira Trujillo  : 1950  MRN: [id number]    Date of Service: 3/26/2020    Discharge Recommendations:  Patient would benefit from continued therapy after discharge  OT Equipment Recommendations  Equipment Needed: No    Assessment   Performance deficits / Impairments: Decreased functional mobility ; Decreased strength;Decreased endurance;Decreased ADL status; Decreased safe awareness;Decreased high-level IADLs  Assessment: Pt would benfit from continued acute care and post acute care OT to address decreased endurance. Pt required min A for LE dressing on  Treatment Diagnosis: SOB  Prognosis: Fair  Decision Making: Medium Complexity  OT Education: OT Role;Plan of Care  Patient Education: safety awareness tech, proper hand placement for transfers, and energy conservation tech with proper breathing tech with fair return. REQUIRES OT FOLLOW UP: Yes  Activity Tolerance  Activity Tolerance: Patient Tolerated treatment well  Safety Devices  Safety Devices in place: Yes  Type of devices: Left in chair;Call light within reach;Nurse notified  Restraints  Initially in place: No       Patient Diagnosis(es): The primary encounter diagnosis was Respiratory arrest ([address] Diagnoses of Alcohol abuse, Coronary artery disease due to calcified coronary lesion, and Status post angioplasty with stent were also pertinent to this visit. has a past medical history of Arthritis, Chronic back pain, COPD (chronic obstructive pulmonary disease) ([address] Elevated troponin level, Hyperlipidemia, Hypertension, Respiratory arrest ([address] Thrombus, and Wears partial dentures. has a past surgical history that includes joint replacement (Left, ); AAA repair, endovascular (2016); Nerve Block (Right, 2017); joint replacement (Right, 2017);  Total hip arthroplasty (Right, 2017); and Cardiac catheterization (03/20/2020). Restrictions  Restrictions/Precautions  Restrictions/Precautions: Up as Tolerated, Fall Risk  Required Braces or Orthoses?: No     Subjective   General  Patient assessed for rehabilitation services?: Yes  Family / Caregiver Present: No  Vital Signs  Patient Currently in Pain: Denies     Orientation  Orientation  Overall Orientation Status: Within Functional Limits     Objective    ADL  Grooming: Independent;Setup(to apply lotion to face and hands)  UE Dressing: Contact guard assistance(to don personal shirt seated EOB)  LE Dressing: Setup; Increased time to complete;Minimal assistance(to don socks, pants seated EOB )  Additional Comments: Pt seated EOB on arrival, assisted to don pants and personal top. Pt set up to place lotion on face and hands. Pt assisted to complete sit > stand transfer and functional mobility into clark. Pt returned to chair, call light in reach and RN notified on therapist exit.       Balance  Sitting Balance: Supervision  Standing Balance: Contact guard assistance(use of RW)  Standing Balance  Time: ~5 min  Activity: functional mobility into clark   Comment: Pt reports increased \"weakness\" with activity   Functional Mobility  Functional - Mobility Device: Rolling Walker  Activity: Other  Assist Level: Contact guard assistance  Functional Mobility Comments: VCs for hand placement on RW during session     Bed mobility  Comment: Pt up at EOB on arrival, returned to chair on exit      Transfers  Stand Step Transfers: Contact guard assistance  Sit to stand: Contact guard assistance  Stand to sit: Contact guard assistance  Transfer Comments: VCs for hand placement on RW during session     Cognition  Overall Cognitive Status: WFL        Perception  Overall Perceptual Status: [facility]     Plan   Plan  Times per week: 3-4 x week  Current Treatment Recommendations: Equipment Evaluation, Education, & procurement, Patient/Caregiver Education & Training, Self-Care / ADL, Safety Education & Training, Endurance Training, Functional Mobility Training    Goals  Short term goals  Time Frame for Short term goals: Pt will, by discharge:   Short term goal 1: Dem I with adl performance utilizing energy conservation tech  Short term goal 2: Dem I with functional transfers  Short term goal 3: Dem I with dynamic mobility with least restricted device  Short term goal 4: Dem good safety awareness tech for all transfers/mobility  Short term goal 5: Dem a 15 min dynamic task with sba to increase activity tolerance       Therapy Time   Individual Concurrent Group Co-treatment   Time In 1050         Time Out 1120         Minutes 30         Timed Code Treatment Minutes: 30 Minutes   See above for LOF. RN reports patient is medically stable for therapy treatment this date. Chart reviewed prior to treatment and patient is agreeable for therapy. All lines intact and patient positioned comfortably at end of treatment. All patient needs addressed prior to ending therapy session.       Mc Proctor, OTR/L

## 2020-03-26 NOTE — PROGRESS NOTES
Report called to mehnaz Anaya at [facility] rehab. Discharge plan review and all questions answer. Nurse receiving report denied any further questions and/or concerns. Client prepared for discharge and also denies any compliants or concerns. Transportation to rehab set up via .

## 2020-03-26 NOTE — PROGRESS NOTES
repair, endovascular (06/29/2016); Nerve Block (Right, 03/08/2017); joint replacement (Right, 08/22/2017); Total hip arthroplasty (Right, 8/22/2017); and Cardiac catheterization (03/20/2020). Restrictions  Restrictions/Precautions  Restrictions/Precautions: Up as Tolerated, Fall Risk  Required Braces or Orthoses?: No     Subjective   General  Chart Reviewed: Yes  Response To Previous Treatment: Patient with no complaints from previous session. Family / Caregiver Present: No  Subjective  Subjective: RN and pt agreeable to PT. Pt sitting in recliner upon arrival, pleasant and cooperative throughout. Pt reported chest pain, which she thinks is from chest compressions. Pt also reported pain and tingling in her R UE, like she hit her elbow, and that this has been going on for about a week.  (PTA notified the pt's RN of these symptoms)  Pain Screening  Patient Currently in Pain: Yes  Pain Assessment  Pain Assessment: 0-10(see subjective above)  Pain Level: 9  Pain Type: Acute pain  Pain Location: Chest  Pain Orientation: Mid;Anterior  Pain Frequency: Continuous  Non-Pharmaceutical Pain Intervention(s): Ambulation/Increased Activity;Repositioned;Rest;Distraction  Multiple Pain Sites: Yes  Pain 2  Pain Rating 2: 9(see subjective above)  Pain Location 2: Arm  Pain Descriptors 2: Tingling;Nagging;Pins and Needles  Non-Pharmaceutical Pain Intervention(s) 2: Ambulation/Increased Activity;Repositioned;Rest;Distraction  Vital Signs  Patient Currently in Pain: Yes       Orientation  Orientation  Overall Orientation Status: Within Normal Limits(Previous notes report mild confusion)  Orientation Level: Oriented X4    Objective    Transfers  Sit to Stand: Contact guard assistance  Stand to sit: Contact guard assistance  Bed to Chair: Contact guard assistance  Comment: transfers performed with RW; verbal cues for hand placement, with good return  Ambulation  Ambulation?: Yes  Ambulation 1  Surface: level tile  Device: Rolling Walker  Assistance: Contact guard assistance  Gait Deviations: Slow Kavya;Decreased step length  Distance: 250 ft  Stairs/Curb  Stairs?: No(pt declined stairs today)     Balance  Standing - Static: Fair  Standing - Dynamic: Fair  Exercises  Hip Flexion: Standing: BLE x15  Hip Abduction: Standing: BLE x15   &   Seated: BLE x15  Knee Long Arc Quad: Seated: BLE x15  Ankle Pumps: Seated: BLE x15  Comments: Educated pt on LE/UE (seated and standing) HEP packet and left it with the pt       Goals  Short term goals  Time Frame for Short term goals: 14 visits  Short term goal 1: Perform bed mobility and functional transfers independently  Short term goal 2: Ambulate 300ft with least restrictive AD and supervision  Short term goal 3: Ascend/descend 6 steps with L HR and CGA  Short term goal 4: Participate in 30 minutes of therapy to demo increased endurance  Short term goal 5: Demo Good- dynamic standing balance to decrease risk of falls  Patient Goals   Patient goals : Unable to state    Plan    Plan  Times per week: 5-6x/wk  Current Treatment Recommendations: Strengthening, Balance Training, Functional Mobility Training, Transfer Training, ROM, Endurance Training, Safety Education & Training, Home Exercise Program, Patient/Caregiver Education & Training, Gait Training, Stair training  Safety Devices  Type of devices: Left in bed, Gait belt, Call light within reach, Nurse notified, All fall risk precautions in place, Left in chair, Patient at risk for falls(okay without chair alarm per RN)  Restraints  Initially in place: No  Restraints: (B wrist restraints)     Therapy Time   Individual Concurrent Group Co-treatment   Time In 1351         Time Out 1435         Minutes 44         Timed Code Treatment Minutes: [facility], hospitals

## 2020-03-26 NOTE — PROGRESS NOTES
[facility]  Internal Medicine Teaching Residency Program  Inpatient Daily Progress Note  ______________________________________________________________________________    Patient: Molly Florian  YOB: 1950   CDQ:[id number]    Acct: [de-identified]     Room: [address] [place] [address]  Admit date: 3/11/2020  Today's date: 03/26/20  Number of days in the hospital: 15    SUBJECTIVE     Admitting Diagnosis: Acute respiratory failure with hypoxia and hypercapnia ([address]    CC: Respiratory arrest    Patient seen and examined bedside. Labs and chart reviewed. No acute overnight events. Patient lying comfortably in bed. Status post PCI with PTCA CUONG stents x2 in LCx. Continue aspirin and Brilinta. Started on lisinopril. C/o Rt sided chest pain from, probably from CPR. BRIEF HISTORY     The patient is a 60-year-old female with past medical history of COPD, essential hypertension, descending thoracic aortic aneurysm brought into the emergency department for evaluation of acute onset respiratory arrest and pulselessness. She was given CPR and 1 dose of atropine. She was intubated, sedated on Versed and fentanyl. She was started on Rocephin and azithromycin in the ER for pneumonia. CT PE was negative for pulmonary embolism but showed bilateral pulmonary infiltrates, pleural effusion but showed mural thrombus in descending allograft. Vascular surgery recommended no intervention at this time. Patient was started on heparin drip. Troponins were trending up. Cardiology was consulted. The patient was aspirin, statin and beta-blocker. Echogram shows preserved ejection fraction. She was treated with Solu-Medrol, breathing treatments and antibiotics. She was continued on supportive care while being intubated. She had ASHLEY initially which improved over time. The patient also required pressor support for hypotension. Her sodium levels improved.   For pulmonary edema, she was Continuous Infusions:    dextrose       PRN Medicationsketorolac, 15 mg, Q6H PRN  melatonin, 3 mg, Nightly PRN  potassium chloride, 40 mEq, PRN    Or  potassium alternative oral replacement, 40 mEq, PRN    Or  potassium chloride, 10 mEq, PRN  benzonatate, 100 mg, TID PRN  benzocaine-menthol, 1 lozenge, Q2H PRN  glucose, 15 g, PRN  dextrose, 12.5 g, PRN  glucagon (rDNA), 1 mg, PRN  dextrose, 100 mL/hr, PRN  sodium chloride flush, 10 mL, PRN  acetaminophen, 650 mg, Q6H PRN    Or  acetaminophen, 650 mg, Q6H PRN  polyethylene glycol, 17 g, Daily PRN  promethazine, 12.5 mg, Q6H PRN    Or  ondansetron, 4 mg, Q6H PRN  magnesium sulfate, 2 g, PRN  albuterol, 2.5 mg, As Directed RT PRN        Diagnostic Labs:  CBC:   Recent Labs     03/24/20  0553 03/25/20  0822 03/26/20  0456   WBC 14.7* 16.0* 13.1*   RBC 3.06* 3.52* 3.24*   HGB 9.4* 10.8* 9.8*   HCT 29.7* 35.2* 32.3*   MCV 97.1 100.0 99.7   RDW 15.7* 16.3* 16.4*    325 301     BMP:   Recent Labs     03/24/20  0553 03/25/20  0822 03/26/20  0456   * 142 145*   K 3.5* 3.6* 3.8   * 109* 110*   CO2 26 21 22   BUN 15 14 21   CREATININE 0.70 0.74 0.83     APTT:   Recent Labs     03/23/20  1239 03/23/20  1524   APTT 46.7* 40.4*       MICROBIOLOGY:   Lab Results   Component Value Date/Time    CULTURE NORMAL RESPIRATORY JOHN LIGHT GROWTH 03/11/2020 05:29 PM       Imaging:    Xr Abdomen (kub) (single Ap View)    Result Date: 3/17/2020  Nonspecific, nonobstructive bowel gas pattern NG tube in place, tip and proximal port in the upper body of the stomach     Xr Chest Portable    Result Date: 3/19/2020  Interval extubation. Findings consistent with mild interstitial edema with small effusions. Xr Chest Portable    Result Date: 3/17/2020  1. Much improved pneumatization bilateral lungs with nearly resolved sequela of congestive heart failure seen on yesterday's study.  2. Low endotracheal tube positioning only about 11 mm superior to the gautam; this could be

## 2020-04-04 NOTE — DISCHARGE SUMMARY
[address]     Department of Internal Medicine - Staff Internal Medicine Teaching Service    INPATIENT DISCHARGE SUMMARY      Patient Identification:  Neena Downs is a 78 y.o. female. :  1950  MRN: [id number]     Acct: [de-identified]   PCP: Moncho Fink MD  Admit Date:  3/11/2020  Discharge date and time: 3/26/2020  6:23 PM   Attending Provider: Dr. Noni Wing Problem Lists:  Principal Problem:    Acute respiratory failure with hypoxia and hypercapnia (HCC)  Active Problems:    Shortness of breath    COPD with acute exacerbation (HCC)    Acute pulmonary edema (HCC)    NSTEMI (non-ST elevated myocardial infarction) ([facility]    Shock circulatory (HCC)    Thrombus of aorta (HCC)    Lactic acidosis    Pneumonia of both upper lobes due to infectious organism [facility])    Respiratory arrest ([facility] [facility]  Resolved Problems:    * No resolved hospital problems.  *      HOSPITAL STAY     Brief Inpatient course:     Neena Downs is a 78 y.o. female with past medical history of COPD, essential hypertension, descending thoracic aortic aneurysm who was admitted for the management of Acute respiratory failure with hypoxia and hypercapnia ([facility] [address] [facility] presented to the emergency department for evaluation of acute onset respiratory arrest and pulselessness.  She was given CPR and 1 dose of atropine.  She was intubated, sedated on Versed and fentanyl.  She was started on Rocephin and azithromycin in the ER for pneumonia.  CT PE was negative for pulmonary embolism but showed bilateral pulmonary infiltrates, pleural effusion but showed mural thrombus in descending allograft.  Vascular surgery recommended no intervention at this time.  Patient was started on heparin drip.  Troponins were trending up.  Cardiology was consulted.  The patient was aspirin, statin and beta-blocker.  Echogram shows preserved ejection fraction.  She was treated with Solu-Medrol, breathing treatments and antibiotics.  She was continued on supportive care while being intubated.  She had ASHLEY initially which improved over time.  The patient also required pressor support for hypotension.  Her sodium levels improved.  For pulmonary edema, she was given intermittent doses of Lasix.  The patient is extubated.  Cardiology had a plan to do cardiac cath once the patient has been stabilized.  The patient also has a history of alcohol intoxication and alcohol withdrawals.  As per the , the patient is a daily drinker and drinks 1 pint of beer every day. Hospital problems and management:  1. Acute respiratory failure with hypoxia secondary to pneumonia and COPD exacerbation: Resolved. Continue prednisone taper. Continue incentive spirometry  2. PEA arrest in ED with elevated troponins: S/P CPR for 1 minute. ROSC obtained. Off heparin drip currently. Lidocaine patch and toradol as needed Encourage incentive spirometry  3. Coronary artery disease: S/P coronary angiogram with PCI 3/23/2020. With PTCA CUONG stents x2 in proximal and mid LCx. Continue aspirin and Brilinta. Follow-up with cardiology as outpatient. Outpatient cardiac rehab. 4. Descending Aortic mural thrombus: Off anticoagulation per vascular surgery. Follow-up with CTA chest with vascular surgery/cardiology as outpatient. 5. Alcohol withdrawal: Strongly encouraged to quit alcoholism. Patient voiced understanding and is willing to quit. Procedures/ Significant Interventions:    coronary angiogram with PCI 3/23/2020. With PTCA CUONG stents x2 in proximal and mid LCx.     Consults:     Consults:     Final Specialist Recommendations/Findings:   IP CONSULT TO CRITICAL CARE  IP CONSULT TO VASCULAR SURGERY  IP CONSULT TO CARDIOLOGY  IP CONSULT TO DIETITIAN  IP CONSULT TO CASE MANAGEMENT  IP CONSULT TO CARDIAC REHAB      Any Hospital Acquired Infections: none    Discharge Functional Status:  stable    DISCHARGE rehab  Take aspirin  And brilinta    Follow up labs: None  Follow up imaging: None    Note that over 30 minutes was spent in preparing discharge papers, discussing discharge with patient, medication review, etc.      Ximena Gage MD, MD  Internal Medicine Resident, PGY-1  [address], [place]  4/4/2020, 2:13 PM

## 2020-05-14 ENCOUNTER — TELEPHONE (OUTPATIENT)
Dept: VASCULAR SURGERY | Age: 70
End: 2020-05-14

## 2020-05-14 NOTE — TELEPHONE ENCOUNTER
Called referring providers office regarding referral diagnosis to see if they had any testing or office notes that support the diagnosis. Spoke to Edna, she states she will send over a SANDY report and the last office note with the referring provider.

## 2020-06-12 ENCOUNTER — HOSPITAL ENCOUNTER (OUTPATIENT)
Dept: CARDIAC REHAB | Age: 70
Setting detail: THERAPIES SERIES
Discharge: HOME | End: 2020-06-12
Payer: MEDICARE

## 2020-06-12 VITALS
BODY MASS INDEX: 31.13 KG/M2 | HEIGHT: 61 IN | DIASTOLIC BLOOD PRESSURE: 72 MMHG | SYSTOLIC BLOOD PRESSURE: 126 MMHG | WEIGHT: 164.9 LBS | TEMPERATURE: 95.9 F

## 2020-06-12 PROCEDURE — 93798 PHYS/QHP OP CAR RHAB W/ECG: CPT

## 2020-06-12 ASSESSMENT — PATIENT HEALTH QUESTIONNAIRE - PHQ9: SUM OF ALL RESPONSES TO PHQ QUESTIONS 1-9: 0

## 2020-06-12 NOTE — PROGRESS NOTES
Pt oriented to Cardiac Rehab, goals set, CAD Risk Factors and Prevention video viewed. Consents signed. Education provided on diet, wt loss, exercise, stress and RPT.

## 2020-06-17 ENCOUNTER — HOSPITAL ENCOUNTER (OUTPATIENT)
Dept: CARDIAC REHAB | Age: 70
Setting detail: THERAPIES SERIES
End: 2020-06-17
Payer: MEDICARE

## 2020-06-19 ENCOUNTER — HOSPITAL ENCOUNTER (OUTPATIENT)
Dept: CARDIAC REHAB | Age: 70
Setting detail: THERAPIES SERIES
End: 2020-06-19
Payer: MEDICARE

## 2020-06-24 ENCOUNTER — HOSPITAL ENCOUNTER (OUTPATIENT)
Dept: CARDIAC REHAB | Age: 70
Setting detail: THERAPIES SERIES
Discharge: HOME | End: 2020-06-24
Payer: MEDICARE

## 2020-06-24 VITALS — BODY MASS INDEX: 30.04 KG/M2 | WEIGHT: 159 LBS | TEMPERATURE: 98.1 F

## 2020-06-24 PROCEDURE — 93798 PHYS/QHP OP CAR RHAB W/ECG: CPT

## 2020-06-26 ENCOUNTER — HOSPITAL ENCOUNTER (OUTPATIENT)
Dept: CARDIAC REHAB | Age: 70
Setting detail: THERAPIES SERIES
Discharge: HOME | End: 2020-06-26
Payer: MEDICARE

## 2020-06-26 VITALS — TEMPERATURE: 96.3 F | WEIGHT: 159.4 LBS | BODY MASS INDEX: 30.12 KG/M2

## 2020-06-26 PROCEDURE — 93798 PHYS/QHP OP CAR RHAB W/ECG: CPT

## 2020-06-26 RX ORDER — M-VIT,TX,IRON,MINS/CALC/FOLIC 27MG-0.4MG
1 TABLET ORAL DAILY
COMMUNITY
End: 2022-05-18

## 2020-06-26 RX ORDER — MAGNESIUM GLUCONATE 30 MG(550)
1 TABLET ORAL DAILY
COMMUNITY

## 2020-06-26 RX ORDER — METHOCARBAMOL 500 MG/1
500 TABLET, FILM COATED ORAL 3 TIMES DAILY PRN
COMMUNITY
End: 2022-05-18

## 2020-06-26 RX ORDER — OYSTER SHELL CALCIUM WITH VITAMIN D 500; 200 MG/1; [IU]/1
1 TABLET, FILM COATED ORAL DAILY
COMMUNITY

## 2020-06-26 RX ORDER — MAGNESIUM OXIDE 400 MG/1
400 TABLET ORAL DAILY
COMMUNITY

## 2020-06-26 RX ORDER — CHLORAL HYDRATE 500 MG
1000 CAPSULE ORAL DAILY
COMMUNITY

## 2020-07-01 ENCOUNTER — HOSPITAL ENCOUNTER (OUTPATIENT)
Dept: CARDIAC REHAB | Age: 70
Setting detail: THERAPIES SERIES
Discharge: HOME | End: 2020-07-01
Payer: MEDICARE

## 2020-07-01 VITALS — BODY MASS INDEX: 30.21 KG/M2 | WEIGHT: 159.9 LBS

## 2020-07-01 PROCEDURE — 93798 PHYS/QHP OP CAR RHAB W/ECG: CPT

## 2020-07-08 ENCOUNTER — HOSPITAL ENCOUNTER (OUTPATIENT)
Dept: CARDIAC REHAB | Age: 70
Setting detail: THERAPIES SERIES
Discharge: HOME | End: 2020-07-08
Payer: MEDICARE

## 2020-07-08 VITALS — BODY MASS INDEX: 30.8 KG/M2 | WEIGHT: 163 LBS | TEMPERATURE: 96.8 F

## 2020-07-08 PROCEDURE — 93798 PHYS/QHP OP CAR RHAB W/ECG: CPT

## 2020-07-10 ENCOUNTER — HOSPITAL ENCOUNTER (OUTPATIENT)
Dept: CARDIAC REHAB | Age: 70
Setting detail: THERAPIES SERIES
Discharge: HOME | End: 2020-07-10
Payer: MEDICARE

## 2020-07-10 VITALS — TEMPERATURE: 96.8 F | BODY MASS INDEX: 30.67 KG/M2 | WEIGHT: 162.3 LBS

## 2020-07-10 PROCEDURE — 93798 PHYS/QHP OP CAR RHAB W/ECG: CPT

## 2020-07-15 ENCOUNTER — HOSPITAL ENCOUNTER (OUTPATIENT)
Dept: CARDIAC REHAB | Age: 70
Setting detail: THERAPIES SERIES
Discharge: HOME | End: 2020-07-15
Payer: MEDICARE

## 2020-07-15 VITALS — TEMPERATURE: 96.3 F | WEIGHT: 161.5 LBS | HEIGHT: 61 IN | BODY MASS INDEX: 30.49 KG/M2

## 2020-07-15 PROCEDURE — 93798 PHYS/QHP OP CAR RHAB W/ECG: CPT

## 2020-07-15 NOTE — PROGRESS NOTES
ITP updated, goals reviewed, pt losing some weight, continues to work in increased strength and endurance and losing more weight. Pt educated on cholesterol lowering nutrition therapy, educational handouts provided.

## 2020-07-17 ENCOUNTER — HOSPITAL ENCOUNTER (OUTPATIENT)
Dept: CARDIAC REHAB | Age: 70
Setting detail: THERAPIES SERIES
Discharge: HOME | End: 2020-07-17
Payer: MEDICARE

## 2020-07-17 VITALS — TEMPERATURE: 96 F | BODY MASS INDEX: 30.78 KG/M2 | WEIGHT: 162.9 LBS

## 2020-07-17 PROCEDURE — 93798 PHYS/QHP OP CAR RHAB W/ECG: CPT

## 2020-07-21 ENCOUNTER — HOSPITAL ENCOUNTER (OUTPATIENT)
Age: 70
Setting detail: SPECIMEN
Discharge: HOME | End: 2020-07-21
Payer: MEDICARE

## 2020-07-22 ENCOUNTER — HOSPITAL ENCOUNTER (OUTPATIENT)
Dept: CARDIAC REHAB | Age: 70
Setting detail: THERAPIES SERIES
Discharge: HOME | End: 2020-07-22
Payer: MEDICARE

## 2020-07-22 VITALS — TEMPERATURE: 95.7 F | BODY MASS INDEX: 30.7 KG/M2 | WEIGHT: 162.5 LBS

## 2020-07-22 PROCEDURE — 93798 PHYS/QHP OP CAR RHAB W/ECG: CPT

## 2020-07-24 ENCOUNTER — HOSPITAL ENCOUNTER (OUTPATIENT)
Dept: CARDIAC REHAB | Age: 70
Setting detail: THERAPIES SERIES
Discharge: HOME | End: 2020-07-24
Payer: MEDICARE

## 2020-07-24 VITALS — TEMPERATURE: 94.9 F | BODY MASS INDEX: 30.63 KG/M2 | WEIGHT: 162.1 LBS

## 2020-07-24 LAB — CYTOLOGY REPORT: NORMAL

## 2020-07-24 PROCEDURE — 93798 PHYS/QHP OP CAR RHAB W/ECG: CPT

## 2020-07-24 NOTE — PROGRESS NOTES
Goals reviewed, pt states feeling stronger and has more endurance since starting Cardiac Rehab, continues to work on these goals as well as losing weight.

## 2020-07-29 ENCOUNTER — HOSPITAL ENCOUNTER (OUTPATIENT)
Dept: CARDIAC REHAB | Age: 70
Setting detail: THERAPIES SERIES
Discharge: HOME | End: 2020-07-29
Payer: MEDICARE

## 2020-07-29 VITALS — TEMPERATURE: 96.6 F | WEIGHT: 162.3 LBS | BODY MASS INDEX: 30.67 KG/M2

## 2020-07-29 PROCEDURE — 93798 PHYS/QHP OP CAR RHAB W/ECG: CPT

## 2020-07-31 ENCOUNTER — HOSPITAL ENCOUNTER (OUTPATIENT)
Dept: CARDIAC REHAB | Age: 70
Setting detail: THERAPIES SERIES
Discharge: HOME | End: 2020-07-31
Payer: MEDICARE

## 2020-07-31 VITALS — TEMPERATURE: 96.1 F | BODY MASS INDEX: 30.53 KG/M2 | WEIGHT: 161.6 LBS

## 2020-07-31 PROCEDURE — 93798 PHYS/QHP OP CAR RHAB W/ECG: CPT

## 2020-08-05 ENCOUNTER — HOSPITAL ENCOUNTER (OUTPATIENT)
Dept: CARDIAC REHAB | Age: 70
Setting detail: THERAPIES SERIES
Discharge: HOME | End: 2020-08-05
Payer: MEDICARE

## 2020-08-07 ENCOUNTER — HOSPITAL ENCOUNTER (OUTPATIENT)
Dept: CARDIAC REHAB | Age: 70
Setting detail: THERAPIES SERIES
Discharge: HOME | End: 2020-08-07
Payer: MEDICARE

## 2020-08-12 ENCOUNTER — HOSPITAL ENCOUNTER (OUTPATIENT)
Dept: CARDIAC REHAB | Age: 70
Setting detail: THERAPIES SERIES
Discharge: HOME | End: 2020-08-12
Payer: MEDICARE

## 2020-08-12 VITALS — WEIGHT: 161.4 LBS | BODY MASS INDEX: 30.5 KG/M2 | TEMPERATURE: 95.7 F

## 2020-08-12 PROCEDURE — 93798 PHYS/QHP OP CAR RHAB W/ECG: CPT

## 2020-08-12 NOTE — PROGRESS NOTES
Goal reviewed-pt states she is feeling stronger since beginning cardiac rehab. Provided with handout on staying hydrated.

## 2020-08-14 ENCOUNTER — HOSPITAL ENCOUNTER (OUTPATIENT)
Dept: CARDIAC REHAB | Age: 70
Setting detail: THERAPIES SERIES
Discharge: HOME | End: 2020-08-14
Payer: MEDICARE

## 2020-08-14 VITALS — HEIGHT: 61 IN | TEMPERATURE: 95.4 F | BODY MASS INDEX: 30.29 KG/M2 | WEIGHT: 160.4 LBS

## 2020-08-14 PROCEDURE — 93798 PHYS/QHP OP CAR RHAB W/ECG: CPT

## 2020-08-19 ENCOUNTER — HOSPITAL ENCOUNTER (OUTPATIENT)
Dept: CARDIAC REHAB | Age: 70
Setting detail: THERAPIES SERIES
Discharge: HOME | End: 2020-08-19
Payer: MEDICARE

## 2020-08-19 VITALS — TEMPERATURE: 96.7 F | BODY MASS INDEX: 30.21 KG/M2 | WEIGHT: 159.9 LBS

## 2020-08-19 PROCEDURE — 93798 PHYS/QHP OP CAR RHAB W/ECG: CPT

## 2020-08-21 ENCOUNTER — HOSPITAL ENCOUNTER (OUTPATIENT)
Dept: CARDIAC REHAB | Age: 70
Setting detail: THERAPIES SERIES
Discharge: HOME | End: 2020-08-21
Payer: MEDICARE

## 2020-08-21 VITALS — WEIGHT: 161.9 LBS | TEMPERATURE: 96 F | BODY MASS INDEX: 30.59 KG/M2

## 2020-08-21 PROCEDURE — 93798 PHYS/QHP OP CAR RHAB W/ECG: CPT

## 2020-08-25 ENCOUNTER — HOSPITAL ENCOUNTER (OUTPATIENT)
Dept: PHYSICAL THERAPY | Age: 70
Setting detail: THERAPIES SERIES
Discharge: HOME | End: 2020-08-25
Payer: MEDICARE

## 2020-08-25 PROCEDURE — 97161 PT EVAL LOW COMPLEX 20 MIN: CPT

## 2020-08-25 NOTE — CONSULTS
[x] [facility]  Outpatient Physical Therapy  [address]  Phone: [phone]  Fax: [phone] [] [facility] at [address] [place] [address]  Phone: [phone]   Fax: [phone]     Physical Therapy Evaluation    Date:  2020  Patient: Patti Locke  :   MRN: [id number]  Physician: Jacob Bennett MD     Insurance: [facility] Medicare need Em Akhtar for secondary  Medical Diagnosis: M47.816 Lumbar facet arthropathy, M62.830 Muscle spasm of back, M54.16 Lumbar radiculopathy, M46.1 Sacroillitis, M54.41 Lumbago with right side sciatica. Rehab Codes: M54.5, R26.2, M25.551, M25.661  Onset Date: 20                                 Next 's appt:    Subjective:   CC: Patient reports low back and hip stiffness over the past few months. A recent heart procedure ( stents placed)  and COVID-19 closings have limited her physical activity. Patient reports her low back pain increases with prolonged standing, walking, bending, and lifting. Recently began using a cane for ambulation. Patient hopes to return to her line dancing and other social gatherings once she is feeling better and COVID restrictions are lifted.    HPI: R AIDEE 2017, prior PT episode for B hip bursitis 2018    PMHx: [] Unremarkable [] Diabetes [] HTN  [] Pacemaker   [x] MI/Heart Problems [] Cancer [] Arthritis [] Asthma                         [] refer to full medical chart  In EPIC  [] Other:      Tests: [] X-Ray: [] MRI:  [] Other:    Medications: [x] Refer to full medical record [] None [] Other:  Allergies:      [x] Refer to full medical record  [] None [] Other:    Function:    Patient lives with: Alone   In what type of home []  One story   [x] Two story   [] Split level   Number of stairs to enter 3   With handrail on the [x]  Right to enter   [] Left to enter   Bathroom has a []  Tub only  [x] Tub/shower combo   [] Walk in shower    []  Grab bars   Washing machine is on [x]  Main level   [] Second level   [] Basement     ADL/IADL Previous level of function Current level of function Who currently assists the patient with task   Bathing  [x] Independent  [] Assist [x] Independent  [] Assist    Dress/grooming [x] Independent  [] Assist [x] Independent  [] Assist    Transfer/mobility [x] Independent  [] Assist [x] Independent  [] Assist    Feeding [x] Independent  [] Assist [x] Independent  [] Assist    Toileting [x] Independent  [] Assist [x] Independent  [] Assist    Driving [x] Independent  [] Assist [x] Independent  [] Assist    Housekeeping [x] Independent  [] Assist [x] Independent  [] Assist    Grocery shop/meal prep [x] Independent  [] Assist [x] Independent  [] Assist      Gait Prior level of function Current level of function    [x] Independent  [] Assist [x] Independent  [] Assist   Device: [x] Independent [x] Independent    [] Straight Cane [] Quad cane [] Straight Cane [] Quad cane    [] Standard walker [] Rolling walker   [] 4 wheeled walker [] Standard walker [] Rolling walker   [] 4 wheeled walker    [] Wheelchair [] Wheelchair     Working:  [] Full-time  [] Part-time  [] Off d/t condition  [x] Retired  [] Disability  [] N/A  Job/Employer:    Pain:  [x] Yes  [] No Location: low back  Pain Rating: (0-10 scale) 9/10  Pain altered Tx:  [] Yes  [x] No  Action:    Objective: p = pain, L = Lacks      ROM  ° A/P STRENGTH  ROM    Left Right Left Right Cervical    Shoulder Flex     Flexion    Abduction     Extension    ER     Rotation L R    IR     Side bending L R   Elbow Flex          Ext          Wrist Flex         Ext     Lumbar    Hand      Flexion 80%   Hip Flexion    4/5 4/5 Extension 10%   Ext   4/5 4/5 Rotation L 50% R 50%   Abd   4/5 4/5 Sidebend L 50% R 50%   Add     -------------------- --------- --------   ER     STRENGTH     IR     Abdominals 4-/5    Knee Flex     Erector Spinae 4-/5    Ext      Scapular L R   Ankle DF     -Scaption     PF      -Retraction Inversion     -Horz Abd     Eversion     -Extension        Special Tests:   Positive:Left SI cluster        Negative: SLR     OBSERVATION Comments   Posture Fwd head, trunk flexed in standing    Joint Alignment Anterior pelviic tilt, increased lumbar lordosis   Gait No deficit    Palpation Tender left SI   Edema No deficit   Neurological No deficit     Assessment: Patient presents with low back and SI pain due to underlying degeneration and recent sedentary lifestyle causing core and hip weakness. Patient will benefit from PT to improve lumbopelvic ROM and strengthening to improve functional mobility. Problems:    [x] ? Pain: 9/10 low back/ SI  [x] ? ROM: all trunk motion especially extension   [x] ? Flexibility: hamstrings, hip flexor  [x] ? Strength:Abs, glutes  [x] ? Function:Oswestry score 52% impaired   [] Other:     STG: (to be met in 8 treatments)  1. ? Pain: 3/10 low back pain with standing activity. 2. ? Strength: 4+/5 B hip extension to improve lumbopelvic stability. 3. ? Function: Oswestry score to 32% impaired or better to improve ADLs. 4. Independent with Home Exercise Programs    LTG: (to be met in 12 treatments)  1. Patient will be bend and lift household items without pain. 2. Patient is able to ambulate community distances without cane. Patient goals: Return to normal activity. Rehab Potential:  [x] Good  [] Fair  [] Poor   Suggested Professional Referral:  [x] No  [] Yes:  Barriers to Goal Achievement[de-identified]  [x] No  [] Yes:  Domestic Concerns:  [x] No  [] Yes:    Pt. Education:  [x] Plans/Goals, Risks/Benefits discussed  [x] Home exercise program, Standing hip/core ex  Method of Education: [x] Verbal  [x] Demo  [x] Written  Comprehension of Education:  [x] Verbalizes understanding. [x] Demonstrates understanding. [x] Needs Review. [] Demonstrates/verbalizes understanding of HEP/Ed previously given.     Treatment Plan:  [x] Therapeutic Exercise   52832  [x] Vasopneumatic cold with compression  [id number]    [x] Therapeutic Activity  21668 [x] Cold/hotpack    [x] Gait Training   [phone] [] Lumbar/Cervical Traction  [id number]   [x] Neuromuscular Re-education  [phone] [x] Electrical Stimulation Unattended  72343   [x] Manual Therapy    80590 [x] Electrical Stimulation Attended  [id number]   [] Iontophoresis: 4 mg/mL Dexamethasone Sodium Phosphate  mAmin  34424 []  Medication allergies reviewed for use of   Dexamethasone Sodium Phosphate 4mg/ml with iontophoresis treatments. Pt is not allergic. Frequency:  2 x/week for 12 visits    Todays Treatment:  Precautions:  Modalities:   Exercises:  Exercise Reps/ Time Weight/ Level Comments   4 way hip  10x  Handout and demo only    Supine hip program   Handout only         Other:    Specific Instructions for next treatment:Hip and core strengthening, postural correction. Evaluation Complexity:  History (Personal factors, comorbidities) [] 0 [x] 1-2 [] 3+   Exam (limitations, restrictions) [] 1-2 [x] 3 [] 4+   Clinical presentation (progression) [x] Stable [] Evolving  [] Unstable   Decision Making [x] Low [] Moderate [] High    [x] Low Complexity [] Moderate Complexity [] High Complexity       Treatment Charges: Mins Units   [x] Evaluation       [x]  Low       []  Moderate       []  High 25 1   []  Modalities     []  Ther Exercise     []  Manual Therapy     []  Ther Activities     []  Aquatics     []  Vasocompression     []  Other       TOTAL TREATMENT TIME: 0, Eval only      Time in:1300     Time out:1330    Electronically signed by: Shital Lee PT        Physician Signature:________________________________Date:__________________  By signing above or cosigning this note, I have reviewed this plan of care and certify a need for medically necessary rehabilitation services.      *PLEASE SIGN ABOVE AND FAX BACK ALL PAGES*

## 2020-08-26 ENCOUNTER — HOSPITAL ENCOUNTER (OUTPATIENT)
Dept: CARDIAC REHAB | Age: 70
Setting detail: THERAPIES SERIES
Discharge: HOME | End: 2020-08-26
Payer: MEDICARE

## 2020-08-26 VITALS — WEIGHT: 159.7 LBS | TEMPERATURE: 96.3 F | BODY MASS INDEX: 30.18 KG/M2

## 2020-08-26 PROCEDURE — 93798 PHYS/QHP OP CAR RHAB W/ECG: CPT

## 2020-08-28 ENCOUNTER — HOSPITAL ENCOUNTER (OUTPATIENT)
Dept: CARDIAC REHAB | Age: 70
Setting detail: THERAPIES SERIES
Discharge: HOME | End: 2020-08-28
Payer: MEDICARE

## 2020-08-28 VITALS — TEMPERATURE: 95.4 F | WEIGHT: 158.8 LBS | BODY MASS INDEX: 30 KG/M2

## 2020-08-28 PROCEDURE — 93798 PHYS/QHP OP CAR RHAB W/ECG: CPT

## 2020-09-01 ENCOUNTER — HOSPITAL ENCOUNTER (EMERGENCY)
Age: 70
Discharge: HOME | End: 2020-09-01
Attending: EMERGENCY MEDICINE
Payer: MEDICARE

## 2020-09-01 VITALS
SYSTOLIC BLOOD PRESSURE: 158 MMHG | DIASTOLIC BLOOD PRESSURE: 78 MMHG | WEIGHT: 159 LBS | OXYGEN SATURATION: 98 % | TEMPERATURE: 99 F | BODY MASS INDEX: 29.26 KG/M2 | RESPIRATION RATE: 16 BRPM | HEART RATE: 72 BPM | HEIGHT: 62 IN

## 2020-09-01 VITALS
TEMPERATURE: 97.3 F | HEART RATE: 94 BPM | WEIGHT: 159.9 LBS | HEIGHT: 62 IN | OXYGEN SATURATION: 98 % | RESPIRATION RATE: 16 BRPM | BODY MASS INDEX: 29.43 KG/M2 | SYSTOLIC BLOOD PRESSURE: 155 MMHG | DIASTOLIC BLOOD PRESSURE: 80 MMHG

## 2020-09-01 PROCEDURE — 99283 EMERGENCY DEPT VISIT LOW MDM: CPT

## 2020-09-01 PROCEDURE — 30901 CONTROL OF NOSEBLEED: CPT

## 2020-09-01 PROCEDURE — 2580000003 HC RX 258: Performed by: EMERGENCY MEDICINE

## 2020-09-01 PROCEDURE — 99282 EMERGENCY DEPT VISIT SF MDM: CPT

## 2020-09-01 PROCEDURE — 2500000003 HC RX 250 WO HCPCS: Performed by: EMERGENCY MEDICINE

## 2020-09-01 PROCEDURE — 6370000000 HC RX 637 (ALT 250 FOR IP): Performed by: EMERGENCY MEDICINE

## 2020-09-01 RX ORDER — HYDROCODONE BITARTRATE AND ACETAMINOPHEN 5; 325 MG/1; MG/1
1 TABLET ORAL ONCE
Status: COMPLETED | OUTPATIENT
Start: 2020-09-01 | End: 2020-09-01

## 2020-09-01 RX ORDER — CLINDAMYCIN HYDROCHLORIDE 150 MG/1
300 CAPSULE ORAL ONCE
Status: COMPLETED | OUTPATIENT
Start: 2020-09-01 | End: 2020-09-01

## 2020-09-01 RX ORDER — CLINDAMYCIN HYDROCHLORIDE 300 MG/1
300 CAPSULE ORAL 4 TIMES DAILY
Qty: 40 CAPSULE | Refills: 0 | Status: SHIPPED | OUTPATIENT
Start: 2020-09-01 | End: 2020-09-11

## 2020-09-01 RX ORDER — TRANEXAMIC ACID 100 MG/ML
500 INJECTION, SOLUTION INTRAVENOUS ONCE
Status: COMPLETED | OUTPATIENT
Start: 2020-09-01 | End: 2020-09-01

## 2020-09-01 RX ORDER — ONDANSETRON 4 MG/1
4 TABLET, ORALLY DISINTEGRATING ORAL ONCE
Status: COMPLETED | OUTPATIENT
Start: 2020-09-01 | End: 2020-09-01

## 2020-09-01 RX ADMIN — CLINDAMYCIN HYDROCHLORIDE 300 MG: 150 CAPSULE ORAL at 00:34

## 2020-09-01 RX ADMIN — HYDROCODONE BITARTRATE AND ACETAMINOPHEN 1 TABLET: 5; 325 TABLET ORAL at 16:55

## 2020-09-01 RX ADMIN — TRANEXAMIC ACID 500 MG: 1 INJECTION, SOLUTION INTRAVENOUS at 14:35

## 2020-09-01 RX ADMIN — HYDROCODONE BITARTRATE AND ACETAMINOPHEN 1 TABLET: 5; 325 TABLET ORAL at 00:34

## 2020-09-01 RX ADMIN — ONDANSETRON 4 MG: 4 TABLET, ORALLY DISINTEGRATING ORAL at 00:33

## 2020-09-01 RX ADMIN — WATER 5 ML: 1 INJECTION INTRAMUSCULAR; INTRAVENOUS; SUBCUTANEOUS at 14:35

## 2020-09-01 ASSESSMENT — PAIN SCALES - GENERAL
PAINLEVEL_OUTOF10: 9
PAINLEVEL_OUTOF10: 1
PAINLEVEL_OUTOF10: 10

## 2020-09-01 NOTE — ED PROVIDER NOTES
EMERGENCY DEPARTMENT ENCOUNTER    Pt Name: Nithya Dao  MRN: [id number]  [place] 1950  Date of evaluation: 9/1/20  CHIEF COMPLAINT       Chief Complaint   Patient presents with    Epistaxis     pt states that her nose bleed started around 2225 this evening; states that she has used ice and stuffed her nose with cloths. Pt is on blood thinner     HISTORY OF PRESENT ILLNESS   HPI    HISTORY OF PRESENT ILLNESS:  Past medical history of respiratory arrest presents for chief complaint of nosebleed. Patient is on Brilinta. Has had a nosebleed for the past few hours. Coming out of her left nostril. No lightheadedness or dizziness. No trauma. Severity is moderate. No aggravating or relieving factors. Timing is few hours. Course constant. Context blood thinners.   -----------------------  -----------------------  REVIEW OF SYSTEMS  *see ED Caveat  ED Caveat: [none]  Gen:  No fever  CV: No CP, no palpitations  Resp: No SOB, no respiratory distress  GI: No V/D, no abd pain  : No dysuria, no increased frequency  Skin: No rash, no purulent lesions  Eyes: No blurry vision, No double vision  MSK: No back pain, no joint pain  Neuro: No HA, no sensation changes  Psych: No SI/HI  -----------------------  -----------------------  ALLERGIES  -per nursing records, reviewed    PAST MEDICAL HISTORY  -See HPI    SOCIAL HISTORY  -No daily drinking, no IV drugs  -----------------------  -----------------------  PHYSICAL EXAM  Gen: no acute distress  Skin: no rashes  Head: Normocephalic, atraumatic  Neck: no nuchal rigidity  Eye: PERRLA, normal conjunctiva  ENT: Mucous membranes moist  CV: Normal rate  Resp: Respirations unlabored  MSK: no large joint effusions  ABD: Non distended  Neuro: Alert and oriented, no focal neurological deficits observed  Psych: Cooperative  -----------------------  -----------------------  MEDICAL DECISION MAKING  Differential Diagnosis:  - Consideration is given for   anterior nosebleed, posterior nosebleed, trauma  -  #Impression/Plan:  - Clinically patient's presentation is most consistent with anterior nosebleed. Packing is placed. Symptoms resolved. -   -----------------------  Nose Bleed Control Procedure  Consent: Responsible party, emergent  Indication: Nosebleed  Location: left nare  Preparation: External Pressure  Description: [anterior] packing, [muricell]  Post Procedure Bleeding: minimal  Complications: [none]  -----------------------  Jordan Almaraz MD, SIENNA  Emergency Medicine Attending  Questions? Please contact my cell phone anytime. [phone]  *This charting supersedes any ED resident or staff charting and was written using speech recognition software      ## The patient was evaluated during the global COVID-19 pandemic, and that diagnosis was suspected/considered upon their initial presentation.       PASTMEDICAL HISTORY     Past Medical History:   Diagnosis Date    Arthritis     Chronic back pain     COPD (chronic obstructive pulmonary disease) (formerly [facility])     Elevated troponin level 03/2020    Hyperlipidemia     Hypertension     Respiratory arrest ([facility] [address] 03/11/2020    Thrombus 03/2020    descending aortic mural thrombus    Wears partial dentures     UPPER AND LOWER     SURGICAL HISTORY       Past Surgical History:   Procedure Laterality Date    ABDOMINAL AORTIC ANEURYSM REPAIR, ENDOVASCULAR  06/29/2016    thoracic    CARDIAC CATHETERIZATION  03/20/2020    CORONARY ANGIOPLASTY WITH STENT PLACEMENT  03/23/2020    JOINT REPLACEMENT Left 2011    HIP    JOINT REPLACEMENT Right 08/22/2017    ant. total hip    NERVE BLOCK Right 03/08/2017    right hip arthrogram injection depomedrol 80mg    TOTAL HIP ARTHROPLASTY Right 8/22/2017    HIP TOTAL ARTHROPLASTY ANTERIOR APPROACH - MEDACTA, C-ARM, MEDACTA TABLE, FASCIA ILIACA BLOCK, NSA=SPINAL VS GENERAL  performed by Guero Lucero DO at [address] [place] [address]       Previous Medications    ACETAMINOPHEN (TYLENOL 8 HOUR) 650 MG EXTENDED RELEASE TABLET    Take 1 tablet by mouth every 8 hours as needed for Pain    ALBUTEROL SULFATE HFA (VENTOLIN HFA) 108 (90 BASE) MCG/ACT INHALER    Inhale 2 puffs into the lungs 4 times daily as needed for Wheezing    ALENDRONATE (FOSAMAX) 35 MG TABLET    Take 35 mg by mouth every 7 days Sunday    AMLODIPINE (NORVASC) 10 MG TABLET    Take 1 tablet by mouth daily    ASPIRIN 81 MG CHEWABLE TABLET    Take 1 tablet by mouth daily    ATORVASTATIN (LIPITOR) 40 MG TABLET    Take 40 mg by mouth daily     CALCIUM-VITAMIN D (OSCAL-500) 500-200 MG-UNIT PER TABLET    Take 1 tablet by mouth daily    CHANTIX STARTING MONTH JOEL 0.5 MG X 11 & 1 MG X 42 TABLET    On 5/20/16 states she has not yet started    DOCUSATE SODIUM (COLACE) 100 MG CAPSULE    Take 1 capsule by mouth 2 times daily    GARLIC 0544 MG CAPS    Take by mouth    IPRATROPIUM-ALBUTEROL (DUONEB) 0.5-2.5 (3) MG/3ML SOLN NEBULIZER SOLUTION    Inhale 3 mLs into the lungs every 4 hours as needed for Shortness of Breath    LIDOCAINE 4 % EXTERNAL PATCH    Place 1 patch onto the skin daily    LISINOPRIL (PRINIVIL;ZESTRIL) 10 MG TABLET    Take 1 tablet by mouth daily    LORATADINE (CLARITIN) 10 MG TABLET    Take 10 mg by mouth daily    MAGNESIUM OXIDE (MAG-OX) 400 MG TABLET    Take 400 mg by mouth daily    METHOCARBAMOL (ROBAXIN) 500 MG TABLET    Take 500 mg by mouth 3 times daily as needed    METOPROLOL TARTRATE (LOPRESSOR) 50 MG TABLET    Take 1 tablet by mouth 2 times daily    MISC.  DEVICES (RAISED TOILET SEAT) MISC    1 Device by Does not apply route daily    MULTIPLE VITAMIN (MULTIVITAMIN) TABLET    Take 1 tablet by mouth daily    MULTIPLE VITAMINS-MINERALS (THERAPEUTIC MULTIVITAMIN-MINERALS) TABLET    Take 1 tablet by mouth daily    MUPIROCIN (BACTROBAN NASAL) 2 % NASAL OINTMENT    Take by Nasal route 2 times daily for 5 days    OMEGA-3 FATTY ACIDS (FISH OIL) 1000 MG CAPS    Take 3,000 mg by mouth 3 times daily    POTASSIUM GLUCONATE 595 (99 K) MG TABS    Take by mouth    TICAGRELOR (BRILINTA) 90 MG TABS TABLET    Take 1 tablet by mouth 2 times daily    TIOTROPIUM (SPIRIVA HANDIHALER) 18 MCG INHALATION CAPSULE    Inhale 1 capsule into the lungs daily    TRAMADOL (ULTRAM) 50 MG TABLET    Take 1 tablet by mouth 3 times daily as needed for Pain     ALLERGIES     is allergic to penicillins. FAMILY HISTORY     She indicated that her mother is . She indicated that her father is . She indicated that the status of her maternal grandmother is unknown.      SOCIAL HISTORY       Social History     Tobacco Use    Smoking status: Current Every Day Smoker     Packs/day: 0.50     Years: 38.00     Pack years: 19.00    Smokeless tobacco: Never Used    Tobacco comment: WAS AT 1.5 PPD   Substance Use Topics    Alcohol use: Yes     Comment: drinks heavily, a bottle of \"something\" per day    Drug use: No     PHYSICAL EXAM     INITIAL VITALS: BP (!) 155/80   Pulse 94   Temp 97.3 °F (36.3 °C) (Tympanic)   Resp 16   Ht 5' 2\" (1.575 m)   Wt 159 lb 14.4 oz (72.5 kg)   SpO2 98%   BMI 29.25 kg/m²    Physical Exam    MEDICAL DECISION MAKING:            Labs Reviewed - No data to display  EMERGENCY DEPARTMENTCOURSE:         Vitals:    Vitals:    20 0017   BP: (!) 155/80   Pulse: 94   Resp: 16   Temp: 97.3 °F (36.3 °C)   TempSrc: Tympanic   SpO2: 98%   Weight: 159 lb 14.4 oz (72.5 kg)   Height: 5' 2\" (1.575 m)       The patient was given the following medications while in the emergency department:  Orders Placed This Encounter   Medications    HYDROcodone-acetaminophen (NORCO) 5-325 MG per tablet 1 tablet    ondansetron (ZOFRAN-ODT) disintegrating tablet 4 mg    clindamycin (CLEOCIN) capsule 300 mg    clindamycin (CLEOCIN) 300 MG capsule     Sig: Take 1 capsule by mouth 4 times daily for 10 days     Dispense:  40 capsule     Refill:  0     CONSULTS:  None    FINAL IMPRESSION      1. Epistaxis          DISPOSITION/PLAN   DISPOSITION Decision To Discharge 09/01/2020 12:31:06 AM      PATIENT REFERRED TO:  Salud Herndon MD  [address]  [phone]          DISCHARGE MEDICATIONS:  New Prescriptions    CLINDAMYCIN (CLEOCIN) 300 MG CAPSULE    Take 1 capsule by mouth 4 times daily for 10 days     Todd Ruano MD  Attending Emergency Physician                    Guadalupe Cruz MD  09/01/20 0324

## 2020-09-01 NOTE — ED PROVIDER NOTES
Respiratory arrest ([facility] [address] R09.2     SURGICAL HISTORY       Past Surgical History:   Procedure Laterality Date    ABDOMINAL AORTIC ANEURYSM REPAIR, ENDOVASCULAR  06/29/2016    thoracic    CARDIAC CATHETERIZATION  03/20/2020    CORONARY ANGIOPLASTY WITH STENT PLACEMENT  03/23/2020    JOINT REPLACEMENT Left 2011    HIP    JOINT REPLACEMENT Right 08/22/2017    ant. total hip    NERVE BLOCK Right 03/08/2017    right hip arthrogram injection depomedrol 80mg    TOTAL HIP ARTHROPLASTY Right 8/22/2017    HIP TOTAL ARTHROPLASTY ANTERIOR APPROACH - MEDACTA, C-ARM, MEDACTA TABLE, FASCIA ILIACA BLOCK, NSA=SPINAL VS GENERAL  performed by Bekah Ugarte DO at [place] [address]       Discharge Medication List as of 9/1/2020  4:52 PM      CONTINUE these medications which have NOT CHANGED    Details   clindamycin (CLEOCIN) 300 MG capsule Take 1 capsule by mouth 4 times daily for 10 days, Disp-40 capsule,R-0Print      magnesium oxide (MAG-OX) 400 MG tablet Take 400 mg by mouth dailyHistorical Med      calcium-vitamin D (OSCAL-500) 500-200 MG-UNIT per tablet Take 1 tablet by mouth dailyHistorical Med      !! Multiple Vitamins-Minerals (THERAPEUTIC MULTIVITAMIN-MINERALS) tablet Take 1 tablet by mouth dailyHistorical Med      Potassium Gluconate 595 (99 K) MG TABS Take by mouthHistorical Med      Omega-3 Fatty Acids (FISH OIL) 1000 MG CAPS Take 3,000 mg by mouth 3 times dailyHistorical Med      Garlic 9320 MG CAPS Take by mouthHistorical Med      methocarbamol (ROBAXIN) 500 MG tablet Take 500 mg by mouth 3 times daily as neededHistorical Med      lisinopril (PRINIVIL;ZESTRIL) 10 MG tablet Take 1 tablet by mouth daily, Disp-30 tablet, R-2Normal      acetaminophen (TYLENOL 8 HOUR) 650 MG extended release tablet Take 1 tablet by mouth every 8 hours as needed for Pain, Disp-60 tablet, R-3Normal      lidocaine 4 % external patch Place 1 patch onto the skin daily, Transdermal, DAILY Starting Fri 3/27/2020, Disp-15 patch, R-1, Normal      albuterol sulfate HFA (VENTOLIN HFA) 108 (90 Base) MCG/ACT inhaler Inhale 2 puffs into the lungs 4 times daily as needed for Wheezing, Disp-3 Inhaler, R-1Normal      tiotropium (SPIRIVA HANDIHALER) 18 MCG inhalation capsule Inhale 1 capsule into the lungs daily, Disp-30 capsule, R-3Normal      ipratropium-albuterol (DUONEB) 0.5-2.5 (3) MG/3ML SOLN nebulizer solution Inhale 3 mLs into the lungs every 4 hours as needed for Shortness of Breath, Disp-360 mL, R-2Normal      aspirin 81 MG chewable tablet Take 1 tablet by mouth daily, Disp-30 tablet, R-3Normal      metoprolol tartrate (LOPRESSOR) 50 MG tablet Take 1 tablet by mouth 2 times daily, Disp-60 tablet, R-3Normal      ticagrelor (BRILINTA) 90 MG TABS tablet Take 1 tablet by mouth 2 times daily, Disp-60 tablet, R-2Normal      !! Multiple Vitamin (MULTIVITAMIN) tablet Take 1 tablet by mouth daily, Disp-40 tablet, R-2Normal      amLODIPine (NORVASC) 10 MG tablet Take 1 tablet by mouth daily, Disp-30 tablet, R-3Normal      traMADol (ULTRAM) 50 MG tablet Take 1 tablet by mouth 3 times daily as needed for Pain, Disp-21 tablet, R-0Normal      docusate sodium (COLACE) 100 MG capsule Take 1 capsule by mouth 2 times daily, Disp-30 capsule, R-0Print      Misc. Devices (RAISED TOILET SEAT) MISC DAILY Starting 8/24/2017, Until Discontinued, Disp-1 each, R-0, Normal      loratadine (CLARITIN) 10 MG tablet Take 10 mg by mouth dailyHistorical Med      mupirocin (BACTROBAN NASAL) 2 % nasal ointment Take by Nasal route 2 times daily for 5 days, Disp-1 Tube, R-0, Normal      CHANTIX STARTING MONTH JOEL 0.5 MG X 11 & 1 MG X 42 tablet On 5/20/16 states she has not yet started, R-0, MARINA      alendronate (FOSAMAX) 35 MG tablet Take 35 mg by mouth every 7 days Hira, R-0      atorvastatin (LIPITOR) 40 MG tablet Take 40 mg by mouth daily , R-0       !! - Potential duplicate medications found. Please discuss with provider.         ALLERGIES     is allergic to

## 2020-09-02 ENCOUNTER — HOSPITAL ENCOUNTER (OUTPATIENT)
Dept: CARDIAC REHAB | Age: 70
Setting detail: THERAPIES SERIES
Discharge: HOME | End: 2020-09-02
Payer: MEDICARE

## 2020-09-02 ENCOUNTER — HOSPITAL ENCOUNTER (EMERGENCY)
Age: 70
Discharge: HOME | End: 2020-09-02
Attending: EMERGENCY MEDICINE
Payer: MEDICARE

## 2020-09-02 VITALS
RESPIRATION RATE: 18 BRPM | BODY MASS INDEX: 29.26 KG/M2 | WEIGHT: 159 LBS | DIASTOLIC BLOOD PRESSURE: 78 MMHG | OXYGEN SATURATION: 97 % | TEMPERATURE: 98.3 F | HEIGHT: 62 IN | HEART RATE: 76 BPM | SYSTOLIC BLOOD PRESSURE: 146 MMHG

## 2020-09-02 PROCEDURE — 99283 EMERGENCY DEPT VISIT LOW MDM: CPT

## 2020-09-02 RX ORDER — HYDROCODONE BITARTRATE AND ACETAMINOPHEN 5; 325 MG/1; MG/1
1 TABLET ORAL EVERY 6 HOURS PRN
Qty: 10 TABLET | Refills: 0 | Status: SHIPPED | OUTPATIENT
Start: 2020-09-02 | End: 2020-09-05

## 2020-09-02 ASSESSMENT — PAIN DESCRIPTION - PAIN TYPE: TYPE: ACUTE PAIN

## 2020-09-02 ASSESSMENT — PAIN DESCRIPTION - DESCRIPTORS: DESCRIPTORS: PRESSURE

## 2020-09-02 ASSESSMENT — PAIN DESCRIPTION - LOCATION: LOCATION: NOSE

## 2020-09-02 ASSESSMENT — PAIN SCALES - GENERAL: PAINLEVEL_OUTOF10: 9

## 2020-09-02 NOTE — ED PROVIDER NOTES
[address] [place] [address] ED  EMERGENCY DEPARTMENT ENCOUNTER      Pt Name: Janelle Mcdonnell  MRN: [id number]  [place] 1950  Date of evaluation: 9/2/2020  Provider: Lata Mcfadden       Chief Complaint   Patient presents with    Other     rhino rocket problem         HISTORY OFPRESENT ILLNESS  (Location/Symptom, Timing/Onset, Context/Setting, Quality, Duration, Modifying Factors, Severity.)   Janelle Mcdonnell is a 69 y.o. female who presents to the emergency department for evaluation of nasal pain after she had a Rhino Rocket placed yesterday emergency department after having bleeding to her left nostril for the past 2 days. Rates her pain 10 out of 10. Denies fever or chills. She was placed on clindamycin which she is been taking. Denies oozing around the packing. Nursing Notes were reviewed.     PASTMEDICAL HISTORY     Past Medical History:   Diagnosis Date    Arthritis     Chronic back pain     COPD (chronic obstructive pulmonary disease) (HCC)     Elevated troponin level 03/2020    Hyperlipidemia     Hypertension     Respiratory arrest ([facility] [address] 03/11/2020    Thrombus 03/2020    descending aortic mural thrombus    Wears partial dentures     UPPER AND LOWER         SURGICAL HISTORY       Past Surgical History:   Procedure Laterality Date    ABDOMINAL AORTIC ANEURYSM REPAIR, ENDOVASCULAR  06/29/2016    thoracic    CARDIAC CATHETERIZATION  03/20/2020    CORONARY ANGIOPLASTY WITH STENT PLACEMENT  03/23/2020    JOINT REPLACEMENT Left 2011    HIP    JOINT REPLACEMENT Right 08/22/2017    ant. total hip    NERVE BLOCK Right 03/08/2017    right hip arthrogram injection depomedrol 80mg    TOTAL HIP ARTHROPLASTY Right 8/22/2017    HIP TOTAL ARTHROPLASTY ANTERIOR APPROACH - MEDACTA, C-ARM, MEDACTA TABLE, FASCIA ILIACA BLOCK, NSA=SPINAL VS GENERAL  performed by Rajendra Magdaleno DO at [address] [place] [address]     Previous Medications    ACETAMINOPHEN (TYLENOL 8 HOUR) 650 CAPS    Take 3,000 mg by mouth 3 times daily    POTASSIUM GLUCONATE 595 (99 K) MG TABS    Take by mouth    TICAGRELOR (BRILINTA) 90 MG TABS TABLET    Take 1 tablet by mouth 2 times daily    TIOTROPIUM (SPIRIVA HANDIHALER) 18 MCG INHALATION CAPSULE    Inhale 1 capsule into the lungs daily    TRAMADOL (ULTRAM) 50 MG TABLET    Take 1 tablet by mouth 3 times daily as needed for Pain       ALLERGIES     Penicillins    FAMILY HISTORY       Family History   Problem Relation Age of Onset    COPD Mother     Prostate Cancer Father     Hypertension Father     Diabetes Maternal Grandmother           SOCIAL HISTORY       Social History     Socioeconomic History    Marital status:      Spouse name: None    Number of children: None    Years of education: None    Highest education level: None   Occupational History    None   Social Needs    Financial resource strain: None    Food insecurity     Worry: None     Inability: None    Transportation needs     Medical: None     Non-medical: None   Tobacco Use    Smoking status: Current Every Day Smoker     Packs/day: 0.50     Years: 38.00     Pack years: 19.00    Smokeless tobacco: Never Used    Tobacco comment: WAS AT 1.5 PPD   Substance and Sexual Activity    Alcohol use: Yes     Comment: drinks heavily, a bottle of \"something\" per day    Drug use: No    Sexual activity: None   Lifestyle    Physical activity     Days per week: None     Minutes per session: None    Stress: None   Relationships    Social connections     Talks on phone: None     Gets together: None     Attends Buddhist service: None     Active member of club or organization: None     Attends meetings of clubs or organizations: None     Relationship status: None    Intimate partner violence     Fear of current or ex partner: None     Emotionally abused: None     Physically abused: None     Forced sexual activity: None   Other Topics Concern    None   Social History Narrative    None REVIEW OF SYSTEMS    (2-9 systems for level 4, 10 or more for level 5)     Review of Systems   HENT: Positive for nosebleeds. All other systems reviewed and are negative. Except as noted above the remainder of the review of systems was reviewed and negative. PHYSICAL EXAM    (up to 7 for level 4, 8 or more for level 5)     ED Triage Vitals [09/02/20 1022]   BP Temp Temp Source Pulse Resp SpO2 Height Weight   (!) 146/78 98.3 °F (36.8 °C) Oral 76 18 97 % 5' 2\" (1.575 m) 159 lb (72.1 kg)       Physical Exam  Constitutional:       Appearance: Normal appearance. She is normal weight. HENT:      Head: Normocephalic. Right Ear: External ear normal.      Left Ear: External ear normal.      Mouth/Throat:      Comments: Rhino Rocket is in place in left nostril. There is no bleeding around the [employer]. No blood in the back of the throat. Eyes:      Extraocular Movements: Extraocular movements intact. Conjunctiva/sclera: Conjunctivae normal.      Pupils: Pupils are equal, round, and reactive to light. Neck:      Musculoskeletal: Normal range of motion. Pulmonary:      Effort: Pulmonary effort is normal. No respiratory distress. Musculoskeletal: Normal range of motion. Skin:     General: Skin is dry. Neurological:      Mental Status: She is alert and oriented to person, place, and time. Psychiatric:         Mood and Affect: Mood normal.         Behavior: Behavior normal.         Thought Content:  Thought content normal.         Judgment: Judgment normal.           DIAGNOSTIC RESULTS     EKG:All EKG's are interpreted by the Emergency Department Physician who either signs or Co-signs this chart in the absence of a cardiologist.        RADIOLOGY:   Non-plain film images such as CT, Ultrasound and MRI are read by theradiologist. Plain radiographic images are visualized and preliminarily interpreted by the emergency physician with the below findings:        Interpretation per the Radiologist below, if available at the time of this note:    No orders to display         EDBEDSIDE ULTRASOUND:   Performed by Сергей Molina - none    LABS:  Labs Reviewed - No data to display    All other labs were within normal range or not returned as of this dictation. EMERGENCY DEPARTMENT COURSE andDIFFERENTIAL DIAGNOSIS/MDM:   The patient was evaluated in conjunction with attending physician. Rhino Rocket is in place. No bleeding present. I did remove 2 cc of air from the [place]. Patient states she felt a little relief. She is in pain management but is currently out of her tramadol. I will write her for 10 tablets of Norco for her nose pain from the [place]. Patient states she is currently taking Brilinta and aspirin. I instructed her to call her cardiologist office to discuss with them if she should hold those medications until after she follows up with ENT in 2 days. Return precautions provided. Vitals:    Vitals:    09/02/20 1022   BP: (!) 146/78   Pulse: 76   Resp: 18   Temp: 98.3 °F (36.8 °C)   TempSrc: Oral   SpO2: 97%   Weight: 159 lb (72.1 kg)   Height: 5' 2\" (1.575 m)         CONSULTS:  None    RES:  Procedures    FINAL IMPRESSION      1. Left-sided epistaxis    2. Nasal pain          DISPOSITION/PLAN   DISPOSITION Decision To Discharge 09/02/2020 10:59:39 AM      PATIENT REFERRED TO:     Follow-up with ENT physician as scheduled on Friday. [facility] ED  [address] [facility]  [phone]    If symptoms worsen      DISCHARGE MEDICATIONS:     New Prescriptions    HYDROCODONE-ACETAMINOPHEN (NORCO) 5-325 MG PER TABLET    Take 1 tablet by mouth every 6 hours as needed for Pain for up to 3 days. Intended supply: 3 days.  Take lowest dose possible to manage pain     Electronically signed by YOMI Patel 9/2/2020 at 11:06 AM           YOMI Patel CNP  09/02/20 [id number]

## 2020-09-04 ENCOUNTER — HOSPITAL ENCOUNTER (EMERGENCY)
Age: 70
Discharge: HOME | End: 2020-09-04
Attending: EMERGENCY MEDICINE
Payer: MEDICARE

## 2020-09-04 ENCOUNTER — HOSPITAL ENCOUNTER (OUTPATIENT)
Dept: CARDIAC REHAB | Age: 70
Setting detail: THERAPIES SERIES
Discharge: HOME | End: 2020-09-04
Payer: MEDICARE

## 2020-09-04 VITALS
DIASTOLIC BLOOD PRESSURE: 89 MMHG | HEART RATE: 87 BPM | BODY MASS INDEX: 25.86 KG/M2 | SYSTOLIC BLOOD PRESSURE: 162 MMHG | TEMPERATURE: 98.3 F | WEIGHT: 160.9 LBS | HEIGHT: 66 IN | OXYGEN SATURATION: 96 % | RESPIRATION RATE: 20 BRPM

## 2020-09-04 PROCEDURE — 99281 EMR DPT VST MAYX REQ PHY/QHP: CPT

## 2020-09-04 RX ORDER — HYDROCODONE BITARTRATE AND ACETAMINOPHEN 5; 325 MG/1; MG/1
1 TABLET ORAL EVERY 8 HOURS PRN
Qty: 6 TABLET | Refills: 0 | Status: SHIPPED | OUTPATIENT
Start: 2020-09-04 | End: 2020-09-06

## 2020-09-04 ASSESSMENT — PAIN SCALES - GENERAL: PAINLEVEL_OUTOF10: 9

## 2020-09-04 ASSESSMENT — PAIN DESCRIPTION - LOCATION: LOCATION: NOSE

## 2020-09-05 NOTE — ED PROVIDER NOTES
[address] [place] [address] ED  EMERGENCY DEPARTMENT ENCOUNTER      Pt Name: Margorie Leventhal  MRN: [id number]  [place] 1950  Date of evaluation: 9/4/2020  Provider: YOMI Yan - CNP    CHIEF COMPLAINT       Chief Complaint   Patient presents with    Medication Refill         HISTORY OFPRESENT ILLNESS  (Location/Symptom, Timing/Onset, Context/Setting, Quality, Duration, Modifying Factors, Severity.)   Margorie Leventhal is a 69 y.o. female who presents to the emergency department for evaluation of left nasal pain. Patient was evaluated here last week for epistaxis. She had nasal packing placed and was referred to ENT which she followed-up with. Patient states she had cauterization of her left nostril several days ago by ENT and is still having pain. Rates her pain 9 out of 10. Nursing Notes were reviewed.     PASTMEDICAL HISTORY     Past Medical History:   Diagnosis Date    Arthritis     Chronic back pain     COPD [facility]chronic obstructive [facility] disease) [facility])     Elevated troponin level 03/2020    Hyperlipidemia     Hypertension     Respiratory arrest ([facility] [address] 03/11/2020    Thrombus 03/2020    descending aortic mural thrombus    Wears partial dentures     UPPER AND LOWER         SURGICAL HISTORY       Past Surgical History:   Procedure Laterality Date    ABDOMINAL AORTIC ANEURYSM REPAIR, ENDOVASCULAR  06/29/2016    thoracic    CARDIAC CATHETERIZATION  03/20/2020    CORONARY ANGIOPLASTY WITH STENT PLACEMENT  03/23/2020    JOINT REPLACEMENT Left 2011    HIP    JOINT REPLACEMENT Right 08/22/2017    ant. total hip    NERVE BLOCK Right 03/08/2017    right hip arthrogram injection depomedrol 80mg    TOTAL HIP ARTHROPLASTY Right 8/22/2017    HIP TOTAL ARTHROPLASTY ANTERIOR APPROACH - MEDACTA, C-ARM, MEDACTA TABLE, FASCIA ILIACA BLOCK, NSA=SPINAL VS GENERAL  performed by Genaro Auguste DO at . [address]     Previous Medications    ACETAMINOPHEN (TYLENOL 8 HOUR) 650 MG MULTIVITAMIN-MINERALS) TABLET    Take 1 tablet by mouth daily    MUPIROCIN (BACTROBAN NASAL) 2 % NASAL OINTMENT    Take by Nasal route 2 times daily for 5 days    OMEGA-3 FATTY ACIDS (FISH OIL) 1000 MG CAPS    Take 3,000 mg by mouth 3 times daily    POTASSIUM GLUCONATE 595 (99 K) MG TABS    Take by mouth    TICAGRELOR (BRILINTA) 90 MG TABS TABLET    Take 1 tablet by mouth 2 times daily    TIOTROPIUM (SPIRIVA HANDIHALER) 18 MCG INHALATION CAPSULE    Inhale 1 capsule into the lungs daily    TRAMADOL (ULTRAM) 50 MG TABLET    Take 1 tablet by mouth 3 times daily as needed for Pain       ALLERGIES     Penicillins    FAMILY HISTORY       Family History   Problem Relation Age of Onset    COPD Mother     Prostate Cancer Father     Hypertension Father     Diabetes Maternal Grandmother           SOCIAL HISTORY       Social History     Socioeconomic History    Marital status:      Spouse name: Not on file    Number of children: Not on file    Years of education: Not on file    Highest education level: Not on file   Occupational History    Not on file   Social Needs    Financial resource strain: Not on file    Food insecurity     Worry: Not on file     Inability: Not on file    Transportation needs     Medical: Not on file     Non-medical: Not on file   Tobacco Use    Smoking status: Current Every Day Smoker     Packs/day: 0.50     Years: 38.00     Pack years: 19.00    Smokeless tobacco: Never Used    Tobacco comment: WAS AT 1.5 PPD   Substance and Sexual Activity    Alcohol use: Yes     Comment: drinks heavily, a bottle of \"something\" per day    Drug use: No    Sexual activity: Not on file   Lifestyle    Physical activity     Days per week: Not on file     Minutes per session: Not on file    Stress: Not on file   Relationships    Social connections     Talks on phone: Not on file     Gets together: Not on file     Attends [employer] service: Not on file     Active member of club or organization: Not on file     Attends meetings of clubs or organizations: Not on file     Relationship status: Not on file    Intimate partner violence     Fear of current or ex partner: Not on file     Emotionally abused: Not on file     Physically abused: Not on file     Forced sexual activity: Not on file   Other Topics Concern    Not on file   Social History Narrative    Not on file         REVIEW OF SYSTEMS    (2-9 systems for level 4, 10 or more for level 5)     Review of Systems   All other systems reviewed and are negative. Except as noted above the remainder of the review of systems was reviewed and negative. PHYSICAL EXAM    (up to 7 for level 4, 8 or more for level 5)     ED Triage Vitals [09/04/20 2307]   BP Temp Temp Source Pulse Resp SpO2 Height Weight   (!) 162/89 98.3 °F (36.8 °C) Oral 87 20 96 % 5' 6\" (1.676 m) 160 lb 14.4 oz (73 kg)       Physical Exam  Constitutional:       Appearance: Normal appearance. She is normal weight. HENT:      Head: Normocephalic. Right Ear: External ear normal.      Left Ear: External ear normal.      Nose:      Right Nostril: No epistaxis. Left Nostril: No epistaxis. Eyes:      Extraocular Movements: Extraocular movements intact. Conjunctiva/sclera: Conjunctivae normal.      Pupils: Pupils are equal, round, and reactive to light. Neck:      Musculoskeletal: Normal range of motion. Pulmonary:      Effort: Pulmonary effort is normal. No respiratory distress. Musculoskeletal: Normal range of motion. Skin:     General: Skin is warm and dry. Capillary Refill: Capillary refill takes less than 2 seconds. Neurological:      Mental Status: She is alert and oriented to person, place, and time. Psychiatric:         Mood and Affect: Mood normal.         Behavior: Behavior normal.         Thought Content:  Thought content normal.         Judgment: Judgment normal.           DIAGNOSTIC RESULTS     EKG:All EKG's are interpreted by the Emergency Department Physician who either signs or Co-signs this chart in the absence of a cardiologist.        RADIOLOGY:   Non-plain film images such as CT, Ultrasound and MRI are read by theradiologist. Plain radiographic images are visualized and preliminarily interpreted by the emergency physician with the below findings:        Interpretation per the Radiologist below, if available at the time of this note:    No orders to display         EDBEDSIDE ULTRASOUND:   Performed by Patricia Ventura - none    LABS:  Labs Reviewed - No data to display    All other labs were within normal range or not returned as of this dictation. EMERGENCY DEPARTMENT COURSE andDIFFERENTIAL DIAGNOSIS/MDM:   Patient presents with left nostril pain after having several cauterizations due to epistaxis. Patient states she is out of her pain medication. OARRS reviewed. Patient was provided with prescription for 6 tablets of Norco.  I informed the patient that she will not receive any further pain medication from the emergency department and she is to follow-up with her primary care doctor as well as her ENT doctor. Vitals:    Vitals:    09/04/20 2307   BP: (!) 162/89   Pulse: 87   Resp: 20   Temp: 98.3 °F (36.8 °C)   TempSrc: Oral   SpO2: 96%   Weight: 160 lb 14.4 oz (73 kg)   Height: 5' 6\" (1.676 m)         CONSULTS:  None    RES:  Procedures    FINAL IMPRESSION      1. Nasal pain          DISPOSITION/PLAN   DISPOSITION Decision To Discharge 09/04/2020 11:27:56 PM      PATIENT REFERRED TO:   Remington Philip MD  [address] [facility]  [phone]    Schedule an appointment as soon as possible for a visit       [facility] ED  [address] [facility]  [phone]    As needed      DISCHARGE MEDICATIONS:     New Prescriptions    HYDROCODONE-ACETAMINOPHEN (NORCO) 5-325 MG PER TABLET    Take 1 tablet by mouth every 8 hours as needed for Pain for up to 2 days.      Electronically signed by YOMI Carroll

## 2020-09-08 ENCOUNTER — HOSPITAL ENCOUNTER (EMERGENCY)
Age: 70
Discharge: HOME | End: 2020-09-08
Attending: EMERGENCY MEDICINE
Payer: MEDICARE

## 2020-09-08 VITALS
TEMPERATURE: 98.7 F | OXYGEN SATURATION: 96 % | WEIGHT: 161.3 LBS | RESPIRATION RATE: 14 BRPM | BODY MASS INDEX: 30.45 KG/M2 | HEIGHT: 61 IN | HEART RATE: 78 BPM | SYSTOLIC BLOOD PRESSURE: 155 MMHG | DIASTOLIC BLOOD PRESSURE: 81 MMHG

## 2020-09-08 PROCEDURE — 99282 EMERGENCY DEPT VISIT SF MDM: CPT

## 2020-09-08 PROCEDURE — 6370000000 HC RX 637 (ALT 250 FOR IP): Performed by: EMERGENCY MEDICINE

## 2020-09-08 RX ORDER — OXYMETAZOLINE HYDROCHLORIDE 0.05 G/100ML
2 SPRAY NASAL 2 TIMES DAILY
Status: COMPLETED | OUTPATIENT
Start: 2020-09-08 | End: 2020-09-08

## 2020-09-08 RX ORDER — CIPROFLOXACIN/HYDROCORTISONE 0.2 %-1 %
3 SUSPENSION, DROPS(FINAL DOSAGE FORM)(ML) OTIC (EAR) 2 TIMES DAILY
Qty: 10 ML | Refills: 0 | Status: SHIPPED | OUTPATIENT
Start: 2020-09-08 | End: 2020-09-15

## 2020-09-08 RX ADMIN — NASAL DECONGESTANT 2 SPRAY: 0.05 SPRAY NASAL at 20:48

## 2020-09-08 ASSESSMENT — PAIN SCALES - GENERAL: PAINLEVEL_OUTOF10: 0

## 2020-09-08 NOTE — ED NOTES
Pt was seen at her PCP where there cleaned her ears out  In that process they scrapped the inner right ear where the patient had some intermittent bleeding  Pt states that she cleaned it out at home and then applied some pressure  Pt states that the bleeding comes and goes  Pt is on 81 Mg ASA  Pt denies any pain at this time  Pt walks with a cane to assigned room from triage   Luis Perez MD at bedside for examination      Cheryle Coss, RN  09/08/20 6387

## 2020-09-09 ENCOUNTER — HOSPITAL ENCOUNTER (OUTPATIENT)
Dept: CARDIAC REHAB | Age: 70
Setting detail: THERAPIES SERIES
Discharge: HOME | End: 2020-09-09
Payer: MEDICARE

## 2020-09-09 NOTE — ED PROVIDER NOTES
EMERGENCY DEPARTMENT ENCOUNTER    Pt Name: Mellisa Mijares  MRN: [id number]  [place] 1950  Date of evaluation: 9/8/20  CHIEF COMPLAINT       Chief Complaint   Patient presents with    Ear Injury     HISTORY OF PRESENT ILLNESS   41-year-old female presents the emergency room today for bleeding of the right ear. Patient reports that her nurse practitioner had cleaned out her ears earlier today and she noticed the bleeding after this. Patient reports that the ear was cleaned using a flush as well as some type of metal object. Patient does not have any pain to the ear. She is following up with ENT on an outpatient basis for recent episode of epistaxis. Patient is on Brilinta. REVIEW OF SYSTEMS     Review of Systems   All other systems reviewed and are negative.       PASTMEDICAL HISTORY     Past Medical History:   Diagnosis Date    Arthritis     Chronic back pain     [facility]     Elevated troponin level 03/2020    Hyperlipidemia     Hypertension     Respiratory arrest ([facility] [address] 03/11/2020    Thrombus 03/2020    descending aortic mural thrombus    Wears partial dentures     UPPER AND LOWER     Past Problem List  Patient Active Problem List   Diagnosis Code    Descending thoracic aortic aneurysm ([facility]) I71.2    Essential hypertension I10    Hyperlipidemia E78.5    COPD (chronic obstructive pulmonary disease) ([facility]) J44.9    Tobacco abuse Z72.0    Normocytic anemia D64.9    Hip pain, left M25.552    Arthritis of right hip M16.11    ASHLEY (acute kidney injury) ([facility] [address] N17.9    Urine retention R33.9    Pure hypercholesterolemia E78.00    Shortness of breath R06.02    Acute respiratory failure with hypoxia and hypercapnia ([facility]) J96.01, J96.02    COPD with acute exacerbation ([facility]) J44.1    Acute pulmonary edema ([facility]) J81.0    NSTEMI (non-ST elevated myocardial infarction) ([facility]) I21.4    Shock circulatory ([facility]) R57.9    Thrombus of aorta ([facility]) I74.10  Lactic acidosis E87.2    Pneumonia of both upper lobes due to infectious organism ([facility] [address] J18.1    Respiratory arrest ([facility] [address] R09.2     SURGICAL HISTORY       Past Surgical History:   Procedure Laterality Date    ABDOMINAL AORTIC ANEURYSM REPAIR, ENDOVASCULAR  06/29/2016    thoracic    CARDIAC CATHETERIZATION  03/20/2020    CORONARY ANGIOPLASTY WITH STENT PLACEMENT  03/23/2020    JOINT REPLACEMENT Left 2011    HIP    JOINT REPLACEMENT Right 08/22/2017    ant. total hip    NERVE BLOCK Right 03/08/2017    right hip arthrogram injection depomedrol 80mg    TOTAL HIP ARTHROPLASTY Right 8/22/2017    HIP TOTAL ARTHROPLASTY ANTERIOR APPROACH - MEDACTA, C-ARM, MEDACTA TABLE, FASCIA ILIACA BLOCK, NSA=SPINAL VS GENERAL  performed by Grace Porter DO at [address] [place] [address]       Previous Medications    ACETAMINOPHEN (TYLENOL 8 HOUR) 650 MG EXTENDED RELEASE TABLET    Take 1 tablet by mouth every 8 hours as needed for Pain    ALBUTEROL SULFATE HFA (VENTOLIN HFA) 108 (90 BASE) MCG/ACT INHALER    Inhale 2 puffs into the lungs 4 times daily as needed for Wheezing    ALENDRONATE (FOSAMAX) 35 MG TABLET    Take 35 mg by mouth every 7 days Sunday    AMLODIPINE (NORVASC) 10 MG TABLET    Take 1 tablet by mouth daily    ASPIRIN 81 MG CHEWABLE TABLET    Take 1 tablet by mouth daily    ATORVASTATIN (LIPITOR) 40 MG TABLET    Take 40 mg by mouth daily     CALCIUM-VITAMIN D (OSCAL-500) 500-200 MG-UNIT PER TABLET    Take 1 tablet by mouth daily    CHANTIX STARTING MONTH JOEL 0.5 MG X 11 & 1 MG X 42 TABLET    On 5/20/16 states she has not yet started    CLINDAMYCIN (CLEOCIN) 300 MG CAPSULE    Take 1 capsule by mouth 4 times daily for 10 days    DOCUSATE SODIUM (COLACE) 100 MG CAPSULE    Take 1 capsule by mouth 2 times daily    GARLIC 7551 MG CAPS    Take by mouth    IPRATROPIUM-ALBUTEROL (DUONEB) 0.5-2.5 (3) MG/3ML SOLN NEBULIZER SOLUTION    Inhale 3 mLs into the lungs every 4 hours as needed for Shortness of Breath LIDOCAINE 4 % EXTERNAL PATCH    Place 1 patch onto the skin daily    LISINOPRIL (PRINIVIL;ZESTRIL) 10 MG TABLET    Take 1 tablet by mouth daily    LORATADINE (CLARITIN) 10 MG TABLET    Take 10 mg by mouth daily    MAGNESIUM OXIDE (MAG-OX) 400 MG TABLET    Take 400 mg by mouth daily    METHOCARBAMOL (ROBAXIN) 500 MG TABLET    Take 500 mg by mouth 3 times daily as needed    METOPROLOL TARTRATE (LOPRESSOR) 50 MG TABLET    Take 1 tablet by mouth 2 times daily    MISC. DEVICES (RAISED TOILET SEAT) MISC    1 Device by Does not apply route daily    MULTIPLE VITAMIN (MULTIVITAMIN) TABLET    Take 1 tablet by mouth daily    MULTIPLE VITAMINS-MINERALS (THERAPEUTIC MULTIVITAMIN-MINERALS) TABLET    Take 1 tablet by mouth daily    MUPIROCIN (BACTROBAN NASAL) 2 % NASAL OINTMENT    Take by Nasal route 2 times daily for 5 days    OMEGA-3 FATTY ACIDS (FISH OIL) 1000 MG CAPS    Take 3,000 mg by mouth 3 times daily    POTASSIUM GLUCONATE 595 (99 K) MG TABS    Take by mouth    TICAGRELOR (BRILINTA) 90 MG TABS TABLET    Take 1 tablet by mouth 2 times daily    TIOTROPIUM (SPIRIVA HANDIHALER) 18 MCG INHALATION CAPSULE    Inhale 1 capsule into the lungs daily    TRAMADOL (ULTRAM) 50 MG TABLET    Take 1 tablet by mouth 3 times daily as needed for Pain     ALLERGIES     is allergic to penicillins. FAMILY HISTORY     She indicated that her mother is . She indicated that her father is . She indicated that the status of her maternal grandmother is unknown.      SOCIAL HISTORY       Social History     Tobacco Use    Smoking status: Current Every Day Smoker     Packs/day: 0.50     Years: 38.00     Pack years: 19.00    Smokeless tobacco: Never Used    Tobacco comment: WAS AT 1.5 PPD   Substance Use Topics    Alcohol use: Yes     Comment: drinks heavily, a bottle of \"something\" per day    Drug use: No     PHYSICAL EXAM     INITIAL VITALS: BP (!) 155/81   Pulse 78   Temp 98.7 °F (37.1 °C)   Resp 14   Ht 5' 1\" (1.549 m) Wt 161 lb 4.8 oz (73.2 kg)   SpO2 96%   BMI 30.48 kg/m²    Physical Exam  Constitutional:       General: She is not in acute distress. Appearance: She is well-developed. HENT:      Head: Normocephalic. Right Ear: There is impacted cerumen. Ears:      Comments: Bleeding in the ear canal, no arterial bleed  Blood and cerumen impacting visualization of the TM  Eyes:      Pupils: Pupils are equal, round, and reactive to light. Cardiovascular:      Rate and Rhythm: Normal rate and regular rhythm. Heart sounds: Normal heart sounds. Pulmonary:      Effort: Pulmonary effort is normal. No respiratory distress. Breath sounds: Normal breath sounds. Abdominal:      General: Bowel sounds are normal.      Palpations: Abdomen is soft. Tenderness: There is no abdominal tenderness. Musculoskeletal: Normal range of motion. Skin:     General: Skin is warm and dry. Neurological:      Mental Status: She is alert and oriented to person, place, and time. MEDICAL DECISION MAKIN-year-old female coming the emergency room for bleeding to the right ear after recent outpatient procedure. Bleeding is venous and not arterial in nature based on examination. Patient was started on Afrin with cotton ball to the ear as instructed by ENT. I discussed with the patient the need to keep the ear dry and to use the eardrops prescribed here in case of trauma to the tympanic membrane. Patient will be following up with ENT later this week due to bleeding. CRITICAL CARE:       PROCEDURES:    Procedures    DIAGNOSTIC RESULTS   EKG:All EKG's are interpreted by the Emergency Department Physician who either signs or Co-signs this chart in the absence of a cardiologist.        RADIOLOGY:All plain film, CT, MRI, and formal ultrasound images (except ED bedside ultrasound) are read by the radiologist, see reports below, unless otherwisenoted in MDM or here.   No orders to display     LABS: All lab results were reviewed by myself, and all abnormals are listed below. Labs Reviewed - No data to display    EMERGENCY DEPARTMENTCOURSE:         Vitals:    Vitals:    09/08/20 1857 09/08/20 1858   BP:  (!) 155/81   Pulse:  78   Resp:  14   Temp:  98.7 °F (37.1 °C)   SpO2:  96%   Weight: 161 lb 4.8 oz (73.2 kg)    Height: 5' 1\" (1.549 m)        The patient was given the following medications while in the emergency department:  Orders Placed This Encounter   Medications    gelatin adsorbable (GELFOAM) sponge 1 each    oxymetazoline (AFRIN) 0.05 % nasal spray 2 spray    ciprofloxacin-hydrocortisone (CIPRO HC) 0.2-1 % otic suspension     Sig: Place 3 drops into the right ear 2 times daily for 7 days     Dispense:  10 mL     Refill:  0     CONSULTS:  None    FINAL IMPRESSION      1.  Otorrhagia of right ear          DISPOSITION/PLAN   DISPOSITION Decision To Discharge 09/08/2020 08:32:17 PM      PATIENT REFERRED TO:  Ankit Ibarra MD  [address] [place] [address]  [phone]    Schedule an appointment as soon as possible for a visit in 1 week      DISCHARGE MEDICATIONS:  New Prescriptions    CIPROFLOXACIN-HYDROCORTISONE (CIPRO HC) 0.2-1 % OTIC SUSPENSION    Place 3 drops into the right ear 2 times daily for 7 days     Leandra Brock MD  Attending Emergency Physician                  Kael Perdomo MD  09/08/20 2820

## 2020-09-09 NOTE — PROGRESS NOTES
Patient called staff to notify she will not be at cardiac rehab today d/t having other dr appointments. Will reschedule session.

## 2020-09-11 ENCOUNTER — HOSPITAL ENCOUNTER (OUTPATIENT)
Dept: CARDIAC REHAB | Age: 70
Setting detail: THERAPIES SERIES
Discharge: HOME | End: 2020-09-11
Payer: MEDICARE

## 2020-09-11 VITALS — TEMPERATURE: 95.8 F | WEIGHT: 161.1 LBS | BODY MASS INDEX: 30.44 KG/M2

## 2020-09-11 PROCEDURE — 93798 PHYS/QHP OP CAR RHAB W/ECG: CPT

## 2020-09-15 ENCOUNTER — HOSPITAL ENCOUNTER (OUTPATIENT)
Dept: PHYSICAL THERAPY | Age: 70
Setting detail: THERAPIES SERIES
Discharge: HOME | End: 2020-09-15
Payer: MEDICARE

## 2020-09-15 PROCEDURE — 97110 THERAPEUTIC EXERCISES: CPT

## 2020-09-15 PROCEDURE — 97140 MANUAL THERAPY 1/> REGIONS: CPT

## 2020-09-15 NOTE — FLOWSHEET NOTE
[] [facility] [address]  [address]  [phone]  F: [phone] [] [address]  [facility]   [address]  P: [phone]  F: [phone] [] [place]  [address] [facility]  P: [phone]  F: [phone] [] [address]  [facility]   [address]   [place]: [phone]  F: [phone]      Physical Therapy Daily Treatment Note    Date:  9/15/2020  Patient Name:  Gladis Mariano    :  1950  MRN: [id number]  Physician: Colt Birmingham MD                         Insurance: [facility] APPROVED 8 VISITS (20-10.7.20), GEHA 60v for secondary  Medical Diagnosis: M47.816 Lumbar facet arthropathy, M62.830 Muscle spasm of back, M54.16 Lumbar radiculopathy, M46.1 Sacroillitis, M54.41 Lumbago with right side sciatica. Rehab Codes: M54.5, R26.2, M25.551, M25.661  Onset Date: 20                                 Next 's appt:  Visit# / total visits: ; Progress note for Medicare patient due at visit 8     Cancels/No Shows: 0/0    Subjective:    Pain:  [x] Yes  [] No  Location: Low back  Pain Rating: (0-10 scale) 8/10  Pain altered Tx:  [] No  [] Yes  Action:  Comments:  Patient arrives with low back pain. Pain starts in back and goes down buttocks and around the front of the R hip. Will have cardiac rehab 2x per week as well as PT.        Objective:  Modalities: CP R ant hip in supine with legs elevated; towel roll under back  Precautions: Cardiac Stent  Exercises:  Exercise Reps/ Time Weight/ Level Comments   SciFit 5'     Standing      Incline stretch 3x20\"     Heel raises 10x     Marches 10x     Hip abd 10x     Hip ext 10x     Hip flexion 10x           Supine      Bridges 10x     SLR  10x     Hip flexor stretch 10x                 Other: Manual Tiger stick to R ant quad x5 min      Treatment Charges: Mins Units   []  Modalities     [x]  Ther Exercise 50 3   [x]  Manual Therapy 5 1   []  Ther Activities     []  Aquatics     []  Vasocompression     []  Other     Total Treatment time 55 4       Assessment: [x] Progressing toward goals. Patient arrived 15 min late. Started with SciFit to increase blood flow. Initiated standing exercises to increase LE strength with good tolerance. Completed mat exercises with fair tolerance with noted pain with SLR. Improved supine tolerance with towel roll for lumbar support. Ended with manual tiger stick to R quad and CP to R ant hip/groin. [] No change. [] Other:  [x] Patient would continue to benefit from skilled physical therapy services in order to: Decrease pain in low back, increase tissue extensibility of hamstrings/ quad. Problems:    [x]? ? Pain: 9/10 low back/ SI  [x]? ? ROM: all trunk motion especially extension   [x]? ? Flexibility: hamstrings, hip flexor  [x]? ? Strength:Abs, glutes  [x]? ? Function:Oswestry score 52% impaired   []? Other:      STG: (to be met in 8 treatments)  1. ? Pain: 3/10 low back pain with standing activity. 2. ? Strength: 4+/5 B hip extension to improve lumbopelvic stability. 3. ? Function: Oswestry score to 32% impaired or better to improve ADLs. 4. Independent with Home Exercise Programs     LTG: (to be met in 12 treatments)  1. Patient will be bend and lift household items without pain. 2. Patient is able to ambulate community distances without cane.      Patient goals: Return to normal activity. Pt. Education:  [x] Yes  [] No  [] Reviewed Prior HEP/Ed  Method of Education: [x] Verbal  [x] Demo  [] Written  Comprehension of Education:  [x] Verbalizes understanding. [x] Demonstrates understanding. [x] Needs review. SLR  [x] Demonstrates/verbalizes HEP/Ed previously given. Plan: [x] Continue current frequency toward long and short term goals.     [x] Specific Instructions for subsequent treatments: Hip and core strengthening, postural correction.        Time In: 1015            Time Out: 1115    Electronically signed by:  Liza Flores PTA

## 2020-09-16 ENCOUNTER — HOSPITAL ENCOUNTER (OUTPATIENT)
Dept: CARDIAC REHAB | Age: 70
Setting detail: THERAPIES SERIES
Discharge: HOME | End: 2020-09-16
Payer: MEDICARE

## 2020-09-16 VITALS — HEIGHT: 61 IN | WEIGHT: 162.7 LBS | TEMPERATURE: 96.8 F | BODY MASS INDEX: 30.72 KG/M2

## 2020-09-16 PROCEDURE — 93798 PHYS/QHP OP CAR RHAB W/ECG: CPT

## 2020-09-17 ENCOUNTER — HOSPITAL ENCOUNTER (OUTPATIENT)
Dept: PHYSICAL THERAPY | Age: 70
Setting detail: THERAPIES SERIES
Discharge: HOME | End: 2020-09-17
Payer: MEDICARE

## 2020-09-17 PROCEDURE — 97140 MANUAL THERAPY 1/> REGIONS: CPT

## 2020-09-17 PROCEDURE — 97110 THERAPEUTIC EXERCISES: CPT

## 2020-09-17 NOTE — FLOWSHEET NOTE
[] [facility]  [address]  [phone]  F: [phone] [] [address]  [facility] 36   [address]  P: [phone]  F: [phone] [] [address]  [address] [facility]  P: [phone]  F: [phone] [] [address]  [facility]   [address]   [place]: [phone]  F: [phone]      Physical Therapy Daily Treatment Note    Date:  2020  Patient Name:  Luci Kendall    :  1950  MRN: [id number]  Physician: Cherylene Lou, MD                         Insurance: [facility] APPROVED 8 VISITS (20-10.7.20), GEHA 60v for secondary  Medical Diagnosis: M47.816 Lumbar facet arthropathy, M62.830 Muscle spasm of back, M54.16 Lumbar radiculopathy, M46.1 Sacroillitis, M54.41 Lumbago with right side sciatica. Rehab Codes: M54.5, R26.2, M25.551, M25.661  Onset Date: 20                                 Next 's appt:  Visit# / total visits: 3/8; Progress note for Medicare patient due at visit 8     Cancels/No Shows: 0/0    Subjective:    Pain:  [x] Yes  [] No  Location: Low back  Pain Rating: (0-10 scale) 8/10  Pain altered Tx:  [x] No  [] Yes  Action:  Comments:  No change in pain. Pain is still along the R side of low back and around the R hip.       Objective:  Modalities: CP R ant hip in supine with legs elevated; towel roll under back  Precautions: Cardiac Stent  Exercises:  Exercise Reps/ Time Weight/ Level Comments   SciFit 5'     Standing      Incline stretch 3x20\"     Heel raises 20x     Marches 20x     Hip abd 10x2     Hip ext 10x2     Hip flexion 10x2           Supine      Hamstring stretch 3x20\"       Butterfly stretch 3x20\"     Hip abd 10x Lime     Hip add 10x     Bridges 10x     SLR  10x     Hip flexor stretch 10x     Hip IR/ER stretch 10x     SL hip flexor 10x     SL clams 10x                             Other: Hyper Volt in SL to R hip/glutes and low back. Soft head on L1 x10 min      Treatment Charges: Mins Units   [x]  Modalities 10 0   [x]  Ther Exercise 59 4   [x]  Manual Therapy 10 1   []  Ther Activities     []  Aquatics     []  Vasocompression     []  Other     Total Treatment time 69 5       Assessment: [x] Progressing toward goals. Increased reps with all therapeutic exercisers with fair tolerance. Required 2 sit down rest break during standing ex d/t muscular fatigue. Added SL hip abd/clams to increase hip strength. Ended with Hyper Volt to R hip/glutes in sidelying followed by CP to decrease pain. [] No change. [] Other:  [x] Patient would continue to benefit from skilled physical therapy services in order to: Decrease pain in low back, increase tissue extensibility of hamstrings/ quad. Problems:    [x]? ? Pain: 9/10 low back/ SI  [x]? ? ROM: all trunk motion especially extension   [x]? ? Flexibility: hamstrings, hip flexor  [x]? ? Strength:Abs, glutes  [x]? ? Function:Oswestry score 52% impaired   []? Other:      STG: (to be met in 8 treatments)  1. ? Pain: 3/10 low back pain with standing activity. 2. ? Strength: 4+/5 B hip extension to improve lumbopelvic stability. 3. ? Function: Oswestry score to 32% impaired or better to improve ADLs. 4. Independent with Home Exercise Programs     LTG: (to be met in 12 treatments)  1. Patient will be bend and lift household items without pain. 2. Patient is able to ambulate community distances without cane.      Patient goals: Return to normal activity. Pt. Education:  [x] Yes  [] No  [] Reviewed Prior HEP/Ed  Method of Education: [x] Verbal  [x] Demo  [] Written  Comprehension of Education:  [x] Verbalizes understanding. [x] Demonstrates understanding. [x] Needs review. SLR  [x] Demonstrates/verbalizes HEP/Ed previously given.    9.17.20 SL clams/abd,

## 2020-09-18 ENCOUNTER — HOSPITAL ENCOUNTER (OUTPATIENT)
Dept: CARDIAC REHAB | Age: 70
Setting detail: THERAPIES SERIES
Discharge: HOME | End: 2020-09-18
Payer: MEDICARE

## 2020-09-18 VITALS — WEIGHT: 161.4 LBS | TEMPERATURE: 97.4 F | BODY MASS INDEX: 30.5 KG/M2

## 2020-09-18 PROCEDURE — 93798 PHYS/QHP OP CAR RHAB W/ECG: CPT

## 2020-09-22 ENCOUNTER — HOSPITAL ENCOUNTER (OUTPATIENT)
Dept: PHYSICAL THERAPY | Age: 70
Setting detail: THERAPIES SERIES
Discharge: HOME | End: 2020-09-22
Payer: MEDICARE

## 2020-09-22 PROCEDURE — 97110 THERAPEUTIC EXERCISES: CPT

## 2020-09-22 PROCEDURE — 97140 MANUAL THERAPY 1/> REGIONS: CPT

## 2020-09-22 NOTE — FLOWSHEET NOTE
[] [facility]  [address]  [phone]  F: [phone] [] [address]  [facility] [place] [facility]"" 36   [address]  P: [phone]  F: [phone] [] [place]  [address] [facility]  P: [phone]  F: [phone] [] [address]  [facility]   [address]   [place]: [phone]  F: [phone]      Physical Therapy Daily Treatment Note    Date:  2020  Patient Name:  Janelle Mcdonnell    :  1950  MRN: [id number]  Physician: Scott Abbott MD                         Insurance: [facility] APPROVED 8 VISITS (20-10.7.20), GEHA 60v for secondary  Medical Diagnosis: M47.816 Lumbar facet arthropathy, M62.830 Muscle spasm of back, M54.16 Lumbar radiculopathy, M46.1 Sacroillitis, M54.41 Lumbago with right side sciatica. Rehab Codes: M54.5, R26.2, M25.551, M25.661  Onset Date: 20                                 Next 's appt:  Visit# / total visits: ; Progress note for Medicare patient due at visit 8     Cancels/No Shows: 0/0    Subjective:    Pain:  [x] Yes  [] No  Location: Low back  Pain Rating: (0-10 scale) 5-6/10  Pain altered Tx:  [x] No  [] Yes  Action:  Comments:  Patient reports improvement on her back pain. Felt relief following the CP last session and has been stretching all week. Did not take tramadol, only 2 tylenol. Afraid pain pill was making her too tired.       Objective:  Modalities: CP R ant hip in supine with legs elevated; towel roll under back  Precautions: Cardiac Stent / Abdominal aortic aneurysm 2016  Exercises:  Exercise Reps/ Time Weight/ Level Comments   SciFit 5'     Standing      Incline stretch 3x20\"     Heel raises 20x     Marches 20x     Hip abd 20x     Hip ext 20x     Hip flexion 10x2     Step ups 20x 6\" Supine      Hamstring stretch 3x20\"       Butterfly stretch 3x20\"     Hip abd 10x Lime     Hip add 10x     Bridges 10x     SLR  10x     Hip flexor stretch 10x     Hip IR/ER stretch 10x     SL hip flexor 10x     SL clams 10x lime                            Other: Tiger stick in SL to R hip/glutes and low back  x10 min      Treatment Charges: Mins Units   [x]  Modalities  CP 10 0   [x]  Ther Exercise 41 2   [x]  Manual Therapy 10 1   []  Ther Activities     []  Aquatics     []  Vasocompression     []  Other     Total Treatment time 51 3       Assessment: [x] Progressing toward goals. Improved muscular endurance noted with combining 2 sets into increased reps without a break. SL hip abd and bridges are more difficult with increased pain. Patient noted relief following tiger stick rolling. Ended with CP to R hip in SL.      [] No change. [x] Other: Palpitatable \"mass\" noted in superior glutes. \"Mass\" is movable, not painful and unrelated to trigger points. [x] Patient would continue to benefit from skilled physical therapy services in order to: Decrease pain in low back, increase tissue extensibility of hamstrings/ quad. Problems:    [x]? ? Pain: 9/10 low back/ SI  [x]? ? ROM: all trunk motion especially extension   [x]? ? Flexibility: hamstrings, hip flexor  [x]? ? Strength:Abs, glutes  [x]? ? Function:Oswestry score 52% impaired   []? Other:      STG: (to be met in 8 treatments)  1. ? Pain: 3/10 low back pain with standing activity. 2. ? Strength: 4+/5 B hip extension to improve lumbopelvic stability. 3. ? Function: Oswestry score to 32% impaired or better to improve ADLs. 4. Independent with Home Exercise Programs     LTG: (to be met in 12 treatments)  1. Patient will be bend and lift household items without pain. 2. Patient is able to ambulate community distances without cane.      Patient goals: Return to normal activity.      Pt. Education:  [x] Yes  [] No  [] Reviewed Prior

## 2020-09-23 ENCOUNTER — HOSPITAL ENCOUNTER (OUTPATIENT)
Dept: CARDIAC REHAB | Age: 70
Setting detail: THERAPIES SERIES
Discharge: HOME | End: 2020-09-23
Payer: MEDICARE

## 2020-09-23 VITALS — WEIGHT: 161.4 LBS | BODY MASS INDEX: 30.5 KG/M2

## 2020-09-23 PROCEDURE — 93798 PHYS/QHP OP CAR RHAB W/ECG: CPT

## 2020-09-24 ENCOUNTER — HOSPITAL ENCOUNTER (OUTPATIENT)
Dept: PHYSICAL THERAPY | Age: 70
Setting detail: THERAPIES SERIES
Discharge: HOME | End: 2020-09-24
Payer: MEDICARE

## 2020-09-24 PROCEDURE — 97110 THERAPEUTIC EXERCISES: CPT

## 2020-09-24 PROCEDURE — 97140 MANUAL THERAPY 1/> REGIONS: CPT

## 2020-09-24 NOTE — FLOWSHEET NOTE
[x] [facility]  [address]  [phone]  F: [phone] [] [address] [facility]   [address]  P: [phone]  F: [phone] [] [place]  [address] [facility]  P: [phone]  F: [phone] [] [address]  [facility]   [address]   [place]: [phone]  F: [phone]      Physical Therapy Daily Treatment Note    Date:  2020  Patient Name:  Walter Joseph    :  1950  MRN: [id number]  Physician: Dior Lind MD                         Insurance: [facility] APPROVED 8 VISITS (20-10.7.20), GEHA 60v for secondary  Medical Diagnosis: M47.816 Lumbar facet arthropathy, M62.830 Muscle spasm of back, M54.16 Lumbar radiculopathy, M46.1 Sacroillitis, M54.41 Lumbago with right side sciatica. Rehab Codes: M54.5, R26.2, M25.551, M25.661  Onset Date: 20                                 Next 's appt:  Visit# / total visits: ; Progress note for Medicare patient due at visit 8     Cancels/No Shows: 0/0    Subjective:    Pain:  [x] Yes  [] No  Location: Low back  Pain Rating: (0-10 scale) 2/10  Pain altered Tx:  [x] No  [] Yes  Action:  Comments:  Patient arrives stating she is feeling more \"sluggish\" today. Notes she did not take her pain pill before therapy today, feels like she will be more painful during PT today. Notes lower pain numeric.        Objective:  Modalities: CP R ant hip in supine with legs elevated; towel roll under back  Precautions: Cardiac Stent / Abdominal aortic aneurysm 2016  Exercises:  Exercise Reps/ Time Weight/ Level Comments   SciFit 5' L3          Standing      Incline stretch 3x20\"     Heel raises 20x     Marches 20x     Hip abd 20x     Hip ext 20x     Hip flexion 10x2     Step ups 20x 6\"    Lat step ups 10x ea 6\"          Seated      HS stretch 3x20\"  stool   LAQ 10x     Marches 10x ea  TrA iso               Supine      Hamstring stretch 3x20\"       Butterfly stretch 3x20\"     Hip abd 20x Lime     Hip add 10x     Bridges 10x     SLR  10x     Hip flexor stretch 10x     Hip IR/ER stretch 10x     SL hip flexor 10x     SL clams 20x lime                            Other: Tiger stick to R hip/glutes and riac quads  x10 min      Treatment Charges: Mins Units   [x]  Modalities  CP 10 0   [x]  Ther Exercise 45 3   [x]  Manual Therapy 10 1   []  Ther Activities     []  Aquatics     []  Vasocompression     []  Other     Total Treatment time 55 4       Assessment: [x] Progressing toward goals. Patient with fair tolerance to program this date, required 2 seated rest breaks during standing program this date. Progressed with addition of lateral step ups as well as seated LE strengthening exs with good tolerance and no onset of pain. Continued with  to R hip/glutes as well as rica quads for tension decrease with patient stating relief post.         [] No change. [x] Other:    [x] Patient would continue to benefit from skilled physical therapy services in order to: Decrease pain in low back, increase tissue extensibility of hamstrings/ quad. Problems:    [x]? ? Pain: 9/10 low back/ SI  [x]? ? ROM: all trunk motion especially extension   [x]? ? Flexibility: hamstrings, hip flexor  [x]? ? Strength:Abs, glutes  [x]? ? Function:Oswestry score 52% impaired   []? Other:      STG: (to be met in 8 treatments)  1. ? Pain: 3/10 low back pain with standing activity. 2. ? Strength: 4+/5 B hip extension to improve lumbopelvic stability. 3. ? Function: Oswestry score to 32% impaired or better to improve ADLs. 4. Independent with Home Exercise Programs     LTG: (to be met in 12 treatments)  1. Patient will be bend and lift household items without pain.    2. Patient is able to ambulate community distances without cane.      Patient goals: Return to normal activity. Pt. Education:  [x] Yes  [] No  [] Reviewed Prior HEP/Ed  Method of Education: [x] Verbal  [x] Demo  [] Written  Comprehension of Education:  [x] Verbalizes understanding. [x] Demonstrates understanding. [x] Needs review. SLR  [x] Demonstrates/verbalizes HEP/Ed previously given. 9.17.20 SL clams/abd, butterfly stretch    Plan: [x] Continue current frequency toward long and short term goals.     [x] Specific Instructions for subsequent treatments: Hip and core strengthening, postural correction, retro and lateral gait       Time In: 955 am            Time Out: 1105    Electronically signed by:  Viviana Mann PTA

## 2020-09-25 ENCOUNTER — HOSPITAL ENCOUNTER (OUTPATIENT)
Dept: CARDIAC REHAB | Age: 70
Setting detail: THERAPIES SERIES
Discharge: HOME | End: 2020-09-25
Payer: MEDICARE

## 2020-09-25 VITALS — BODY MASS INDEX: 30.5 KG/M2 | WEIGHT: 161.4 LBS | TEMPERATURE: 95.9 F

## 2020-09-25 PROCEDURE — 93798 PHYS/QHP OP CAR RHAB W/ECG: CPT

## 2020-09-29 ENCOUNTER — HOSPITAL ENCOUNTER (OUTPATIENT)
Dept: PHYSICAL THERAPY | Age: 70
Setting detail: THERAPIES SERIES
Discharge: HOME | End: 2020-09-29
Payer: MEDICARE

## 2020-09-29 PROCEDURE — 97140 MANUAL THERAPY 1/> REGIONS: CPT

## 2020-09-29 PROCEDURE — 97110 THERAPEUTIC EXERCISES: CPT

## 2020-09-29 NOTE — FLOWSHEET NOTE
[x] [facility] &  Therapy  [address]  [phone]  F: [phone] [] [address]  [facility]   [address]  P: [phone]  F: [phone] [] [address]  [address] [facility]  P: [phone]  F: [phone] [] [address]  [facility]   [address]   [place]: [phone]  F: [phone]      Physical Therapy Daily Treatment Note    Date:  2020  Patient Name:  Leonora Gonzalez    :  1950  MRN: [id number]  Physician: Miriam Julien MD                         Insurance: [facility] APPROVED 8 VISITS (20-10.7.20), GEHA 60v for secondary  Medical Diagnosis: M47.816 Lumbar facet arthropathy, M62.830 Muscle spasm of back, M54.16 Lumbar radiculopathy, M46.1 Sacroillitis, M54.41 Lumbago with right side sciatica. Rehab Codes: M54.5, R26.2, M25.551, M25.661  Onset Date: 20                                 Next 's appt:  Visit# / total visits: ; Progress note for Medicare patient due at visit 8     Cancels/No Shows: 0/0    Subjective:    Pain:  [x] Yes  [] No  Location: Low back  Pain Rating: (0-10 scale) 2/10  Pain altered Tx:  [x] No  [] Yes  Action:  Comments:  Patient arrives with soreness in the R thigh. Notes she purchased a roller stick yesterday and may have rolled her thighs too much. As rolled her low back and calves.          Objective:  Modalities: CP R ant hip in supine with legs elevated; towel roll under back - Held 9.29  Precautions: Cardiac Stent / Abdominal aortic aneurysm 2016  Exercises:  Exercise Reps/ Time Weight/ Level Comments   SciFit 5' L3          Standing      Incline stretch 3x20\"     Heel raises 20x 1 lb    Marches 20x 1 lb    Hip abd L 20x  R 15x  1 lb    Hip ext 20x 1 lb    Hip flexion 10x2 1 lb    Step ups 20x 6\" 1 lb    Lat step ups 10x ea 6\" 2 lb          Seated      HS stretch 3x20\"  stool   LAQ 10x     Marches 10x ea  TrA iso               Supine      Hamstring stretch 3x20\"       Butterfly stretch 3x20\"     Hip abd 20x Lime     Hip add 10x     Bridges 10x     SLR  10x     Hip flexor stretch 10x     Hip IR/ER stretch 10x     SL hip flexor 10x     SL clams 20x lime          Retro gait 2 laps  Gait belt               Other:    Manual: Hyper volt to R hip/glutes and R quad  x10 min      Treatment Charges: Mins Units   []  Modalities  CP     [x]  Ther Exercise 38 2   [x]  Manual Therapy 10 1   []  Ther Activities     []  Aquatics     []  Vasocompression     []  Other     Total Treatment time 48 3       Assessment: [x] Progressing toward goals. Added weights to standing ex to increase LE strength and muscular endurance with good tolerance. Regression in R sided hip abd d/t fatigue. Increased R hip pain noted with retro gait. Ended with Hyper volt to R quad and R glutes and low back. Patient noted a decrease in pain at end of session. [] No change. [] Other:    [x] Patient would continue to benefit from skilled physical therapy services in order to: Decrease pain in low back, increase tissue extensibility of hamstrings/ quad. Problems:    [x]? ? Pain: 9/10 low back/ SI  [x]? ? ROM: all trunk motion especially extension   [x]? ? Flexibility: hamstrings, hip flexor  [x]? ? Strength:Abs, glutes  [x]? ? Function:Oswestry score 52% impaired   []? Other:      STG: (to be met in 8 treatments)  1. ? Pain: 3/10 low back pain with standing activity. 2. ? Strength: 4+/5 B hip extension to improve lumbopelvic stability. 3. ? Function: Oswestry score to 32% impaired or better to improve ADLs. 4. Independent with Home Exercise Programs     LTG: (to be met in 12 treatments)  1. Patient will be bend and lift household items without pain. 2. Patient is able to ambulate community distances without cane.    Patient goals: Return to normal activity. Pt. Education:  [x] Yes  [] No  [] Reviewed Prior HEP/Ed  Method of Education: [x] Verbal  [x] Demo  [] Written  Comprehension of Education:  [x] Verbalizes understanding. [x] Demonstrates understanding. [x] Needs review. SLR  [x] Demonstrates/verbalizes HEP/Ed previously given. 9.17.20 SL clams/abd, butterfly stretch    Plan: [x] Continue current frequency toward long and short term goals.     [x] Specific Instructions for subsequent treatments: Hip and core strengthening, postural correction,  lateral gait       Time In: 955 am            Time Out: 1048    Electronically signed by:  Maggie Powell PTA

## 2020-09-30 ENCOUNTER — HOSPITAL ENCOUNTER (OUTPATIENT)
Dept: CARDIAC REHAB | Age: 70
Setting detail: THERAPIES SERIES
Discharge: HOME | End: 2020-09-30
Payer: MEDICARE

## 2020-09-30 NOTE — PROGRESS NOTES
PT called to say she would not be able to come to cardiac rehab dt being at a Drs appt. Visit rescheduled.

## 2020-10-01 ENCOUNTER — HOSPITAL ENCOUNTER (OUTPATIENT)
Dept: PHYSICAL THERAPY | Age: 70
Setting detail: THERAPIES SERIES
Discharge: HOME | End: 2020-10-01
Payer: MEDICARE

## 2020-10-01 ENCOUNTER — HOSPITAL ENCOUNTER (EMERGENCY)
Age: 70
Discharge: HOME | End: 2020-10-01
Attending: EMERGENCY MEDICINE
Payer: MEDICARE

## 2020-10-01 VITALS
TEMPERATURE: 98 F | OXYGEN SATURATION: 97 % | SYSTOLIC BLOOD PRESSURE: 129 MMHG | DIASTOLIC BLOOD PRESSURE: 70 MMHG | HEIGHT: 62 IN | RESPIRATION RATE: 14 BRPM | WEIGHT: 160 LBS | BODY MASS INDEX: 29.44 KG/M2 | HEART RATE: 81 BPM

## 2020-10-01 LAB
ABSOLUTE EOS #: 0.12 K/UL (ref 0–0.44)
ABSOLUTE IMMATURE GRANULOCYTE: 0.03 K/UL (ref 0–0.3)
ABSOLUTE LYMPH #: 2.23 K/UL (ref 1.1–3.7)
ABSOLUTE MONO #: 0.68 K/UL (ref 0.1–1.2)
BASOPHILS # BLD: 1 % (ref 0–2)
BASOPHILS ABSOLUTE: 0.05 K/UL (ref 0–0.2)
DIFFERENTIAL TYPE: ABNORMAL
EOSINOPHILS RELATIVE PERCENT: 2 % (ref 1–4)
HCT VFR BLD CALC: 38.2 % (ref 36.3–47.1)
HEMOGLOBIN: 11.7 G/DL (ref 11.9–15.1)
IMMATURE GRANULOCYTES: 0 %
INR BLD: 1
LYMPHOCYTES # BLD: 29 % (ref 24–43)
MCH RBC QN AUTO: 29.4 PG (ref 25.2–33.5)
MCHC RBC AUTO-ENTMCNC: 30.6 G/DL (ref 28.4–34.8)
MCV RBC AUTO: 96 FL (ref 82.6–102.9)
MONOCYTES # BLD: 9 % (ref 3–12)
NRBC AUTOMATED: 0 PER 100 WBC
PARTIAL THROMBOPLASTIN TIME: 29.4 SEC (ref 23.9–33.8)
PDW BLD-RTO: 14.4 % (ref 11.8–14.4)
PLATELET # BLD: 250 K/UL (ref 138–453)
PLATELET ESTIMATE: ABNORMAL
PMV BLD AUTO: 9.2 FL (ref 8.1–13.5)
PROTHROMBIN TIME: 13.3 SEC (ref 11.5–14.2)
RBC # BLD: 3.98 M/UL (ref 3.95–5.11)
RBC # BLD: ABNORMAL 10*6/UL
SEG NEUTROPHILS: 59 % (ref 36–65)
SEGMENTED NEUTROPHILS ABSOLUTE COUNT: 4.49 K/UL (ref 1.5–8.1)
WBC # BLD: 7.6 K/UL (ref 3.5–11.3)
WBC # BLD: ABNORMAL 10*3/UL

## 2020-10-01 PROCEDURE — 85025 COMPLETE CBC W/AUTO DIFF WBC: CPT

## 2020-10-01 PROCEDURE — 85610 PROTHROMBIN TIME: CPT

## 2020-10-01 PROCEDURE — 6370000000 HC RX 637 (ALT 250 FOR IP): Performed by: PHYSICIAN ASSISTANT

## 2020-10-01 PROCEDURE — 99283 EMERGENCY DEPT VISIT LOW MDM: CPT

## 2020-10-01 PROCEDURE — 30901 CONTROL OF NOSEBLEED: CPT

## 2020-10-01 PROCEDURE — 2500000003 HC RX 250 WO HCPCS: Performed by: PHYSICIAN ASSISTANT

## 2020-10-01 PROCEDURE — 6370000000 HC RX 637 (ALT 250 FOR IP): Performed by: EMERGENCY MEDICINE

## 2020-10-01 PROCEDURE — 85730 THROMBOPLASTIN TIME PARTIAL: CPT

## 2020-10-01 PROCEDURE — 2580000003 HC RX 258: Performed by: PHYSICIAN ASSISTANT

## 2020-10-01 PROCEDURE — 30903 CONTROL OF NOSEBLEED: CPT

## 2020-10-01 PROCEDURE — 99282 EMERGENCY DEPT VISIT SF MDM: CPT

## 2020-10-01 RX ORDER — TRANEXAMIC ACID 100 MG/ML
500 INJECTION, SOLUTION INTRAVENOUS ONCE
Status: COMPLETED | OUTPATIENT
Start: 2020-10-01 | End: 2020-10-01

## 2020-10-01 RX ORDER — HYDROCODONE BITARTRATE AND ACETAMINOPHEN 5; 325 MG/1; MG/1
1 TABLET ORAL ONCE
Status: COMPLETED | OUTPATIENT
Start: 2020-10-01 | End: 2020-10-01

## 2020-10-01 RX ORDER — DOXYCYCLINE HYCLATE 100 MG
100 TABLET ORAL 2 TIMES DAILY
Qty: 10 TABLET | Refills: 0 | Status: SHIPPED | OUTPATIENT
Start: 2020-10-01 | End: 2020-10-06

## 2020-10-01 RX ORDER — OXYMETAZOLINE HYDROCHLORIDE 0.05 G/100ML
2 SPRAY NASAL ONCE
Status: COMPLETED | OUTPATIENT
Start: 2020-10-01 | End: 2020-10-01

## 2020-10-01 RX ORDER — HYDROCODONE BITARTRATE AND ACETAMINOPHEN 5; 325 MG/1; MG/1
1-2 TABLET ORAL EVERY 8 HOURS PRN
Qty: 10 TABLET | Refills: 0 | Status: SHIPPED | OUTPATIENT
Start: 2020-10-01 | End: 2020-10-04

## 2020-10-01 RX ADMIN — NASAL DECONGESTANT 2 SPRAY: 0.05 SPRAY NASAL at 15:32

## 2020-10-01 RX ADMIN — TRANEXAMIC ACID 500 MG: 1 INJECTION, SOLUTION INTRAVENOUS at 17:59

## 2020-10-01 RX ADMIN — WATER 5 ML: 1 INJECTION INTRAMUSCULAR; INTRAVENOUS; SUBCUTANEOUS at 17:59

## 2020-10-01 RX ADMIN — HYDROCODONE BITARTRATE AND ACETAMINOPHEN 1 TABLET: 5; 325 TABLET ORAL at 17:59

## 2020-10-01 ASSESSMENT — ENCOUNTER SYMPTOMS
ABDOMINAL PAIN: 0
EYE DISCHARGE: 0
CHEST TIGHTNESS: 0
FACIAL SWELLING: 0
SHORTNESS OF BREATH: 0
ABDOMINAL DISTENTION: 0
BACK PAIN: 0
EYE PAIN: 0

## 2020-10-01 ASSESSMENT — PAIN SCALES - GENERAL
PAINLEVEL_OUTOF10: 4
PAINLEVEL_OUTOF10: 4

## 2020-10-01 NOTE — ED PROVIDER NOTES
[address] [place] [address] ED  eMERGENCY dEPARTMENTeNCOUnter      Pt Name: Lorenza Morton  MRN: [id number]  [place] 1950  Date ofevaluation: 10/1/2020  Provider: Josiane Pozo, [facility] (formerly [facility])       Chief Complaint   Patient presents with    Epistaxis     started this morning         HISTORY OF PRESENT ILLNESS  (Location/Symptom, Timing/Onset, Context/Setting, Quality, Duration, Modifying Factors, Severity.)   Lorenza Morton is a 69 y.o. female who presents to the emergency department with left-sided nosebleed that started this morning. Patient takes Brilinta. She has had some nosebleeds intermittently over the last few weeks. Reports being scheduled to see her ear, nose, throat doctor tomorrow. Patient denies any headaches. No fevers or chills. No nausea vomiting. No injuries. No deaf alleviating aggravating factors. No other complaints. Nursing Notes were reviewed.     ALLERGIES     Penicillins    CURRENT MEDICATIONS       Discharge Medication List as of 10/1/2020  6:14 PM      CONTINUE these medications which have NOT CHANGED    Details   magnesium oxide (MAG-OX) 400 MG tablet Take 400 mg by mouth dailyHistorical Med      calcium-vitamin D (OSCAL-500) 500-200 MG-UNIT per tablet Take 1 tablet by mouth dailyHistorical Med      !! Multiple Vitamins-Minerals (THERAPEUTIC MULTIVITAMIN-MINERALS) tablet Take 1 tablet by mouth dailyHistorical Med      Potassium Gluconate 595 (99 K) MG TABS Take by mouthHistorical Med      Omega-3 Fatty Acids (FISH OIL) 1000 MG CAPS Take 3,000 mg by mouth 3 times dailyHistorical Med      Garlic 0850 MG CAPS Take by mouthHistorical Med      methocarbamol (ROBAXIN) 500 MG tablet Take 500 mg by mouth 3 times daily as neededHistorical Med      lisinopril (PRINIVIL;ZESTRIL) 10 MG tablet Take 1 tablet by mouth daily, Disp-30 tablet, R-2Normal      acetaminophen (TYLENOL 8 HOUR) 650 MG extended release tablet Take 1 tablet by mouth every 8 hours as needed for Pain, Disp-60 tablet, R-3Normal      lidocaine 4 % external patch Place 1 patch onto the skin daily, Transdermal, DAILY Starting Fri 3/27/2020, Disp-15 patch, R-1, Normal      albuterol sulfate HFA (VENTOLIN HFA) 108 (90 Base) MCG/ACT inhaler Inhale 2 puffs into the lungs 4 times daily as needed for Wheezing, Disp-3 Inhaler, R-1Normal      tiotropium (SPIRIVA HANDIHALER) 18 MCG inhalation capsule Inhale 1 capsule into the lungs daily, Disp-30 capsule, R-3Normal      ipratropium-albuterol (DUONEB) 0.5-2.5 (3) MG/3ML SOLN nebulizer solution Inhale 3 mLs into the lungs every 4 hours as needed for Shortness of Breath, Disp-360 mL, R-2Normal      aspirin 81 MG chewable tablet Take 1 tablet by mouth daily, Disp-30 tablet, R-3Normal      metoprolol tartrate (LOPRESSOR) 50 MG tablet Take 1 tablet by mouth 2 times daily, Disp-60 tablet, R-3Normal      ticagrelor (BRILINTA) 90 MG TABS tablet Take 1 tablet by mouth 2 times daily, Disp-60 tablet, R-2Normal      !! Multiple Vitamin (MULTIVITAMIN) tablet Take 1 tablet by mouth daily, Disp-40 tablet, R-2Normal      amLODIPine (NORVASC) 10 MG tablet Take 1 tablet by mouth daily, Disp-30 tablet, R-3Normal      traMADol (ULTRAM) 50 MG tablet Take 1 tablet by mouth 3 times daily as needed for Pain, Disp-21 tablet, R-0Normal      docusate sodium (COLACE) 100 MG capsule Take 1 capsule by mouth 2 times daily, Disp-30 capsule, R-0Print      Misc.  Devices (RAISED TOILET SEAT) MISC DAILY Starting 8/24/2017, Until Discontinued, Disp-1 each, R-0, Normal      loratadine (CLARITIN) 10 MG tablet Take 10 mg by mouth dailyHistorical Med      mupirocin (BACTROBAN NASAL) 2 % nasal ointment Take by Nasal route 2 times daily for 5 days, Disp-1 Tube, R-0, Normal      CHANTIX STARTING MONTH JOEL 0.5 MG X 11 & 1 MG X 42 tablet On 5/20/16 states she has not yet started, R-0, MARINA      alendronate (FOSAMAX) 35 MG tablet Take 35 mg by mouth every 7 days Sunday, R-0      atorvastatin (LIPITOR) 40 MG tablet Take 40 mg by mouth daily , R-0       !! - Potential duplicate medications found. Please discuss with provider. PAST MEDICAL HISTORY         Diagnosis Date    Arthritis     Chronic back pain     [facility] obstructive [facility] disease) [facility]     Elevated troponin level 2020    Hyperlipidemia     Hypertension     Respiratory arrest ([facility] 2020    Thrombus 2020    descending aortic mural thrombus    Wears partial dentures     UPPER AND LOWER       SURGICAL HISTORY           Procedure Laterality Date    ABDOMINAL AORTIC ANEURYSM REPAIR, ENDOVASCULAR  2016    thoracic    CARDIAC CATHETERIZATION  2020    CORONARY ANGIOPLASTY WITH STENT PLACEMENT  2020    JOINT REPLACEMENT Left     HIP    JOINT REPLACEMENT Right 2017    ant. total hip    NERVE BLOCK Right 2017    right hip arthrogram injection depomedrol 80mg    TOTAL HIP ARTHROPLASTY Right 2017    HIP TOTAL ARTHROPLASTY ANTERIOR APPROACH - MEDACTA, C-ARM, MEDACTA TABLE, FASCIA ILIACA BLOCK, NSA=SPINAL VS GENERAL  performed by Blaine Rodarte, DO at [address] HISTORY           Problem Relation Age of Onset    COPD Mother     Prostate Cancer Father     Hypertension Father     Diabetes Maternal Grandmother      Family Status   Relation Name Status    Mother      Father      MGM  (Not Specified)        SOCIAL HISTORY      reports that she has been smoking. She has a 19.00 pack-year smoking history. She has never used smokeless tobacco. She reports current alcohol use. She reports that she does not use drugs. REVIEW OFSYSTEMS    (2-9 systems for level 4, 10 or more for level 5)   Review of Systems    Except as noted above the remainder of the review of systems was reviewed and negative.      PHYSICAL EXAM    (up to 7 for level 4, 8 or more for level 5)     ED Triage Vitals   BP Temp Temp src Pulse Resp SpO2 Height Weight   10/01/20 1440 10/01/20 1440 -- 10/01/20 1440 10/01/20 1440 10/01/20 1440 10/01/20 1439 10/01/20 1439   129/70 98 °F (36.7 °C)  81 14 97 % 5' 2\" (1.575 m) 160 lb (72.6 kg)      Physical Exam  Constitutional:       Appearance: She is well-developed. HENT:      Head: Normocephalic and atraumatic. Nose:      Right Nostril: No foreign body. Left Nostril: Epistaxis present. No foreign body or septal hematoma. Neck:      Musculoskeletal: Normal range of motion and neck supple. Cardiovascular:      Rate and Rhythm: Normal rate and regular rhythm. Pulmonary:      Effort: Pulmonary effort is normal.      Breath sounds: Normal breath sounds. Abdominal:      Palpations: Abdomen is soft. Musculoskeletal: Normal range of motion. Skin:     General: Skin is warm. Findings: No rash. Neurological:      Mental Status: She is alert and oriented to person, place, and time. Psychiatric:         Behavior: Behavior normal.                 DIAGNOSTIC RESULTS     EKG: All EKG's are interpreted by the Emergency Department Physician who either signs or Co-signs this chart in the absence of a cardiologist.        RADIOLOGY:   Non-plain film images such as CT, Ultrasound and MRI are read by the radiologist. Plain radiographic images arevisualized and preliminarily interpreted by the emergency physician with the below findings:        Interpretation per the Radiologist below, if available at thetime of this note:          ED BEDSIDE ULTRASOUND:   Performed by ED Physician - none    LABS:  Labs Reviewed   CBC WITH AUTO DIFFERENTIAL - Abnormal; Notable for the following components:       Result Value    Hemoglobin 11.7 (*)     All other components within normal limits   PROTIME-INR   APTT       All other labs were within normal range or not returned as of this dictation.     EMERGENCY DEPARTMENT COURSE and DIFFERENTIAL DIAGNOSIS/MDM:   Vitals:    Vitals:    10/01/20 1439 10/01/20 1440   BP:  129/70   Pulse:  81   Resp:  14   Temp:  98 °F (36.7 °C)   SpO2: 97%   Weight: 160 lb (72.6 kg)    Height: 5' 2\" (1.575 m)      TXC acid was used AFTER afrin was tried by my attnding. txc was unsucessful  7.5 cm rhinorocket used. CONSULTS:  None    PROCEDURES:  Epistaxis Mgmt    Date/Time: 10/1/2020 10:09 PM  Performed by: Steve Bray PA-C  Authorized by: Maryjane Umana MD     Consent:     Consent obtained:  Verbal    Consent given by:  Patient    Risks discussed:  Bleeding, nasal injury and pain    Alternatives discussed:  Referral and observation  Procedure details:     Treatment site:  L anterior    Treatment method:  Anterior pack (txc)    Treatment complexity:  Extensive    Treatment episode: recurring    Post-procedure details:     Assessment:  Bleeding stopped    Patient tolerance of procedure: Tolerated well, no immediate complications            FINAL IMPRESSION      1. Epistaxis          DISPOSITION/PLAN   DISPOSITION Decision To Discharge 10/01/2020 06:11:18 PM      PATIENTREFERRED TO:   No follow-up provider specified. DISCHARGE MEDICATIONS:     Discharge Medication List as of 10/1/2020  6:14 PM      START taking these medications    Details   doxycycline hyclate (VIBRA-TABS) 100 MG tablet Take 1 tablet by mouth 2 times daily for 5 days, Disp-10 tablet,R-0Print      HYDROcodone-acetaminophen (NORCO) 5-325 MG per tablet Take 1-2 tablets by mouth every 8 hours as needed for Pain for up to 3 days. , Disp-10 tablet,R-0Print                 (Please note that portions of this note were completed with a voice recognition program.  Efforts were made to edit thedictations but occasionally words are mis-transcribed.)    KLAUS Rodrigez PA-C  10/01/20 9711

## 2020-10-01 NOTE — FLOWSHEET NOTE
[] [address]  [address]    [phone]  F: [phone]   [] [address]  [facility]   [address]  P: [phone]  F: [phone]  [] [place]  [address]  P: [phone]  F: [phone]  [] [address]  [address] [address]   [address]   [place]: [phone]  F: [phone]   [] [facility]  [address]  [place]: [phone]   F: [phone]     Physical Therapy Cancel/No Show note    Date: 10/1/2020  Patient: Robson Cantu  : 1950  MRN: [id number]    Cancels/No Shows to date:     For today's appointment patient:    [x]  Cancelled    [] Rescheduled appointment    [] No-show     Reason given by patient:    [x]  Patient ill    []  Conflicting appointment    [] No transportation      [] Conflict with work    [] No reason given    [] Weather related    [] COVID-19    [x] Other:      Comments:  Patient called and let me know she was experiencing heavy epistaxis. Current nose bleed has been 1 hr with no slowing down.         [x] Next appointment was confirmed    Electronically signed by: Nguyễn Escobar PTA

## 2020-10-01 NOTE — ED PROVIDER NOTES
EMERGENCY DEPARTMENT ENCOUNTER    Pt Name: Lorrie Peguero  MRN: [id number]  [place] 1950  Date of evaluation: 10/1/20  CHIEF COMPLAINT       Chief Complaint   Patient presents with    Epistaxis     started this morning     HISTORY OF PRESENT ILLNESS   HPI   The patient is a 65-year-old female who presented to the emergency department secondary to nosebleed. Patient complains of bleeding her nose started earlier today no trauma or injury. Patient has a history of previous nosebleed several weeks ago resolved after nasal packing she was scheduled to follow with ENT doctor tomorrow. Patient takes Brilinta and aspirin is been taking as prescribed no other blood thinners. She denies clotting disorder or hemophilia. She denied hemoptysis hematuria or rectal bleeding. Patient had chest pain, shortness of breath, nausea, vomiting, fevers or chills. Of note, patient stated when she had previous nasal packing and responded to cauterizing and the nasal packing had to be changed secondary to causing increased pain. REVIEW OF SYSTEMS     Review of Systems   Constitutional: Negative for chills, diaphoresis and fever. HENT: Positive for nosebleeds. Negative for congestion, ear pain and facial swelling. Eyes: Negative for pain, discharge and visual disturbance. Respiratory: Negative for chest tightness and shortness of breath. Cardiovascular: Negative for chest pain and palpitations. Gastrointestinal: Negative for abdominal distention and abdominal pain. Genitourinary: Negative for difficulty urinating and flank pain. Musculoskeletal: Negative for back pain. Skin: Negative for wound. Neurological: Negative for dizziness, light-headedness and headaches.      PASTMEDICAL HISTORY     Past Medical History:   Diagnosis Date    Arthritis     Chronic back pain     COPD (chronic obstructive pulmonary disease) (HCC)     Elevated troponin level 03/2020    Hyperlipidemia     Hypertension     R-1, Normal      albuterol sulfate HFA (VENTOLIN HFA) 108 (90 Base) MCG/ACT inhaler Inhale 2 puffs into the lungs 4 times daily as needed for Wheezing, Disp-3 Inhaler, R-1Normal      tiotropium (SPIRIVA HANDIHALER) 18 MCG inhalation capsule Inhale 1 capsule into the lungs daily, Disp-30 capsule, R-3Normal      ipratropium-albuterol (DUONEB) 0.5-2.5 (3) MG/3ML SOLN nebulizer solution Inhale 3 mLs into the lungs every 4 hours as needed for Shortness of Breath, Disp-360 mL, R-2Normal      aspirin 81 MG chewable tablet Take 1 tablet by mouth daily, Disp-30 tablet, R-3Normal      metoprolol tartrate (LOPRESSOR) 50 MG tablet Take 1 tablet by mouth 2 times daily, Disp-60 tablet, R-3Normal      ticagrelor (BRILINTA) 90 MG TABS tablet Take 1 tablet by mouth 2 times daily, Disp-60 tablet, R-2Normal      !! Multiple Vitamin (MULTIVITAMIN) tablet Take 1 tablet by mouth daily, Disp-40 tablet, R-2Normal      amLODIPine (NORVASC) 10 MG tablet Take 1 tablet by mouth daily, Disp-30 tablet, R-3Normal      traMADol (ULTRAM) 50 MG tablet Take 1 tablet by mouth 3 times daily as needed for Pain, Disp-21 tablet, R-0Normal      docusate sodium (COLACE) 100 MG capsule Take 1 capsule by mouth 2 times daily, Disp-30 capsule, R-0Print      Misc. Devices (RAISED TOILET SEAT) MISC DAILY Starting 8/24/2017, Until Discontinued, Disp-1 each, R-0, Normal      loratadine (CLARITIN) 10 MG tablet Take 10 mg by mouth dailyHistorical Med      mupirocin (BACTROBAN NASAL) 2 % nasal ointment Take by Nasal route 2 times daily for 5 days, Disp-1 Tube, R-0, Normal      CHANTIX STARTING MONTH JOEL 0.5 MG X 11 & 1 MG X 42 tablet On 5/20/16 states she has not yet started, R-0, MARINA      alendronate (FOSAMAX) 35 MG tablet Take 35 mg by mouth every 7 days Sunday, R-0      atorvastatin (LIPITOR) 40 MG tablet Take 40 mg by mouth daily , R-0       !! - Potential duplicate medications found. Please discuss with provider.         ALLERGIES     is allergic to penicillins. FAMILY HISTORY     She indicated that her mother is . She indicated that her father is . She indicated that the status of her maternal grandmother is unknown. SOCIAL HISTORY       Social History     Tobacco Use    Smoking status: Current Every Day Smoker     Packs/day: 0.50     Years: 38.00     Pack years: 19.00    Smokeless tobacco: Never Used    Tobacco comment: WAS AT 1.5 PPD   Substance Use Topics    Alcohol use: Yes     Comment: drinks heavily, a bottle of \"something\" per day    Drug use: No     PHYSICAL EXAM     INITIAL VITALS: /70   Pulse 81   Temp 98 °F (36.7 °C)   Resp 14   Ht 5' 2\" (1.575 m)   Wt 160 lb (72.6 kg)   SpO2 97%   BMI 29.26 kg/m²    Physical Exam  Vitals signs and nursing note reviewed. Constitutional:       General: She is not in acute distress. Appearance: She is well-developed. She is not diaphoretic. HENT:      Head: Normocephalic and atraumatic. Eyes:      Pupils: Pupils are equal, round, and reactive to light. Neck:      Musculoskeletal: Normal range of motion and neck supple. Cardiovascular:      Rate and Rhythm: Normal rate and regular rhythm. Pulmonary:      Effort: Pulmonary effort is normal.      Breath sounds: Normal breath sounds. Abdominal:      General: Bowel sounds are normal.      Palpations: Abdomen is soft. Musculoskeletal: Normal range of motion. Skin:     General: Skin is warm. Capillary Refill: Capillary refill takes less than 2 seconds. Neurological:      Mental Status: She is alert and oriented to person, place, and time. MEDICAL DECISION MAKING:   The patient is a 79-year-old female who presented to the emergency department secondary to nosebleed. Initially Afrin was placed with a nasal clip however despite the section bleeding continued, TXA therefore nasal packing placed please see note.   Patient tolerated well discharged home with outpatient ENT follow-up and parameters to return to the emergency department. All patient's question's and concerns were answered prior to disposition and patient and/or family expressed understanding and agreement of treatment plan. CRITICAL CARE:              NIH STROKE SCALE:            PROCEDURES:    Procedures    DIAGNOSTIC RESULTS   EKG:All EKG's are interpreted by the Emergency Department Physician who either signs or Co-signs this chart in the absence of a cardiologist.        RADIOLOGY:All plain film, CT, MRI, and formal ultrasound images (except ED bedside ultrasound) are read by the radiologist, see reports below, unless otherwisenoted in MDM or here. No orders to display     LABS: All lab results were reviewed by myself, and all abnormals are listed below. Labs Reviewed   CBC WITH AUTO DIFFERENTIAL - Abnormal; Notable for the following components:       Result Value    Hemoglobin 11.7 (*)     All other components within normal limits   PROTIME-INR   APTT       EMERGENCY DEPARTMENTCOURSE:         Vitals:    Vitals:    10/01/20 1439 10/01/20 1440   BP:  129/70   Pulse:  81   Resp:  14   Temp:  98 °F (36.7 °C)   SpO2:  97%   Weight: 160 lb (72.6 kg)    Height: 5' 2\" (1.575 m)        The patient was given the following medications while in the emergency department:  Orders Placed This Encounter   Medications    oxymetazoline (AFRIN) 0.05 % nasal spray 2 spray    tranexamic acid (CYKLOKAPRON) injection 500 mg    sterile water injection 5 mL    HYDROcodone-acetaminophen (NORCO) 5-325 MG per tablet 1 tablet    doxycycline hyclate (VIBRA-TABS) 100 MG tablet     Sig: Take 1 tablet by mouth 2 times daily for 5 days     Dispense:  10 tablet     Refill:  0    HYDROcodone-acetaminophen (NORCO) 5-325 MG per tablet     Sig: Take 1-2 tablets by mouth every 8 hours as needed for Pain for up to 3 days.      Dispense:  10 tablet     Refill:  0     CONSULTS:  None    FINAL IMPRESSION      1. Epistaxis          DISPOSITION/PLAN   DISPOSITION Decision To Discharge 10/01/2020 06:11:18 PM      PATIENT REFERRED TO:  No follow-up provider specified. DISCHARGE MEDICATIONS:  Discharge Medication List as of 10/1/2020  6:14 PM      START taking these medications    Details   doxycycline hyclate (VIBRA-TABS) 100 MG tablet Take 1 tablet by mouth 2 times daily for 5 days, Disp-10 tablet,R-0Print      HYDROcodone-acetaminophen (NORCO) 5-325 MG per tablet Take 1-2 tablets by mouth every 8 hours as needed for Pain for up to 3 days. , Disp-10 [id number]           Cristel Christopher MD  Attending Emergency Physician    This note was created with the assistance of a speech-recognition program. While intending to generate a document that actually reflects the content of the visit, no guarantees can be provided that every mistake has been identified and corrected by editing.                     Cristel Christopher MD  39/95/08 2011

## 2020-10-08 ENCOUNTER — APPOINTMENT (OUTPATIENT)
Dept: PHYSICAL THERAPY | Age: 70
End: 2020-10-08
Payer: MEDICARE

## 2020-10-08 ENCOUNTER — HOSPITAL ENCOUNTER (OUTPATIENT)
Dept: PHYSICAL THERAPY | Age: 70
Setting detail: THERAPIES SERIES
Discharge: HOME | End: 2020-10-08
Payer: MEDICARE

## 2020-10-13 ENCOUNTER — HOSPITAL ENCOUNTER (OUTPATIENT)
Dept: PHYSICAL THERAPY | Age: 70
Setting detail: THERAPIES SERIES
Discharge: HOME | End: 2020-10-13
Payer: MEDICARE

## 2020-10-13 ENCOUNTER — APPOINTMENT (OUTPATIENT)
Dept: PHYSICAL THERAPY | Age: 70
End: 2020-10-13
Payer: MEDICARE

## 2020-10-13 PROCEDURE — 97140 MANUAL THERAPY 1/> REGIONS: CPT

## 2020-10-13 PROCEDURE — 97110 THERAPEUTIC EXERCISES: CPT

## 2020-10-13 NOTE — FLOWSHEET NOTE
[x] [facility] &  Therapy  [address]  [phone]  F: [phone] [] [address]  [place] [address]   [address]  P: [phone]  F: [phone] [] [place]  [address] [facility]  P: [phone]  F: [phone] [] [address]  [facility]   [address]   [place]: [phone]  F: [phone]      Physical Therapy Daily Treatment Note    Date:  10/13/2020  Patient Name:  Kiana Mendez    :  1950  MRN: [id number]  Physician: Gwen Thorne MD                         Insurance: [facility] APPROVED 8 VISITS (20-10.7.20), GEHA 60v for secondary  Medical Diagnosis: M47.816 Lumbar facet arthropathy, M62.830 Muscle spasm of back, M54.16 Lumbar radiculopathy, M46.1 Sacroillitis, M54.41 Lumbago with right side sciatica. Rehab Codes: M54.5, R26.2, M25.551, M25.661  Onset Date: 20                                 Next 's appt:  Visit# / total visits: ; Progress note for Medicare patient due at visit 8     Cancels/No Shows: 0/0    Subjective:    Pain:  [x] Yes  [] No  Location: Low back  Pain Rating: (0-10 scale) 8.5/10 - R hip and quad, 7/10 - low back  Pain altered Tx:  [x] No  [] Yes  Action:  Comments:  Patient coming back from small lapse in therapy sessions. States she received 3 shots in her back musculature last week for pain, has helped some. States her R hip and thigh are still bothering her most. Reports she tried to keep up with gentle exs while she was not coming to therapy.       Objective:  Modalities: CP R ant hip in supine with legs elevated; towel roll under back - Held 9.29  Precautions: Cardiac Stent / Abdominal aortic aneurysm 2016  Exercises:  Exercise Reps/ Time Weight/ Level Comments   SciFit 5' L3          Standing Incline stretch 3x20\"     Heel raises 20x 1 lb    Marches 20x 1 lb    Hip abd L 20x  R 20x  1 lb    Hip ext 20x 1 lb    Hip flexion 10x2 1 lb    Step ups 20x 6\" 1 lb    Lat step ups 20x ea 6\" 1 lb          Seated      HS stretch 3x20\"  stool   LAQ 20x 1lb    Marches 20x 1lb  TrA iso               Supine      Hamstring stretch 3x20\"       Butterfly stretch 3x20\"     Hip abd 20x Lime     Hip add 10x     Bridges 10x     SLR  10x  Attempt 10/13, patient states increased difficulty due to stiffness   Hip flexor stretch 10x     Hip IR/ER stretch      SL hip flexor 10x     SL clams 20x lime          Retro gait 2 laps  Gait belt -- Held 10/13               Other:    Manual: Hyper volt to rica quad  x10 min      Treatment Charges: Mins Units   []  Modalities  CP     [x]  Ther Exercise 45 3   [x]  Manual Therapy 10 1   []  Ther Activities     []  Aquatics     []  Vasocompression     []  Other     Total Treatment time 55 4       Assessment: [x] Progressing toward goals. Able to resume majority of therapeutic exercises as noted per log. Increased all standing and seated exs to 20x reps with no issues to note. Patient with increased difficulty this date maintaining rica knee ext during SLR, hypervolt to rica quads and hip flexors with patient noting decreased tension and improved mobility post. Encouraged patient to continue with stretches, james hip flexors, at home as able with good verbal understanding. [] No change. [x] Other:  Educated patient on insurance approval extension required at this time. Will contact insurance company regarding extension and contact patient if any issues. [x] Patient would continue to benefit from skilled physical therapy services in order to: Decrease pain in low back, increase tissue extensibility of hamstrings/ quad. Problems:    [x]? ? Pain: 9/10 low back/ SI  [x]? ? ROM: all trunk motion especially extension   [x]? ? Flexibility: hamstrings, hip flexor  [x]? ?  Strength:Abs, glutes  [x]? ? Function:Oswestry score 52% impaired   []? Other:      STG: (to be met in 8 treatments)  1. ? Pain: 3/10 low back pain with standing activity. 2. ? Strength: 4+/5 B hip extension to improve lumbopelvic stability. 3. ? Function: Oswestry score to 32% impaired or better to improve ADLs. 4. Independent with Home Exercise Programs     LTG: (to be met in 12 treatments)  1. Patient will be bend and lift household items without pain. 2. Patient is able to ambulate community distances without cane.      Patient goals: Return to normal activity. Pt. Education:  [x] Yes  [] No  [] Reviewed Prior HEP/Ed  Method of Education: [x] Verbal  [x] Demo  [] Written  Comprehension of Education:  [x] Verbalizes understanding. [x] Demonstrates understanding. [x] Needs review. SLR  [x] Demonstrates/verbalizes HEP/Ed previously given. 9.17.20 SL clams/abd, butterfly stretch    Plan: [x] Continue current frequency toward long and short term goals.     [x] Specific Instructions for subsequent treatments: Hip and core strengthening, postural correction,  lateral gait       Time In: 1005 am            Time Out: 1110 am    Electronically signed by:  Rudy Charles PTA

## 2020-10-14 ENCOUNTER — HOSPITAL ENCOUNTER (OUTPATIENT)
Dept: CARDIAC REHAB | Age: 70
Setting detail: THERAPIES SERIES
Discharge: HOME | End: 2020-10-14
Payer: MEDICARE

## 2020-10-14 VITALS — WEIGHT: 162.2 LBS | BODY MASS INDEX: 29.67 KG/M2 | TEMPERATURE: 97.5 F

## 2020-10-14 PROCEDURE — 93798 PHYS/QHP OP CAR RHAB W/ECG: CPT

## 2020-10-15 ENCOUNTER — HOSPITAL ENCOUNTER (OUTPATIENT)
Dept: PHYSICAL THERAPY | Age: 70
Setting detail: THERAPIES SERIES
Discharge: HOME | End: 2020-10-15
Payer: MEDICARE

## 2020-10-16 ENCOUNTER — HOSPITAL ENCOUNTER (OUTPATIENT)
Dept: CARDIAC REHAB | Age: 70
Setting detail: THERAPIES SERIES
Discharge: HOME | End: 2020-10-16
Payer: MEDICARE

## 2020-10-16 VITALS — TEMPERATURE: 96.1 F | HEIGHT: 62 IN | WEIGHT: 162.2 LBS | BODY MASS INDEX: 29.85 KG/M2

## 2020-10-16 PROCEDURE — 93798 PHYS/QHP OP CAR RHAB W/ECG: CPT

## 2020-10-19 ENCOUNTER — APPOINTMENT (OUTPATIENT)
Dept: CARDIAC REHAB | Age: 70
End: 2020-10-19
Payer: MEDICARE

## 2020-10-20 ENCOUNTER — HOSPITAL ENCOUNTER (OUTPATIENT)
Dept: SLEEP CENTER | Age: 70
Discharge: HOME | End: 2020-10-22
Payer: MEDICARE

## 2020-10-20 ENCOUNTER — HOSPITAL ENCOUNTER (OUTPATIENT)
Dept: PHYSICAL THERAPY | Age: 70
Setting detail: THERAPIES SERIES
Discharge: HOME | End: 2020-10-20
Payer: MEDICARE

## 2020-10-20 PROCEDURE — G0399 HOME SLEEP TEST/TYPE 3 PORTA: HCPCS

## 2020-10-21 ENCOUNTER — HOSPITAL ENCOUNTER (OUTPATIENT)
Dept: CARDIAC REHAB | Age: 70
Setting detail: THERAPIES SERIES
Discharge: HOME | End: 2020-10-21
Payer: MEDICARE

## 2020-10-21 ENCOUNTER — APPOINTMENT (OUTPATIENT)
Dept: CARDIAC REHAB | Age: 70
End: 2020-10-21
Payer: MEDICARE

## 2020-10-21 VITALS — BODY MASS INDEX: 29.67 KG/M2 | WEIGHT: 162.2 LBS | TEMPERATURE: 96.5 F

## 2020-10-21 PROCEDURE — 93798 PHYS/QHP OP CAR RHAB W/ECG: CPT

## 2020-10-23 ENCOUNTER — HOSPITAL ENCOUNTER (OUTPATIENT)
Dept: CARDIAC REHAB | Age: 70
Setting detail: THERAPIES SERIES
Discharge: HOME | End: 2020-10-23
Payer: MEDICARE

## 2020-10-23 ENCOUNTER — APPOINTMENT (OUTPATIENT)
Dept: CARDIAC REHAB | Age: 70
End: 2020-10-23
Payer: MEDICARE

## 2020-10-23 VITALS — TEMPERATURE: 96.1 F | BODY MASS INDEX: 29.17 KG/M2 | WEIGHT: 159.5 LBS

## 2020-10-23 PROCEDURE — 93798 PHYS/QHP OP CAR RHAB W/ECG: CPT

## 2020-10-28 ENCOUNTER — HOSPITAL ENCOUNTER (OUTPATIENT)
Dept: CARDIAC REHAB | Age: 70
Setting detail: THERAPIES SERIES
Discharge: HOME | End: 2020-10-28
Payer: MEDICARE

## 2020-10-28 VITALS — TEMPERATURE: 97 F | BODY MASS INDEX: 29.12 KG/M2 | WEIGHT: 159.2 LBS

## 2020-10-28 PROCEDURE — 93798 PHYS/QHP OP CAR RHAB W/ECG: CPT

## 2020-10-30 ENCOUNTER — HOSPITAL ENCOUNTER (OUTPATIENT)
Dept: CARDIAC REHAB | Age: 70
Setting detail: THERAPIES SERIES
Discharge: HOME | End: 2020-10-30
Payer: MEDICARE

## 2020-10-30 VITALS — BODY MASS INDEX: 29.7 KG/M2 | WEIGHT: 162.4 LBS | TEMPERATURE: 96.1 F

## 2020-10-30 PROCEDURE — 93798 PHYS/QHP OP CAR RHAB W/ECG: CPT

## 2020-11-04 ENCOUNTER — HOSPITAL ENCOUNTER (OUTPATIENT)
Dept: CARDIAC REHAB | Age: 70
Setting detail: THERAPIES SERIES
Discharge: HOME | End: 2020-11-04
Payer: OTHER GOVERNMENT

## 2020-11-06 ENCOUNTER — HOSPITAL ENCOUNTER (OUTPATIENT)
Dept: CARDIAC REHAB | Age: 70
Setting detail: THERAPIES SERIES
Discharge: HOME | End: 2020-11-06
Payer: OTHER GOVERNMENT

## 2020-11-06 LAB — STATUS: NORMAL

## 2020-11-11 ENCOUNTER — HOSPITAL ENCOUNTER (OUTPATIENT)
Dept: CARDIAC REHAB | Age: 70
Setting detail: THERAPIES SERIES
Discharge: HOME | End: 2020-11-11
Payer: OTHER GOVERNMENT

## 2020-11-30 ENCOUNTER — HOSPITAL ENCOUNTER (OUTPATIENT)
Dept: PREADMISSION TESTING | Age: 70
Discharge: HOME | End: 2020-12-04
Payer: MEDICARE

## 2020-11-30 PROCEDURE — U0003 INFECTIOUS AGENT DETECTION BY NUCLEIC ACID (DNA OR RNA); SEVERE ACUTE RESPIRATORY SYNDROME CORONAVIRUS 2 (SARS-COV-2) (CORONAVIRUS DISEASE [COVID-19]), AMPLIFIED PROBE TECHNIQUE, MAKING USE OF HIGH THROUGHPUT TECHNOLOGIES AS DESCRIBED BY CMS-2020-01-R: HCPCS

## 2020-12-03 LAB — SARS-COV-2, NAA: NOT DETECTED

## 2020-12-04 ENCOUNTER — HOSPITAL ENCOUNTER (OUTPATIENT)
Dept: SLEEP CENTER | Age: 70
Discharge: HOME | End: 2020-12-06
Payer: MEDICARE

## 2020-12-04 VITALS — TEMPERATURE: 97.5 F

## 2020-12-04 PROCEDURE — 95811 POLYSOM 6/>YRS CPAP 4/> PARM: CPT

## 2020-12-11 LAB — STATUS: NORMAL

## 2021-02-26 NOTE — PROGRESS NOTES
Called to confirm upcoming appt. -LM with callback number and request for her to call us back and confirm.

## 2021-03-01 ENCOUNTER — HOSPITAL ENCOUNTER (OUTPATIENT)
Dept: CARDIAC REHAB | Age: 71
Setting detail: THERAPIES SERIES
Discharge: HOME | End: 2021-03-01
Payer: MEDICARE

## 2021-03-01 VITALS — HEIGHT: 61 IN | WEIGHT: 165 LBS | BODY MASS INDEX: 31.15 KG/M2

## 2021-03-01 PROCEDURE — 93798 PHYS/QHP OP CAR RHAB W/ECG: CPT

## 2021-03-01 ASSESSMENT — PATIENT HEALTH QUESTIONNAIRE - PHQ9: SUM OF ALL RESPONSES TO PHQ QUESTIONS 1-9: 0

## 2021-03-03 ENCOUNTER — HOSPITAL ENCOUNTER (OUTPATIENT)
Dept: CARDIAC REHAB | Age: 71
Setting detail: THERAPIES SERIES
End: 2021-03-03
Payer: MEDICARE

## 2021-03-05 ENCOUNTER — HOSPITAL ENCOUNTER (OUTPATIENT)
Dept: CARDIAC REHAB | Age: 71
Setting detail: THERAPIES SERIES
Discharge: HOME | End: 2021-03-05
Payer: MEDICARE

## 2021-03-05 VITALS — BODY MASS INDEX: 31.01 KG/M2 | WEIGHT: 164.1 LBS | TEMPERATURE: 96.7 F

## 2021-03-05 PROCEDURE — 93798 PHYS/QHP OP CAR RHAB W/ECG: CPT

## 2021-03-08 ENCOUNTER — HOSPITAL ENCOUNTER (OUTPATIENT)
Dept: CARDIAC REHAB | Age: 71
Setting detail: THERAPIES SERIES
Discharge: HOME | End: 2021-03-08
Payer: MEDICARE

## 2021-03-08 VITALS — BODY MASS INDEX: 31.04 KG/M2 | WEIGHT: 164.3 LBS | TEMPERATURE: 98.2 F

## 2021-03-08 PROCEDURE — 93798 PHYS/QHP OP CAR RHAB W/ECG: CPT

## 2021-03-10 ENCOUNTER — HOSPITAL ENCOUNTER (OUTPATIENT)
Dept: CARDIAC REHAB | Age: 71
Setting detail: THERAPIES SERIES
Discharge: HOME | End: 2021-03-10
Payer: MEDICARE

## 2021-03-10 VITALS — WEIGHT: 162.4 LBS | BODY MASS INDEX: 30.69 KG/M2

## 2021-03-10 PROCEDURE — 93798 PHYS/QHP OP CAR RHAB W/ECG: CPT

## 2021-03-10 RX ORDER — ATENOLOL 50 MG/1
100 TABLET ORAL DAILY
COMMUNITY
End: 2021-09-14

## 2021-03-10 RX ORDER — UMECLIDINIUM 62.5 UG/1
1 AEROSOL, POWDER ORAL DAILY
COMMUNITY
End: 2022-05-18

## 2021-03-10 RX ORDER — FLUTICASONE PROPIONATE 50 MCG
2 SPRAY, SUSPENSION (ML) NASAL DAILY
COMMUNITY

## 2021-03-10 RX ORDER — HYDROCHLOROTHIAZIDE 50 MG/1
50 TABLET ORAL DAILY
COMMUNITY

## 2021-03-10 RX ORDER — ALENDRONATE SODIUM 70 MG/1
70 TABLET ORAL
Status: ON HOLD | COMMUNITY
End: 2022-06-15

## 2021-03-10 NOTE — FLOWSHEET NOTE
Workout modified that will include recumbent equipment r/t fall risk. When patient able to use TMILL, steady gait observed, pt encouraged to use handrails when on TMILL, and assistance provided when entering/exiting equipment.

## 2021-03-12 ENCOUNTER — HOSPITAL ENCOUNTER (OUTPATIENT)
Dept: CARDIAC REHAB | Age: 71
Setting detail: THERAPIES SERIES
Discharge: HOME | End: 2021-03-12
Payer: MEDICARE

## 2021-03-12 VITALS — TEMPERATURE: 96.3 F | BODY MASS INDEX: 31.29 KG/M2 | WEIGHT: 165.6 LBS

## 2021-03-12 PROCEDURE — 93798 PHYS/QHP OP CAR RHAB W/ECG: CPT

## 2021-03-15 ENCOUNTER — HOSPITAL ENCOUNTER (OUTPATIENT)
Dept: CARDIAC REHAB | Age: 71
Setting detail: THERAPIES SERIES
Discharge: HOME | End: 2021-03-15
Payer: MEDICARE

## 2021-03-15 VITALS — BODY MASS INDEX: 31.12 KG/M2 | WEIGHT: 164.7 LBS | TEMPERATURE: 95.2 F

## 2021-03-15 PROCEDURE — 93798 PHYS/QHP OP CAR RHAB W/ECG: CPT

## 2021-03-17 ENCOUNTER — HOSPITAL ENCOUNTER (OUTPATIENT)
Dept: CARDIAC REHAB | Age: 71
Setting detail: THERAPIES SERIES
Discharge: HOME | End: 2021-03-17
Payer: MEDICARE

## 2021-03-17 VITALS — BODY MASS INDEX: 31.55 KG/M2 | TEMPERATURE: 97.1 F | WEIGHT: 167 LBS

## 2021-03-17 PROCEDURE — 93798 PHYS/QHP OP CAR RHAB W/ECG: CPT

## 2021-03-19 ENCOUNTER — HOSPITAL ENCOUNTER (OUTPATIENT)
Dept: CARDIAC REHAB | Age: 71
Setting detail: THERAPIES SERIES
Discharge: HOME | End: 2021-03-19
Payer: MEDICARE

## 2021-03-19 VITALS — BODY MASS INDEX: 31.44 KG/M2 | TEMPERATURE: 97.1 F | WEIGHT: 166.4 LBS

## 2021-03-19 PROCEDURE — 93798 PHYS/QHP OP CAR RHAB W/ECG: CPT

## 2021-03-22 ENCOUNTER — HOSPITAL ENCOUNTER (OUTPATIENT)
Dept: CARDIAC REHAB | Age: 71
Setting detail: THERAPIES SERIES
Discharge: HOME | End: 2021-03-22
Payer: MEDICARE

## 2021-03-22 VITALS — TEMPERATURE: 96.7 F | WEIGHT: 168.3 LBS | BODY MASS INDEX: 31.8 KG/M2

## 2021-03-22 PROCEDURE — 93798 PHYS/QHP OP CAR RHAB W/ECG: CPT

## 2021-03-24 ENCOUNTER — HOSPITAL ENCOUNTER (OUTPATIENT)
Dept: CARDIAC REHAB | Age: 71
Setting detail: THERAPIES SERIES
Discharge: HOME | End: 2021-03-24
Payer: MEDICARE

## 2021-03-24 VITALS — BODY MASS INDEX: 31.91 KG/M2 | HEIGHT: 61 IN | WEIGHT: 169 LBS | TEMPERATURE: 97 F

## 2021-03-24 PROCEDURE — 93798 PHYS/QHP OP CAR RHAB W/ECG: CPT

## 2021-03-24 NOTE — PROGRESS NOTES
Pt completed 33/36 visits. ITP updated, PHQ completed, medications reviewed and updated as needed. Pt does not plan to participate in Phase 3 cardiac rehab. Written education provided on Keeping a Healthy Body Weight.

## 2021-03-26 ENCOUNTER — APPOINTMENT (OUTPATIENT)
Dept: CARDIAC REHAB | Age: 71
End: 2021-03-26
Payer: MEDICARE

## 2021-03-29 ENCOUNTER — APPOINTMENT (OUTPATIENT)
Dept: CARDIAC REHAB | Age: 71
End: 2021-03-29
Payer: MEDICARE

## 2021-04-13 ENCOUNTER — HOSPITAL ENCOUNTER (OUTPATIENT)
Dept: PHYSICAL THERAPY | Age: 71
Setting detail: THERAPIES SERIES
Discharge: HOME | End: 2021-04-13
Payer: MEDICARE

## 2021-04-13 PROCEDURE — 97162 PT EVAL MOD COMPLEX 30 MIN: CPT

## 2021-04-13 PROCEDURE — 97110 THERAPEUTIC EXERCISES: CPT

## 2021-04-13 PROCEDURE — 97140 MANUAL THERAPY 1/> REGIONS: CPT

## 2021-04-13 NOTE — FLOWSHEET NOTE
Jimmie Fall Risk Assessment    Patient Name:  Indira Mayes  : 1950        Risk Factor Scale  Score   History of Falls [] Yes  [x] No 25  0 0   Secondary Diagnosis [] Yes  [x] No 15  0 0   Ambulatory Aid [] Furniture  [x] Crutches/cane/walker  [] None/bedrest/wheelchair/nurse 30  15  0 15   IV/Heparin Lock [] Yes  [x] No 20  0 0   Gait/Transferring [] Impaired  [] Weak  [x] Normal/bedrest/immobile 20  10  0 0   Mental Status [] Forgets limitations  [x] Oriented to own ability 15  0 0      Total: 15     Based on the Assessment score: check the appropriate box.     [x]  No intervention needed   Low =   Score of 0-24    []  Use standard prevention interventions Moderate =  Score of 24-44   [] Give patient handout and discuss fall prevention strategies   [] Establish goal of education for patient/family RE: fall prevention strategies    []  Use high risk prevention interventions High = Score of 45 and higher   [] Give patient handout and discuss fall prevention strategies   [] Establish goal of education for patient/family Re: fall prevention strategies   [] Discuss lifeline / other resources    Electronically signed by:   Olinda Matias PT  Date: 2021

## 2021-04-13 NOTE — CONSULTS
Other:  Recently had D and C              [x] Refer to full medical chart  In EPIC     Comorbidities:   [] Obesity [] Dialysis  [] N/A   [x] Asthma/COPD [] Dementia [x] Other: Bilateral hip replaced, right more recent. [] Stroke [] Sleep apnea [] Other:   [] Vascular disease [] Rheumatic disease [] Other:     Tests: [] X-Ray: [] MRI:  [] Other:    Medications: [x] Refer to full medical record [] None [] Other:  Allergies:      [x] Refer to full medical record [] None [] Other:    Function:  Hand Dominance  [x] Right  [] Left  Patient lives with:    In what type of home []  One story   [x] Two story 6+6 steps - bedroom;has bathroom downstairs but she uses the one upstairs. [] Split level   Number of stairs to enter 2 + 6    With handrail on the [x]  Right to enter   [] Left to enter   Bathroom has a [x]  Tub only - Sits in tub and uses a shower hose attachment  [] Tub/shower combo   [] Walk in shower    []  Grab bars   Washing machine is on []  Main level   [] Second level   [x] Basement   Employer    Job Status []  Normal duty   [] Light duty   [] Off due to condition    [x]  Retired   [] Not employed   [] Disability  [] Other:  []  Return to work: Work activities/duties      ADL/IADL Previous level of function Current level of function Who currently assists the patient with task   Bathing  [x] Independent  [] Assist [x] Independent  [] Assist    Dress/grooming [x] Independent  [] Assist [x] Independent  [] Assist    Transfer/mobility [x] Independent  [] Assist [x] Independent  [] Assist    Feeding [x] Independent  [] Assist [x] Independent  [] Assist    Toileting [x] Independent  [] Assist [x] Independent  [] Assist    Driving [x] Independent  [] Assist [x] Independent  [] Assist    Housekeeping [] Independent  [x] Assist [] Independent  [x] Assist  - has always helped; currently had stents.      Grocery shop/meal prep [x] Independent  [] Assist [x] Independent  [] Assist      Gait Prior level of function Current level of function    [x] Independent  [] Assist [x] Independent  [] Assist   Device: [x] Independent [x] Independent    [] Straight Cane [] Quad cane [] Straight Cane [] Quad cane    [] Standard walker [] Rolling walker   [] 4 wheeled walker [] Standard walker [] Rolling walker   [] 4 wheeled walker    [] Wheelchair [] Wheelchair     Pain:  [x] Yes  [] No Location: Low back   Pain Rating: (0-10 scale) 7-9/10 low back; right hip 6-9/10; left hip 5-7/10; left calf up to 9/10  Pain altered Tx:  [] Yes  [x] No  Action:    Symptoms:  [] Improving [] Worsening [x] Same - since getting shots  Better:  [] AM    [] PM    [] Sit    [] Rise/Sit    []Stand    [] Walk    [] Lying    [x] Other: injection; medication  Worse: [] AM    [] PM    [] Sit    [] Rise/Sit    [x]Stand    [x] Walk    [] Lying    [] Bend                      [] Valsalva    [] Other:  Sleep: [] OK    [x] Disturbed -- mostly because of CPAP mask- as sleeping well before it. Objective:   STRENGTH  ROM    Left Right Cervical    L1-2 Hip Flex 4+ 4+ Flexion    Hip Abd 4+ 4+ Extension    L3-4 Knee Ext 4+ 4+ Rotation L R   L4 Ankle DF 4+ 4+ Sidebend L R   L5 EHL 4+ 4+ Retraction    S1 Plant. Flex   Lumbar    Abdominals   Flexion 80 + pn   Erector Spinae   Extension Neutral + pn      Rotation L 25% decr R 25% decr      Sidebend L 10 + pn R 15 + pn     TESTS (+/-) LEFT RIGHT Not Tested   SLR [] sit [x] supine - + []   Piriformis NT NT []   SKTC NT NT []   DKTC NT NT []   LTR - - []   Kun Tests ? Pain ?  Pain No Change Not Tested   RFIS [x] [] [] []   CARLOS [x] [] [] []   RFIL [x] [] [] []   REIL [x] [] [] []     OBSERVATION No Deficit Deficit Not Tested Comments   Posture       Forward Head [] [x] []    Rounded Shoulders [] [x] []    Kyphosis [x] [] []    Lordosis [x] [] []    Lateral Shift [] [] [x]    Scoliosis [] [] [x]    Iliac Crest [] [x] [] Tender right   PSIS [] [x] [] Tender right   ASIS [] [x] [] Tender right   Genu Valgus [x] [] [] Genu Varus [x] [] []    Genu Recurvatum [x] [] []    Pronation [x] [] []    Supination [x] [] []    Leg Length Discrp [] [x] [] Right leg approx 2\" shorter   Slumped Sitting [] [x] []    Palpation [] [x] [] Tender: Right greater trochanter, ischial tuberosity, bilateral PSIS   Sensation [] [x] [] Shooting pain down right leg to midcalf   Edema [x] [] []    Neurological [x] [] []    Patellar Mobility [] [] [x]    Patellar Orientation [] [] [x]    Gait [] [x] [] Analysis: Flexed posture, slight shuffle gait, cane in right hand, shoulders elevated     FUNCTION Normal Difficult Unable   Sitting [] [x] []   Standing [] [x] []   Ambulation [] [x] []   Groom/Dress [] [x] []   Lift/Carry [] [x] []   Stairs [] [x] []   Bending [] [x] []   Squat [] [x] []   Kneel [] [x] []     BALANCE/PROPRIOCEPTION              [] Not tested   Single leg stance       R                     L                                PAIN   Eyes open                     3        Sec.          4        Sec                  . []    Eyes closed                          Sec. Sec                  . []      Functional Test: Oswestry Score: 54% functionally impaired     Comments:    Assessment:  Patient would benefit from skilled physical therapy services in order to: decrease right hip pain/left calf pain/low back pain, improve gait, decrease risk of falls, improve hip strength, maintain proper leg length alignment. Problems:    [x] ? Pain:  [x] ? ROM:  [x] ? Strength:  [x] ? Function:  [x] Other:     STG: (to be met in 9 treatments)  1. ? Pain: Lumbar pain improve to 6/10 at max with standing and walking  2. Right hip pain improve to 6/10 with standing and walking  3. Left calf pain improve to 6/10 at all times  4. ? ROM: Lumbar extension improve to 5 degrees  5.  Patient to report no radicular symptoms with side bending  6. ? Strength: patient able to SLS on each leg 6 seconds without LOB  7. ? Function: Patient to report improved ability to stand upright during gait cycle  8. Patient to be independent with home exercise program as demonstrated by performance with correct form without cues. LTG: (to be met in 18 treatments)  1. Lumbar pain improve to 4/10 at max with activity  2. Right hip pain improve to 2/10 at max with activity  3. Left calf pain improve to 2/10 at max with activity  4. Oswestry score improve to 35% functionally impaired. 5. Patient to report resolution of radicular symptoms down leg. 6. Patient to report improved ease of going up/down steps at home. Patient goals: \"Increase mobility in back area, increase standing and sitting without pain\"    Rehab Potential:  [x] Good  [] Fair  [] Poor   Suggested Professional Referral:  [x] No  [] Yes:  Barriers to Goal Achievement:  [x] No  [] Yes:  Domestic Concerns:  [x] No  [] Yes:    Pt. Education:  [x] Plans/Goals, Risks/Benefits discussed  [x] Home exercise program  Method of Education: [x] Verbal  [x] Demo  [x] Written -- see chart below  Comprehension of Education:  [] Verbalizes understanding. [] Demonstrates understanding. [x] Needs Review. [] Demonstrates/verbalizes understanding of HEP/Ed previously given.     Treatment Plan:  [x] Therapeutic Exercise   82178  [x] Iontophoresis: 4 mg/mL Dexamethasone Sodium Phosphate  mAmin  82247   [x] Therapeutic Activity  06089 [x] Vasopneumatic cold with compression  35052    [x] Gait Training   56890 [] Ultrasound   60886   [] Neuromuscular Re-education  41035 [x] Electrical Stimulation Unattended  54748   [x] Manual Therapy  67336 [] Electrical Stimulation Attended  83005   [x] Instruction in HEP  [x] Lumbar/Cervical Traction  11729   [] Aquatic Therapy   53618 [] Cold/hotpack    [] Massage   59994      [] Dry Needling, 1 or 2 muscles  68958   [] Biofeedback, first 15 minutes   72506  [] Biofeedback, additional 15 minutes   90473 [] Dry Needling, 3 or more muscles  26514     [x]  Medication allergies reviewed for use of Dexamethasone Sodium Phosphate 4mg/ml     with iontophoresis treatments. Pt is not allergic. Frequency:  2 x/week for 18 visits , may increase to 3 times per week if patient desires. Todays Treatment:  Modalities:   Precautions:  Exercises:  Exercise Reps/ Time Weight/ Level Comments   MET to correct right leg shorter 2 x  Shotgun technique 2 x. Corrected LLD. Bridge 12 x  HEP   Clamshell 10 x ea  HEP   Supine SLR 10 x ea  HEP   Supine butterfly 10 x  Blueberry HEP and blue tband   Hypervolt -- prone 8  min  5 min left calf, level 2  3 min right hip, level 1         Other:  142/84 BP this date. Patient reports pain either side of her spine when laying prone - that it feels like her spine does not want to bend that way. Will use pillows when laying in prone in the future. Specific Instructions for next treatment:  May need roll in back during seated exercises. Begin with NuStep/SCIFIT. Core stabilization exercises. Standing exercises. Lumbar rotation machine. Lumbar extension machine. Leg press machine. Check for LLD (previously right leg shorter). Once order signed, may begin ionto for right hip if pain does not begin to subside. Hip flexor stretch, hamstring stretch. Can trial manual traction, and progress to mechanical traction if needed. Can use modalities as needed for pain control. Use pillow when working in prone.     Evaluation Complexity:  History (Personal factors, comorbidities) [] 0 [] 1-2 [x] 3+   Exam (limitations, restrictions) [] 1-2 [x] 3 [] 4+   Clinical presentation (progression) [] Stable [x] Evolving  [] Unstable   Decision Making [] Low [x] Moderate [] High    [] Low Complexity [x] Moderate Complexity [] High Complexity       Treatment Charges: Mins Units   [x] Evaluation       []  Low       [x]  Moderate       []  High 40 1   []  Modalities     [x]  Ther Exercise 10 1   [x]  Manual Therapy 15 1   []  Ther Activities     []  Aquatics     []  Vasocompression []  Other       TOTAL TREATMENT TIME: 65    Time in: 1004      Time out: 1110    Electronically signed by: Lis Brooks PT        Physician Signature:________________________________Date:__________________  By signing above or cosigning this note, I have reviewed this plan of care and certify a need for medically necessary rehabilitation services.      *PLEASE SIGN ABOVE AND FAX BACK ALL PAGES*

## 2021-04-16 ENCOUNTER — HOSPITAL ENCOUNTER (OUTPATIENT)
Dept: PHYSICAL THERAPY | Age: 71
Setting detail: THERAPIES SERIES
Discharge: HOME | End: 2021-04-16
Payer: MEDICARE

## 2021-04-16 PROCEDURE — 97140 MANUAL THERAPY 1/> REGIONS: CPT

## 2021-04-16 PROCEDURE — 97110 THERAPEUTIC EXERCISES: CPT

## 2021-04-16 NOTE — FLOWSHEET NOTE
Medicare Cap   [x] Physical Therapy  [] Speech Therapy  [] Occupational therapy  *PT and Speech caps combine      $2100 Limit for PT and Speech combined  $2100 Limit for OT individually  At the beginning of the month where you expect to go over $2100, please add the [address] [place] [address] modifier      Patient Name: Elle Montiel  YOB: 1950    Note:  This is an estimate of charges billed.      Date of [place] [address] Name # units/ charge $$$ charge Daily Total Charge Ongoing Total $$$           4/16/21 There ex, man 37.73+19.74+19.74  77.21

## 2021-04-16 NOTE — FLOWSHEET NOTE
[x] [facility]) [facility] &  Therapy  [address]  [phone]  F: [phone] [] [address]  [facility]   [address]  P: [phone]  F: [phone] [] [address]  [address]  P: [phone]  F: [phone] [] [address]  P: [phone]  F: [phone] [] [address]  [facility]   [address]   [place]: [phone]  F: [phone]      Physical Therapy Daily Treatment Note    Date:  2021  Patient Name:  Sarah Davis    :  1950  MRN: [id number]   Physician: Dr. Tuan Brooks MD                               Insurance: Medicare ([address] [place] [address]), Jhon Mej[facility] (61 visits per year)  Medical Diagnosis: Lumbar facet arthropathy, hip pain, lumbar radiculopathy, inflammatory spondylopathy lumbar region, lumbago with sciatica right side, myofascial pain, Ilioinguinal neuralgia of right side  Rehab Codes: M 54.5, M 54.41, M 25.551, M 25.552, M 62.81, R 26.2, R 29.3, M 79.1  Onset Date: 3/30/21                 Next 's appt.: 21  Visit# / total visits:  ; Progress note for Medicare patient due at visit #9     Cancels/No Shows: 0/0    Subjective:    Pain:  [x] Yes  [] No Location: LB, R  hip  Pain Rating: (0-10 scale)  /10  Pain altered Tx:  [] No  [] Yes  Action:  Comments: Reports pain in rica calves and LB only.      Objective:  Modalities:  hypervolt to R hip and Left calf x 8 mins total.  5 mins R hip and 3 mins left calf -  Precautions:   Exercises:  Exercise Reps/ Time Weight/ Level Comments   sci fit w/ lumbar roll 5 mins L1.5 Added          Standing   Added    Gastro stretch 3x20\"  wedge   Heel raises 10x     Hip abd iso abdom 10x  rica   Hip extension 10x  rica         Supine      LTR 5x10\"     MET activity  3. Left calf pain improve to 2/10 at max with activity  4. Oswestry score improve to 35% functionally impaired. 5. Patient to report resolution of radicular symptoms down leg. 6. Patient to report improved ease of going up/down steps at home.     Patient goals: \"Increase mobility in back area, increase standing and sitting without pain\"     Pt. Education:  [x] Yes  [] No  [x] Reviewed Prior HEP/Ed  Method of Education: [x] Verbal  [x] Demo  [x] Written  Comprehension of Education:  [x] Verbalizes understanding. [x] Demonstrates understanding. [] Needs review. [] Demonstrates/verbalizes HEP/Ed previously given. Access Code: [email or web address]: [email or web address]/Date: 04/16/2021Prepared by: John Blanchard on Wall - 1 x daily - 7 x weekly - 1 sets - 3 reps - 30 hold   Standing Hip Abduction - 1 x daily - 5 x weekly - 2 sets - 10 reps   Standing Hip Extension with Counter Support - 1 x daily - 5 x weekly - 2 sets - 10 reps   Supine Transversus Abdominis Bracing with Heel Slide - 1 x daily - 7 x weekly - 2 sets - 10 reps - 5 hold   Supine Lower Trunk Rotation - 1 x daily - 7 x weekly - 1-2 sets - 10 reps - 10 hold   Hooklying Isometric Clamshell - 1 x daily - 5 x weekly - 2 sets - 10 reps   Clamshell with Resistance - 1 x daily - 5 x weekly - 2 sets - 10 reps    Plan: [x] Continue current frequency toward long and short term goals. [x] Specific Instructions for subsequent treatments: May need roll in back during seated exercises. Core stabilization exercises. Standing exercises. Lumbar rotation machine. Lumbar extension machine. Leg press machine. Check for LLD (previously right leg shorter). Once order signed, may begin ionto for right hip if pain does not begin to subside. Hip flexor stretch, hamstring stretch. Can trial manual traction, and progress to mechanical traction if needed. Can use modalities as needed for pain control.   Use pillow in prone Time In: 3046          Time Out: 6524    Electronically signed by:  Soo [place] PTA

## 2021-04-20 ENCOUNTER — HOSPITAL ENCOUNTER (OUTPATIENT)
Dept: PHYSICAL THERAPY | Age: 71
Setting detail: THERAPIES SERIES
Discharge: HOME | End: 2021-04-20
Payer: MEDICARE

## 2021-04-20 PROCEDURE — 97110 THERAPEUTIC EXERCISES: CPT

## 2021-04-20 PROCEDURE — 97140 MANUAL THERAPY 1/> REGIONS: CPT

## 2021-04-20 NOTE — FLOWSHEET NOTE
[x] [facility]) [facility] &  Therapy  [address]  [phone]  F: [phone] [] [address]  [place] [address]   [address]  P: [phone]  F: [phone] [] [place]  [address]  P: [phone]  F: [phone] [] [address]  P: [phone]  F: [phone] [] [address]  [facility]   [address]   [place]: [phone]  F: [phone]      Physical Therapy Daily Treatment Note    Date:  2021  Patient Name:  José Manuel Julian    :  1950  MRN: [id number]   Physician: Dr. Fidel Manley MD                               Insurance: Medicare ([address] [place] [address]), [facility] (61 visits per year)  Medical Diagnosis: Lumbar facet arthropathy, hip pain, lumbar radiculopathy, inflammatory spondylopathy lumbar region, lumbago with sciatica right side, myofascial pain, Ilioinguinal neuralgia of right side  Rehab Codes: M 54.5, M 54.41, M 25.551, M 25.552, M 62.81, R 26.2, R 29.3, M 79.1  Onset Date: 3/30/21                 Next 's appt.: 21  Visit# / total visits:  ; Progress note for Medicare patient due at visit #9     Cancels/No Shows: 0/0    Subjective:    Pain:  [x] Yes  [] No Location: LB, R  hip  Pain Rating: (0-10 scale)  7/10 in R hip, 5/10 in LB   Pain altered Tx:  [x] No  [] Yes  Action:  Comments: Reports pain in R calf as well in hip and LB this date.      Objective:  Modalities:  hypervolt to R hip and Left calf x 8 mins total.  5 mins R hip and 3 mins left calf -  Precautions:   Exercises:  Exercise Reps/ Time Weight/ Level Comments   sci fit w/ lumbar roll 5 mins L1.5 Added 4/16         Standing   Added    Gastro stretch 3x20\"  wedge   Heel raises 15x     Hip abd iso abdom 15x  rica   Hip extension 15x extension improve to 5 degrees  5. Patient to report no radicular symptoms with side bending  6. ? Strength: patient able to SLS on each leg 6 seconds without LOB  7. ? Function: Patient to report improved ability to stand upright during gait cycle  8. Patient to be independent with home exercise program as demonstrated by performance with correct form without cues.     LTG: (to be met in 18 treatments)  1. Lumbar pain improve to 4/10 at max with activity  2. Right hip pain improve to 2/10 at max with activity  3. Left calf pain improve to 2/10 at max with activity  4. Oswestry score improve to 35% functionally impaired. 5. Patient to report resolution of radicular symptoms down leg. 6. Patient to report improved ease of going up/down steps at home.     Patient goals: \"Increase mobility in back area, increase standing and sitting without pain\"     Pt. Education:  [x] Yes  [] No  [x] Reviewed Prior HEP/Ed  Method of Education: [x] Verbal  [x] Demo  [] Written  Comprehension of Education:  [x] Verbalizes understanding. [x] Demonstrates understanding. [] Needs review. [] Demonstrates/verbalizes HEP/Ed previously given. Access Code: [email or web address]: [email or web address]. com/Date: 04/16/2021Prepared by: Joe Celestin on Wall - 1 x daily - 7 x weekly - 1 sets - 3 reps - 30 hold   Standing Hip Abduction - 1 x daily - 5 x weekly - 2 sets - 10 reps   Standing Hip Extension with Counter Support - 1 x daily - 5 x weekly - 2 sets - 10 reps   Supine Transversus Abdominis Bracing with Heel Slide - 1 x daily - 7 x weekly - 2 sets - 10 reps - 5 hold   Supine Lower Trunk Rotation - 1 x daily - 7 x weekly - 1-2 sets - 10 reps - 10 hold   Hooklying Isometric Clamshell - 1 x daily - 5 x weekly - 2 sets - 10 reps   Clamshell with Resistance - 1 x daily - 5 x weekly - 2 sets - 10 reps    Plan: [x] Continue current frequency toward long and short term goals.     [x] Specific Instructions for subsequent treatments: May need roll in back during seated exercises. Core stabilization exercises. Standing exercises. Lumbar rotation machine. Lumbar extension machine. Leg press machine. Check for LLD (previously right leg shorter). Once order signed, may begin ionto for right hip if pain does not begin to subside. Hip flexor stretch, hamstring stretch. Can trial manual traction, and progress to mechanical traction if needed. Can use modalities as needed for pain control.   Use pillow in prone         Time In: 09:50 am   Time Out: 1050 am    Electronically signed by:  Fortino Patrick PTA

## 2021-04-20 NOTE — FLOWSHEET NOTE
Medicare Cap   [x] Physical Therapy  [] Speech Therapy  [] Occupational therapy  *PT and Speech caps combine      $2100 Limit for PT and Speech combined  $2100 Limit for OT individually  At the beginning of the month where you expect to go over $2100, please add the [address] [place] [address] modifier      Patient Name: Ap Allen  YOB: 1950    Note:  This is an estimate of charges billed.      Date of [address] Name # units/ charge $$$ charge Daily Total Charge Ongoing Total $$$           4/16/21 There ex, man 37.73+19.74+19.74  77.21    4/20/21 There ex, man 37.73+19.74+19.74+19.74  96.95

## 2021-04-22 ENCOUNTER — HOSPITAL ENCOUNTER (OUTPATIENT)
Dept: PHYSICAL THERAPY | Age: 71
Setting detail: THERAPIES SERIES
Discharge: HOME | End: 2021-04-22
Payer: MEDICARE

## 2021-04-22 PROCEDURE — 97110 THERAPEUTIC EXERCISES: CPT

## 2021-04-22 PROCEDURE — 97140 MANUAL THERAPY 1/> REGIONS: CPT

## 2021-04-22 NOTE — FLOWSHEET NOTE
[x] [facility] &  Therapy  [address]  [phone]  F: [phone] [] [address]  [place] [address]   [address]  P: [phone]  F: [phone] [] [place]  [address]  P: [phone]  F: [phone] [] [address]  P: [phone]  F: [phone] [] [address]  [facility]   [address]   [place]: [phone]  F: [phone]      Physical Therapy Daily Treatment Note    Date:  2021  Patient Name:  Indira Mayes    :  1950  MRN: [id number]  Physician: Dr. Shade Henderson MD                               Insurance: Medicare ([address] [place] [address]), [facility] [address] (61 visits per year)  Medical Diagnosis: Lumbar facet arthropathy, hip pain, lumbar radiculopathy, inflammatory spondylopathy lumbar region, lumbago with sciatica right side, myofascial pain, Ilioinguinal neuralgia of right side  Rehab Codes: M 54.5, M 54.41, M 25.551, M 25.552, M 62.81, R 26.2, R 29.3, M 79.1  Onset Date: 3/30/21                 Next 's appt.: 21  Visit# / total visits:  ; Progress note for Medicare patient due at visit #9     Cancels/No Shows: 0/0    Subjective:    Pain:  [x] Yes  [] No Location: LB, B  hip  Pain Rating: (0-10 scale)  6/10 in R hip, 6/10 in LB 3/10 B calfs  Pain altered Tx:  [x] No  [] Yes  Action:  Comments: Patient reports pain in B calves. Feels it because she has been exercising again. Pain in low back and around the L hip today.       Objective:  Modalities:  hypervolt to R hip and Left calf x 8 mins total.  5 mins R hip and 3 mins left calf -/  Precautions:   Exercises:  Exercise Reps/ Time Weight/ Level Comments   NuStep w/ lumbar roll 5 mins L3          Standing      Gastro stretch 3x20\"  wedge   Heel raises 10x  Regressed 4..22    Hip abd iso abdom 10x2  Inc reps 4.22   Hip extension 10x2  rica   Hs curls  15x     Hip flexion  15x     Marches  10x     Squats 15x  Added 4.22         Seated      LAQ 15x 2 lb    Hamstring stretch 3x20\"     HS curls 15x lime                Supine      LTR 5x10\"     MET to correct right leg shorter 1 x   Shotgun technique 2 x. Corrected LLD. Bridge 15x   HEP         iso abdom 10x     iso abdom w/ march 10x     iso abdom w/alt UE 10x 2 lbs    Alt UE/LEs opp 10x 2 lb in UEs    HS stretch 3x30\"  Strap, rica    Supine SLR 15 x ea   HEP   Supine butterfly 15 x  lime HEP and blue tband   Hip flexor stretch w/lumbar roll  2x30\"  Leg off side of mat         SL clamshells w. iso abdom. 10x1 blue HEP   Manual hip flexor stretch 2x30\"   held today          Prone pillow      Hypervolt -- prone/pillow 8  min   3 min left calf, level 2  5 min right hip, level 1                   Other:            Treatment Charges: Mins Units   []  Modalities     [x]  Ther Exercise 44 3   [x]  Manual Therapy 18 1   []  Ther Activities     []  Aquatics     []  Vasocompression     []  Other     Total Treatment time 62 4       Assessment: [x] Progressing toward goals. Increased reps with standing hip abd/ext with 2 sets of reps. Regressed with heel raises due to cramping in the calves. Increased weight with supine DLS to increase core strength. Additional time spent with cueing for PPT. Ended with hyper volt to B gastroc region and low back for TPR. Patient noted she was lighter on her feet following therapy session. [] No change. [x] Other: 15 min late due to [address] parking  [x] Patient would continue to benefit from skilled physical therapy services in order to:  Decreased LB and LE pain and increase core and hip strengthening in order to progress functional activity. STG: (to be met in 9 treatments)  1. ? Pain: Lumbar pain improve to 6/10 at max with standing and walking  2.  Right hip pain improve to 6/10 with standing and walking  3. Left calf pain improve to 6/10 at all times  4. ? ROM: Lumbar extension improve to 5 degrees  5. Patient to report no radicular symptoms with side bending  6. ? Strength: patient able to SLS on each leg 6 seconds without LOB  7. ? Function: Patient to report improved ability to stand upright during gait cycle  8. Patient to be independent with home exercise program as demonstrated by performance with correct form without cues.     LTG: (to be met in 18 treatments)  1. Lumbar pain improve to 4/10 at max with activity  2. Right hip pain improve to 2/10 at max with activity  3. Left calf pain improve to 2/10 at max with activity  4. Oswestry score improve to 35% functionally impaired. 5. Patient to report resolution of radicular symptoms down leg. 6. Patient to report improved ease of going up/down steps at home.     Patient goals: \"Increase mobility in back area, increase standing and sitting without pain\"     Pt. Education:  [x] Yes  [] No  [x] Reviewed Prior HEP/Ed  Method of Education: [x] Verbal  [x] Demo  [x] Written 4.22 Medbridge texted to patient   Comprehension of Education:  [x] Verbalizes understanding. [x] Demonstrates understanding. [] Needs review. [x] Demonstrates/verbalizes HEP/Ed previously given. Access Code: [email or web address]: [employer]/Date: 04/22/2021Prepared by: Gerber Potter   Supine Posterior Pelvic Tilt - 1 x daily - 5 x weekly - 3 sets - 10 reps - 10 hold   Supine March with Posterior Pelvic Tilt - 1 x daily - 7 x weekly - 10 reps - 3 sets   Supine Bridge - 1 x daily - 7 x weekly - 10 reps - 3 sets   Active Straight Leg Raise with Quad Set - 1 x daily - 7 x weekly - 10 reps - 3 sets   Clamshell - 1 x daily - 7 x weekly - 10 reps - 3 sets   Sidelying Hip Abduction - 1 x daily - 7 x weekly - 10 reps - 3 sets      Access Code: [email or web address]: [employer]. com/Date: 04/16/2021Prepared by: Brandan Moeller Gastroc Stretch on Wall - 1 x daily - 7 x weekly - 1 sets - 3 reps - 30 hold   Standing Hip Abduction - 1 x daily - 5 x weekly - 2 sets - 10 reps   Standing Hip Extension with Counter Support - 1 x daily - 5 x weekly - 2 sets - 10 reps   Supine Transversus Abdominis Bracing with Heel Slide - 1 x daily - 7 x weekly - 2 sets - 10 reps - 5 hold   Supine Lower Trunk Rotation - 1 x daily - 7 x weekly - 1-2 sets - 10 reps - 10 hold   Hooklying Isometric Clamshell - 1 x daily - 5 x weekly - 2 sets - 10 reps   Clamshell with Resistance - 1 x daily - 5 x weekly - 2 sets - 10 reps    Plan: [x] Continue current frequency toward long and short term goals. [x] Specific Instructions for subsequent treatments: May need roll in back during seated exercises. Lumbar rotation machine. Lumbar extension machine. Leg press machine. Check for LLD (previously right leg shorter). Once order signed, may begin ionto for right hip if pain does not begin to subside. Hip flexor stretch. Can trial manual traction, and progress to mechanical traction if needed. Can use modalities as needed for pain control.   Use pillow in prone         Time In: 09:58 am   Time Out: 1105 am    Electronically signed by:  Onofre Edwards PTA

## 2021-04-26 ENCOUNTER — APPOINTMENT (OUTPATIENT)
Dept: PHYSICAL THERAPY | Age: 71
End: 2021-04-26
Payer: MEDICARE

## 2021-04-28 ENCOUNTER — HOSPITAL ENCOUNTER (OUTPATIENT)
Dept: PHYSICAL THERAPY | Age: 71
Setting detail: THERAPIES SERIES
Discharge: HOME | End: 2021-04-28
Payer: MEDICARE

## 2021-04-28 PROCEDURE — 97110 THERAPEUTIC EXERCISES: CPT

## 2021-04-28 NOTE — FLOWSHEET NOTE
Medicare Cap   [x] Physical Therapy  [] Speech Therapy  [] Occupational therapy  *PT and Speech caps combine      $2100 Limit for PT and Speech combined  $2100 Limit for OT individually  At the beginning of the month where you expect to go over $2100, please add the [address] [place] [address] modifier      Patient Name: Leah Nicole  YOB: 1950    Note:  This is an estimate of charges billed.      Date of [address] Name # units/ charge $$$ charge Daily Total Charge Ongoing Total $$$           4/16/21 There ex, man 37.73+19.74+19.74  77.21    4/20/21 There ex, man 37.73+19.74+19.74+19.74  96.95    4/22/21 There ex, man 37.73+19.74+19.74+19.74  96.95 271.11   4/28/21 There ex,  25.26+19.74+19.74+19.74  84.48   355.59

## 2021-04-28 NOTE — FLOWSHEET NOTE
[x] [facility]) [facility] &  Therapy  [address]  [phone]  F: [phone] [] [address]  [facility] [address]   [address]  P: [phone]  F: [phone] [] [address]  [address] [facility]  P: [phone]  F: [phone] [] [address]  P: [phone]  F: [phone] [] [address]  [facility]   [address]   [place]: [phone]  F: [phone]      Physical Therapy Daily Treatment Note    Date:  2021  Patient Name:  Elle Montiel    :  1950  MRN: [id number]  Physician: Dr. Mikki Meng MD                               Insurance: Medicare ([address] [place] [address]), [facility] (61 visits per year)  Medical Diagnosis: Lumbar facet arthropathy, hip pain, lumbar radiculopathy, inflammatory spondylopathy lumbar region, lumbago with sciatica right side, myofascial pain, Ilioinguinal neuralgia of right side  Rehab Codes: M 54.5, M 54.41, M 25.551, M 25.552, M 62.81, R 26.2, R 29.3, M 79.1  Onset Date: 3/30/21                 Next 's appt.: 21  Visit# / total visits:  ; Progress note for Medicare patient due at visit #9     Cancels/No Shows: 0/0    Subjective:    Pain:  [x] Yes  [] No Location: LB, B  hip  Pain Rating: (0-10 scale)  4/10 in R hip, 4/10  LB    Pain altered Tx:  [x] No  [] Yes  Action:  Comments:  Reports feeling somewhat better today. Addressing HEP but having problems with medbridge code.      Objective:  Modalities:  hypervolt to R hip and Left calf x 8 mins total.  5 mins R hip and 3 mins left calf -  Precautions:   Exercises:2021, completed exercises marked with \"x\"    Exercise Reps/ Time Weight/ Level Comments    NuStep w/ lumbar roll 5 mins L3  x          Standing       Gastro stretch 3x20\"  wedge x Heel raises 20x     x   Hip abd iso abdom 10x2    x   Hip extension 10x2   x   Hs curls  15x      Hip flexion  15x      Marches  10x      Squats 15x    x          Seated       LAQ 15x 2 lb     Hamstring stretch 3x20\"      HS curls 15x lime                   Supine       LTR 5x10\"   x   MET to correct right leg shorter 1 x   Shotgun technique 2 x. Corrected LLD.   x   MET self correction 5x6\"  For correction of LLD, R le shorter. HEP issued  x   Bridge 15x   HEP x          iso abdom 10x   x   iso abdom w/ march 10x   x   iso abdom w/alt UE 10x 2 lbs  x   Alt UE/LEs opp 10x 2 lb in U/LE Added wt wt 4/28 x   Crunch w/ ball 10x  Added 4/28 x   Reverse crunch 10x  Tap 4' step,added 4/28 x   HS stretch 3x30\"  Strap, rica     Diagonal crunch 5x   Added, (pt requested all abdominals 4/28) x   Supine SLR 10 x ea  2 lbs HEP, added wt. 4/28 x   Supine butterfly 10x 2 lime HEP and blue tband, incr. sets x   Hip flexor stretch w/lumbar roll  3x30\"  Leg off side of mat x          SL clamshells w. iso abdom. 10x2 blue HEP x   Manual hip flexor stretch 2x30\"   held today            Prone pillow       Hypervolt -- prone/pillow 8  min   3 min left calf, level 2  5 min right hip, level 1                      Other:     answered pt many questions about HEP and reprinted and added core strengthening to HEP with code [email or web address], added add\"l written directions on written HEP per pt request.  Have not started ionto for R hip due to minimal complaints of pain this date and initial PT order not signed to date.        Treatment Charges: Mins Units   []  Modalities     [x]  Ther Exercise  59 4   [x]  Manual Therapy -- --   []  Ther Activities     []  Aquatics     []  Vasocompression     []  Other     Total Treatment time  59 4       Assessment: [x] Progressing toward goals slowly progressing core strengthening exercises, addressed manual for R LE LLD with good corrected and issued written copy for HEP with self LLD correction.  Pt demonstrated fairly good understanding. VC for body mechanics with transfer changes to decrease work load on LB    I   [] No change. [] Other   [x] Patient would continue to benefit from skilled physical therapy services in order to:  Decreased LB and LE pain and increase core and hip strengthening in order to progress functional activity. STG: (to be met in 9 treatments)  1. ? Pain: Lumbar pain improve to 6/10 at max with standing and walking  2. Right hip pain improve to 6/10 with standing and walking  3. Left calf pain improve to 6/10 at all times  4. ? ROM: Lumbar extension improve to 5 degrees  5. Patient to report no radicular symptoms with side bending  6. ? Strength: patient able to SLS on each leg 6 seconds without LOB  7. ? Function: Patient to report improved ability to stand upright during gait cycle  8. Patient to be independent with home exercise program as demonstrated by performance with correct form without cues.     LTG: (to be met in 18 treatments)  1. Lumbar pain improve to 4/10 at max with activity  2. Right hip pain improve to 2/10 at max with activity  3. Left calf pain improve to 2/10 at max with activity  4. Oswestry score improve to 35% functionally impaired. 5. Patient to report resolution of radicular symptoms down leg. 6. Patient to report improved ease of going up/down steps at home.     Patient goals: \"Increase mobility in back area, increase standing and sitting without pain\"     Pt. Education:  [x] Yes  [] No  [x] Reviewed Prior HEP/Ed  Method of Education: [x] Verbal  [x] Demo  [x] Written 4.22 Medbridge texted to patient   Comprehension of Education:  [x] Verbalizes understanding. [x] Demonstrates understanding. [] Needs review. [x] Demonstrates/verbalizes HEP/Ed previously given. Access Code: [email or web address] URL: [email or web address]. com/Date: 04/22/2021Prepared by: Katlin Hung   Supine Posterior Pelvic Tilt - 1 x daily - 5 x weekly - 3 sets - 10 reps - 10 hold Supine March with Posterior Pelvic Tilt - 1 x daily - 7 x weekly - 10 reps - 3 sets   Supine Bridge - 1 x daily - 7 x weekly - 10 reps - 3 sets   Active Straight Leg Raise with Quad Set - 1 x daily - 7 x weekly - 10 reps - 3 sets   Clamshell - 1 x daily - 7 x weekly - 10 reps - 3 sets   Sidelying Hip Abduction - 1 x daily - 7 x weekly - 10 reps - 3 sets      Access Code: [email or web address]: [employer]/Date: 04/16/2021Prepared by: Jewell Childress on Wall - 1 x daily - 7 x weekly - 1 sets - 3 reps - 30 hold   Standing Hip Abduction - 1 x daily - 5 x weekly - 2 sets - 10 reps   Standing Hip Extension with Counter Support - 1 x daily - 5 x weekly - 2 sets - 10 reps   Supine transverse abdominal bracing 1x daily 3x wk 10x 2  Supine Transversus Abdominis Bracing with march - 1 x daily - 7 x weekly - 2 sets - 10 reps - 5 hold   Supine Lower Trunk Rotation - 1 x daily - 7 x weekly - 1-2 sets - 10 reps - 10 hold   Hooklying Isometric Clamshell - 1 x daily - 5 x weekly - 2 sets - 10 reps   Clamshell with Resistance - 1 x daily - 5 x weekly - 2 sets - 10 reps  Ball abdominal crutches 3x wk 2x10 reps    Plan: [x] Continue current frequency toward long and short term goals. [x] Specific Instructions for subsequent treatments  Lumbar rotation machine. Lumbar extension machine. Leg press machine. Check for LLD (previously right leg shorter). Once order signed, may begin ionto for right hip if pain does not begin to subside. .  Can trial manual traction, and progress to mechanical traction if needed. Can use modalities as needed for pain control.   Use pillow in prone         Time In: 0945    Time Out:  1050    Electronically signed by:  Ana Lilia Patel PTA

## 2021-04-30 ENCOUNTER — HOSPITAL ENCOUNTER (OUTPATIENT)
Dept: PHYSICAL THERAPY | Age: 71
Setting detail: THERAPIES SERIES
Discharge: HOME | End: 2021-04-30
Payer: MEDICARE

## 2021-04-30 PROCEDURE — 97530 THERAPEUTIC ACTIVITIES: CPT

## 2021-04-30 NOTE — FLOWSHEET NOTE
[x] [facility]) [facility] &  Therapy  [address]  [phone]  F: [phone] [] [address]  [place] [address]   [address]  P: [phone]  F: [phone] [] [place]  [address]  P: [phone]  F: [phone] [] [address]  P: [phone]  F: [phone] [] [address]  [facility]   [address]   [place]: [phone]  F: [phone]      Physical Therapy Daily Treatment Note    Date:  2021  Patient Name:  Ap Allen    :  1950  MRN: [id number]  Physician: Dr. Anderson Biswas MD                               Insurance: Medicare ([address] [place] [address]), [facility] (61 visits per year)  Medical Diagnosis: Lumbar facet arthropathy, hip pain, lumbar radiculopathy, inflammatory spondylopathy lumbar region, lumbago with sciatica right side, myofascial pain, Ilioinguinal neuralgia of right side  Rehab Codes: M 54.5, M 54.41, M 25.551, M 25.552, M 62.81, R 26.2, R 29.3, M 79.1  Onset Date: 3/30/21                 Next 's appt.: 21  Visit# / total visits:  ; Progress note for Medicare patient due at visit #9     Cancels/No Shows: 0/0    Subjective:    Pain:  [x] Yes  [] No Location: LB, B  hip  Pain Rating: (0-10 scale)  7/10 in R hip, 3/10  LB    Pain altered Tx:  [x] No  [] Yes  Action:  Comments:  Reports being very tired today.  is tested her for covid and flu and is awaiting her results. Gave her a prescription that is making her very tired. Notes she drove very slow today to try to stay alert.       Objective:  Modalities:  hypervolt to R hip and Left calf x 8 mins total.  5 mins R hip and 3 mins left calf -  Held 4.30  Precautions:   Exercises:2021, completed exercises marked with \"x\"    Exercise Reps/ Time Weight/ Level Comments    NuStep w/ lumbar roll 5 mins L3  x          Standing   Added weight 4.30    Gastro stretch 3x20\"  wedge x   Heel raises 20x 1 lb    x   Hip abd iso abdom 10x2 1 lb   x   Hip extension 10x2 1 lb  x   Hs curls  15x 1 lb  x   Hip flexion  15x 1 lb  x   Marches  15x 1 lb Increased reps 4.30 x   Squats 15x   w/ chair x          Seated       LAQ 15x 2 lb     Hamstring stretch 3x20\"      HS curls 15x lime                   Supine       LTR 5x10\"   x   MET to correct right leg shorter 1 x   Shotgun technique 2 x. Corrected LLD. MET self correction 5x6\"  For correction of LLD, R le shorter. HEP issued     Bridge 15x   HEP x          iso abdom 10x   x   iso abdom w/ march 15x  Increased reps 4.30 x   iso abdom w/alt UE 10x 2 lbs  x   Alt UE/LEs opp 10x 2 lb in U/LE Added wt wt 4/28 x   Crunch w/ ball 15x  Increased reps 4.30 x   Reverse crunch 15x  Tap 4' step  Increased reps 4.30 x   HS stretch 3x30\"  Strap, rica     Diagonal crunch 5x   Added, (pt requested all abdominals 4/28) x   Supine SLR 15 x ea  2 lbs HEP   Increased reps 4.30 x   Supine butterfly 10x 2 lime HEP  x   Hip flexor stretch w/lumbar roll  3x30\"  Leg off side of mat x          SL clamshells w. iso abdom. 10x2 blue HEP x   Manual hip flexor stretch 2x30\"    x          Prone pillow       Hypervolt -- prone/pillow 8  min   3 min left calf, level 2  5 min right hip, level 1                      Other:            Treatment Charges: Mins Units   []  Modalities     [x]  Ther Exercise  57 4   [x]  Manual Therapy -- --   []  Ther Activities     []  Aquatics     []  Vasocompression     []  Other     Total Treatment time 57 4       Assessment: [x] Progressing toward goals. Increased reps for standing and supine exercises. Added ankle weights to standing exercises to increase B LE muscles. Focused on correct technique with supine DLS exercises to strengthen core muscles.  Ended treatment reviewing HEP exercises and giving patient a printed handout to use for home. Patient verbalized understanding of exercises. [] No change. [x] Other. [x] Patient would continue to benefit from skilled physical therapy services in order to:  Decreased LB and LE pain and increase core and hip strengthening in order to progress functional activity. STG: (to be met in 9 treatments)  1. ? Pain: Lumbar pain improve to 6/10 at max with standing and walking  2. Right hip pain improve to 6/10 with standing and walking  3. Left calf pain improve to 6/10 at all times  4. ? ROM: Lumbar extension improve to 5 degrees  5. Patient to report no radicular symptoms with side bending  6. ? Strength: patient able to SLS on each leg 6 seconds without LOB  7. ? Function: Patient to report improved ability to stand upright during gait cycle  8. Patient to be independent with home exercise program as demonstrated by performance with correct form without cues.     LTG: (to be met in 18 treatments)  1. Lumbar pain improve to 4/10 at max with activity  2. Right hip pain improve to 2/10 at max with activity  3. Left calf pain improve to 2/10 at max with activity  4. Oswestry score improve to 35% functionally impaired. 5. Patient to report resolution of radicular symptoms down leg. 6. Patient to report improved ease of going up/down steps at home.     Patient goals: \"Increase mobility in back area, increase standing and sitting without pain\"     Pt. Education:  [x] Yes  [] No  [x] Reviewed Prior HEP/Ed  Method of Education: [x] Verbal  [x] Demo  [x] Written 4.22 [employer] texted to patient   Comprehension of Education:  [x] Verbalizes understanding. [x] Demonstrates understanding. [x] Needs review. [x] Demonstrates/verbalizes HEP/Ed previously given. Access Code: [email or web address]: [email or web address]. com/Date: 04/30/2021Prepared by: India Estrada   Standing Heel Raise with Support - 1 x daily - 7 x weekly - 10 reps - 3 sets   Standing Knee Flexion AROM with Chair Support - 1 x daily - 7 x weekly - 10 reps - 3 sets   Standing Hip Flexion with Counter Support - 1 x daily - 7 x weekly - 10 reps - 3 sets   Standing Hip Extension - 1 x daily - 7 x weekly - 10 reps - 3 sets   Standing Marching - 1 x daily - 7 x weekly - 10 reps - 3 sets   Standing Hip Abduction - 1 x daily - 7 x weekly - 10 reps - 3 sets   Standing Hip Extension with Counter Support - 1 x daily - 7 x weekly - 10 reps - 3 sets   Standing Hip Abduction with Counter Support - 1 x daily - 7 x weekly - 10 reps - 3 sets   Standing Heel Raise - 1 x daily - 7 x weekly - 10 reps - 3 sets   Sit to Stand - 1 x daily - 7 x weekly - 10 reps - 3 sets   Seated Long Arc Quad - 1 x daily - 7 x weekly - 10 reps - 3 sets   Supine Lower Trunk Rotation - 1 x daily - 7 x weekly - 10 reps - 3 sets   Supine Active Straight Leg Raise - 1 x daily - 7 x weekly - 10 reps - 3 sets   Supine Hip Adduction Isometric with Ball - 1 x daily - 7 x weekly - 10 reps - 3 sets   Bent Knee Fallouts - 1 x daily - 7 x weekly - 10 reps - 3 sets   Dead Bug - 1 x daily - 7 x weekly - 10 reps - 3 sets   Hooklying Sequential Leg March and Lower - 1 x daily - 7 x weekly - 10 reps - 3 sets   Supine Bridge - 1 x daily - 7 x weekly - 10 reps - 3 sets   Clamshell - 1 x daily - 7 x weekly - 10 reps - 3 sets   Sidelying Hip Abduction - 1 x daily - 7 x weekly - 10 reps - 3 sets   Supine Posterior Pelvic Tilt - 1 x daily - 7 x weekly - 10 reps - 3 sets   Modified Derrick Stretch - 1 x daily - 7 x weekly - 10 reps - 3 sets   Curl Up with Reach - 1 x daily - 7 x weekly - 10 reps - 3 sets   Diagonal Curl Up with Reach - 1 x daily - 7 x weekly - 10 reps - 3 sets       Access Code: [email or web address] URL: [email or web address]. com/Date: 04/22/2021Prepared by: Domenico Centeno   Supine Posterior Pelvic Tilt - 1 x daily - 5 x weekly - 3 sets - 10 reps - 10 hold   Supine March with Posterior Pelvic Tilt - 1 x daily - 7 x weekly - 10 reps - 3 sets   Supine Bridge - 1 x daily - 7 x weekly - 10 reps - 3 sets   Active Straight Leg Raise with Quad Set - 1 x daily - 7 x weekly - 10 reps - 3 sets   Clamshell - 1 x daily - 7 x weekly - 10 reps - 3 sets   Sidelying Hip Abduction - 1 x daily - 7 x weekly - 10 reps - 3 sets      Access Code: [email or web address]: [employer]/Date: 04/16/2021Prepared by: Ximena Hernandez on Wall - 1 x daily - 7 x weekly - 1 sets - 3 reps - 30 hold   Standing Hip Abduction - 1 x daily - 5 x weekly - 2 sets - 10 reps   Standing Hip Extension with Counter Support - 1 x daily - 5 x weekly - 2 sets - 10 reps   Supine transverse abdominal bracing 1x daily 3x wk 10x 2  Supine Transversus Abdominis Bracing with march - 1 x daily - 7 x weekly - 2 sets - 10 reps - 5 hold   Supine Lower Trunk Rotation - 1 x daily - 7 x weekly - 1-2 sets - 10 reps - 10 hold   Hooklying Isometric Clamshell - 1 x daily - 5 x weekly - 2 sets - 10 reps   Clamshell with Resistance - 1 x daily - 5 x weekly - 2 sets - 10 reps  Ball abdominal crutches 3x wk 2x10 reps    Plan: [x] Continue current frequency toward long and short term goals. [x] Specific Instructions for subsequent treatments  Lumbar rotation machine. Lumbar extension machine. Leg press machine. Check for LLD (previously right leg shorter). Once order signed, may begin ionto for right hip if pain does not begin to subside. .  Can trial manual traction, and progress to mechanical traction if needed. Can use modalities as needed for pain control.   Use pillow in prone         Time In: 9:58 AM    Time Out:  11:00 AM    Electronically signed by:  FERNY Brice  Treatment under the supervision of, and documentation reviewed by: Raymundo Rosa PTA

## 2021-05-03 ENCOUNTER — HOSPITAL ENCOUNTER (OUTPATIENT)
Dept: PHYSICAL THERAPY | Age: 71
Setting detail: THERAPIES SERIES
Discharge: HOME | End: 2021-05-03
Payer: MEDICARE

## 2021-05-03 NOTE — FLOWSHEET NOTE
[x] [facility]) - [facility] &  Therapy  [address]    [phone]  F: [phone]   [] [address] [place] [facility]\[address]   [address]  P: [phone]  F: [phone]  [] [address] &  Therapy  [address]  P: [phone]  F: [phone] [] [address]  P: [phone]  F: [phone]  [] [address]  [address] [facility] [address]   [address]   [place]: [phone]  F: [phone]   [] [facility]  [address] [facility] [address]  [place]: [phone]   F: [phone]     Physical Therapy Cancel/No Show note    Date: 5/3/2021  Patient: Alexandra Gomez  : 1950  MRN: [id number]    Cancels/No Shows to date:     For today's appointment patient:    [x]  Cancelled    [] Rescheduled appointment    [] No-show     Reason given by patient:    []  Patient ill    [x]  Conflicting appointment    [] No transportation      [] Conflict with work    [] No reason given    [] Weather related    [] [id number]    [x] Other:      Comments:  Pt cx  afternoon, <24 hrs notice. Unable to get 2x wk of 5/3 at pt preferred 10 AM time. Possible pt only be seen 1x this wk as a result.        [] Next appointment was confirmed    Electronically signed by: Sigrid Diaz PTA

## 2021-05-05 ENCOUNTER — HOSPITAL ENCOUNTER (OUTPATIENT)
Dept: PHYSICAL THERAPY | Age: 71
Setting detail: THERAPIES SERIES
Discharge: HOME | End: 2021-05-05
Payer: MEDICARE

## 2021-05-05 PROCEDURE — 97110 THERAPEUTIC EXERCISES: CPT

## 2021-05-05 NOTE — FLOWSHEET NOTE
[x] [facility] &  Therapy  [address]  [phone]  F: [phone] [] [address]  [facility]   [address]  P: [phone]  F: [phone] [] [place]  [address]  P: [phone]  F: [phone] [] [address]  P: [phone]  F: [phone] [] [address]  [facility]   [address]   [place]: [phone]  F: [phone]      Physical Therapy Daily Treatment Note    Date:  2021  Patient Name:  Lauren Santos    :  1950  MRN: [id number]  Physician: Dr. Kina Canales MD                               Insurance: Medicare ([address] [place] [address]), [facility] (61 visits per year)  Medical Diagnosis: Lumbar facet arthropathy, hip pain, lumbar radiculopathy, inflammatory spondylopathy lumbar region, lumbago with sciatica right side, myofascial pain, Ilioinguinal neuralgia of right side  Rehab Codes: M 54.5, M 54.41, M 25.551, M 25.552, M 62.81, R 26.2, R 29.3, M 79.1  Onset Date: 3/30/21                 Next 's appt.: 21  Visit# / total visits:  ; Progress note for Medicare patient due at visit #9     Cancels/No Shows: 1/0    Subjective:    Pain:  [x] Yes  [] No Location: LB, B  hip  Pain Rating: (0-10 scale)  2/10 in R hip, 0/10  LB    Pain altered Tx:  [x] No  [] Yes  Action:  Comments:   Took pain medication this AM.  Addressing HEP as able.      Objective:  Modalities:  hypervolt to R hip and Left calf x 8 mins total.  5 mins R hip and 3 mins left calf -  Held 4.30  Precautions:   Exercises:2021, completed exercises marked with \"x\"    Exercise Reps/ Time Weight/ Level Comments    NuStep w/ lumbar roll 5 mins L3  x          Standing   Added weight 4.30    Gastro stretch 3x20\"  wedge x   Heel raises 20x 1 lb    x   Hip abd iso abdom 10x2 1 lb   x   Hip extension 10x2 1 lb  x   Hs curls  15x 1 lb  x   Hip flexion  15x 1 lb  x   Marches  15x 1 lb   x   Squats 10x2   w/ chair, mod vc for tech x          Seated       LAQ 15x 2 lb 10x 5/4 x   Hamstring stretch 3x20\"   x   HS curls 15x lime  x                 Supine       LTR 5x10\"       Piriformis stretch 3x20\"  Added 5/5 x   MET to correct right leg shorter 1 x   Shotgun technique 2 x. Corrected LLD. MET self correction 5x6\"  For correction of LLD, R le shorter. HEP issued     Bridge 15x   HEP  x          iso abdom 5x5\"   x   iso abdom w/ march 15x 2 lbs  added wt 5/5 x   iso abdom w/alt UE 10x 2 lbs  x   Alt UE/LEs opp 10x 2 lb in U/LE   x   Crunch w/ ball 15x   x   Reverse crunch 15x  Tap 4' step  I  x   HS stretch 3x30\"  Strap, rica     Diagonal crunch 5x   Added, (pt requested all abdominals 4/28)     Supine SLR 15 x ea  2 lbs HEP   I  x   Supine butterfly 10x 2 lime HEP      Hip flexor stretch w/lumbar roll  3x30\"  Leg off side of mat x   SL hip abd 10x1 1 lbs Added 5/5 x   SL clamshells w. iso abdom. 10x2 blue HEP x                 Prone pillow       Hypervolt -- prone/pillow 8  min   3 min left calf, level 2  5 min right hip, level 1                      Other:            Treatment Charges: Mins Units   []  Modalities     [x]  Ther Exercise  53 4   [x]  Manual Therapy -- --   []  Ther Activities     []  Aquatics     []  Vasocompression     []  Other     Total Treatment time  53  4       Assessment: [x] Progressing toward goals continue with gluteal, core and rica LE strengthening. Added SL hip abduction and pirifiormis stretching for R hip ROM and strengthening. [] No change. [x] Other VC for LE exercise technique /avoiding momentum and addressing muscle isolation/placement with ea exercise. Many vc to maintain iso abdom/neutral spine  during core exercises.    [x] Patient would continue to benefit from skilled physical therapy services in order to:  Decreased LB and LE pain and increase core and hip strengthening in order to progress functional activity. STG: (to be met in 9 treatments)  1. ? Pain: Lumbar pain improve to 6/10 at max with standing and walking  2. Right hip pain improve to 6/10 with standing and walking  3. Left calf pain improve to 6/10 at all times  4. ? ROM: Lumbar extension improve to 5 degrees  5. Patient to report no radicular symptoms with side bending  6. ? Strength: patient able to SLS on each leg 6 seconds without LOB  7. ? Function: Patient to report improved ability to stand upright during gait cycle  8. Patient to be independent with home exercise program as demonstrated by performance with correct form without cues.     LTG: (to be met in 18 treatments)  1. Lumbar pain improve to 4/10 at max with activity  2. Right hip pain improve to 2/10 at max with activity  3. Left calf pain improve to 2/10 at max with activity  4. Oswestry score improve to 35% functionally impaired. 5. Patient to report resolution of radicular symptoms down leg. 6. Patient to report improved ease of going up/down steps at home.     Patient goals: \"Increase mobility in back area, increase standing and sitting without pain\"     Pt. Education:  [x] Yes  [] No  [x] Reviewed Prior HEP/Ed  Method of Education: [x] Verbal  [x] Demo  [x] Written 4.22 [employer] texted to patient   Comprehension of Education:  [x] Verbalizes understanding. [x] Demonstrates understanding. [x] Needs review. [x] Demonstrates/verbalizes HEP/Ed previously given. Access Code: [email or web address]: [employer]. com/Date: 04/30/2021Prepared by: [address] with Support - 1 x daily - 7 x weekly - 10 reps - 3 sets   Standing Knee Flexion AROM with Chair Support - 1 x daily - 7 x weekly - 10 reps - 3 sets   Standing Hip Flexion with Counter Support - 1 x daily - 7 x weekly - 10 reps - 3 sets   Standing Hip Extension - 1 x daily - 7 x weekly - 10 reps - 3 sets   Standing Marching - 1 x daily - 7 x weekly - 10 reps - 3 sets   Standing Hip Abduction - 1 x daily - 7 x weekly - 10 reps - 3 sets   Standing Hip Extension with Counter Support - 1 x daily - 7 x weekly - 10 reps - 3 sets   Standing Hip Abduction with Counter Support - 1 x daily - 7 x weekly - 10 reps - 3 sets   Standing Heel Raise - 1 x daily - 7 x weekly - 10 reps - 3 sets   Sit to Stand - 1 x daily - 7 x weekly - 10 reps - 3 sets   Seated Long Arc Quad - 1 x daily - 7 x weekly - 10 reps - 3 sets   Supine Lower Trunk Rotation - 1 x daily - 7 x weekly - 10 reps - 3 sets   Supine Active Straight Leg Raise - 1 x daily - 7 x weekly - 10 reps - 3 sets   Supine Hip Adduction Isometric with Ball - 1 x daily - 7 x weekly - 10 reps - 3 sets   Bent Knee Fallouts - 1 x daily - 7 x weekly - 10 reps - 3 sets   Dead Bug - 1 x daily - 7 x weekly - 10 reps - 3 sets   Hooklying Sequential Leg March and Lower - 1 x daily - 7 x weekly - 10 reps - 3 sets   Supine Bridge - 1 x daily - 7 x weekly - 10 reps - 3 sets   Clamshell - 1 x daily - 7 x weekly - 10 reps - 3 sets   Sidelying Hip Abduction - 1 x daily - 7 x weekly - 10 reps - 3 sets   Supine Posterior Pelvic Tilt - 1 x daily - 7 x weekly - 10 reps - 3 sets   Modified Derrick Stretch - 1 x daily - 7 x weekly - 10 reps - 3 sets   Curl Up with Reach - 1 x daily - 7 x weekly - 10 reps - 3 sets   Diagonal Curl Up with Reach - 1 x daily - 7 x weekly - 10 reps - 3 sets       Access Code: [email or web address] URL: [email or web address] com/Date: 04/22/2021Prepared by: Wilhemenia China   Supine Posterior Pelvic Tilt - 1 x daily - 5 x weekly - 3 sets - 10 reps - 10 hold   Supine March with Posterior Pelvic Tilt - 1 x daily - 7 x weekly - 10 reps - 3 sets   Supine Bridge - 1 x daily - 7 x weekly - 10 reps - 3 sets   Active Straight Leg Raise with Quad Set - 1 x daily - 7 x weekly - 10 reps - 3 sets   Clamshell - 1 x daily - 7 x weekly - 10 reps - 3 sets   Sidelying Hip Abduction - 1 x daily - 7 x weekly - 10 reps - 3 sets      Access Code: Teresa Alanis: [email or web address]. com/Date: 04/16/2021Prepared by: Ena Simmons on Wall - 1 x daily - 7 x weekly - 1 sets - 3 reps - 30 hold   Standing Hip Abduction - 1 x daily - 5 x weekly - 2 sets - 10 reps   Standing Hip Extension with Counter Support - 1 x daily - 5 x weekly - 2 sets - 10 reps   Supine transverse abdominal bracing 1x daily 3x wk 10x 2  Supine Transversus Abdominis Bracing with march - 1 x daily - 7 x weekly - 2 sets - 10 reps - 5 hold   Supine Lower Trunk Rotation - 1 x daily - 7 x weekly - 1-2 sets - 10 reps - 10 hold   Hooklying Isometric Clamshell - 1 x daily - 5 x weekly - 2 sets - 10 reps   Clamshell with Resistance - 1 x daily - 5 x weekly - 2 sets - 10 reps  Ball abdominal crutches 3x wk 2x10 reps    Plan: [x] Continue current frequency toward long and short term goals. [x] Specific Instructions for subsequent treatments  Lumbar rotation machine. Lumbar extension machine. Leg press machine. Check for LLD (previously right leg shorter). Once order signed, may begin ionto for right hip if pain does not begin to subside. .  Can trial manual traction, and progress to mechanical traction if needed. Can use modalities as needed for pain control.   Use pillow in prone         Time In: 0950  Time Out:   1049    Electronically signed by:  Nai Marina PTA,

## 2021-05-05 NOTE — FLOWSHEET NOTE
Medicare Cap   [x] Physical Therapy  [] Speech Therapy  [] Occupational therapy  *PT and Speech caps combine      $2100 Limit for PT and Speech combined  $2100 Limit for OT individually  At the beginning of the month where you expect to go over $2100, please add the [address] modifier      Patient Name: Krystal Fink  YOB: 1950    Note:  This is an estimate of charges billed.      Date of [place] [address] Name # units/ charge $$$ charge Daily Total Charge Ongoing Total $$$           4/16/21 There ex, man 37.73+19.74+19.74  77.21    4/20/21 There ex, man 37.73+19.74+19.74+19.74  96.95    4/22/21 There ex, man 37.73+19.74+19.74+19.74  96.95 271.11   4/28/21 There ex,  25.26+19.74+19.74+19.74  84.48   355.59   4/30/21    84.84    5/5/21 There ex  25.26+19.74+19.74+19.74  84.48 524.55

## 2021-05-12 ENCOUNTER — HOSPITAL ENCOUNTER (OUTPATIENT)
Dept: PHYSICAL THERAPY | Age: 71
Setting detail: THERAPIES SERIES
Discharge: HOME | End: 2021-05-12
Payer: MEDICARE

## 2021-05-12 NOTE — FLOWSHEET NOTE
[x] [facility]) - [facility] &  Therapy  [address]    [phone]  F: [phone]   [] [address]  [facility] [address]   [address]  P: [phone]  F: [phone]  [] [address] &  Therapy  [address] [facility]  P: [phone]  F: [phone] [] [address]  P: [phone]  F: [phone]  [] [address]  [facility]   [address]   [place]: [phone]  F: [phone]   [] [place] [address] [facility] [address]  [place]: [phone]   F: [phone]     Physical Therapy Cancel/No Show note    Date: 2021  Patient: Becky Gonzalez  : 1950  MRN: [id number]    Cancels/No Shows to date:    For today's appointment patient:    [x]  Cancelled    [] Rescheduled appointment    [] No-show     Reason given by patient:    []  Patient ill    [x]  Conflicting appointment    [] No transportation      [] Conflict with work    [] No reason given    [] Weather related    [] [id number]    [] Other:      Comments:        [x] Next appointment was confirmed 21    Electronically signed by: Homer Hammond PTA

## 2021-05-14 ENCOUNTER — HOSPITAL ENCOUNTER (OUTPATIENT)
Dept: PHYSICAL THERAPY | Age: 71
Setting detail: THERAPIES SERIES
Discharge: HOME | End: 2021-05-14
Payer: MEDICARE

## 2021-05-14 PROCEDURE — 97110 THERAPEUTIC EXERCISES: CPT

## 2021-05-14 PROCEDURE — 97112 NEUROMUSCULAR REEDUCATION: CPT

## 2021-05-14 NOTE — FLOWSHEET NOTE
[x] [facility]) [facility] &  Therapy  [address]  [phone]  F: [phone] [] [address]  [facility]   [address]  P: [phone]  F: [phone] [] [place]  [address]  P: [phone]  F: [phone] [] [address]  P: [phone]  F: [phone] [] [address]  [facility]   [address]   [place]: [phone]  F: [phone]      Physical Therapy Daily Treatment Note    Date:  2021  Patient Name:  Félix Sr    :  1950  MRN: [id number]  Physician: Dr. Brenda Solano MD                               Insurance: Medicare ([address] [place] [address]), [facility] [address] (61 visits per year)  Medical Diagnosis: Lumbar facet arthropathy, hip pain, lumbar radiculopathy, inflammatory spondylopathy lumbar region, lumbago with sciatica right side, myofascial pain, Ilioinguinal neuralgia of right side  Rehab Codes: M 54.5, M 54.41, M 25.551, M 25.552, M 62.81, R 26.2, R 29.3, M 79.1  Onset Date: 3/30/21                 Next 's appt.: 21  Visit# / total visits:  ; Progress note for Medicare patient due at visit #9     Cancels/No Shows: 2/0    Subjective:    Pain:  [x] Yes  [] No Location: LB, B  hip  Pain Rating: (0-10 scale)  0/10 in R hip, 0/10  LB  3/10 LB  Pain altered Tx:  [x] No  [] Yes  Action:  Comments:   Reports she is more tried today. Addressing HEP and has not had to take pain medication as often. Objective:  Modalities:  hypervolt to R hip and Left calf x 8 mins total.  5 mins R hip and 3 mins left calf -  Held 4.30  Precautions:   Exercises:2021, completed exercises marked with \"x\"    Exercise Reps/ Time Weight/ Level Comments    NuStep w/ lumbar roll 5 mins L3  x          Standing    Incr.  Wt but pt decr. reps 5/14    Gastro stretch 3x20\"  wedge x   Heel raises   20x      x   Hip flexor stretch 3x30\" ea  Added 5/14 x   Hip abd iso abdom 10x1 1.5 lb   x   Hip extension 10x1 1.5. lb  x   Hs curls  15x 1.5 lb  x   Hip flexion  15x 1.5 lb  x   Marches  25x 1.5 lb   x   Squats 10x2   w/ chair, mod vc for tech     SLS 3x5-15'  added x   Tandem stance 2x15\" ea  added x   Static standing nudging  10x   Normal JOCELYN, added x   Lumbar ext  3x15\"  rica hands on  Post. Hips, added  x          Seated       LAQ 15x 2 lb 10x 5/4 x   Hamstring stretch 3x20\"  stool x   HS curls 15x lime  x                 Supine       LTR 5x10\"       Piriformis stretch 3x20\"  Added 5/5 x   MET to correct right leg shorter 1 x   Shotgun technique 2 x. Corrected LLD. MET self correction 5x6\"  For correction of LLD, R le shorter. HEP issued     Bridge 15x   HEP  x          iso abdom 5x5\"   x   iso abdom w/ march 15x 2 lbs    x   iso abdom w/alt UE 15x 2 lbs  x    iso abdom alt UE/LEs opp 15x 2 lb in U/LE   x   iso abdom alt same UE/LE 15x 2 lbs U/LE added x   Crunch w/ ball 15x       Reverse crunch 10x2  Tap 4' step     x   HS stretch 3x30\"  Strap, rica     Supine SLR 15 x ea  2 lbs HEP         Supine butterfly 10x 2 lime HEP  x   Hip flexor stretch w/lumbar roll  3x30\"  Leg off side of mat     SL hip abd 10x1 1 lbs A      SL clamshells w. iso abdom. 10x2 blue HEP                  Prone pillow       Hypervolt -- prone/pillow 8  min   3 min left calf, level 2  5 min right hip, level 1                      Other:            Treatment Charges: Mins Units   []  Modalities     [x]  Ther Exercise  48 3   [x]  Manual Therapy -- --   []  Ther Activities     []  Aquatics     []  Vasocompression     [x]  Other neuro re-ed    6 1   Total Treatment time  54 4       Assessment: [x] Progressing toward goals  Leg length equal with assessment. Focus on core and gluteal strengthening exercises as charged.  . Pt still required intermittent vc for exercise techniques. Progressed standing balance activates with static balance times progressing with increased reps. [] No change. [x] Other VC for LE exercise technique /avoiding momentum and addressing muscle isolation/placement with ea exercise. Many vc to maintain iso abdom/neutral spine  during core exercises. [x] Patient would continue to benefit from skilled physical therapy services in order to:  Decreased LB and LE pain and increase core and hip strengthening in order to progress functional activity. STG: (to be met in 9 treatments)  1. ? Pain: Lumbar pain improve to 6/10 at max with standing and walking  2. Right hip pain improve to 6/10 with standing and walking  3. Left calf pain improve to 6/10 at all times  4. ? ROM: Lumbar extension improve to 5 degrees  5. Patient to report no radicular symptoms with side bending  6. ? Strength: patient able to SLS on each leg 6 seconds without LOB  7. ? Function: Patient to report improved ability to stand upright during gait cycle  8. Patient to be independent with home exercise program as demonstrated by performance with correct form without cues.     LTG: (to be met in 18 treatments)  1. Lumbar pain improve to 4/10 at max with activity  2. Right hip pain improve to 2/10 at max with activity  3. Left calf pain improve to 2/10 at max with activity  4. Oswestry score improve to 35% functionally impaired. 5. Patient to report resolution of radicular symptoms down leg. 6. Patient to report improved ease of going up/down steps at home.     Patient goals: \"Increase mobility in back area, increase standing and sitting without pain\"     Pt. Education:  [x] Yes  [] No  [x] Reviewed Prior HEP/Ed  Method of Education: [x] Verbal  [x] Demo  [x] Written 4.22 [email or web address] texted to patient   Comprehension of Education:  [x] Verbalizes understanding. [x] Demonstrates understanding. [x] Needs review. [x] Demonstrates/verbalizes HEP/Ed previously given.     Access Code: [email or web address]: [place]/Date: 04/30/2021Prepared by: [address] with Support - 1 x daily - 7 x weekly - 10 reps - 3 sets   Standing Knee Flexion AROM with Chair Support - 1 x daily - 7 x weekly - 10 reps - 3 sets   Standing Hip Flexion with Counter Support - 1 x daily - 7 x weekly - 10 reps - 3 sets   Standing Hip Extension - 1 x daily - 7 x weekly - 10 reps - 3 sets   Standing Marching - 1 x daily - 7 x weekly - 10 reps - 3 sets   Standing Hip Abduction - 1 x daily - 7 x weekly - 10 reps - 3 sets   Standing Hip Extension with Counter Support - 1 x daily - 7 x weekly - 10 reps - 3 sets   Standing Hip Abduction with Counter Support - 1 x daily - 7 x weekly - 10 reps - 3 sets   Standing Heel Raise - 1 x daily - 7 x weekly - 10 reps - 3 sets   Sit to Stand - 1 x daily - 7 x weekly - 10 reps - 3 sets   Seated Long Arc Quad - 1 x daily - 7 x weekly - 10 reps - 3 sets   Supine Lower Trunk Rotation - 1 x daily - 7 x weekly - 10 reps - 3 sets   Supine Active Straight Leg Raise - 1 x daily - 7 x weekly - 10 reps - 3 sets   Supine Hip Adduction Isometric with Ball - 1 x daily - 7 x weekly - 10 reps - 3 sets   Bent Knee Fallouts - 1 x daily - 7 x weekly - 10 reps - 3 sets   Dead Bug - 1 x daily - 7 x weekly - 10 reps - 3 sets   Hooklying Sequential Leg March and Lower - 1 x daily - 7 x weekly - 10 reps - 3 sets   Supine Bridge - 1 x daily - 7 x weekly - 10 reps - 3 sets   Clamshell - 1 x daily - 7 x weekly - 10 reps - 3 sets   Sidelying Hip Abduction - 1 x daily - 7 x weekly - 10 reps - 3 sets   Supine Posterior Pelvic Tilt - 1 x daily - 7 x weekly - 10 reps - 3 sets   Modified Derrick Stretch - 1 x daily - 7 x weekly - 10 reps - 3 sets   Curl Up with Reach - 1 x daily - 7 x weekly - 10 reps - 3 sets   Diagonal Curl Up with Reach - 1 x daily - 7 x weekly - 10 reps - 3 sets       Access Code: [email or web address] URL: [email or web address]/Date: 04/22/2021Prepared by: Kenyetta Segovia AriasExercises   Supine Posterior Pelvic Tilt - 1 x daily - 5 x weekly - 3 sets - 10 reps - 10 hold   Supine March with Posterior Pelvic Tilt - 1 x daily - 7 x weekly - 10 reps - 3 sets   Supine Bridge - 1 x daily - 7 x weekly - 10 reps - 3 sets   Active Straight Leg Raise with Quad Set - 1 x daily - 7 x weekly - 10 reps - 3 sets   Clamshell - 1 x daily - 7 x weekly - 10 reps - 3 sets   Sidelying Hip Abduction - 1 x daily - 7 x weekly - 10 reps - 3 sets      Access Code: [email or web address]: [employer]/Date: 04/16/2021Prepared by: Josh Hodgson on Wall - 1 x daily - 7 x weekly - 1 sets - 3 reps - 30 hold   Standing Hip Abduction - 1 x daily - 5 x weekly - 2 sets - 10 reps   Standing Hip Extension with Counter Support - 1 x daily - 5 x weekly - 2 sets - 10 reps   Supine transverse abdominal bracing 1x daily 3x wk 10x 2  Supine Transversus Abdominis Bracing with march - 1 x daily - 7 x weekly - 2 sets - 10 reps - 5 hold   Supine Lower Trunk Rotation - 1 x daily - 7 x weekly - 1-2 sets - 10 reps - 10 hold   Hooklying Isometric Clamshell - 1 x daily - 5 x weekly - 2 sets - 10 reps   Clamshell with Resistance - 1 x daily - 5 x weekly - 2 sets - 10 reps  Ball abdominal crutches 3x wk 2x10 reps  Access Code: [facility], [facility]  URL: [email or web address]  Date: 05/14/2021  Prepared by: Ara Abrams    Exercises  Standing Hip Flexor Stretch - 1 x daily - 7 x weekly - 1 sets - 3 reps - 30 hold    Plan: [x] Continue current frequency toward long and short term goals. [x] Specific Instructions for subsequent treatments  Lumbar rotation machine. Lumbar extension machine. Leg press machine. Check for LLD (previously right leg shorter). Once order signed, may begin ionto for right hip if pain does not begin to subside. .  Can trial manual traction, and progress to mechanical traction if needed. Can use modalities as needed for pain control.   Use pillow in prone         Time In:  0881

## 2021-05-19 ENCOUNTER — HOSPITAL ENCOUNTER (OUTPATIENT)
Dept: PHYSICAL THERAPY | Age: 71
Setting detail: THERAPIES SERIES
Discharge: HOME | End: 2021-05-19
Payer: MEDICARE

## 2021-05-19 PROCEDURE — 97110 THERAPEUTIC EXERCISES: CPT

## 2021-05-19 PROCEDURE — 97112 NEUROMUSCULAR REEDUCATION: CPT

## 2021-05-19 NOTE — FLOWSHEET NOTE
[x] [facility] &  Therapy  [address]  [phone]  F: [phone] [] [address]  [facility]   [address]  P: [phone]  F: [phone] [] [address]  [address]  P: [phone]  F: [phone] [] [address]  P: [phone]  F: [phone] [] [address]  [facility]   [address]   [place]: [phone]  F: [phone]      Physical Therapy Daily Treatment Note    Date:  2021  Patient Name:  Ap Allen    :  1950  MRN: [id number]  Physician: Dr. Anderson Biswas MD                               Insurance: Medicare ([address] [place] [address]), [facility] (61 visits per year)  Medical Diagnosis: Lumbar facet arthropathy, hip pain, lumbar radiculopathy, inflammatory spondylopathy lumbar region, lumbago with sciatica right side, myofascial pain, Ilioinguinal neuralgia of right side  Rehab Codes: M 54.5, M 54.41, M 25.551, M 25.552, M 62.81, R 26.2, R 29.3, M 79.1  Onset Date: 3/30/21                 Next 's appt.: 21  Visit# / total visits:  ; Progress note for Medicare patient due at visit #9     Cancels/No Shows: 2/0    Subjective:    Pain:  [x] Yes  [] No Location: LB, B  hip  Pain Rating: (0-10 scale)  3/10 in R hip, 0/10  LB  3-4/10 LB  Pain altered Tx:  [x] No  [] Yes  Action:  Comments:    5mins late  Reports she is more tired today. States she really worked hard with her HEP yesterday. Reports rica LB/posterior hip pain with L> R.  Took her pain medication and \"relaxer'\"   Medications  prior to session this AM.    Objective:  Modalities:  hypervolt to R hip and Left calf x 8 mins total.  5 mins R hip and 3 mins left calf -  Held 4.30  Precautions:   Exercises:2021, completed exercises marked with \"x\"    Exercise Reps/ Time Weight/ Level Comments    NuStep w/ lumbar roll 5 mins L4 Incr. Resistance 5/18 x   Treadmill  ADD             Standing         Gastro stretch 3x20\"  wedge x   Heel raises   20x          Hip flexor stretch 3x30\" ea    x   Hip abd iso abdom 10x2 2 lb  incr. Wt and sets 5/18 x   Hip extension 10x1 2 lb  x   Hs curls  15x 1.5 lb      Hip flexion  15x 1.5 lb      Marches  25x 1.5 lb       Squats 10x2   w/ chair, mod vc for tech x          SLS 2 15'    x   Tandem stance 2x15\" ea    x   Static standing nudging, floor 10x   Normal YARIEL, added     NBOS, EC on foam 2x15\"    Added 5/18 x   Lumbar ext  3x15\"  rica hands on  Post. Hips, added  x   tradiional yariel on foam, nudging 30\"  Added 5/18 x          Seated       LAQ 15x 2 lb 10x 5/4     Hamstring stretch 3x20\"  Stool, standing 5/18 w/ 12\"step  x   HS curls 15x lime      Lumbar flexion 2x15\"   x          Supine       LTR 5x10\"       Piriformis stretch 3x20\"  Added 5/5     MET to correct right leg shorter 1 x   Shotgun technique 2 x. Corrected LLD. MET self correction 5x6\"  For correction of LLD, R le shorter. HEP issued     Bridge 15x   HEP             iso abdom 5x5\"   x   iso abdom w/ march 15x 2 lbs    x   iso abdom w/alt UE 15x 2 lbs       iso abdom alt UE/LEs opp 15x 2 lb in U/LE   x   iso abdom alt same UE/LE 15x 2 lbs U/LE added x   Crunch w/ ball 10x2   x   Reverse crunch 10x2  Tap 4' step     x   HS stretch 3x30\"  Strap, rica     Supine SLR 15 x ea  2 lbs HEP         Supine butterfly 10x 2 lime               SL hip abd 10x1 1 lbs A      SL clamshells w. iso abdom.   10x2 blue HEP           plank 2x10\"  Added 5/18 x          Prone pillow       Hypervolt -- prone/pillow 8  min   3 min left calf, level 2  5 min right hip, level 1                      Other:     pt cancelled her 5/21/21 945  appt, therefore 1 session wk of 5/17      Treatment Charges: Mins Units   []  Modalities     [x]  Ther Exercise  40 2   [x]  Manual Therapy -- -- []  Ther Activities     []  Aquatics     []  Vasocompression     [x]  Other neuro re-ed  10 1   Total Treatment time 50 3       Assessment: [x] Progressing toward goals   Progressing static balance activities , gluteal  and core strengthening as charted. Added planks with good technique demonstrated. Education on importance of daily stretching HS, hip flexors and gastroc. Pt voices good understanding. Pt demonstrates 30+mins of standing activity without report of increased LB pain. Will trial treadmill for progressing gait tolerance for community distances. [] No change. [x] Other    [x] Patient would continue to benefit from skilled physical therapy services in order to:  Decreased LB and LE pain and increase core and hip strengthening in order to progress functional activity. STG: (to be met in 9 treatments)  1. ? Pain: Lumbar pain improve to 6/10 at max with standing and walking 5/21/21: MET  (5/10 ave pain)  2. Right hip pain improve to 6/10 with standing and walking 5/21/21: MET (6/10  Ave pain)  3. Left calf pain improve to 6/10 at all times 5/18/21 MET, only after ex and will be 4/10 or less and less frequent. 4. ? ROM: Lumbar extension improve to 5 degrees  5. Patient to report no radicular symptoms with side bending  6. ? Strength: patient able to SLS on each leg 6 seconds without LOB 5/21/21: MET, 15 second rica LE  7. ? Function: Patient to report improved ability to stand upright during gait cycle  8. Patient to be independent with home exercise program as demonstrated by performance with correct form without cues.     LTG: (to be met in 18 treatments)  1. Lumbar pain improve to 4/10 at max with activity  2. Right hip pain improve to 2/10 at max with activity  3. Left calf pain improve to 2/10 at max with activity  4. Oswestry score improve to 35% functionally impaired. 5. Patient to report resolution of radicular symptoms down leg.   6. Patient to report improved ease of going up/down steps at home.     Patient goals: \"Increase mobility in back area, increase standing and sitting without pain\"     Pt. Education:  [x] Yes  [] No  [x] Reviewed Prior HEP/Ed  Method of Education: [x] Verbal  [x] Demo  [x] Written 4.22 [employer] texted to patient   Comprehension of Education:  [x] Verbalizes understanding. [x] Demonstrates understanding. [x] Needs review. [x] Demonstrates/verbalizes HEP/Ed previously given. Access Code: [email or web address]: [address]/Date: 04/30/2021Prepared by: [address] with Support - 1 x daily - 7 x weekly - 10 reps - 3 sets   Standing Knee Flexion AROM with Chair Support - 1 x daily - 7 x weekly - 10 reps - 3 sets   Standing Hip Flexion with Counter Support - 1 x daily - 7 x weekly - 10 reps - 3 sets   Standing Hip Extension - 1 x daily - 7 x weekly - 10 reps - 3 sets   Standing Marching - 1 x daily - 7 x weekly - 10 reps - 3 sets   Standing Hip Abduction - 1 x daily - 7 x weekly - 10 reps - 3 sets   Standing Hip Extension with Counter Support - 1 x daily - 7 x weekly - 10 reps - 3 sets   Standing Hip Abduction with Counter Support - 1 x daily - 7 x weekly - 10 reps - 3 sets   Standing Heel Raise - 1 x daily - 7 x weekly - 10 reps - 3 sets   Sit to Stand - 1 x daily - 7 x weekly - 10 reps - 3 sets   Seated Long Arc Quad - 1 x daily - 7 x weekly - 10 reps - 3 sets   Supine Lower Trunk Rotation - 1 x daily - 7 x weekly - 10 reps - 3 sets   Supine Active Straight Leg Raise - 1 x daily - 7 x weekly - 10 reps - 3 sets   Supine Hip Adduction Isometric with Ball - 1 x daily - 7 x weekly - 10 reps - 3 sets   Bent Knee Fallouts - 1 x daily - 7 x weekly - 10 reps - 3 sets   Dead Bug - 1 x daily - 7 x weekly - 10 reps - 3 sets   Hooklying Sequential Leg March and Lower - 1 x daily - 7 x weekly - 10 reps - 3 sets   Supine Bridge - 1 x daily - 7 x weekly - 10 reps - 3 sets   Clamshell - 1 x daily - 7 x weekly - 10 reps - 3 sets   Sidelying Hip Abduction - 1 x daily - 7 x weekly - 10 reps - 3 sets   Supine Posterior Pelvic Tilt - 1 x daily - 7 x weekly - 10 reps - 3 sets   Modified Derrick Stretch - 1 x daily - 7 x weekly - 10 reps - 3 sets   Curl Up with Reach - 1 x daily - 7 x weekly - 10 reps - 3 sets   Diagonal Curl Up with Reach - 1 x daily - 7 x weekly - 10 reps - 3 sets       Access Code: [email or web address] URL: [employer]/Date: 04/22/2021Prepared by: Jodee Rodriguez   Supine Posterior Pelvic Tilt - 1 x daily - 5 x weekly - 3 sets - 10 reps - 10 hold   Supine March with Posterior Pelvic Tilt - 1 x daily - 7 x weekly - 10 reps - 3 sets   Supine Bridge - 1 x daily - 7 x weekly - 10 reps - 3 sets   Active Straight Leg Raise with Quad Set - 1 x daily - 7 x weekly - 10 reps - 3 sets   Clamshell - 1 x daily - 7 x weekly - 10 reps - 3 sets   Sidelying Hip Abduction - 1 x daily - 7 x weekly - 10 reps - 3 sets      Access Code: [email or web address]: [employer]/Date: 04/16/2021Prepared by: Jewell Childress on Wall - 1 x daily - 7 x weekly - 1 sets - 3 reps - 30 hold   Standing Hip Abduction - 1 x daily - 5 x weekly - 2 sets - 10 reps   Standing Hip Extension with Counter Support - 1 x daily - 5 x weekly - 2 sets - 10 reps   Supine transverse abdominal bracing 1x daily 3x wk 10x 2  Supine Transversus Abdominis Bracing with march - 1 x daily - 7 x weekly - 2 sets - 10 reps - 5 hold   Supine Lower Trunk Rotation - 1 x daily - 7 x weekly - 1-2 sets - 10 reps - 10 hold   Hooklying Isometric Clamshell - 1 x daily - 5 x weekly - 2 sets - 10 reps   Clamshell with Resistance - 1 x daily - 5 x weekly - 2 sets - 10 reps  Ball abdominal crutches 3x wk 2x10 reps  Access Code: [facility]  URL: [email or web address]  Date: 05/14/2021  Prepared by: Arvilla Rip    Exercises  Standing Hip Flexor Stretch - 1 x daily - 7 x weekly - 1 sets - 3 reps - 30 hold    Plan: [x] Continue current frequency toward long and short term goals. [x] Specific Instructions for subsequent treatments  Lumbar rotation machine. Lumbar extension machine. Leg press machine. Check for LLD (previously right leg shorter). Once order signed, may begin ionto for right hip if pain does not begin to subside. .  Can trial manual traction, and progress to mechanical traction if needed. Can use modalities as needed for pain control.   Use pillow in prone         Time In:    0950       Time Out: 1048    Electronically signed by:  Lena Varghese PTA,

## 2021-05-21 ENCOUNTER — APPOINTMENT (OUTPATIENT)
Dept: PHYSICAL THERAPY | Age: 71
End: 2021-05-21
Payer: MEDICARE

## 2021-05-25 ENCOUNTER — HOSPITAL ENCOUNTER (OUTPATIENT)
Dept: PHYSICAL THERAPY | Age: 71
Setting detail: THERAPIES SERIES
Discharge: HOME | End: 2021-05-25
Payer: MEDICARE

## 2021-05-25 PROCEDURE — 97530 THERAPEUTIC ACTIVITIES: CPT

## 2021-05-25 PROCEDURE — 97110 THERAPEUTIC EXERCISES: CPT

## 2021-05-25 PROCEDURE — 97112 NEUROMUSCULAR REEDUCATION: CPT

## 2021-05-25 NOTE — FLOWSHEET NOTE
Other:     pt cancelled her 5/21/21 945  appt, therefore 1 session wk of 5/17      Treatment Charges: Mins Units   []  Modalities     [x]  Ther Exercise  38 2   [x]  Manual Therapy -- --   []  Ther Activities     []  Aquatics     []  Vasocompression     [x]  Other neuro re-ed  10 1   Total Treatment time 48 3       Assessment: [x] Progressing toward goals  Focus on hip and core strengthening. Also progressed balance activities with  Progression to compliant surface. Pt demonstrated decreased stability  with one repetition Fwd/retro nudging when on foam, min assist for balance required. No reports of radicular pain throughout session. [] No change. [] Other    [x] Patient would continue to benefit from skilled physical therapy services in order to:  Decreased LB and LE pain and increase core and hip strengthening in order to progress functional activity. STG: (to be met in 9 treatments)  1. ? Pain: Lumbar pain improve to 6/10 at max with standing and walking 5/21/21: MET  (5/10 ave pain)  2. Right hip pain improve to 6/10 with standing and walking 5/21/21: MET (6/10  Ave pain)  3. Left calf pain improve to 6/10 at all times 5/18/21 MET, only after ex and will be 4/10 or less and less frequent. 4. ? ROM: Lumbar extension improve to 5 degrees  5. Patient to report no radicular symptoms with side bending 5/25/21: MET  6. ? Strength: patient able to SLS on each leg 6 seconds without LOB 5/21/21: MET, 15 second rica LE  7. ? Function: Patient to report improved ability to stand upright during gait cycle  Patient to be independent with home exercise program as demonstrated by performance with correct form without cues. 5/25/21: MET      LTG: (to be met in 18 treatments)  1. Lumbar pain improve to 4/10 at max with activity  2. Right hip pain improve to 2/10 at max with activity  3. Left calf pain improve to 2/10 at max with activity  4.  Oswestry score improve to 35% functionally impaired. 5. Patient to report resolution of radicular symptoms down leg. 6. Patient to report improved ease of going up/down steps at home.     Patient goals: \"Increase mobility in back area, increase standing and sitting without pain\"     Pt. Education:  [x] Yes  [] No  [x] Reviewed Prior HEP/Ed  Method of Education: [x] Verbal  [x] Demo  [x] Written 4.22 [employer] texted to patient   Comprehension of Education:  [x] Verbalizes understanding. [x] Demonstrates understanding. [x] Needs review. [x] Demonstrates/verbalizes HEP/Ed previously given. Access Code: [email or web address]: VerbalizeIt/Date: 04/30/2021Prepared by: [address] with Support - 1 x daily - 7 x weekly - 10 reps - 3 sets   Standing Knee Flexion AROM with Chair Support - 1 x daily - 7 x weekly - 10 reps - 3 sets   Standing Hip Flexion with Counter Support - 1 x daily - 7 x weekly - 10 reps - 3 sets   Standing Hip Extension - 1 x daily - 7 x weekly - 10 reps - 3 sets   Standing Marching - 1 x daily - 7 x weekly - 10 reps - 3 sets   Standing Hip Abduction - 1 x daily - 7 x weekly - 10 reps - 3 sets   Standing Hip Extension with Counter Support - 1 x daily - 7 x weekly - 10 reps - 3 sets   Standing Hip Abduction with Counter Support - 1 x daily - 7 x weekly - 10 reps - 3 sets   Standing Heel Raise - 1 x daily - 7 x weekly - 10 reps - 3 sets   Sit to Stand - 1 x daily - 7 x weekly - 10 reps - 3 sets   Seated Long Arc Quad - 1 x daily - 7 x weekly - 10 reps - 3 sets   Supine Lower Trunk Rotation - 1 x daily - 7 x weekly - 10 reps - 3 sets   Supine Active Straight Leg Raise - 1 x daily - 7 x weekly - 10 reps - 3 sets   Supine Hip Adduction Isometric with Ball - 1 x daily - 7 x weekly - 10 reps - 3 sets   Bent Knee Fallouts - 1 x daily - 7 x weekly - 10 reps - 3 sets   Dead Bug - 1 x daily - 7 x weekly - 10 reps - 3 sets   Hooklying Sequential Leg March and Lower - 1 x daily - 7 x weekly - 10 reps - 3 sets Supine Bridge - 1 x daily - 7 x weekly - 10 reps - 3 sets   Clamshell - 1 x daily - 7 x weekly - 10 reps - 3 sets   Sidelying Hip Abduction - 1 x daily - 7 x weekly - 10 reps - 3 sets   Supine Posterior Pelvic Tilt - 1 x daily - 7 x weekly - 10 reps - 3 sets   Modified Derrick Stretch - 1 x daily - 7 x weekly - 10 reps - 3 sets   Curl Up with Reach - 1 x daily - 7 x weekly - 10 reps - 3 sets   Diagonal Curl Up with Reach - 1 x daily - 7 x weekly - 10 reps - 3 sets       Access Code: [email or web address] URL: [employer]/Date: 04/22/2021Prepared by: Xavier Ar   Supine Posterior Pelvic Tilt - 1 x daily - 5 x weekly - 3 sets - 10 reps - 10 hold   Supine March with Posterior Pelvic Tilt - 1 x daily - 7 x weekly - 10 reps - 3 sets   Supine Bridge - 1 x daily - 7 x weekly - 10 reps - 3 sets   Active Straight Leg Raise with Quad Set - 1 x daily - 7 x weekly - 10 reps - 3 sets   Clamshell - 1 x daily - 7 x weekly - 10 reps - 3 sets   Sidelying Hip Abduction - 1 x daily - 7 x weekly - 10 reps - 3 sets      Access Code: [email or web address]: [employer]/Date: 04/16/2021Prepared by: Alex Hills on Wall - 1 x daily - 7 x weekly - 1 sets - 3 reps - 30 hold   Standing Hip Abduction - 1 x daily - 5 x weekly - 2 sets - 10 reps   Standing Hip Extension with Counter Support - 1 x daily - 5 x weekly - 2 sets - 10 reps   Supine transverse abdominal bracing 1x daily 3x wk 10x 2  Supine Transversus Abdominis Bracing with march - 1 x daily - 7 x weekly - 2 sets - 10 reps - 5 hold   Supine Lower Trunk Rotation - 1 x daily - 7 x weekly - 1-2 sets - 10 reps - 10 hold   Hooklying Isometric Clamshell - 1 x daily - 5 x weekly - 2 sets - 10 reps   Clamshell with Resistance - 1 x daily - 5 x weekly - 2 sets - 10 reps  Ball abdominal crutches 3x wk 2x10 reps  Access Code: [facility]  URL: [email or web address]  Date: 05/14/2021  Prepared by: Jasbir Osborn    Exercises  Standing Hip Flexor Stretch - 1 x daily - 7 x weekly - 1 sets - 3 reps - 30 hold    Plan: [x] Continue current frequency toward long and short term goals. [x] Specific Instructions for subsequent treatments  Lumbar rotation machine. Lumbar extension machine. Leg press machine. Check for LLD (previously right leg shorter). Once order signed, may begin ionto for right hip if pain does not begin to subside. .  Can trial manual traction, and progress to mechanical traction if needed. Can use modalities as needed for pain control.   Use pillow in prone         Time In:    8439        Time Out:  103    Electronically signed by:  Ama Antonio PTA,

## 2021-05-25 NOTE — PROGRESS NOTES
[x] [facility]) [facility] &  Therapy  [address]  [phone]  F: [phone] [] [address]  [facility]   [address]  P: [phone]  F: [phone] [] [address] &  Therapy  [address]  P: [phone]  F: [phone] [] [address]  P: [phone]  F: [phone] [] [address]  [facility]   [address]   [place]: [phone]  F: [phone]      Physical Therapy Progress Note    Date: 2021      Patient: Lauren Santos  : 1950  MRN: [id number]    Physician: Dr. Kina Canales MD                               Insurance: Medicare ([address] [place] [address]), [facility] [place] [id number] (60 visits per year)  Medical Diagnosis: Lumbar facet arthropathy, hip pain, lumbar radiculopathy, inflammatory spondylopathy lumbar region, lumbago with sciatica right side, myofascial pain, Ilioinguinal neuralgia of right side  Rehab Codes: M 54.5, M 54.41, M 25.551, M 25.552, M 62.81, R 26.2, R 29.3, M 79.1  Onset Date: 3/30/21                 Next 's appt.: 21  Visit# / total visits:  ; Progress note for Medicare patient due at visit #9                                Cancels/No Shows: 2/0  Date range of services: 21 to 21 and  for injections    Subjective:    Pain:  [x]? Yes  []? No   Location: LB, B  hip     Pain Rating: (0-10 scale)  3/10 in R hip, 5/10  L LB  510 LB  Pain altered Tx:  [x]? No  []? Yes  Action:  Comments:   Reports she will get a \"catch in her left LB\" states she is to have a back injection in approx. 2 weeks. No radicular LE pain. Addressing HEP 2 or more times a week. Objective:  Test Measurements: Pain up to 7/10 in back and 5/10 in right hip with standing for 5 minutes.   States she has not had an injection \"on that side\" and will be having injections on June 18 and July 2nd. Patient does not have radicular symptoms. Function:  Balance has improved to 15 seconds each leg. Able to stand for 5 minutes. Gait is still slightly flexed to the left. This date right leg was 2\" shorter again. Corrected with MET x 2 with shotgun technique 2x    Assessment:  STG: (to be met in 9 treatments)  1. ? Pain: Lumbar pain improve to 6/10 at max with standing and walking States she can stand for about 4-5 minutes then the pain elevates until 6-7/10 (when standing to do dishes. 2. Right hip pain improve to 6/10 with standing and walking States right hip pain is 5/10 after standing for 5 minutes to do dishes. 3. Left calf pain improve to 6/10 at all times States the left calf is getting better - states both calves feel equal.  Feels exacerbated after being stretched. No cramps in left calf. 4. ? ROM: Lumbar extension improve to 5 degrees -- Met, 7 degrees lumbar extension with legs braced on bed. 5. Patient to report no radicular symptoms with side bending  - -Discomfort in low back but no radicular sx. 6. ? Strength: patient able to SLS on each leg 6 seconds without LOB 5/21/21: MET, 15 second rica LE  7. ? Function: Patient to report improved ability to stand upright during gait cycle -- Slight improvement- still flexed with slight left lean. 8. Patient to be independent with home exercise program as demonstrated by performance with correct form without cues. -- Met     LTG: (to be met in 18 treatments)  1. Lumbar pain improve to 4/10 at max with activity  2. Right hip pain improve to 2/10 at max with activity  3. Left calf pain improve to 2/10 at max with activity  4. Oswestry score improve to 35% functionally impaired. 5. Patient to report resolution of radicular symptoms down leg.   6. Patient to report improved ease of going up/down steps at home.     Patient goals:  \"Increase mobility in back area, increase standing and sitting without pain\"    Treatment Plan:  [x] Therapeutic Exercise   77289  [] Iontophoresis: 4 mg/mL Dexamethasone Sodium Phosphate  mAmin  58102   [] Therapeutic Activity  11096 [] Vasopneumatic cold with compression  17373    [] Gait Training   86625 [] Ultrasound   91135   [] Neuromuscular Re-education  95473 [] Electrical Stimulation Unattended  54820   [x] Manual Therapy  79612 [] Electrical Stimulation Attended  11367   [x] Instruction in HEP  [] Lumbar/Cervical Traction  91345   [] Aquatic Therapy   23271 [] Cold/hotpack    [] Massage   40085      [] Dry Needling, 1 or 2 muscles  65899   [] Biofeedback, first 15 minutes   59412  [] Biofeedback, additional 15 minutes   47802 [] Dry Needling, 3 or more muscles  67553       Patient Status:     [x] Continue per initial plan of care. [] Additional visits necessary. [x] Other:   Treatment Charges: Mins Units   [x]  Therapeutic activity 10 1   Total Treatment time 10                         Time In: 7444      Time Out: 9735         Requested Frequency/Duration: 2 times per week for 8 remaining treatments. Electronically signed by Jhon Salazar PT on 5/25/2021 at 10:34 AM      If you have any questions or concerns, please don't hesitate to call. Thank you for your referral.    Physician Signature:________________________________Date:__________________  By signing above or cosigning this note, I have reviewed this plan of care and certify a need for medically necessary rehabilitation services.      *PLEASE SIGN ABOVE AND FAX BACK ALL PAGES*

## 2021-05-28 ENCOUNTER — HOSPITAL ENCOUNTER (OUTPATIENT)
Dept: PHYSICAL THERAPY | Age: 71
Setting detail: THERAPIES SERIES
Discharge: HOME | End: 2021-05-28
Payer: MEDICARE

## 2021-05-28 PROCEDURE — 97110 THERAPEUTIC EXERCISES: CPT

## 2021-05-28 NOTE — FLOWSHEET NOTE
[x] [facility]  Outpatient Rehabilitation &  Therapy  [address]  [phone]  F: [phone] [] [address]  [facility] 36   [address]  P: [phone]  F: [phone] [] [address]  [address] [facility]  P: [phone]  F: [phone] [] [address]  P: [phone]  F: [phone] [] [address]  [facility]   [address]   [place]: [phone]  F: [phone]      Physical Therapy Daily Treatment Note    Date:  2021  Patient Name:  José Manuel Julian    :  1950  MRN: [id number]  Physician: Dr. Fidel Manley MD                               Insurance: Medicare ([address] [place] [address]), [address] (61 visits per year)  Medical Diagnosis: Lumbar facet arthropathy, hip pain, lumbar radiculopathy, inflammatory spondylopathy lumbar region, lumbago with sciatica right side, myofascial pain, Ilioinguinal neuralgia of right side  Rehab Codes: M 54.5, M 54.41, M 25.551, M 25.552, M 62.81, R 26.2, R 29.3, M 79.1  Onset Date: 3/30/21                 Next 's appt.: 21  Visit# / total visits:  ; Progress note for Medicare patient due at visit #9     Cancels/No Shows: 2/0    Subjective:    Pain:  [x] Yes  [] No Location: LB, B  hip  Pain Rating: (0-10 scale)  3/10 in R hip, 5/10  L LB  510 LB  Pain altered Tx:  [x] No  [] Yes  Action:    Comments: Reports taking pain medication this AM. Hip is fine and reports feeling \"stress\" along LB waist band area.  (pelvis in anteriorly rotated)    Objective:  Modalities:  hypervolt to R hip and Left calf x 8 mins total.  5 mins R hip and 3 mins left calf -  Held   Precautions:   Exercises:2021, completed exercises marked with \"x\"    Exercise   LB/ hip Reps/ Time Weight/ Level Comments    NuStep w/ lumbar roll 5 mins L4       Treadmill  5 mins 1.2 mph   x          Standing         Gastro stretch 3x20\"  wedge x   Heel raises   20x          Hip flexor stretch 3x30\" ea        Hip abd iso abdom 10x2 2 lb    x   Hip extension 10x2 2 lb  x   Hs curls  15x 1.5 lb      Hip flexion  15x 2lbs  x   Marches  15x 2 lb   x   Squats 10x2   w/20\" box , min vc/TC for tech x   iso abdom w. Neutral spine against wall  10x5\"  added x          Balance        Large retro  Lunge step  5x1  5x1  Single UE support, added   No UE support, added      SLS 2 15'  Added foarm 5/25    Tandem stance 2x10-15\" ea   added foarm 5/25    Static standing nudging, floor 10x   Normal YARIEL, added    NBOS, EC on foam 2x15\"         Lumbar ext  3x15\"  rica hands on  Post. Hips, added     tradiional yariel on foam, nudging 2x15\"   added foam 5/25    NBOS on foam nudging eyes closed  2xx15'  Mild LOB anterior/post. CGA to regain-5/25     Seated       LAQ 15x 2 lb       Hamstring stretch 3x20\"  Stool, standing 5/18 w/ 12\"step     HS curls 15x lime      Lumbar flexion 2x15\"              Supine       LTR 5x10\"       Piriformis stretch 3x20\"  Added 5/5     MET to correct right leg shorter 1 x   Shotgun technique 2 x. Corrected LLD. MET self correction x  For correction of LLD, R le shorter. HEP issued     Ck pelvis/LL, LL equal        x     Bridge 15x   HEP  x          iso abdom 5x5\"   x   iso abdom w/ march 15x 2 lbs    x   iso abdom w/alt opp. UE 15x 2 lbs   x    iso abdom alt opp  UE/LEs   15x 2 lb in U/LE   x   iso abdom alt same UE/LE 15x 2 lbs U/LE   x   Crunch w/ ball 2x10   x   Reverse crunch 2x10  Tap 4' step     x   HS stretch 3x30\"  Strap, rica     Supine SLR 10 x ea  2 lbs HEP     x   Supine butterfly 2x10 lime   x            SL hip abd 1x10 1 lbs A      SL clamshells w. iso abdom.   2x10 blue HEP           plank 1x12\"  2x15\"    x  x          Prone pillow       Hypervolt -- prone/pillow 8  min   3 min left calf, level 2  5 min right hip, level 1 Other:     pt cancelled her 5/21/21 945  appt, therefore 1 session wk of 5/17      Treatment Charges: Mins Units   []  Modalities     [x]  Ther Exercise 43  3   [x]  Manual Therapy -- --   []  Ther Activities     []  Aquatics     []  Vasocompression     [x]  Other neuro re-ed       Total Treatment time  43 3       Assessment: [x] Progressing toward goals  Added treadmill walking for gait endurance goal. Focus on core strengthening in supine and  then  trial iso abdominals with neutral pelvis in standing, pt demonstrated difficulties with technique. Did trial with back to wall but pt would bend Left knee. Wall, vc and demonstration added for education  Addressed gluteal and hip flexor strengthening in standing with good pt understanding demonstrated. [] No change. [x] Other  Decreased L hip extension, stance time  and minimal L knee flexion, swing thru with gait. Demonstration and verbal cue education  for squat technique continues as pt is hesitant to flex rica knees and she  increases lumbar lordosis. [x] Patient would continue to benefit from skilled physical therapy services in order to:  Decreased LB and LE pain and increase core and hip strengthening in order to progress functional activity. STG: (to be met in 9 treatments)  ? Pain: Lumbar pain improve to 6/10 at max with standing and walking States she can stand for about 4-5 minutes then the pain elevates until 6-7/10 (when standing to do dishes. Right hip pain improve to 6/10 with standing and walking States right hip pain is 5/10 after standing for 5 minutes to do dishes. Left calf pain improve to 6/10 at all times States the left calf is getting better - states both calves feel equal.  Feels exacerbated after being stretched. No cramps in left calf. ? ROM: Lumbar extension improve to 5 degrees -- Met, 7 degrees lumbar extension with legs braced on bed.   Patient to report no radicular symptoms with side bending  - -Discomfort in

## 2021-06-02 ENCOUNTER — HOSPITAL ENCOUNTER (OUTPATIENT)
Dept: PHYSICAL THERAPY | Age: 71
Setting detail: THERAPIES SERIES
Discharge: HOME | End: 2021-06-02
Payer: MEDICARE

## 2021-06-02 PROCEDURE — 97110 THERAPEUTIC EXERCISES: CPT

## 2021-06-02 NOTE — FLOWSHEET NOTE
[x] [facility]) [facility] &  Therapy  [address]  [phone]  F: [phone] [] [address]  [facility]   [address]  P: [phone]  F: [phone] [] [place]  [address]  P: [phone]  F: [phone] [] [address]  P: [phone]  F: [phone] [] [address]  [facility]   [address]   [place]: [phone]  F: [phone]      Physical Therapy Daily Treatment Note    Date:  2021  Patient Name:  Ap Allen    :  1950  MRN: [id number]  Physician: Dr. Anderson Biswas MD                               Insurance: Medicare ([address] [place] [address]), [facility] [employer] (61 visits per year)  Medical Diagnosis: Lumbar facet arthropathy, hip pain, lumbar radiculopathy, inflammatory spondylopathy lumbar region, lumbago with sciatica right side, myofascial pain, Ilioinguinal neuralgia of right side  Rehab Codes: M 54.5, M 54.41, M 25.551, M 25.552, M 62.81, R 26.2, R 29.3, M 79.1  Onset Date: 3/30/21                 Next 's appt.: 21  Visit# / total visits:  ; Progress note for Medicare patient due at visit #9     Cancels/No Shows: 2/0    Subjective:    Pain:  [x] Yes  [] No Location: LB, B  hip  Pain Rating: (0-10 scale)  4-5/10 in R hip,   L LB  5/10 LB  Pain altered Tx:  [x] No  [] Yes  Action:    Comments:  Reports R hip and R LB pain is a little more bothersome today. Reports the pain changes from activity to activity and from day to day. Feels PT has been helpful.      Objective:  Modalities:  hypervolt to R hip and Left calf x 8 mins total.  5 mins R hip and 3 mins left calf -  Held   Precautions:   Exercises:2021, completed exercises marked with \"x\"  Exercise   LB/ hip Reps/ Time Weight/ Level Comments NuStep w/ lumbar roll 5 mins L4       Treadmill  5 mins 1.2 mph  single UE support  x          Standing         Gastro stretch 3x20\"  wedge x   Heel raises   20x          Hip flexor stretch 3x30\" ea        Hip abd iso abdom 10x2 2 lb    x   Hip extension 10x2 2 lb  x   Hs curls  15x 1.5 lb      Hip flexion  15x 2lbs      Marches  15x 2 lb      Squats 10x2   w/20\" box , min vc/TC for tech x   iso abdom w. Neutral spine against wall  10x5\"               cybex rotary 2x10  Added 6/2 x   cybex extension 2x10  Added 6/2 x   Leg press  add             Balance        Large retro  Lunge step  5x1  5x1  Single UE support, added   No UE support, added      SLS 2 15'  Added foarm 5/25    Tandem stance 2x10-15\" ea   added foarm 5/25    Static standing nudging, floor 10x   Normal YARIEL, added    NBOS, EC on foam 2x15\"         Lumbar ext  3x15\"  rica hands on  Post. Hips, added     tradiional yariel on foam, nudging 2x15\"   added foam 5/25    NBOS on foam nudging eyes closed  2xx15'  Mild LOB anterior/post. CGA to regain-5/25     Seated       LAQ 15x 2 lb    x   Hamstring stretch 3x20\"  Stool, standing 5/18 w/ 12\"step     HS curls 15x lime      Lumbar flexion 2x15\"              Supine       LTR 5x10\"   x   Piriformis stretch 3x20\"    x   R IT Band stretch 3x20\"   x   MET to correct right leg shorter 1 x   Shotgun technique 2 x. Corrected LLD. MET self correction x  For correction of LLD, R le shorter. HEP issued     Ck pelvis/LL, LL equal        x     Bridge 15x   HEP  x          iso abdom 5x5\"   x   iso abdom w/ march 15x 2 lbs    x   iso abdom w/alt opp.   UE 15x 2 lbs  x    iso abdom alt opp  UE/LEs   15x 2 lb in U/LE   x   iso abdom alt same UE/LE 15x 2 lbs U/LE   x   iso abdom w/ walk out 10x 2 lbs LE Added  x   Crunch w/ ball 2x10   x   Reverse crunch 2x10  Tap 4' step     x   HS stretch 3x30\"  Strap, rica  x   Supine SLR 10 x ea  2 lbs HEP         Supine butterfly 2x10 lime               SL hip abd 1x10 1 lbs A      SL sammy cervantes iso abdom. 2x10 blue HEP           plank 1x12\"  2x15\"                  Prone pillow       Prone lying 3 mins  Another option of hip flexor elongation, (pt sits often) added 6/2 x   Hypervolt -- prone/pillow 8  min   3 min left calf, level 2  5 min right hip, level 1                      Other:     pt cancelled her 5/21/21 945  appt, therefore 1 session wk of 5/17      Treatment Charges: Mins Units   []  Modalities     [x]  Ther Exercise  55 4   [x]  Manual Therapy -- --   []  Ther Activities     []  Aquatics     []  Vasocompression     [x]  Other neuro re-ed       Total Treatment time  55 4       Assessment: [x] Progressing toward goals  Added cybex exercises for abdominal and back extensors and continued with mat core strengthening as well. Intermittent vc for pacing exercises and for correct technique. [] No change. [x] Other  Decreased L hip extension, stance time  and minimal L knee flexion, swing thru with gait. Demonstration and verbal cue education  for squat technique continues as pt is hesitant to flex rica knees and she  increases lumbar lordosis. [x] Patient would continue to benefit from skilled physical therapy services in order to:  Decreased LB and LE pain and increase core and hip strengthening in order to progress functional activity. STG: (to be met in 9 treatments)  1. ? Pain: Lumbar pain improve to 6/10 at max with standing and walking States she can stand for about 4-5 minutes then the pain elevates until 6-7/10 (when standing to do dishes. 2. Right hip pain improve to 6/10 with standing and walking States right hip pain is 5/10 after standing for 5 minutes to do dishes. 3. Left calf pain improve to 6/10 at all times States the left calf is getting better - states both calves feel equal.  Feels exacerbated after being stretched. No cramps in left calf.     4. ? ROM: Lumbar extension improve to 5 degrees -- Met, 7 degrees lumbar extension with legs braced on bed.  5. Patient to report no radicular symptoms with side bending  - -Discomfort in low back but no radicular sx. 6. ? Strength: patient able to SLS on each leg 6 seconds without LOB 5/21/21: MET, 15 second rica LE  7. ? Function: Patient to report improved ability to stand upright during gait cycle -- Slight improvement- still flexed with slight left lean. 1. Patient to be independent with home exercise program as demonstrated by performance with correct form without cues. -- Met     LTG: (to be met in 18 treatments)  1. Lumbar pain improve to 4/10 at max with activity  2. Right hip pain improve to 2/10 at max with activity  3. Left calf pain improve to 2/10 at max with activity  4. Oswestry score improve to 35% functionally impaired. 5. Patient to report resolution of radicular symptoms down leg. 6. Patient to report improved ease of going up/down steps at home. Patient goals: \"Increase mobility in back area, increase standing and sitting without pain\"     Pt. Education:  [x] Yes  [] No  [x] Reviewed Prior HEP/Ed  Method of Education: [x] Verbal  [x] Demo  [x] Written 4.22 [employer] texted to patient   Comprehension of Education:  [x] Verbalizes understanding. [x] Demonstrates understanding. [x] Needs review. [x] Demonstrates/verbalizes HEP/Ed previously given. Access Code: [email or web address]: [employer] com/Date: 04/30/2021Prepared by: Janeen Castillo    Standing Heel Raise with Support - 1 x daily - 7 x weekly - 10 reps - 3 sets    Standing Knee Flexion AROM with Chair Support - 1 x daily - 7 x weekly - 10 reps - 3 sets    Standing Hip Flexion with Counter Support - 1 x daily - 7 x weekly - 10 reps - 3 sets    Standing Hip Extension - 1 x daily - 7 x weekly - 10 reps - 3 sets    Standing Marching - 1 x daily - 7 x weekly - 10 reps - 3 sets    Standing Hip Abduction - 1 x daily - 7 x weekly - 10 reps - 3 sets    Standing Hip Extension with Counter Support - 1 x daily - 7 x weekly - 10 reps - 3 sets    Standing Hip Abduction with Counter Support - 1 x daily - 7 x weekly - 10 reps - 3 sets    Standing Heel Raise - 1 x daily - 7 x weekly - 10 reps - 3 sets    Sit to Stand - 1 x daily - 7 x weekly - 10 reps - 3 sets    Seated Long Arc Quad - 1 x daily - 7 x weekly - 10 reps - 3 sets    Supine Lower Trunk Rotation - 1 x daily - 7 x weekly - 10 reps - 3 sets    Supine Active Straight Leg Raise - 1 x daily - 7 x weekly - 10 reps - 3 sets    Supine Hip Adduction Isometric with Ball - 1 x daily - 7 x weekly - 10 reps - 3 sets    Bent Knee Fallouts - 1 x daily - 7 x weekly - 10 reps - 3 sets    Dead Bug - 1 x daily - 7 x weekly - 10 reps - 3 sets    Hooklying Sequential Leg March and Lower - 1 x daily - 7 x weekly - 10 reps - 3 sets    Supine Bridge - 1 x daily - 7 x weekly - 10 reps - 3 sets    Clamshell - 1 x daily - 7 x weekly - 10 reps - 3 sets    Sidelying Hip Abduction - 1 x daily - 7 x weekly - 10 reps - 3 sets    Supine Posterior Pelvic Tilt - 1 x daily - 7 x weekly - 10 reps - 3 sets    Modified Derrick Stretch - 1 x daily - 7 x weekly - 10 reps - 3 sets    Curl Up with Reach - 1 x daily - 7 x weekly - 10 reps - 3 sets    Diagonal Curl Up with Reach - 1 x daily - 7 x weekly - 10 reps - 3 sets       Access Code: [email or web address] URL: [employer]/Date: 04/22/2021Prepared by: Sherine Angle    Supine Posterior Pelvic Tilt - 1 x daily - 5 x weekly - 3 sets - 10 reps - 10 hold    Supine March with Posterior Pelvic Tilt - 1 x daily - 7 x weekly - 10 reps - 3 sets    Supine Bridge - 1 x daily - 7 x weekly - 10 reps - 3 sets    Active Straight Leg Raise with Quad Set - 1 x daily - 7 x weekly - 10 reps - 3 sets    Clamshell - 1 x daily - 7 x weekly - 10 reps - 3 sets    Sidelying Hip Abduction - 1 x daily - 7 x weekly - 10 reps - 3 sets      Access Code: [email or web address]: [email or web address] com/Date: 04/16/2021Prepared by: Yolette Calloway Stretch on Wall - 1 x daily - 7 x weekly - 1 sets - 3 reps - 30 hold    Standing Hip Abduction - 1 x daily - 5 x weekly - 2 sets - 10 reps    Standing Hip Extension with Counter Support - 1 x daily - 5 x weekly - 2 sets - 10 reps    Supine transverse abdominal bracing 1x daily 3x wk 10x 2   Supine Transversus Abdominis Bracing with march - 1 x daily - 7 x weekly - 2 sets - 10 reps - 5 hold    Supine Lower Trunk Rotation - 1 x daily - 7 x weekly - 1-2 sets - 10 reps - 10 hold    Hooklying Isometric Clamshell - 1 x daily - 5 x weekly - 2 sets - 10 reps    Clamshell with Resistance - 1 x daily - 5 x weekly - 2 sets - 10 reps   Ball abdominal crutches 3x wk 2x10 reps  Access Code: [facility]  URL: [email or web address] com/  Date: 05/14/2021  Prepared by: Caitlin Nolen    Exercises  Standing Hip Flexor Stretch - 1 x daily - 7 x weekly - 1 sets - 3 reps - 30 hold    Plan: [x] Continue current frequency toward long and short term goals. [x] Specific Instructions for subsequent treatments add  Leg press machine as able. .  Check for LLD (previously right leg shorter). Once order signed, may begin ionto for right hip if pain does not begin to subside. Progress cybex and core exercise as able.  .   .      Time In:  0945          Time Out:  1048    Electronically signed by:  Meliza Wilson PTA,

## 2021-06-04 ENCOUNTER — HOSPITAL ENCOUNTER (OUTPATIENT)
Dept: PHYSICAL THERAPY | Age: 71
Setting detail: THERAPIES SERIES
Discharge: HOME | End: 2021-06-04
Payer: MEDICARE

## 2021-06-04 PROCEDURE — 97110 THERAPEUTIC EXERCISES: CPT

## 2021-06-04 NOTE — FLOWSHEET NOTE
Treadmill  5 mins 1.2 mph  single UE support  x          Standing         Gastro stretch 3x20\"  wedge x   Heel raises   20x      x   Hip flexor stretch 3x30\" ea        Hip abd iso abdom 10x2 2 lb    x   Hip extension 10x2 2 lb  x   Hs curls  15x 1.5 lb      Hip flexion  15x 2lbs  x   Marches  15x 2 lb   x   Squats 10x2   w/20\" box , min vc/TC for tech/   Used chair for TC 6/4 x   iso abdom w. Neutral spine against wall  10x5\"               cybex rotary 2x10  Added 6/2 x   cybex extension 2x10  Added 6/2 x   Leg press  2x10  Added 6/4 x          Balance        Large retro  Lunge step  5x1  5x1  Single UE support, added   No UE support, added      SLS 2 15'  Added foarm 5/25    Tandem stance 2x10-15\" ea   added foarm 5/25    Static standing nudging, floor 10x   Normal YARIEL, added    NBOS, EC on foam 2x15\"         Lumbar ext  3x15\"  rica hands on  Post. Hips, added     tradiional yariel on foam, nudging 2x15\"   added foam 5/25    NBOS on foam nudging eyes closed  2xx15'  Mild LOB anterior/post. CGA to regain-5/25     Seated       LAQ 15x 2 lb       Hamstring stretch 3x20\"  Stool, standing 5/18 w/ 12\"step     HS curls 15x lime      Lumbar flexion 2x15\"              Supine       LTR 5x10\"   x   Piriformis stretch 3x20\"    x   R IT Band stretch 3x20\"   x   MET to correct right leg shorter 1 x   Shotgun technique 2 x. Corrected LLD. MET self correction x  For correction of LLD, R le shorter. HEP issued     Ck pelvis/LL, LL equal             Bridge 15x   HEP            iso abdom 5x5\"      iso abdom w/ march 15x 2 lbs    x   iso abdom w/alt opp. UE 15x 2 lbs      iso abdom alt opp  UE/LEs   15x 2 lb in U/LE      iso abdom alt same UE/LE 15x 2 lbs U/LE      iso abdom w/ walk out 10x 2 lbs LE Added     Crunch w/ ball 2x10      Reverse crunch 2x10  Tap 4' step        HS stretch 3x30\"  Strap, rica  x   Supine SLR 10 x ea  2 lbs HEP         Supine butterfly 2x10 lime               SL hip abd 1x10 1 lbs A      SL clamshells w. iso abdom. 2x10 blue HEP           plank 1x12\"  2x15\"                  Prone pillow       Prone lying 3 mins  Another option of hip flexor elongation, (pt sits often) added 6/2 x   Hypervolt -- prone/pillow 8  min   3 min left calf, level 2  5 min right hip, level 1                      Other:     pt cancelled her 5/21/21 945  appt, therefore 1 session wk of 5/17      Treatment Charges: Mins Units   []  Modalities     [x]  Ther Exercise  51 3   []  Manual Therapy     []  Ther Activities     []  Aquatics     []  Vasocompression     [x]  Other neuro re-ed       Total Treatment time  51 3       Assessment: [x] Progressing toward goals  Pt is very motivated and compliant throughout treatment. Pt voices her knowledge on her deficits based on what she has learned thus far from PT. Pt had a strong focus on proper posture and foot alignment during warm up on treadmill. Chair was used during squats to improve proper technique with good carry over. Added leg press this date to improve strength. Ended treatment with prolonged hamstring, piriformis, and hip flexor stretching in order to reduce tightness. [] No change. [x] Other  Decreased L hip extension, stance time  and minimal L knee flexion, swing thru with gait. Demonstration and verbal cue education  for squat technique continues as pt is hesitant to flex rica knees and she  increases lumbar lordosis. [x] Patient would continue to benefit from skilled physical therapy services in order to:  Decreased LB and LE pain and increase core and hip strengthening in order to progress functional activity. STG: (to be met in 9 treatments)  1. ? Pain: Lumbar pain improve to 6/10 at max with standing and walking States she can stand for about 4-5 minutes then the pain elevates until 6-7/10 (when standing to do dishes. 2. Right hip pain improve to 6/10 with standing and walking States right hip pain is 5/10 after standing for 5 minutes to do dishes.     3. Left calf pain improve to 6/10 at all times States the left calf is getting better - states both calves feel equal.  Feels exacerbated after being stretched. No cramps in left calf. 4. ? ROM: Lumbar extension improve to 5 degrees -- Met, 7 degrees lumbar extension with legs braced on bed. 5. Patient to report no radicular symptoms with side bending  - -Discomfort in low back but no radicular sx. 6. ? Strength: patient able to SLS on each leg 6 seconds without LOB 5/21/21: MET, 15 second rica LE  7. ? Function: Patient to report improved ability to stand upright during gait cycle -- Slight improvement- still flexed with slight left lean. 1. Patient to be independent with home exercise program as demonstrated by performance with correct form without cues. -- Met     LTG: (to be met in 18 treatments)  1. Lumbar pain improve to 4/10 at max with activity  2. Right hip pain improve to 2/10 at max with activity  3. Left calf pain improve to 2/10 at max with activity  4. Oswestry score improve to 35% functionally impaired. 5. Patient to report resolution of radicular symptoms down leg. 6. Patient to report improved ease of going up/down steps at home. Patient goals: \"Increase mobility in back area, increase standing and sitting without pain\"     Pt. Education:  [x] Yes  [] No  [x] Reviewed Prior HEP/Ed  Method of Education: [x] Verbal  [x] Demo  [] Written 4.22 [employer] texted to patient   Comprehension of Education:  [x] Verbalizes understanding. [x] Demonstrates understanding. [x] Needs review. [x] Demonstrates/verbalizes HEP/Ed previously given. Access Code: [email or web address]: [employer] com/Date: 04/30/2021Prepared by: Femi Jackson    Standing Heel Raise with Support - 1 x daily - 7 x weekly - 10 reps - 3 sets    Standing Knee Flexion AROM with Chair Support - 1 x daily - 7 x weekly - 10 reps - 3 sets    Standing Hip Flexion with Counter Support - 1 x daily - 7 x weekly - 10 reps - 3 sets    Standing Hip Extension - 1 x daily - 7 x weekly - 10 reps - 3 sets    Standing Marching - 1 x daily - 7 x weekly - 10 reps - 3 sets    Standing Hip Abduction - 1 x daily - 7 x weekly - 10 reps - 3 sets    Standing Hip Extension with Counter Support - 1 x daily - 7 x weekly - 10 reps - 3 sets    Standing Hip Abduction with Counter Support - 1 x daily - 7 x weekly - 10 reps - 3 sets    Standing Heel Raise - 1 x daily - 7 x weekly - 10 reps - 3 sets    Sit to Stand - 1 x daily - 7 x weekly - 10 reps - 3 sets    Seated Long Arc Quad - 1 x daily - 7 x weekly - 10 reps - 3 sets    Supine Lower Trunk Rotation - 1 x daily - 7 x weekly - 10 reps - 3 sets    Supine Active Straight Leg Raise - 1 x daily - 7 x weekly - 10 reps - 3 sets    Supine Hip Adduction Isometric with Ball - 1 x daily - 7 x weekly - 10 reps - 3 sets    Bent Knee Fallouts - 1 x daily - 7 x weekly - 10 reps - 3 sets    Dead Bug - 1 x daily - 7 x weekly - 10 reps - 3 sets    Hooklying Sequential Leg March and Lower - 1 x daily - 7 x weekly - 10 reps - 3 sets    Supine Bridge - 1 x daily - 7 x weekly - 10 reps - 3 sets    Clamshell - 1 x daily - 7 x weekly - 10 reps - 3 sets    Sidelying Hip Abduction - 1 x daily - 7 x weekly - 10 reps - 3 sets    Supine Posterior Pelvic Tilt - 1 x daily - 7 x weekly - 10 reps - 3 sets    Modified Derrick Stretch - 1 x daily - 7 x weekly - 10 reps - 3 sets    Curl Up with Reach - 1 x daily - 7 x weekly - 10 reps - 3 sets    Diagonal Curl Up with Reach - 1 x daily - 7 x weekly - 10 reps - 3 sets       Access Code: [email or web address] URL: [email or web address]. com/Date: 04/22/2021Prepared by: Domenico Centeno    Supine Posterior Pelvic Tilt - 1 x daily - 5 x weekly - 3 sets - 10 reps - 10 hold    Supine March with Posterior Pelvic Tilt - 1 x daily - 7 x weekly - 10 reps - 3 sets    Supine Bridge - 1 x daily - 7 x weekly - 10 reps - 3 sets    Active Straight Leg Raise with Quad Set - 1 x daily - 7

## 2021-06-09 ENCOUNTER — HOSPITAL ENCOUNTER (OUTPATIENT)
Dept: PHYSICAL THERAPY | Age: 71
Setting detail: THERAPIES SERIES
Discharge: HOME | End: 2021-06-09
Payer: MEDICARE

## 2021-06-09 PROCEDURE — 97110 THERAPEUTIC EXERCISES: CPT

## 2021-06-09 NOTE — FLOWSHEET NOTE
[x] [facility]) [facility]  [facility]  [address]  [phone]  F: [phone] [] [address]  [place] [address]   [address]  P: [phone]  F: [phone] [] [place]  [address]  P: [phone]  F: [phone] [] [address]  P: [phone]  F: [phone] [] [address]  [facility]   [address]   [place]: [phone]  F: [phone]      Physical Therapy Daily Treatment Note    Date:  2021  Patient Name:  Shahnaz Miranda    :  1950  MRN: [id number]  Physician: Dr. Jennifer Greenfield MD                               Insurance: Medicare ([address] [place] [address]), [facility] (61 visits per year)  Medical Diagnosis: Lumbar facet arthropathy, hip pain, lumbar radiculopathy, inflammatory spondylopathy lumbar region, lumbago with sciatica right side, myofascial pain, Ilioinguinal neuralgia of right side  Rehab Codes: M 54.5, M 54.41, M 25.551, M 25.552, M 62.81, R 26.2, R 29.3, M 79.1  Onset Date: 3/30/21                 Next 's appt.: 21  Visit# / total visits:  ; Progress note for Medicare patient due at visit #9     Cancels/No Shows: 2/0    Subjective:    Pain:  [x] Yes  [] No Location: LB, B  hip  Pain Rating: (0-10 scale)  5/10 in R hip,   L LB  6/10 LB  Pain altered Tx:  [x] No  [] Yes  Action:    Comments:  Pain at LB along waist band and R hip is painful. States numbness continues in R proximal to mid thigh. Pt to have nerve block on  at [facility]. To have another inj in July.       Objective:  Modalities:  hypervolt to R hip and Left calf x 8 mins total.  5 mins R hip and 3 mins left calf -  Held   Precautions:   Exercises:2021, completed exercises marked with \"x\"  Exercise   LB/ hip Reps/ Time Weight/ Level Comments    NuStep w/ lumbar roll 5 mins L4    x   Treadmill  5 mins 1.2 mph  single UE support, pt deferred 6/9            Standing         Gastro stretch 3x20\"  wedge     Heel raises   20x         Hip flexor stretch 3x30\" ea       Hip abd iso abdom 10x2 2 lb       Hip extension 10x2 2 lb     Hs curls  15x 1.5 lb     Hip flexion  15x 2lbs     Marches  15x 2 lb      Squats 10x2   w/20\" box , min vc/TC for tech/   Used chair for TC 6/4    iso abdom w. Neutral spine against wall  10x5\"               cybex rotary 2x10 18 lbs   x   cybex extension 2x10 40 lbs  x   Leg press  2x10 30 lbs  x          Balance        Large retro  Lunge step  5x1  5x1  Single UE support, added   No UE support, added      SLS 2 15'  Added foarm 5/25    Tandem stance 2x10-15\" ea   added foarm 5/25    Static standing nudging, floor 10x   Normal YARIEL, added    NBOS, EC on foam 2x15\"         Lumbar ext  3x15\"  rica hands on  Post. Hips, added     tradiional yariel on foam, nudging 2x15\"   added foam 5/25    NBOS on foam nudging eyes closed  2xx15'  Mild LOB anterior/post. CGA to regain-5/25     Seated       LAQ 15x 2 lb       Hamstring stretch 3x20\"  Stool, standing 5/18 w/ 12\"step     HS curls 15x lime      Lumbar flexion 2x15\"              Supine       LTR 5x10\"       Piriformis stretch 3x20\"       R IT Band stretch 3x20\"      MET to correct right leg shorter 1 x   Shotgun technique 2 x. Corrected LLD. MET self correction x  For correction of LLD, R le shorter. HEP issued     Ck pelvis/LL, LL equal             Bridge 15x   HEP            iso abdom 10x5\"   x   iso abdom w/ march 20x 2 lbs    x   iso abdom w/alt opp.   UE 20x 2 lbs  x    iso abdom alt opp  UE/LEs   20x 2 lb in U/LE   x   iso abdom alt same UE/LE 20x 2 lbs U/LE   x   iso abdom w/ walk out 10x 2 lbs LE   x   Crunch w/ ball 2x10      Reverse crunch 2x10  Tap 4' step     x   HS stretch 3x30\"  Strap, rica      Supine SLR 10 x ea  2 lbs HEP      x   Supine butterfly 2x10 lime SL hip abd 1x10 1 lbs A      SL clamshells w. iso abdom. 2x10 blue HEP x          plank 1x12\"  2x15\"                  Prone pillow       Prone lying 3 mins  Another option of hip flexor elongation, (pt sits often) added 6/2 x   Hypervolt -- prone/pillow 8  min   3 min left calf, level 2  5 min right hip, level 1                      Other:            Treatment Charges: Mins Units   []  Modalities     [x]  Ther Exercise 38 3   []  Manual Therapy     []  Ther Activities     []  Aquatics     []  Vasocompression     [x]  Other neuro re-ed       Total Treatment time  38 3       Assessment: [x] Progressing toward goals  Focus on core strengthening as pt requested. Pt continued to require VC for exercise technique with all of mat exercises. Good endurance demonstrated. [x] No change. Fwd flexed torso with slight R LE limp noted. Hx of R AIDEE. Continued education on  Body mechanics with sit to supine technique. [] Other   . [x] Patient would continue to benefit from skilled physical therapy services in order to:  Decreased LB and LE pain and increase core and hip strengthening in order to progress functional activity. STG: (to be met in 9 treatments)  1. ? Pain: Lumbar pain improve to 6/10 at max with standing and walking States she can stand for about 4-5 minutes then the pain elevates until 6-7/10 (when standing to do dishes. 2. Right hip pain improve to 6/10 with standing and walking States right hip pain is 5/10 after standing for 5 minutes to do dishes. 3. Left calf pain improve to 6/10 at all times States the left calf is getting better - states both calves feel equal.  Feels exacerbated after being stretched. No cramps in left calf. 4. ? ROM: Lumbar extension improve to 5 degrees -- Met, 7 degrees lumbar extension with legs braced on bed. 5. Patient to report no radicular symptoms with side bending  - -Discomfort in low back but no radicular sx.   6. ? Strength: patient able to SLS on each leg 6 seconds without LOB 5/21/21: MET, 15 second rica LE  7. ? Function: Patient to report improved ability to stand upright during gait cycle -- Slight improvement- still flexed with slight left lean. 1. Patient to be independent with home exercise program as demonstrated by performance with correct form without cues. -- Met     LTG: (to be met in 18 treatments)  1. Lumbar pain improve to 4/10 at max with activity  2. Right hip pain improve to 2/10 at max with activity  3. Left calf pain improve to 2/10 at max with activity  4. Oswestry score improve to 35% functionally impaired. 5. Patient to report resolution of radicular symptoms down leg. 6. Patient to report improved ease of going up/down steps at home. Patient goals: \"Increase mobility in back area, increase standing and sitting without pain\"     Pt. Education:  [x] Yes  [] No  [x] Reviewed Prior HEP/Ed  Method of Education: [x] Verbal  [x] Demo  [] Written 4.22 [employer] texted to patient   Comprehension of Education:  [x] Verbalizes understanding. [x] Demonstrates understanding. [x] Needs review. core exercises. [x] Demonstrates/verbalizes HEP/Ed previously given. Access Code: [email or web address]: [employer]. com/Date: 04/30/2021Prepared by: Santhosh Hoskins    Standing Heel Raise with Support - 1 x daily - 7 x weekly - 10 reps - 3 sets    Standing Knee Flexion AROM with Chair Support - 1 x daily - 7 x weekly - 10 reps - 3 sets    Standing Hip Flexion with Counter Support - 1 x daily - 7 x weekly - 10 reps - 3 sets    Standing Hip Extension - 1 x daily - 7 x weekly - 10 reps - 3 sets    Standing Marching - 1 x daily - 7 x weekly - 10 reps - 3 sets    Standing Hip Abduction - 1 x daily - 7 x weekly - 10 reps - 3 sets    Standing Hip Extension with Counter Support - 1 x daily - 7 x weekly - 10 reps - 3 sets    Standing Hip Abduction with Counter Support - 1 x daily - 7 x weekly - 10 reps - 3 sets    Standing Heel Raise - 1 x daily - 7 x weekly - 10 reps - 3 sets    Sit to Stand - 1 x daily - 7 x weekly - 10 reps - 3 sets    Seated Long Arc Quad - 1 x daily - 7 x weekly - 10 reps - 3 sets    Supine Lower Trunk Rotation - 1 x daily - 7 x weekly - 10 reps - 3 sets    Supine Active Straight Leg Raise - 1 x daily - 7 x weekly - 10 reps - 3 sets    Supine Hip Adduction Isometric with Ball - 1 x daily - 7 x weekly - 10 reps - 3 sets    Bent Knee Fallouts - 1 x daily - 7 x weekly - 10 reps - 3 sets    Dead Bug - 1 x daily - 7 x weekly - 10 reps - 3 sets    Hooklying Sequential Leg March and Lower - 1 x daily - 7 x weekly - 10 reps - 3 sets    Supine Bridge - 1 x daily - 7 x weekly - 10 reps - 3 sets    Clamshell - 1 x daily - 7 x weekly - 10 reps - 3 sets    Sidelying Hip Abduction - 1 x daily - 7 x weekly - 10 reps - 3 sets    Supine Posterior Pelvic Tilt - 1 x daily - 7 x weekly - 10 reps - 3 sets    Modified Derrick Stretch - 1 x daily - 7 x weekly - 10 reps - 3 sets    Curl Up with Reach - 1 x daily - 7 x weekly - 10 reps - 3 sets    Diagonal Curl Up with Reach - 1 x daily - 7 x weekly - 10 reps - 3 sets       Access Code: [email or web address] URL: [email or web address]. com/Date: 04/22/2021Prepared by: Thania Marquis    Supine Posterior Pelvic Tilt - 1 x daily - 5 x weekly - 3 sets - 10 reps - 10 hold    Supine March with Posterior Pelvic Tilt - 1 x daily - 7 x weekly - 10 reps - 3 sets    Supine Bridge - 1 x daily - 7 x weekly - 10 reps - 3 sets    Active Straight Leg Raise with Quad Set - 1 x daily - 7 x weekly - 10 reps - 3 sets    Clamshell - 1 x daily - 7 x weekly - 10 reps - 3 sets    Sidelying Hip Abduction - 1 x daily - 7 x weekly - 10 reps - 3 sets      Access Code: [email or web address]: [employer]/Date: 04/16/2021Prepared by: David Laurent    Gastroc Stretch on Wall - 1 x daily - 7 x weekly - 1 sets - 3 reps - 30 hold    Standing Hip Abduction - 1 x daily - 5 x weekly - 2 sets - 10 reps  Standing Hip Extension with Counter Support - 1 x daily - 5 x weekly - 2 sets - 10 reps    Supine transverse abdominal bracing 1x daily 3x wk 10x 2   Supine Transversus Abdominis Bracing with march - 1 x daily - 7 x weekly - 2 sets - 10 reps - 5 hold    Supine Lower Trunk Rotation - 1 x daily - 7 x weekly - 1-2 sets - 10 reps - 10 hold    Hooklying Isometric Clamshell - 1 x daily - 5 x weekly - 2 sets - 10 reps    Clamshell with Resistance - 1 x daily - 5 x weekly - 2 sets - 10 reps   Ball abdominal crutches 3x wk 2x10 reps  Access Code: [facility]  URL: [email or web address]. com/  Date: 05/14/2021  Prepared by: Castro Adam    Exercises  Standing Hip Flexor Stretch - 1 x daily - 7 x weekly - 1 sets - 3 reps - 30 hold    Plan: [x] Continue current frequency toward long and short term goals. [x] Specific Instructions for subsequent treatments add  Leg press machine as able. .  Check for LLD (previously right leg shorter). Once order signed, may begin ionto for right hip if pain does not begin to subside. Progress cybex and core exercise as able.  .   .      Time In:   5238       Time Out:   3429  Electronically signed by:  Jaime Benton PTA,

## 2021-06-11 ENCOUNTER — HOSPITAL ENCOUNTER (OUTPATIENT)
Dept: PHYSICAL THERAPY | Age: 71
Setting detail: THERAPIES SERIES
Discharge: HOME | End: 2021-06-11
Payer: MEDICARE

## 2021-06-11 PROCEDURE — 97110 THERAPEUTIC EXERCISES: CPT

## 2021-06-11 NOTE — FLOWSHEET NOTE
LB/ hip Reps/ Time Weight/ Level Comments    NuStep w/ lumbar roll 5 mins L4    x   Treadmill  5 mins 1.2 mph  single UE support, pt deferred 6/11            Standing         Gastro stretch 3x20\"  wedge     Heel raises   20x         Hip flexor stretch 3x30\" ea       Hip abd iso abdom 10x2 2 lb       Hip extension 10x2 2 lb     Hs curls  15x 1.5 lb     Hip flexion  15x 2lbs     Marches  15x 2 lb      Squats 10x2   w/20\" box , min vc/TC for tech/   Used chair for TC 6/4    iso abdom w. Neutral spine against wall  10x5\"               cybex rotary 2x10 18 lbs   x   cybex extension 2x10 40 lbs  x   Leg press  2x10 30 lbs  x          Balance        Large retro  Lunge step  5x1  5x1  Single UE support, added   No UE support, added      SLS 2 15'  Added foarm 5/25    Tandem stance 2x10-15\" ea   added foarm 5/25    Static standing nudging, floor 10x   Normal YARIEL, added    NBOS, EC on foam 2x15\"         Lumbar ext  3x15\"  rica hands on  Post. Hips, added     tradiional yariel on foam, nudging 2x15\"   added foam 5/25    NBOS on foam nudging eyes closed  2xx15'  Mild LOB anterior/post. CGA to regain-5/25     Seated       LAQ 15x 2 lb       Hamstring stretch 3x20\"  Stool, standing 5/18 w/ 12\"step     HS curls 15x lime      Lumbar flexion 2x15\"              Supine       LTR 5x10\"   x   Piriformis stretch 3x20\"    x   R IT Band stretch 3x20\"      MET to correct right leg shorter 1 x   Shotgun technique 2 x. Corrected LLD. MET self correction x  For correction of LLD, R le shorter. HEP issued     Ck pelvis/LL, LL equal             Bridge 2x10   HEP  x          iso abdom 10x5\"   x   iso abdom w/ march 20x 2 lbs    x   iso abdom w/alt opp.   UE 20x 2 lbs  x    iso abdom alt opp  UE/LEs   20x 2 lb in U/LE   x   iso abdom alt same UE/LE 20x 2 lbs U/LE   x   iso abdom w/ walk out 10x 2 lbs LE   x   Crunch w/ ball 2x10  green x   Reverse crunch 2x10  Tap 4' step     x   HS stretch 3x30\"  Strap, rica      Supine SLR 10 x ea  2 lbs HEP x   Supine butterfly 2x10 lime               SL hip abd 1x10 1 lbs A      SL clamshells w. iso abdom. 2x10 blue HEP x          plank 1x12\"  2x15\"                  Prone pillow       Prone lying 3 mins  Another option of hip flexor elongation, (pt sits often) added 6/2     Hypervolt -- prone/pillow 8  min   3 min left calf, level 2  5 min right hip, level 1                      Other:            Treatment Charges: Mins Units   []  Modalities     [x]  Ther Exercise 31 2   []  Manual Therapy     []  Ther Activities     []  Aquatics     []  Vasocompression     [x]  Other neuro re-ed       Total Treatment time  31 2       Assessment: [x] Progressing toward goals  Focus on core strengthening as pt requested. Completed exercises as charted with decreased verbal cueing needed to move throughout programs. [x] No change. [] Other   . [x] Patient would continue to benefit from skilled physical therapy services in order to:  Decreased LB and LE pain and increase core and hip strengthening in order to progress functional activity. STG: (to be met in 9 treatments)  1. ? Pain: Lumbar pain improve to 6/10 at max with standing and walking States she can stand for about 4-5 minutes then the pain elevates until 6-7/10 (when standing to do dishes. 2. Right hip pain improve to 6/10 with standing and walking States right hip pain is 5/10 after standing for 5 minutes to do dishes. 3. Left calf pain improve to 6/10 at all times States the left calf is getting better - states both calves feel equal.  Feels exacerbated after being stretched. No cramps in left calf. 4. ? ROM: Lumbar extension improve to 5 degrees -- Met, 7 degrees lumbar extension with legs braced on bed. 5. Patient to report no radicular symptoms with side bending  - -Discomfort in low back but no radicular sx.   6. ? Strength: patient able to SLS on each leg 6 seconds without LOB 5/21/21: MET, 15 second rica LE  7. ? Function: Patient to report improved ability to stand upright during gait cycle -- Slight improvement- still flexed with slight left lean. 1. Patient to be independent with home exercise program as demonstrated by performance with correct form without cues. -- Met     LTG: (to be met in 18 treatments)  1. Lumbar pain improve to 4/10 at max with activity  2. Right hip pain improve to 2/10 at max with activity  3. Left calf pain improve to 2/10 at max with activity  4. Oswestry score improve to 35% functionally impaired. 5. Patient to report resolution of radicular symptoms down leg. 6. Patient to report improved ease of going up/down steps at home. Patient goals: \"Increase mobility in back area, increase standing and sitting without pain\"     Pt. Education:  [x] Yes  [] No  [x] Reviewed Prior HEP/Ed  Method of Education: [x] Verbal  [x] Demo  [] Written 4.22 [employer] texted to patient   Comprehension of Education:  [x] Verbalizes understanding. [x] Demonstrates understanding. [x] Needs review. core exercises. [x] Demonstrates/verbalizes HEP/Ed previously given. Access Code: [email or web address]: [employer]. com/Date: 04/30/2021Prepared by: Femi Jackson    Standing Heel Raise with Support - 1 x daily - 7 x weekly - 10 reps - 3 sets    Standing Knee Flexion AROM with Chair Support - 1 x daily - 7 x weekly - 10 reps - 3 sets    Standing Hip Flexion with Counter Support - 1 x daily - 7 x weekly - 10 reps - 3 sets    Standing Hip Extension - 1 x daily - 7 x weekly - 10 reps - 3 sets    Standing Marching - 1 x daily - 7 x weekly - 10 reps - 3 sets    Standing Hip Abduction - 1 x daily - 7 x weekly - 10 reps - 3 sets    Standing Hip Extension with Counter Support - 1 x daily - 7 x weekly - 10 reps - 3 sets    Standing Hip Abduction with Counter Support - 1 x daily - 7 x weekly - 10 reps - 3 sets    Standing Heel Raise - 1 x daily - 7 x weekly - 10 reps - 3 sets    Sit to Stand - 1 x daily - 7 x weekly - 10 reps - 3 sets    Seated Long Arc Quad - 1 x daily - 7 x weekly - 10 reps - 3 sets    Supine Lower Trunk Rotation - 1 x daily - 7 x weekly - 10 reps - 3 sets    Supine Active Straight Leg Raise - 1 x daily - 7 x weekly - 10 reps - 3 sets    Supine Hip Adduction Isometric with Ball - 1 x daily - 7 x weekly - 10 reps - 3 sets    Bent Knee Fallouts - 1 x daily - 7 x weekly - 10 reps - 3 sets    Dead Bug - 1 x daily - 7 x weekly - 10 reps - 3 sets    Hooklying Sequential Leg March and Lower - 1 x daily - 7 x weekly - 10 reps - 3 sets    Supine Bridge - 1 x daily - 7 x weekly - 10 reps - 3 sets    Clamshell - 1 x daily - 7 x weekly - 10 reps - 3 sets    Sidelying Hip Abduction - 1 x daily - 7 x weekly - 10 reps - 3 sets    Supine Posterior Pelvic Tilt - 1 x daily - 7 x weekly - 10 reps - 3 sets    Modified Derrick Stretch - 1 x daily - 7 x weekly - 10 reps - 3 sets    Curl Up with Reach - 1 x daily - 7 x weekly - 10 reps - 3 sets    Diagonal Curl Up with Reach - 1 x daily - 7 x weekly - 10 reps - 3 sets       Access Code: [email or web address] URL: [employer]/Date: 04/22/2021Prepared by: Huma Lopez    Supine Posterior Pelvic Tilt - 1 x daily - 5 x weekly - 3 sets - 10 reps - 10 hold    Supine March with Posterior Pelvic Tilt - 1 x daily - 7 x weekly - 10 reps - 3 sets    Supine Bridge - 1 x daily - 7 x weekly - 10 reps - 3 sets    Active Straight Leg Raise with Quad Set - 1 x daily - 7 x weekly - 10 reps - 3 sets    Clamshell - 1 x daily - 7 x weekly - 10 reps - 3 sets    Sidelying Hip Abduction - 1 x daily - 7 x weekly - 10 reps - 3 sets      Access Code: [email or web address]: [employer]/Date: 04/16/2021Prepared by: Julianne Edmondson    Gastroc Stretch on Wall - 1 x daily - 7 x weekly - 1 sets - 3 reps - 30 hold    Standing Hip Abduction - 1 x daily - 5 x weekly - 2 sets - 10 reps    Standing Hip Extension with Counter Support - 1 x daily - 5 x weekly - 2 sets - 10 reps    Supine transverse abdominal bracing 1x daily 3x wk 10x 2   Supine Transversus Abdominis Bracing with march - 1 x daily - 7 x weekly - 2 sets - 10 reps - 5 hold    Supine Lower Trunk Rotation - 1 x daily - 7 x weekly - 1-2 sets - 10 reps - 10 hold    Hooklying Isometric Clamshell - 1 x daily - 5 x weekly - 2 sets - 10 reps    Clamshell with Resistance - 1 x daily - 5 x weekly - 2 sets - 10 reps   Ball abdominal crutches 3x wk 2x10 reps  Access Code: [facility]  URL: [email or web address]. com/  Date: 05/14/2021  Prepared by: Mark Chanel    Exercises  Standing Hip Flexor Stretch - 1 x daily - 7 x weekly - 1 sets - 3 reps - 30 hold    Plan: [x] Continue current frequency toward long and short term goals. [x] Specific Instructions for subsequent treatments add  Leg press machine as able. .  Check for LLD (previously right leg shorter). Once order signed, may begin ionto for right hip if pain does not begin to subside. Progress cybex and core exercise as able.  .   .      Time In:  1032       Time Out:   1109    Electronically signed by:  Avinash Stephens PTA,

## 2021-06-16 ENCOUNTER — HOSPITAL ENCOUNTER (OUTPATIENT)
Dept: PHYSICAL THERAPY | Age: 71
Setting detail: THERAPIES SERIES
Discharge: HOME | End: 2021-06-16
Payer: MEDICARE

## 2021-06-16 PROCEDURE — 97110 THERAPEUTIC EXERCISES: CPT

## 2021-06-21 ENCOUNTER — HOSPITAL ENCOUNTER (OUTPATIENT)
Dept: PHYSICAL THERAPY | Age: 71
Setting detail: THERAPIES SERIES
Discharge: HOME | End: 2021-06-21
Payer: MEDICARE

## 2021-06-21 PROCEDURE — 97110 THERAPEUTIC EXERCISES: CPT

## 2021-06-21 NOTE — FLOWSHEET NOTE
[x] [facility]) [facility] &  Therapy  [address]  [phone]  F: [phone] [] [address]  [facility]   [address]  P: [phone]  F: [phone] [] [place]  [address]  P: [phone]  F: [phone] [] [address]  P: [phone]  F: [phone] [] [address]  [facility]   [address]   [place]: [phone]  F: [phone]      Physical Therapy Daily Treatment Note    Date:  2021  Patient Name:  Everett Red    :  1950  MRN: [id number]  Physician: Dr. Chandrakant Tejada MD                               Insurance: Medicare ([address] [place] [address]), [facility] (61 visits per year)  Medical Diagnosis: Lumbar facet arthropathy, hip pain, lumbar radiculopathy, inflammatory spondylopathy lumbar region, lumbago with sciatica right side, myofascial pain, Ilioinguinal neuralgia of right side  Rehab Codes: M 54.5, M 54.41, M 25.551, M 25.552, M 62.81, R 26.2, R 29.3, M 79.1  Onset Date: 3/30/21                 Next 's appt.: 21  Visit# / total visits:  ; Progress note for Medicare patient due at visit #18     Cancels/No Shows: 2/0    Subjective:    Pain:  [x] Yes  [] No Location: LB, R hip  Pain Rating: (0-10 scale)  4/10 in R hip,   L LB  7/10 , 4/10 R LE  Pain altered Tx:  [x] No  [] Yes  Action:  Comments: 8 mins late. Reports she had an epidural injection in low sacral area  and next inj for  lumbar spine  21 per pt.         Objective:  Modalities:  hypervolt to R hip and Left calf x 8 mins total.  5 mins R hip and 3 mins left calf -  Held   CP to L LB/L hip x 10 mins post exercises in SL per pt request.   Precautions:   Exercises:2021, completed exercises marked with \"x\"  Exercise   LB/ hip Reps/ Time Weight/ Level Comments    NuStep w/ lumbar roll 5 mins L4    x   Treadmill  5 mins 1.2 mph  single UE support, pt deferred 6/11            Standing         Gastro stretch 3x20\"  wedge x   Heel raises   20x  2 lbs    x   Hip flexor stretch 2x30\" ea   Right x    3 way hip  2x10 2 lbs  rica  x   HS curls  15x 1.5 lb             Marches  15x 2 lb   x   Squats 10x2   w/20\" box       x             flight of stairs  10 steps  One HR, reciprocal gait demonstrated, no LOB. Added 6/21 x          cybex rotary 2x10 18 lbs  45°  x   cybex extension 2x10 40 lbs     Leg press  3x10 30 lbs             Balance        Large retro  Lunge step  5x1  5x1  Single UE support, added   No UE support, added      SLS, floor 3x 15'-L  3x8-13\"-R    x   Tandem stance 2x10-15\" ea       Static standing  On foam  nudging, eyes open  2x15\"    traditional yariel x   NBOS, EC on foam 2x30\"     mild swaying noted. x   Static standing on foam w/ Cervical ROM  10x ea  Cervical flex/ext, rotation R/L x          NBOS on foam    2xx15'  Intermittent CGA with NBOS x   Seated       LAQ 15x 2 lb       Hamstring stretch 3x20\"  Stool, standing 5/18 w/ 12\"step     HS curls 15x lime      Lumbar flexion 2x15\"              Supine       LTR 5x10\"       Piriformis stretch 3x20\"       R IT Band stretch 3x20\"      MET to correct right leg shorter 1 x   Shotgun technique 2 x. Corrected LLD. MET self correction x  For correction of LLD, R le shorter. HEP issued     Ck pelvis/LL, LL equal             Bridge 2x10   HEP  x          iso abdom 5x5\"   x   iso abdom w/ march 20x 2 lbs    x   iso abdom w/alt opp. UE 20x 2 lbs  x    iso abdom alt opp  UE/LEs   20x 2 lb in U/LE   x   iso abdom alt same UE/LE 20x 2 lbs U/LE   x   iso abdom w/ walk out 10x 2 lbs LE   x   Crunch w/ ball 2x10  green     Reverse crunch 2x10  Tap 4' step     x   HS stretch 3x30\"  Strap, rica  x   Supine SLR 2x10 ea  2 lbs HEP, incr.  Sets 6/21     x   Supine butterfly 2x10 lime               SL hip abd 2x10 1 lbs A      SL clamshells w. iso abdom. 2x10 blue HEP  x          plank 1x12\"  2x15\"                  Prone pillow       Prone lying 3 mins  Another option of hip flexor elongation, (pt sits often) added 6/2     Hypervolt -- prone/pillow 8  min   3 min left calf, level 2  5 min right hip, level 1                      Other:  held some cybex exercises due to addressing balance and progressing some mat core/hip exercises/rotating exercises due to volume and time allotment. Treatment Charges: Mins Units   [x]  Modalities  CP --    [x]  Ther Exercise  50 3   []  Manual Therapy     []  Ther Activities     []  Aquatics     []  Vasocompression     [x]  Other neuro re-ed      Total Treatment time 50 3       Assessment: [x] Progressing toward goals focus on core and gluteal strengthening as well as balance as charted. Pt demonstrated good pacing and improved memory recall of exercise addressed today. Able to increased ROM with cybex rotary. [] No change. [] Other   . [x] Patient would continue to benefit from skilled physical therapy services in order to:  Decreased LB and LE pain and increase core and hip strengthening in order to progress functional activity. STG: (to be met in 9 treatments)  1. ? Pain: Lumbar pain improve to 6/10 at max with standing and walking States she can stand for about 4-5 minutes then the pain elevates until 6-7/10 (when standing to do dishes. 2. Right hip pain improve to 6/10 with standing and walking States right hip pain is 5/10 after standing for 5 minutes to do dishes. 3. Left calf pain improve to 6/10 at all times States the left calf is getting better - states both calves feel equal.  Feels exacerbated after being stretched. No cramps in left calf. 4. ? ROM: Lumbar extension improve to 5 degrees -- Met, 7 degrees lumbar extension with legs braced on bed.   5. Patient to report no radicular symptoms with side bending  - -Discomfort in low back but no radicular sx. 6. ? Strength: patient able to SLS on each leg 6 seconds without LOB 5/21/21: MET, 15 second rica LE  7. ? Function: Patient to report improved ability to stand upright during gait cycle -- Slight improvement- still flexed with slight left lean. 1. Patient to be independent with home exercise program as demonstrated by performance with correct form without cues. -- Met     LTG: (to be met in 18 treatments)  1. Lumbar pain improve to 4/10 at max with activity  2. Right hip pain improve to 2/10 at max with activity  3. Left calf pain improve to 2/10 at max with activity  4. Oswestry score improve to 35% functionally impaired. 5. Patient to report resolution of radicular symptoms down leg. 6. Patient to report improved ease of going up/down steps at home. Patient goals: \"Increase mobility in back area, increase standing and sitting without pain\"     Pt. Education:  [x] Yes  [] No  [x] Reviewed Prior HEP/Ed  Method of Education: [x] Verbal  [x] Demo  [] Written 4.22 Medbridge texted to patient   Comprehension of Education:  [x] Verbalizes understanding. [x] Demonstrates understanding. [x] Needs review. core exercises. [x] Demonstrates/verbalizes HEP/Ed previously given. Access Code: [email or web address]: "[employer].[email or web address]. com/Date: 04/30/2021Prepared by: Huma Lopez    Standing Heel Raise with Support - 1 x daily - 7 x weekly - 10 reps - 3 sets    Standing Knee Flexion AROM with Chair Support - 1 x daily - 7 x weekly - 10 reps - 3 sets    Standing Hip Flexion with Counter Support - 1 x daily - 7 x weekly - 10 reps - 3 sets    Standing Hip Extension - 1 x daily - 7 x weekly - 10 reps - 3 sets    Standing Marching - 1 x daily - 7 x weekly - 10 reps - 3 sets    Standing Hip Abduction - 1 x daily - 7 x weekly - 10 reps - 3 sets    Standing Hip Extension with Counter Support - 1 x daily - 7 x weekly - 10 reps - 3 sets    Standing Hip Abduction with Counter Support - 1 x daily - 7 x weekly - 10 reps - 3 sets    Standing Heel Raise - 1 x daily - 7 x weekly - 10 reps - 3 sets    Sit to Stand - 1 x daily - 7 x weekly - 10 reps - 3 sets    Seated Long Arc Quad - 1 x daily - 7 x weekly - 10 reps - 3 sets    Supine Lower Trunk Rotation - 1 x daily - 7 x weekly - 10 reps - 3 sets    Supine Active Straight Leg Raise - 1 x daily - 7 x weekly - 10 reps - 3 sets    Supine Hip Adduction Isometric with Ball - 1 x daily - 7 x weekly - 10 reps - 3 sets    Bent Knee Fallouts - 1 x daily - 7 x weekly - 10 reps - 3 sets    Dead Bug - 1 x daily - 7 x weekly - 10 reps - 3 sets    Hooklying Sequential Leg March and Lower - 1 x daily - 7 x weekly - 10 reps - 3 sets    Supine Bridge - 1 x daily - 7 x weekly - 10 reps - 3 sets    Clamshell - 1 x daily - 7 x weekly - 10 reps - 3 sets    Sidelying Hip Abduction - 1 x daily - 7 x weekly - 10 reps - 3 sets    Supine Posterior Pelvic Tilt - 1 x daily - 7 x weekly - 10 reps - 3 sets    Modified Derrick Stretch - 1 x daily - 7 x weekly - 10 reps - 3 sets    Curl Up with Reach - 1 x daily - 7 x weekly - 10 reps - 3 sets    Diagonal Curl Up with Reach - 1 x daily - 7 x weekly - 10 reps - 3 sets       Access Code: [email or web address] URL: [employer]/Date: 04/22/2021Prepared by: Erlinda [place]    Supine Posterior Pelvic Tilt - 1 x daily - 5 x weekly - 3 sets - 10 reps - 10 hold    Supine March with Posterior Pelvic Tilt - 1 x daily - 7 x weekly - 10 reps - 3 sets    Supine Bridge - 1 x daily - 7 x weekly - 10 reps - 3 sets    Active Straight Leg Raise with Quad Set - 1 x daily - 7 x weekly - 10 reps - 3 sets    Clamshell - 1 x daily - 7 x weekly - 10 reps - 3 sets    Sidelying Hip Abduction - 1 x daily - 7 x weekly - 10 reps - 3 sets      Access Code: [email or web address]: [employer]/Date: 04/16/2021Prepared by: Maritza Jose    Gastroc Stretch on Wall - 1 x daily - 7 x weekly - 1 sets - 3 reps - 30 hold  Standing Hip Abduction - 1 x daily - 5 x weekly - 2 sets - 10 reps    Standing Hip Extension with Counter Support - 1 x daily - 5 x weekly - 2 sets - 10 reps    Supine transverse abdominal bracing 1x daily 3x wk 10x 2   Supine Transversus Abdominis Bracing with march - 1 x daily - 7 x weekly - 2 sets - 10 reps - 5 hold    Supine Lower Trunk Rotation - 1 x daily - 7 x weekly - 1-2 sets - 10 reps - 10 hold    Hooklying Isometric Clamshell - 1 x daily - 5 x weekly - 2 sets - 10 reps    Clamshell with Resistance - 1 x daily - 5 x weekly - 2 sets - 10 reps   Ball abdominal crutches 3x wk 2x10 reps  Access Code: [facility], [facility]  URL: [facility] [email or web address]/  Date: 05/14/2021  Prepared by: Rina Burton    Exercises  Standing Hip Flexor Stretch - 1 x daily - 7 x weekly - 1 sets - 3 reps - 30 hold    Plan: [x] Continue current frequency toward long and short term goals. [x] Specific Instructions for subsequent treatments add  Leg press machine as able. Lucy Garner address LTG and discharge due to 18/1 sessions. Pt to continue with written HEP as issued.         .      Time In:  1487         Time Out:  1050     Electronically signed by:  Homer Hammond PTA,

## 2021-06-23 ENCOUNTER — HOSPITAL ENCOUNTER (OUTPATIENT)
Dept: PHYSICAL THERAPY | Age: 71
Setting detail: THERAPIES SERIES
Discharge: HOME | End: 2021-06-23
Payer: MEDICARE

## 2021-06-23 PROCEDURE — 97110 THERAPEUTIC EXERCISES: CPT

## 2021-06-23 NOTE — DISCHARGE SUMMARY
[x] [facility]) - [facility] &  Therapy  [address]  [phone]  F: [phone] [] [address]  [address]   [address]  P: [phone]  F: [phone] [] [address] &  Therapy  [address]  P: [phone]  F: [phone] [] [address]  P: [phone]  F: [phone] [] [address]  [facility]   [address]   [place]: [phone]  F: [phone]      Physical Therapy Discharge Note    Date: 2021      Patient: Marla Desai  : 1950  MRN: [id number]: Dr. Rock Ellison MD                               Insurance: Medicare ([address] [place] [address]), [facility] (61 visits per year)  Medical Diagnosis: Lumbar facet arthropathy, hip pain, lumbar radiculopathy, inflammatory spondylopathy lumbar region, lumbago with sciatica right side, myofascial pain, Ilioinguinal neuralgia of right side  Rehab Codes: M 54.5, M 54.41, M 25.551, M 25.552, M 62.81, R 26.2, R 29.3, M 79.1  Onset Date: 3/30/21                 Next 's appt.: 21  Visit# / total visits:  ; Progress note for Medicare patient due at visit #18                              Cancels/No Shows: 2/0  Date of initial visit: 21                Date of final visit: 21    Subjective:    Pain:  [x]? Yes  []? No   Location: LB, R hip    Pain Rating: (0-10 scale)  1/10 in R hip,   L LB  6/10 ,    Pain altered Tx:  [x]? No  []? Yes  Action:  Comments:  Running errands this AM, tired. Addressing HEP. Objective:  Test Measurements:  After injections, pain ranges 4-5/10 throughout low back, hip, calf. Function:  Reciprocal up/down steps. Oswestry score of 40% functionally impaired.     Assessment:  STG: (to be met in 9 treatments)  1. ? Pain: Lumbar pain improve to 6/10 at max with standing and walking States she can stand for about 4-5 minutes then the pain elevates until 6-7/10 (when standing to do dishes.    2. Right hip pain improve to 6/10 with standing and walking States right hip pain is 5/10 after standing for 5 minutes to do dishes.    3. Left calf pain improve to 6/10 at all times States the left calf is getting better - states both calves feel equal.  Feels exacerbated after being stretched.  No cramps in left calf.    4. ? ROM: Lumbar extension improve to 5 degrees -- Met, 7 degrees lumbar extension with legs braced on bed. 5. Patient to report no radicular symptoms with side bending  - -Discomfort in low back but no radicular sx. 6. ? Strength: patient able to SLS on each leg 6 seconds without LOB 5/21/21: MET, 15 second rica LE  7. ? Function: Patient to report improved ability to stand upright during gait cycle -- Slight improvement- still flexed with slight left lean.    1. Patient to be independent with home exercise program as demonstrated by performance with correct form without cues. -- Met     LTG: (to be met in 18 treatments)  1. Lumbar pain improve to 4/10 at max with activity -- Reports back pain up to 7/10 with activity - medication decreases pain to 4/10  2. Right hip pain improve to 2/10 at max with activity -- Right hip pain 4-5/10 after injections  3. Left calf pain improve to 2/10 at max with activity -- Reports left calf pain of 5/10  4. Oswestry score improve to 35% functionally impaired. -- Oswestry score improved to 40%. 5. Patient to report resolution of radicular symptoms down leg. -- Reports no radicular pain down leg - shocking pain is alleviating -   6. Patient to report improved ease of going up/down steps at home. -- Doing well with steps. Using hand rail.   Reciprocal gait up/down steps.       Patient goals:  \"Increase mobility in back area, increase standing and sitting without pain\"    Treatment to Date:  [x] Therapeutic Exercise [] Modalities:  [] Therapeutic Activity    [] Ultrasound  [] Electrical Stimulation  [] Gait Training     [] Massage       [] Lumbar/Cervical Traction  [x] Neuromuscular Re-education [x] Cold/hotpack [] Iontophoresis: 4 mg/mL  [x] Instruction in Home Exercise Program                     Dexamethasone Sodium  [x] Manual Therapy             Phosphate 40-80 mAmin  [] Aquatic Therapy                   [] Vasocompression/    [] Other:             Game Ready    Discharge Status:     [] Pt recovered from conditions. Treatment goals were met. [x] Pt received maximum benefit. No further therapy indicated at this time. [x] Pt to continue exercise/home instructions independently. [] Therapy interrupted due to:    [] Pt has 2 or more no shows/cancels, is discontinued per our policy. [x] Pt has completed prescribed number of treatment sessions. [] Other:         Electronically signed by Kiana Iyer PT on 6/23/2021 at 10:45 AM      If you have any questions or concerns, please don't hesitate to call.   Thank you for your referral.

## 2021-06-23 NOTE — FLOWSHEET NOTE
[x] [facility] &  Therapy  [address]  [phone]  F: [phone] [] [address]  [place] [address]   [address]  P: [phone]  F: [phone] [] [place]  [address]  P: [phone]  F: [phone] [] [address]  P: [phone]  F: [phone] [] [address]  [facility]   [address]   [place]: [phone]  F: [phone]      Physical Therapy Daily Treatment Note    Date:  2021  Patient Name:  Jona Trent    :  1950  MRN: [id number]  Physician: Dr. Iftikhar Arce MD                               Insurance: Medicare ([address] [place] [address]), [facility] (61 visits per year)  Medical Diagnosis: Lumbar facet arthropathy, hip pain, lumbar radiculopathy, inflammatory spondylopathy lumbar region, lumbago with sciatica right side, myofascial pain, Ilioinguinal neuralgia of right side  Rehab Codes: M 54.5, M 54.41, M 25.551, M 25.552, M 62.81, R 26.2, R 29.3, M 79.1  Onset Date: 3/30/21                 Next 's appt.: 21  Visit# / total visits:  ; Progress note for Medicare patient due at visit #18     Cancels/No Shows: 2/0    Subjective:    Pain:  [x] Yes  [] No Location: LB, R hip  Pain Rating: (0-10 scale)  1/10 in R hip,   L LB  6/10 ,    Pain altered Tx:  [x] No  [] Yes  Action:  Comments:  Running errands this AM, tired. Addressing HEP. Objective:  Modalities:  hypervolt to R hip and Left calf x 8 mins total.  5 mins R hip and 3 mins left calf -  Held   CP to L LB/L hip x 10 mins post exercises in SL per pt request- HELD.    Precautions:   Exercises:2021, completed exercises marked with \"x\"  Exercise   LB/ hip Reps/ Time Weight/ Level Comments    NuStep w/ lumbar roll 6 mins L4    x report improved ability to stand upright during gait cycle -- Slight improvement- still flexed with slight left lean. 1. Patient to be independent with home exercise program as demonstrated by performance with correct form without cues. -- Met     LTG: (to be met in 18 treatments)  1. Lumbar pain improve to 4/10 at max with activity     2. Right hip pain improve to 2/10 at max with activity   3. Left calf pain improve to 2/10 at max with activity   4. Oswestry score improve to 35% functionally impaired. 6/23/21: 40% impairment, not met but progressed  5. Patient to report resolution of radicular symptoms down leg. 6. Patient to report improved ease of going up/down steps at home. Patient goals: \"Increase mobility in back area, increase standing and sitting without pain\"     Pt. Education:  [x] Yes  [] No  [x] Reviewed Prior HEP/Ed  Method of Education: [x] Verbal  [x] Demo  [] Written 4.22 Medbridge texted to patient   Comprehension of Education:  [x] Verbalizes understanding. [x] Demonstrates understanding. [x] Needs review. core exercises. [x] Demonstrates/verbalizes HEP/Ed previously given. Access Code: [email or web address]: [email or web address]. com/Date: 04/30/2021Prepared by: Huma Lopez    Standing Heel Raise with Support - 1 x daily - 7 x weekly - 10 reps - 3 sets    Standing Knee Flexion AROM with Chair Support - 1 x daily - 7 x weekly - 10 reps - 3 sets    Standing Hip Flexion with Counter Support - 1 x daily - 7 x weekly - 10 reps - 3 sets    Standing Hip Extension - 1 x daily - 7 x weekly - 10 reps - 3 sets    Standing Marching - 1 x daily - 7 x weekly - 10 reps - 3 sets    Standing Hip Abduction - 1 x daily - 7 x weekly - 10 reps - 3 sets    Standing Hip Extension with Counter Support - 1 x daily - 7 x weekly - 10 reps - 3 sets    Standing Hip Abduction with Counter Support - 1 x daily - 7 x weekly - 10 reps - 3 sets    Standing Heel Raise - 1 x daily - 7 x weekly - 10 reps Counter Support - 1 x daily - 5 x weekly - 2 sets - 10 reps    Supine transverse abdominal bracing 1x daily 3x wk 10x 2   Supine Transversus Abdominis Bracing with march - 1 x daily - 7 x weekly - 2 sets - 10 reps - 5 hold    Supine Lower Trunk Rotation - 1 x daily - 7 x weekly - 1-2 sets - 10 reps - 10 hold    Hooklying Isometric Clamshell - 1 x daily - 5 x weekly - 2 sets - 10 reps    Clamshell with Resistance - 1 x daily - 5 x weekly - 2 sets - 10 reps   Ball abdominal crutches 3x wk 2x10 reps  Access Code: [employer], [facility]  URL: [email or web address]  Date: 05/14/2021     Access Code: [email or web address]  URL: "[employer]/  Date: 06/23/2021-pt requested hip adductor strengthening exercises. Exercises  Seated Hip Adduction Isometrics with Ball - 1 x daily - 3 x weekly - 2-3 sets - 10 reps - 5 hold  Sidelying Hip Adduction - 1 x daily - 3 x weekly - 2-3 sets - 10 reps - 3 hold  Supine Bridge with Mini Swiss Ball Between Knees - 1 x daily - 3 x weekly - 2-3 sets - 10 reps - 5 hold    Exercises  Standing Hip Flexor Stretch - 1 x daily - 7 x weekly - 1 sets - 3 reps - 30 hold    Plan: [] Continue current frequency toward long and short term goals. [] Specific Instructions for subsequent treatments add  Leg press machine as able. Discharge as pt has completed 18/18 session. Pt to continue with HEP  And follow up with MD as needed.      Time In:  0641         Time Out:  0794    Electronically signed by:  Shana Quinones PTA,

## 2021-08-17 PROCEDURE — 9900000065 HC CARDIAC REHAB PHASE 3 - 1 VISIT

## 2021-08-31 ENCOUNTER — HOSPITAL ENCOUNTER (OUTPATIENT)
Dept: CARDIAC REHAB | Age: 71
Discharge: HOME | End: 2021-08-31

## 2021-08-31 PROCEDURE — 9900000065 HC CARDIAC REHAB PHASE 3 - 1 VISIT

## 2021-09-14 PROCEDURE — 9900000065 HC CARDIAC REHAB PHASE 3 - 1 VISIT

## 2021-09-14 RX ORDER — AMLODIPINE BESYLATE 5 MG/1
5 TABLET ORAL 2 TIMES DAILY
COMMUNITY

## 2021-09-14 RX ORDER — LISINOPRIL 20 MG/1
20 TABLET ORAL DAILY
COMMUNITY

## 2021-09-14 RX ORDER — ATENOLOL 100 MG/1
100 TABLET ORAL DAILY
COMMUNITY

## 2021-09-28 ENCOUNTER — HOSPITAL ENCOUNTER (OUTPATIENT)
Dept: CARDIAC REHAB | Age: 71
Discharge: HOME | End: 2021-09-28

## 2021-10-01 PROCEDURE — 9900000065 HC CARDIAC REHAB PHASE 3 - 1 VISIT

## 2021-10-12 PROCEDURE — 9900000065 HC CARDIAC REHAB PHASE 3 - 1 VISIT

## 2021-10-26 ENCOUNTER — HOSPITAL ENCOUNTER (OUTPATIENT)
Dept: CARDIAC REHAB | Age: 71
Discharge: HOME | End: 2021-10-26

## 2021-10-26 PROCEDURE — 9900000065 HC CARDIAC REHAB PHASE 3 - 1 VISIT

## 2021-11-09 PROCEDURE — 9900000065 HC CARDIAC REHAB PHASE 3 - 1 VISIT

## 2021-11-23 PROCEDURE — 9900000065 HC CARDIAC REHAB PHASE 3 - 1 VISIT

## 2021-11-30 ENCOUNTER — HOSPITAL ENCOUNTER (OUTPATIENT)
Dept: CARDIAC REHAB | Age: 71
Discharge: HOME | End: 2021-11-30

## 2022-05-18 ENCOUNTER — HOSPITAL ENCOUNTER (OUTPATIENT)
Dept: PREADMISSION TESTING | Age: 72
Discharge: HOME | End: 2022-05-22
Payer: MEDICARE

## 2022-05-18 ENCOUNTER — HOSPITAL ENCOUNTER (OUTPATIENT)
Dept: GENERAL RADIOLOGY | Age: 72
Discharge: HOME | End: 2022-05-20
Payer: MEDICARE

## 2022-05-18 VITALS
DIASTOLIC BLOOD PRESSURE: 74 MMHG | TEMPERATURE: 96.8 F | OXYGEN SATURATION: 97 % | WEIGHT: 192 LBS | BODY MASS INDEX: 36.25 KG/M2 | HEIGHT: 61 IN | SYSTOLIC BLOOD PRESSURE: 142 MMHG | HEART RATE: 70 BPM | RESPIRATION RATE: 20 BRPM

## 2022-05-18 LAB
-: NORMAL
ABO/RH: NORMAL
ABSOLUTE EOS #: 0.14 K/UL (ref 0–0.44)
ABSOLUTE IMMATURE GRANULOCYTE: 0.01 K/UL (ref 0–0.3)
ABSOLUTE LYMPH #: 2.53 K/UL (ref 1.1–3.7)
ABSOLUTE MONO #: 0.77 K/UL (ref 0.1–1.2)
ANION GAP SERPL CALCULATED.3IONS-SCNC: 9 MMOL/L (ref 9–17)
ANTIBODY SCREEN: NEGATIVE
ARM BAND NUMBER: NORMAL
BASOPHILS # BLD: 1 % (ref 0–2)
BASOPHILS ABSOLUTE: 0.04 K/UL (ref 0–0.2)
BILIRUBIN URINE: NEGATIVE
BUN BLDV-MCNC: 17 MG/DL (ref 8–23)
CHLORIDE BLD-SCNC: 107 MMOL/L (ref 98–107)
CO2: 27 MMOL/L (ref 20–31)
COLOR: YELLOW
CREAT SERPL-MCNC: 1.03 MG/DL (ref 0.5–0.9)
EKG ATRIAL RATE: 62 BPM
EKG P AXIS: 26 DEGREES
EKG P-R INTERVAL: 164 MS
EKG Q-T INTERVAL: 402 MS
EKG QRS DURATION: 80 MS
EKG QTC CALCULATION (BAZETT): 408 MS
EKG R AXIS: -22 DEGREES
EKG T AXIS: 82 DEGREES
EKG VENTRICULAR RATE: 62 BPM
EOSINOPHILS RELATIVE PERCENT: 2 % (ref 1–4)
EPITHELIAL CELLS UA: NORMAL /HPF (ref 0–5)
EXPIRATION DATE: NORMAL
GFR AFRICAN AMERICAN: >60 ML/MIN
GFR NON-AFRICAN AMERICAN: 53 ML/MIN
GFR SERPL CREATININE-BSD FRML MDRD: ABNORMAL ML/MIN/{1.73_M2}
GLUCOSE URINE: NEGATIVE
HCT VFR BLD CALC: 42.2 % (ref 36.3–47.1)
HEMOGLOBIN: 12.9 G/DL (ref 11.9–15.1)
IMMATURE GRANULOCYTES: 0 %
INR BLD: 1
KETONES, URINE: NEGATIVE
LEUKOCYTE ESTERASE, URINE: ABNORMAL
LYMPHOCYTES # BLD: 33 % (ref 24–43)
MCH RBC QN AUTO: 28.7 PG (ref 25.2–33.5)
MCHC RBC AUTO-ENTMCNC: 30.6 G/DL (ref 28.4–34.8)
MCV RBC AUTO: 94 FL (ref 82.6–102.9)
MONOCYTES # BLD: 10 % (ref 3–12)
NITRITE, URINE: NEGATIVE
NRBC AUTOMATED: 0 PER 100 WBC
PARTIAL THROMBOPLASTIN TIME: 29.2 SEC (ref 23.9–33.8)
PDW BLD-RTO: 14.8 % (ref 11.8–14.4)
PH UA: 6 (ref 5–8)
PLATELET # BLD: 284 K/UL (ref 138–453)
PMV BLD AUTO: 9.3 FL (ref 8.1–13.5)
POTASSIUM SERPL-SCNC: 4.6 MMOL/L (ref 3.7–5.3)
PROTEIN UA: NEGATIVE
PROTHROMBIN TIME: 12.9 SEC (ref 11.5–14.2)
RBC # BLD: 4.49 M/UL (ref 3.95–5.11)
RBC # BLD: ABNORMAL 10*6/UL
RBC UA: NORMAL /HPF (ref 0–2)
SEG NEUTROPHILS: 54 % (ref 36–65)
SEGMENTED NEUTROPHILS ABSOLUTE COUNT: 4.28 K/UL (ref 1.5–8.1)
SODIUM BLD-SCNC: 143 MMOL/L (ref 135–144)
SPECIFIC GRAVITY UA: 1.01 (ref 1–1.03)
TURBIDITY: CLEAR
URINE HGB: NEGATIVE
UROBILINOGEN, URINE: NORMAL
WBC # BLD: 7.8 K/UL (ref 3.5–11.3)
WBC UA: NORMAL /HPF (ref 0–5)

## 2022-05-18 PROCEDURE — 85730 THROMBOPLASTIN TIME PARTIAL: CPT

## 2022-05-18 PROCEDURE — 81001 URINALYSIS AUTO W/SCOPE: CPT

## 2022-05-18 PROCEDURE — 82565 ASSAY OF CREATININE: CPT

## 2022-05-18 PROCEDURE — 86900 BLOOD TYPING SEROLOGIC ABO: CPT

## 2022-05-18 PROCEDURE — 85025 COMPLETE CBC W/AUTO DIFF WBC: CPT

## 2022-05-18 PROCEDURE — 93005 ELECTROCARDIOGRAM TRACING: CPT | Performed by: ANESTHESIOLOGY

## 2022-05-18 PROCEDURE — 86901 BLOOD TYPING SEROLOGIC RH(D): CPT

## 2022-05-18 PROCEDURE — 80051 ELECTROLYTE PANEL: CPT

## 2022-05-18 PROCEDURE — 71046 X-RAY EXAM CHEST 2 VIEWS: CPT

## 2022-05-18 PROCEDURE — 36415 COLL VENOUS BLD VENIPUNCTURE: CPT

## 2022-05-18 PROCEDURE — 84520 ASSAY OF UREA NITROGEN: CPT

## 2022-05-18 PROCEDURE — 86850 RBC ANTIBODY SCREEN: CPT

## 2022-05-18 PROCEDURE — 85610 PROTHROMBIN TIME: CPT

## 2022-05-18 RX ORDER — FLUTICASONE FUROATE AND VILANTEROL TRIFENATATE 100; 25 UG/1; UG/1
POWDER RESPIRATORY (INHALATION) DAILY
COMMUNITY

## 2022-05-18 RX ORDER — CLINDAMYCIN PHOSPHATE 900 MG/50ML
900 INJECTION INTRAVENOUS ONCE
Status: CANCELLED | OUTPATIENT
Start: 2022-06-14

## 2022-05-18 RX ORDER — METHOCARBAMOL 500 MG/1
500 TABLET, FILM COATED ORAL 4 TIMES DAILY PRN
COMMUNITY

## 2022-05-18 ASSESSMENT — PAIN SCALES - GENERAL: PAINLEVEL_OUTOF10: 0

## 2022-05-18 NOTE — H&P (VIEW-ONLY)
History and Physical Service   [facility]    HISTORY AND PHYSICAL EXAMINATION            Date of Evaluation: 5/18/2022  Patient name:  Moses Sanchez  MRN:   [id number]  YOB: 1950  PCP:    Quan Mackenzie MD    History Obtained From:     Patient, medical records    History of Present Illness: This is Moses Sanchez a 66 y.o. female who presents for a pre-admission testing appointment for an upcoming LEFT CAROTID ENDARTERECTOMY by Vijay Owen MD scheduled on 6/14/2022 at 1200 due to DX LEFT CAROTID STENOSIS. Patient has a history of thoracic aortic aneurysm with repair in 5/2016 with Dr. Anna Done. Patient states they have been monitoring her aneurysm and carotids. She had carotid duplex on 1/27/2022 which showed \"moderate heterogenous calcific plaque with shadowing in the bulb, proximal external and proximal internal carotid arteries bilaterally with no hemodynamic significant stenosis. \" Patient then had CTA angiogram of carotids on 3/8/2022 which Dr. Anna Done reviewed showing \"at least 70-80% bottle neck stenosis at the distal common carotid extending into the internal carotid. \" (Refer to note in short chart) Patient was evaluated by Dr. Anna Done on 4/4/2022 who recommended surgical intervention. Patient denies history of stroke, vision changes. Extensive history - COPD, obstructive sleep apnea with CPAP, hyperlipidemia, hypertension, aortic mural thrombus (2020), coronary artery disease with 2 stents to proximal and mid LCx, left ventricular hypertrophy, CKD IIIa, and respiratory arrest. Patient previously had PEA arrest in March 2020. She was given CPR and Atropine and intubated. Patient had NSTEMI, acute kidney injury, and pulmonary edema. Patient had cardiac catheterization with 2 stents placed. Patient follows with cardiologist Dr. Kamini Gold and received cardiac clearance (refer to short chart).  Patient has upcoming appointment with pulmonologist Dr. Karilyn Pallas on 6/7/2022. Patient follows with nephrology Dr. David Gracia. Patient has shortness of breath with exertion. She denies chest pain, palpitations, dizziness. Transthoracic Echocardiography 12/15/2021:  1. Left ventricle: the left ventircular cavity size is normal. Mild to moderate LVH. 2. Left ventricle: Global systolic function: Overall left ventricular systolic function is normal with an estimated ejection fraction of 55-60%. 3. Left ventricle: There is grade II LV diastolic dysfunction. 4. Right ventricle: Normal right ventricular size and systolic function. 5. Mitral valve: There is trivial to mild mitral regurgitation present. 6. Aortic valve: No evidence of valvular aortic stenosis. 7. Tricuspid valve: There is mild tricuspid regurgitation present. 8. Tricuspid valve: Mild pulmonary hypertension with an estimated right ventricular/pulmonary artery systolic pressure of 72.11WRJB. 9. Aorta: The aortic root is normal in size. The ascending aorta was of normal size. 10. Pericardium: There is no pericardial effusion. There is prominent epicardial fat. CT angiogram carotid 3/8/2022:  Partially visualized thoracic aortic aneurysm measuring up to 5.8 cm in diameter within the proximal descending portion.  Partially visualized ascending thoracic aorta measures up to 4.8 cm in diameter.  Patient is status post thoracic aneurysm repair.  Stent graft originates at the level of the left subclavian artery origin. Conventional branching of the aortic arch.  Heavy calcified and noncalcified plaque of the right brachiocephalic artery.  Right subclavian artery is within normal limits.  Mild narrowing of the left subclavian artery origin with heavy calcified and noncalcified plaque.  The remaining portions of the    visualized left subclavian are within normal limits.  Origins of the bilateral vertebral arteries are within normal limits.  Calcification of the bilateral vertebral arteries.    Moderate narrowing of the distal right common carotid artery.  Heavy calcification at the right carotid artery bifurcation.  There is appropriate opacification of the right external carotid artery and its branches.  There is complete occlusion of the proximal right internal carotid artery C1   segment.  There is reconstitution of the right internal carotid artery C7 segment.  A patent anterior communicating artery and right posterior communicating artery are visualized.  Appropriate opacification of the M1, M2 and visualized M3 branches. Moderate to severe narrowing of the distal left common carotid artery.  Heavy calcification at the left carotid artery bifurcation.  There is appropriate opacification of the left external carotid artery and its branches.  Approximately 50% stenosis of the proximal left internal carotid artery   relative to the distal internal carotid artery diameter.  Moderate calcification of the left internal carotid artery cavernous segment.     Visualized soft tissues of the neck are within normal limits.  Severe degenerative changes of the upper cervical spine.  Lucency surrounding the right maxillary tooth suggestive of periapical abscess. Emphysematous changes of the lungs    Functional Capacity per pt:   1) Pt is not able to walk 2 city blocks on level ground without SOB. 2) Pt is not able to climb 2 flights of stairs without SOB. 3) Pt is not able to walk up a hill for 1-2 city blocks without SOB.     Past Medical History:     Past Medical History:   Diagnosis Date    Arthritis     Chronic back pain     COPD (chronic obstructive pulmonary disease) (HCC)     Elevated troponin level 03/2020    Hyperlipidemia     Hypertension     MAYELA (obstructive sleep apnea)     Respiratory arrest ([facility] [address] 03/11/2020    Thrombus 03/2020    descending aortic mural thrombus    Wears partial dentures     UPPER AND LOWER        Past Surgical History:     Past Surgical History:   Procedure Laterality Date    ABDOMINAL AORTIC ANEURYSM REPAIR, ENDOVASCULAR  06/29/2016    thoracic    CARDIAC CATHETERIZATION  03/20/2020    COLONOSCOPY      CORONARY ANGIOPLASTY WITH STENT PLACEMENT  03/23/2020    JOINT REPLACEMENT Left 2011    HIP    JOINT REPLACEMENT Right 08/22/2017    ant. total hip    NERVE BLOCK Right 03/08/2017    right hip arthrogram injection depomedrol 80mg    TOTAL HIP ARTHROPLASTY Right 8/22/2017    HIP TOTAL ARTHROPLASTY ANTERIOR APPROACH - MEDACTA, C-ARM, MEDACTA TABLE, FASCIA ILIACA BLOCK, NSA=SPINAL VS GENERAL  performed by Kp Davis DO at [place] [id number]        Medications Prior to Admission:     Prior to Admission medications    Medication Sig Start Date End Date Taking?  Authorizing Provider   fluticasone-vilanterol (BREO ELLIPTA) 100-25 MCG/INH AEPB inhaler Inhale into the lungs daily   Yes Historical Provider, MD   methocarbamol (ROBAXIN) 500 MG tablet Take 500 mg by mouth 4 times daily   Yes Historical Provider, MD   atenolol (TENORMIN) 100 MG tablet Take 100 mg by mouth daily    Historical Provider, MD   amLODIPine (NORVASC) 5 MG tablet Take 5 mg by mouth daily    Historical Provider, MD   lisinopril (PRINIVIL;ZESTRIL) 20 MG tablet Take 20 mg by mouth daily    Historical Provider, MD   hydroCHLOROthiazide (HYDRODIURIL) 50 MG tablet Take 50 mg by mouth daily    Historical Provider, MD   alendronate (FOSAMAX) 70 MG tablet Take 70 mg by mouth every 7 days Sunday    Historical Provider, MD   fluticasone (FLONASE) 50 MCG/ACT nasal spray 2 sprays by Each Nostril route daily    Historical Provider, MD   Garlic 374 MG CAPS Take by mouth daily    Historical Provider, MD   magnesium oxide (MAG-OX) 400 MG tablet Take 400 mg by mouth daily    Historical Provider, MD   calcium-vitamin D (OSCAL-500) 500-200 MG-UNIT per tablet Take 1 tablet by mouth daily    Historical Provider, MD   Potassium Gluconate 595 (99 K) MG TABS Take by mouth    Historical Provider, MD   Omega-3 Fatty Acids (FISH OIL) 1000 MG CAPS Take 3,000 mg by mouth 3 times daily    Historical Provider, MD   acetaminophen (TYLENOL 8 HOUR) 650 MG extended release tablet Take 1 tablet by mouth every 8 hours as needed for Pain 3/26/20   Elsa Hutton MD   lidocaine 4 % external patch Place 1 patch onto the skin daily 3/27/20   Elsa Hutton MD   albuterol sulfate HFA (VENTOLIN HFA) 108 (90 Base) MCG/ACT inhaler Inhale 2 puffs into the lungs 4 times daily as needed for Wheezing 3/26/20   Elsa Hutton MD   aspirin 81 MG chewable tablet Take 1 tablet by mouth daily 3/24/20   Kaylen Church MD   Multiple Vitamin (MULTIVITAMIN) tablet Take 1 tablet by mouth daily 3/24/20   Kaylen Church MD   traMADol (ULTRAM) 50 MG tablet Take 1 tablet by mouth 3 times daily as needed for Pain  Patient taking differently: Take 50 mg by mouth 4 times daily as needed for Pain. 10/9/17   Camilo Fulton DO   docusate sodium (COLACE) 100 MG capsule Take 1 capsule by mouth 2 times daily 8/24/17   [facility], DO   Misc. Devices (RAISED TOILET SEAT) MISC 1 Device by Does not apply route daily 8/24/17   Camilo Fulton DO   loratadine (CLARITIN) 10 MG tablet Take 10 mg by mouth daily    Historical Provider, MD   atorvastatin (LIPITOR) 40 MG tablet Take 40 mg by mouth daily  2/8/16   Historical Provider, MD        Allergies:     Penicillins    Social History:     Tobacco:    reports that she quit smoking about 2 years ago. She has a 19.00 pack-year smoking history. She has never used smokeless tobacco.  Alcohol:      reports current alcohol use. Drug Use:  reports no history of drug use. Family History:     Family History   Problem Relation Age of Onset   Mirza COPD Mother     Prostate Cancer Father     Hypertension Father     Diabetes Maternal Grandmother        Review of Systems:     Positive and Negative as described in HPI. CONSTITUTIONAL: Negative for fevers, chills, sweats, fatigue, and weight loss.   HEENT: Partial dentures upper and lower. Allergies. Negative for glasses, hearing changes, rhinorrhea, and throat pain. RESPIRATORY: COPD. Hx respiratory arrest. MAYELA - wears CPAP at night. Uses daily inhaler. Patient has more frequent use of rescue inhaler - uses once daily. Shortness of breath with exertion. Cough with clear mucous. Negative for congestion, and wheezing. CARDIOVASCULAR: HLD. HTN. Aortic mural thrombus (3/2020). CAD. Stents. LVH. Negative for chest pain, blood clot, irregular heartbeat, and palpitations. GASTROINTESTINAL: Constipation. Occasional pain on \"surface of abdomen\" Negative for reflux, nausea, vomiting, diarrhea, change in bowel habits, and abdominal pain. GENITOURINARY: CKD stage IIIa - follows with Dr. Hughes Or. Negative for difficulty of urination, burning with urination, and frequency. INTEGUMENT: Negative for rash, skin lesions, and easy bruising. HEMATOLOGIC/LYMPHATIC: Bilateral lower extremity swelling. ALLERGIC/IMMUNOLOGIC: Negative for urticaria and itching. ENDOCRINE: Negative for increase in thirst, increase in urination, and heat or cold intolerance. MUSCULOSKELETAL: Negative joint pains, muscle aches, and swelling of joints. NEUROLOGICAL: Uses cane for ambulation. Negative for headaches, dizziness, lightheadedness, numbness, and tingling extremities. BEHAVIOR/PSYCH: Negative for depression and anxiety. Physical Exam:   BP (!) 142/74   Pulse 70   Temp 96.8 °F (36 °C) (Temporal)   Resp 20   Ht 5' 1\" (1.549 m)   Wt 192 lb (87.1 kg)   SpO2 97%   BMI 36.28 kg/m²   No LMP recorded. Patient is postmenopausal.  No obstetric history on file. No results for input(s): POCGLU in the last 72 hours. General Appearance:  Alert, well appearing, and in no acute distress. Mental status: Oriented to person, place, and time. Head: Normocephalic and atraumatic. Eye: Wearing glasses. No icterus, redness, pupils equal and reactive, extraocular eye movements intact, and conjunctiva clear.   Ear:  Hearing grossly intact. Nose:  No drainage noted. Mouth: Partial denture upper. Missing teeth lower. Mucous membranes moist.  Neck: Supple and no carotid bruits noted. Lungs: Bilateral equal air entry, clear to auscultation, no wheezing, rales or rhonchi, and normal effort. Cardiovascular: Normal rate, regular rhythm, no murmur, gallop, or rub. Abdomen: Rotund. Soft, nontender, nondistended, and active bowel sounds. Neurologic: Normal speech and cranial nerves II through XII grossly intact. Strength 5/5 bilaterally. Skin: No gross lesions, rashes, bruising, or bleeding on exposed skin area. Extremities: 1+ non-pitting edema bilateral lower extremities. Posterior tibial pulses 2+ bilaterally. No calf tenderness with palpation. Psych: Normal affect.      Investigations:      Laboratory Testing:  Recent Results (from the past 24 hour(s))   CBC with Auto Differential    Collection Time: 05/18/22  9:11 AM   Result Value Ref Range    WBC 7.8 3.5 - 11.3 k/uL    RBC 4.49 3.95 - 5.11 m/uL    Hemoglobin 12.9 11.9 - 15.1 g/dL    Hematocrit 42.2 36.3 - 47.1 %    MCV 94.0 82.6 - 102.9 fL    MCH 28.7 25.2 - 33.5 pg    MCHC 30.6 28.4 - 34.8 g/dL    RDW 14.8 (H) 11.8 - 14.4 %    Platelets 027 033 - 849 k/uL    MPV 9.3 8.1 - 13.5 fL    NRBC Automated 0.0 0.0 per 100 WBC    Seg Neutrophils 54 36 - 65 %    Lymphocytes 33 24 - 43 %    Monocytes 10 3 - 12 %    Eosinophils % 2 1 - 4 %    Basophils 1 0 - 2 %    Immature Granulocytes 0 0 %    Segs Absolute 4.28 1.50 - 8.10 k/uL    Absolute Lymph # 2.53 1.10 - 3.70 k/uL    Absolute Mono # 0.77 0.10 - 1.20 k/uL    Absolute Eos # 0.14 0.00 - 0.44 k/uL    Basophils Absolute 0.04 0.00 - 0.20 k/uL    Absolute Immature Granulocyte 0.01 0.00 - 0.30 k/uL    RBC Morphology ANISOCYTOSIS PRESENT    Protime-INR    Collection Time: 05/18/22  9:11 AM   Result Value Ref Range    Protime 12.9 11.5 - 14.2 sec    INR 1.0    APTT    Collection Time: 05/18/22  9:11 AM   Result Value Ref Range    PTT 29.2 23.9 - 33.8 sec   Electrolyte Panel    Collection Time: 22  9:11 AM   Result Value Ref Range    Sodium 143 135 - 144 mmol/L    Potassium 4.6 3.7 - 5.3 mmol/L    Chloride 107 98 - 107 mmol/L    CO2 27 20 - 31 mmol/L    Anion Gap 9 9 - 17 mmol/L   BUN & Creatinine    Collection Time: 22  9:11 AM   Result Value Ref Range    BUN 17 8 - 23 mg/dL    CREATININE 1.03 (H) 0.50 - 0.90 mg/dL    GFR Non- 53 (L) >60 mL/min    GFR African American >60 >60 mL/min    GFR Comment         EKG 12 Lead    Collection Time: 22  9:14 AM   Result Value Ref Range    Ventricular Rate 62 BPM    Atrial Rate 62 BPM    P-R Interval 164 ms    QRS Duration 80 ms    Q-T Interval 402 ms    QTc Calculation (Bazett) 408 ms    P Axis 26 degrees    R Axis -22 degrees    T Axis 82 degrees       Recent Labs     22  0911   HGB 12.9   HCT 42.2   WBC 7.8   MCV 94.0      K 4.6      CO2 27   BUN 17   CREATININE 1.03*   INR 1.0   PROTIME 12.9   APTT 29.2       No results for input(s): COVID19 in the last 720 hours. *Please note that labs listed above are the most recent lab values available in EPIC at the time of the visit and additional labs may have been drawn or resulted since that time. Imaging/Diagnostics:    No results found. EK2022: See Epic. Diagnosis:      1. DX LEFT CAROTID STENOSIS    Plans:     1.  LEFT CAROTID ENDARTERECTOMY      YOMI Bennett - CNP  2022  9:54 AM

## 2022-05-18 NOTE — H&P
History and Physical Service   [place] [id number]    HISTORY AND PHYSICAL EXAMINATION            Date of Evaluation: 5/18/2022  Patient name:  Angie Tripathi  MRN:   [id number]  YOB: 1950  PCP:    Lasha Lezama MD    History Obtained From:     Patient, medical records    History of Present Illness: This is Angie Tripathi a 66 y.o. female who presents for a pre-admission testing appointment for an upcoming LEFT CAROTID ENDARTERECTOMY by Stiven Orourke MD scheduled on 6/14/2022 at 1200 due to DX LEFT CAROTID STENOSIS. Patient has a history of thoracic aortic aneurysm with repair in 5/2016 with Dr. Ambrosio Paris. Patient states they have been monitoring her aneurysm and carotids. She had carotid duplex on 1/27/2022 which showed \"moderate heterogenous calcific plaque with shadowing in the bulb, proximal external and proximal internal carotid arteries bilaterally with no hemodynamic significant stenosis. \" Patient then had CTA angiogram of carotids on 3/8/2022 which Dr. Ambrosio Paris reviewed showing \"at least 70-80% bottle neck stenosis at the distal common carotid extending into the internal carotid. \" (Refer to note in short chart) Patient was evaluated by Dr. Ambrosio Paris on 4/4/2022 who recommended surgical intervention. Patient denies history of stroke, vision changes. Extensive history - COPD, obstructive sleep apnea with CPAP, hyperlipidemia, hypertension, aortic mural thrombus (2020), coronary artery disease with 2 stents to proximal and mid LCx, left ventricular hypertrophy, CKD IIIa, and respiratory arrest. Patient previously had PEA arrest in March 2020. She was given CPR and Atropine and intubated. Patient had NSTEMI, acute kidney injury, and pulmonary edema. Patient had cardiac catheterization with 2 stents placed. Patient follows with cardiologist Dr. Lowe Done and received cardiac clearance (refer to short chart).  Patient has upcoming appointment with pulmonologist Dr. Jordan Lyn on 6/7/2022. Patient follows with nephrology Dr. Antonella Curtis. Patient has shortness of breath with exertion. She denies chest pain, palpitations, dizziness. Transthoracic Echocardiography 12/15/2021:  1. Left ventricle: the left ventircular cavity size is normal. Mild to moderate LVH. 2. Left ventricle: Global systolic function: Overall left ventricular systolic function is normal with an estimated ejection fraction of 55-60%. 3. Left ventricle: There is grade II LV diastolic dysfunction. 4. Right ventricle: Normal right ventricular size and systolic function. 5. Mitral valve: There is trivial to mild mitral regurgitation present. 6. Aortic valve: No evidence of valvular aortic stenosis. 7. Tricuspid valve: There is mild tricuspid regurgitation present. 8. Tricuspid valve: Mild pulmonary hypertension with an estimated right ventricular/pulmonary artery systolic pressure of 92.94NRZB. 9. Aorta: The aortic root is normal in size. The ascending aorta was of normal size. 10. Pericardium: There is no pericardial effusion. There is prominent epicardial fat. CT angiogram carotid 3/8/2022:  Partially visualized thoracic aortic aneurysm measuring up to 5.8 cm in diameter within the proximal descending portion.  Partially visualized ascending thoracic aorta measures up to 4.8 cm in diameter.  Patient is status post thoracic aneurysm repair.  Stent graft originates at the level of the left subclavian artery origin. Conventional branching of the aortic arch.  Heavy calcified and noncalcified plaque of the right brachiocephalic artery.  Right subclavian artery is within normal limits.  Mild narrowing of the left subclavian artery origin with heavy calcified and noncalcified plaque.  The remaining portions of the    visualized left subclavian are within normal limits.  Origins of the bilateral vertebral arteries are within normal limits.  Calcification of the bilateral vertebral arteries.    Moderate narrowing of the distal right common carotid artery.  Heavy calcification at the right carotid artery bifurcation.  There is appropriate opacification of the right external carotid artery and its branches.  There is complete occlusion of the proximal right internal carotid artery C1   segment.  There is reconstitution of the right internal carotid artery C7 segment.  A patent anterior communicating artery and right posterior communicating artery are visualized.  Appropriate opacification of the M1, M2 and visualized M3 branches. Moderate to severe narrowing of the distal left common carotid artery.  Heavy calcification at the left carotid artery bifurcation.  There is appropriate opacification of the left external carotid artery and its branches.  Approximately 50% stenosis of the proximal left internal carotid artery   relative to the distal internal carotid artery diameter.  Moderate calcification of the left internal carotid artery cavernous segment.     Visualized soft tissues of the neck are within normal limits.  Severe degenerative changes of the upper cervical spine.  Lucency surrounding the right maxillary tooth suggestive of periapical abscess. Emphysematous changes of the lungs    Functional Capacity per pt:   1) Pt is not able to walk 2 city blocks on level ground without SOB. 2) Pt is not able to climb 2 flights of stairs without SOB. 3) Pt is not able to walk up a hill for 1-2 city blocks without SOB.     Past Medical History:     Past Medical History:   Diagnosis Date    Arthritis     Chronic back pain     COPD (chronic obstructive pulmonary disease) (HCC)     Elevated troponin level 03/2020    Hyperlipidemia     Hypertension     MAYELA (obstructive sleep apnea)     Respiratory arrest ([facility] [address] 03/11/2020    Thrombus 03/2020    descending aortic mural thrombus    Wears partial dentures     UPPER AND LOWER        Past Surgical History:     Past Surgical History:   Procedure Laterality Date    ABDOMINAL AORTIC ANEURYSM REPAIR, ENDOVASCULAR  06/29/2016    thoracic    CARDIAC CATHETERIZATION  03/20/2020    COLONOSCOPY      CORONARY ANGIOPLASTY WITH STENT PLACEMENT  03/23/2020    JOINT REPLACEMENT Left 2011    HIP    JOINT REPLACEMENT Right 08/22/2017    ant. total hip    NERVE BLOCK Right 03/08/2017    right hip arthrogram injection depomedrol 80mg    TOTAL HIP ARTHROPLASTY Right 8/22/2017    HIP TOTAL ARTHROPLASTY ANTERIOR APPROACH - MEDACTA, C-ARM, MEDACTA TABLE, FASCIA ILIACA BLOCK, NSA=SPINAL VS GENERAL  performed by Seth Black DO at [place] [address]        Medications Prior to Admission:     Prior to Admission medications    Medication Sig Start Date End Date Taking?  Authorizing Provider   fluticasone-vilanterol (BREO ELLIPTA) 100-25 MCG/INH AEPB inhaler Inhale into the lungs daily   Yes Historical Provider, MD   methocarbamol (ROBAXIN) 500 MG tablet Take 500 mg by mouth 4 times daily   Yes Historical Provider, MD   atenolol (TENORMIN) 100 MG tablet Take 100 mg by mouth daily    Historical Provider, MD   amLODIPine (NORVASC) 5 MG tablet Take 5 mg by mouth daily    Historical Provider, MD   lisinopril (PRINIVIL;ZESTRIL) 20 MG tablet Take 20 mg by mouth daily    Historical Provider, MD   hydroCHLOROthiazide (HYDRODIURIL) 50 MG tablet Take 50 mg by mouth daily    Historical Provider, MD   alendronate (FOSAMAX) 70 MG tablet Take 70 mg by mouth every 7 days Sunday    Historical Provider, MD   fluticasone (FLONASE) 50 MCG/ACT nasal spray 2 sprays by Each Nostril route daily    Historical Provider, MD   Garlic 526 MG CAPS Take by mouth daily    Historical Provider, MD   magnesium oxide (MAG-OX) 400 MG tablet Take 400 mg by mouth daily    Historical Provider, MD   calcium-vitamin D (OSCAL-500) 500-200 MG-UNIT per tablet Take 1 tablet by mouth daily    Historical Provider, MD   Potassium Gluconate 595 (99 K) MG TABS Take by mouth    Historical Provider, MD   Omega-3 Fatty Acids (FISH OIL) 1000 MG CAPS Take 3,000 mg by mouth 3 times daily    Historical Provider, MD   acetaminophen (TYLENOL 8 HOUR) 650 MG extended release tablet Take 1 tablet by mouth every 8 hours as needed for Pain 3/26/20   Minerva Field MD   lidocaine 4 % external patch Place 1 patch onto the skin daily 3/27/20   Minerva Field MD   albuterol sulfate HFA (VENTOLIN HFA) 108 (90 Base) MCG/ACT inhaler Inhale 2 puffs into the lungs 4 times daily as needed for Wheezing 3/26/20   Minerva Field MD   aspirin 81 MG chewable tablet Take 1 tablet by mouth daily 3/24/20   Bisi Miller MD   Multiple Vitamin (MULTIVITAMIN) tablet Take 1 tablet by mouth daily 3/24/20   Bisi Miller MD   traMADol (ULTRAM) 50 MG tablet Take 1 tablet by mouth 3 times daily as needed for Pain  Patient taking differently: Take 50 mg by mouth 4 times daily as needed for Pain. 10/9/17   Camilo Mariano DO   docusate sodium (COLACE) 100 MG capsule Take 1 capsule by mouth 2 times daily 8/24/17   [facility], DO   Misc. Devices (RAISED TOILET SEAT) MISC 1 Device by Does not apply route daily 8/24/17   Camilo Mariano DO   loratadine (CLARITIN) 10 MG tablet Take 10 mg by mouth daily    Historical Provider, MD   atorvastatin (LIPITOR) 40 MG tablet Take 40 mg by mouth daily  2/8/16   Historical Provider, MD        Allergies:     Penicillins    Social History:     Tobacco:    reports that she quit smoking about 2 years ago. She has a 19.00 pack-year smoking history. She has never used smokeless tobacco.  Alcohol:      reports current alcohol use. Drug Use:  reports no history of drug use. Family History:     Family History   Problem Relation Age of Onset   Mirza COPD Mother     Prostate Cancer Father     Hypertension Father     Diabetes Maternal Grandmother        Review of Systems:     Positive and Negative as described in HPI. CONSTITUTIONAL: Negative for fevers, chills, sweats, fatigue, and weight loss.   HEENT: Partial dentures upper and lower. Allergies. Negative for glasses, hearing changes, rhinorrhea, and throat pain. RESPIRATORY: COPD. Hx respiratory arrest. MAYELA - wears CPAP at night. Uses daily inhaler. Patient has more frequent use of rescue inhaler - uses once daily. Shortness of breath with exertion. Cough with clear mucous. Negative for congestion, and wheezing. CARDIOVASCULAR: HLD. HTN. Aortic mural thrombus (3/2020). CAD. Stents. LVH. Negative for chest pain, blood clot, irregular heartbeat, and palpitations. GASTROINTESTINAL: Constipation. Occasional pain on \"surface of abdomen\" Negative for reflux, nausea, vomiting, diarrhea, change in bowel habits, and abdominal pain. GENITOURINARY: CKD stage IIIa - follows with Dr. Alyce Fischer. Negative for difficulty of urination, burning with urination, and frequency. INTEGUMENT: Negative for rash, skin lesions, and easy bruising. HEMATOLOGIC/LYMPHATIC: Bilateral lower extremity swelling. ALLERGIC/IMMUNOLOGIC: Negative for urticaria and itching. ENDOCRINE: Negative for increase in thirst, increase in urination, and heat or cold intolerance. MUSCULOSKELETAL: Negative joint pains, muscle aches, and swelling of joints. NEUROLOGICAL: Uses cane for ambulation. Negative for headaches, dizziness, lightheadedness, numbness, and tingling extremities. BEHAVIOR/PSYCH: Negative for depression and anxiety. Physical Exam:   BP (!) 142/74   Pulse 70   Temp 96.8 °F (36 °C) (Temporal)   Resp 20   Ht 5' 1\" (1.549 m)   Wt 192 lb (87.1 kg)   SpO2 97%   BMI 36.28 kg/m²   No LMP recorded. Patient is postmenopausal.  No obstetric history on file. No results for input(s): POCGLU in the last 72 hours. General Appearance:  Alert, well appearing, and in no acute distress. Mental status: Oriented to person, place, and time. Head: Normocephalic and atraumatic. Eye: Wearing glasses. No icterus, redness, pupils equal and reactive, extraocular eye movements intact, and conjunctiva clear.   Ear:  Hearing grossly intact. Nose:  No drainage noted. Mouth: Partial denture upper. Missing teeth lower. Mucous membranes moist.  Neck: Supple and no carotid bruits noted. Lungs: Bilateral equal air entry, clear to auscultation, no wheezing, rales or rhonchi, and normal effort. Cardiovascular: Normal rate, regular rhythm, no murmur, gallop, or rub. Abdomen: Rotund. Soft, nontender, nondistended, and active bowel sounds. Neurologic: Normal speech and cranial nerves II through XII grossly intact. Strength 5/5 bilaterally. Skin: No gross lesions, rashes, bruising, or bleeding on exposed skin area. Extremities: 1+ non-pitting edema bilateral lower extremities. Posterior tibial pulses 2+ bilaterally. No calf tenderness with palpation. Psych: Normal affect.      Investigations:      Laboratory Testing:  Recent Results (from the past 24 hour(s))   CBC with Auto Differential    Collection Time: 05/18/22  9:11 AM   Result Value Ref Range    WBC 7.8 3.5 - 11.3 k/uL    RBC 4.49 3.95 - 5.11 m/uL    Hemoglobin 12.9 11.9 - 15.1 g/dL    Hematocrit 42.2 36.3 - 47.1 %    MCV 94.0 82.6 - 102.9 fL    MCH 28.7 25.2 - 33.5 pg    MCHC 30.6 28.4 - 34.8 g/dL    RDW 14.8 (H) 11.8 - 14.4 %    Platelets 208 698 - 721 k/uL    MPV 9.3 8.1 - 13.5 fL    NRBC Automated 0.0 0.0 per 100 WBC    Seg Neutrophils 54 36 - 65 %    Lymphocytes 33 24 - 43 %    Monocytes 10 3 - 12 %    Eosinophils % 2 1 - 4 %    Basophils 1 0 - 2 %    Immature Granulocytes 0 0 %    Segs Absolute 4.28 1.50 - 8.10 k/uL    Absolute Lymph # 2.53 1.10 - 3.70 k/uL    Absolute Mono # 0.77 0.10 - 1.20 k/uL    Absolute Eos # 0.14 0.00 - 0.44 k/uL    Basophils Absolute 0.04 0.00 - 0.20 k/uL    Absolute Immature Granulocyte 0.01 0.00 - 0.30 k/uL    RBC Morphology ANISOCYTOSIS PRESENT    Protime-INR    Collection Time: 05/18/22  9:11 AM   Result Value Ref Range    Protime 12.9 11.5 - 14.2 sec    INR 1.0    APTT    Collection Time: 05/18/22  9:11 AM   Result Value Ref Range    PTT 29.2 23.9 - 33.8 sec   Electrolyte Panel    Collection Time: 22  9:11 AM   Result Value Ref Range    Sodium 143 135 - 144 mmol/L    Potassium 4.6 3.7 - 5.3 mmol/L    Chloride 107 98 - 107 mmol/L    CO2 27 20 - 31 mmol/L    Anion Gap 9 9 - 17 mmol/L   BUN & Creatinine    Collection Time: 22  9:11 AM   Result Value Ref Range    BUN 17 8 - 23 mg/dL    CREATININE 1.03 (H) 0.50 - 0.90 mg/dL    GFR Non- 53 (L) >60 mL/min    GFR African American >60 >60 mL/min    GFR Comment         EKG 12 Lead    Collection Time: 22  9:14 AM   Result Value Ref Range    Ventricular Rate 62 BPM    Atrial Rate 62 BPM    P-R Interval 164 ms    QRS Duration 80 ms    Q-T Interval 402 ms    QTc Calculation (Bazett) 408 ms    P Axis 26 degrees    R Axis -22 degrees    T Axis 82 degrees       Recent Labs     22  0911   HGB 12.9   HCT 42.2   WBC 7.8   MCV 94.0      K 4.6      CO2 27   BUN 17   CREATININE 1.03*   INR 1.0   PROTIME 12.9   APTT 29.2       No results for input(s): COVID19 in the last 720 hours. *Please note that labs listed above are the most recent lab values available in EPIC at the time of the visit and additional labs may have been drawn or resulted since that time. Imaging/Diagnostics:    No results found. EK2022: See Epic. Diagnosis:      1. DX LEFT CAROTID STENOSIS    Plans:     1.  LEFT CAROTID ENDARTERECTOMY      YOMI Aldana - CNP  2022  9:54 AM

## 2022-05-18 NOTE — PRE-PROCEDURE INSTRUCTIONS
ARRIVE AT [facility] ON Tuesday June 14th  Arrive at 10:30  Any questions call [phone]  Once you enter the hospital lobby, take the elevators to the second floor. Check-In is at the surgery registration desk. Continue to take your home medications as you normally do up to and including the night before surgery with the exception of any blood thinning medications. Please stop any blood thinning medications as directed by your surgeon or prescribing physician. Failure to stop certain medications may interfere with your scheduled surgery. These may include:  Aspirin, Warfarin (Coumadin), Clopidogrel (Plavix), Ibuprofen (Motrin, Advil), Naproxen (Aleve), Meloxicam (Mobic), Celecoxib (Celebrex), Eliquis, Pradaxa, Xarelto, Effient, Fish Oil, Herbal supplements. Aspirin, fish oil    Please take the following medication(s) the day of surgery with a small sip of water:  Tenormin,amlodipine  Please use your inhalers at home the day of surgery. PREPARING FOR YOUR SURGERY:     Before surgery, you can play an important role in your own health. Because skin is not sterile, we need to be sure that your skin is as free of germs as possible before surgery by carefully washing before surgery. Preparing or prepping skin before surgery can reduce the risk of a surgical site infection.   Do not shave the area of your body where your surgery will be performed unless you received specific permission from your physician. You will need to shower at home the night before surgery and the morning of surgery with a special soap called chlorhexidine gluconate (CHG*). *Not to be used by people allergic to Chlorhexidine Gluconate (CHG). Following these instructions will help you be sure that your skin is clean before surgery. Instructions on cleaning your skin before surgery: The night before your surgery:      You will need to shower with warm water (not hot) and the CHG soap.        Use a clean wash cloth and a clean towel. Have clean clothes available to put on after the shower.   First wash your hair with regular shampoo. Rinse your hair and body thoroughly to remove the shampoo. [facility] Wash your face with your regular soap or water only. Thoroughly rinse your body with warm water from the neck down.  Turn water off to prevent rinsing the soap off too soon.  With a clean wet washcloth and half of the CHG soap in the bottle, lather your entire body from the neck down. Do not use CHG soap near your eyes or ears to avoid injury to those areas.  Wash thoroughly, paying special attention to the area where your surgery will be performed.  Wash your body gently for five (5) minutes. Avoid scrubbing your skin too hard.  Turn the water back on and rinse your body thoroughly.  Pat yourself dry with a clean, soft towel. Do not apply lotion, cream or powder.  Dress with clean freshly washed clothes. The morning of surgery:     Repeat shower following steps above - using remaining half of CHG soap in bottle. Patient Instructions:    [facility] If you are having any type of anesthesia you are to have nothing to eat or drink after midnight the night before your surgery. This includes gum, mints, water or smoking or chewing tobacco.  The only exception to this is a small sip of water to take with any morning dose of heart, blood pressure, or seizure medications. No alcoholic beverages for 24 hours prior to surgery.  Brush your teeth but do not swallow water.  Bring your eyeglasses and case with you. No contacts are to be worn the day of surgery. You also may bring your hearing aids. Most surgical procedures involving anesthesia will require that you remove your dentures prior to surgery.  If you are on C-PAP or Bi-PAP at home and plan on staying in the hospital overnight for your surgery please bring the machine with you.     · Do not wear any jewelry or body piercings day of surgery. Also, NO lotion, perfume or deodorant to be used the day of surgery. No nail polish on the operative extremity (arm/leg surgeries)    · Do not bring any valuables such as jewelry, cash, or credit cards. If you are staying overnight with us, please bring a small bag of personal items.  Please wear loose, comfortable clothing. If you are potentially going to have a cast or brace bring clothing that will fit over them.  In case of illness - If you have cold or flu like symptoms (high fever, runny nose, sore throat, cough, etc.) rash, nausea, vomiting, loose stools, and/or recent contact with someone who has a contagious disease (chicken pox, measles, etc.) Please call your doctor before coming to the hospital.   Day of Surgery/Procedure:    As a patient at [facility] you can expect quality medical and nursing care that is centered on your individual needs. Our goal is to make your surgical experience as comfortable as possible    . Transportation After Your Surgery/Procedure: You will need a friend or family member to drive you home after your procedure. Your  must be 25years of age or older and able to sign off on your discharge instructions. A taxi cab or any other form of public transportation is not acceptable. Your friend or family member must stay at the hospital throughout your procedure. Someone must remain with you for the first 24 hours after your surgery if you receive anesthesia or medication. If you do not have someone to stay with you, your procedure may be cancelled.       If you have any other questions regarding your procedure or the day of surgery, please call [phone]      _________________________  ____________________________  Signature (Patient)              Signature (Provider) & date

## 2022-05-19 ENCOUNTER — ANESTHESIA EVENT (OUTPATIENT)
Dept: OPERATING ROOM | Age: 72
DRG: 039 | End: 2022-05-19
Payer: MEDICARE

## 2022-05-24 ENCOUNTER — HOSPITAL ENCOUNTER (EMERGENCY)
Age: 72
Discharge: HOME | End: 2022-05-24
Attending: EMERGENCY MEDICINE
Payer: MEDICARE

## 2022-05-24 ENCOUNTER — APPOINTMENT (OUTPATIENT)
Dept: CT IMAGING | Age: 72
End: 2022-05-24
Payer: MEDICARE

## 2022-05-24 VITALS
DIASTOLIC BLOOD PRESSURE: 89 MMHG | TEMPERATURE: 97.8 F | RESPIRATION RATE: 16 BRPM | HEIGHT: 61 IN | SYSTOLIC BLOOD PRESSURE: 145 MMHG | WEIGHT: 192 LBS | BODY MASS INDEX: 36.25 KG/M2 | HEART RATE: 85 BPM | OXYGEN SATURATION: 98 %

## 2022-05-24 DIAGNOSIS — Z71.1 NO PROBLEM, FEARED COMPLAINT UNFOUNDED: ICD-10-CM

## 2022-05-24 DIAGNOSIS — R89.9 ABNORMAL LABORATORY TEST: Primary | ICD-10-CM

## 2022-05-24 LAB
ABSOLUTE EOS #: 0.12 K/UL (ref 0–0.44)
ABSOLUTE IMMATURE GRANULOCYTE: 0.02 K/UL (ref 0–0.3)
ABSOLUTE LYMPH #: 2.27 K/UL (ref 1.1–3.7)
ABSOLUTE MONO #: 0.73 K/UL (ref 0.1–1.2)
ANION GAP SERPL CALCULATED.3IONS-SCNC: 9 MMOL/L (ref 9–17)
BASOPHILS # BLD: 1 % (ref 0–2)
BASOPHILS ABSOLUTE: 0.04 K/UL (ref 0–0.2)
BUN BLDV-MCNC: 18 MG/DL (ref 8–23)
BUN/CREAT BLD: 17 (ref 9–20)
CALCIUM SERPL-MCNC: 9.2 MG/DL (ref 8.6–10.4)
CHLORIDE BLD-SCNC: 107 MMOL/L (ref 98–107)
CO2: 26 MMOL/L (ref 20–31)
CREAT SERPL-MCNC: 1.06 MG/DL (ref 0.5–0.9)
EOSINOPHILS RELATIVE PERCENT: 2 % (ref 1–4)
GFR AFRICAN AMERICAN: >60 ML/MIN
GFR NON-AFRICAN AMERICAN: 51 ML/MIN
GFR SERPL CREATININE-BSD FRML MDRD: ABNORMAL ML/MIN/{1.73_M2}
GLUCOSE BLD-MCNC: 93 MG/DL (ref 70–99)
HCT VFR BLD CALC: 40.6 % (ref 36.3–47.1)
HEMOGLOBIN: 12.3 G/DL (ref 11.9–15.1)
IMMATURE GRANULOCYTES: 0 %
LYMPHOCYTES # BLD: 33 % (ref 24–43)
MCH RBC QN AUTO: 28.5 PG (ref 25.2–33.5)
MCHC RBC AUTO-ENTMCNC: 30.3 G/DL (ref 28.4–34.8)
MCV RBC AUTO: 94 FL (ref 82.6–102.9)
MONOCYTES # BLD: 11 % (ref 3–12)
NRBC AUTOMATED: 0 PER 100 WBC
PDW BLD-RTO: 15 % (ref 11.8–14.4)
PLATELET # BLD: 257 K/UL (ref 138–453)
PMV BLD AUTO: 9.7 FL (ref 8.1–13.5)
POTASSIUM SERPL-SCNC: 4.4 MMOL/L (ref 3.7–5.3)
RBC # BLD: 4.32 M/UL (ref 3.95–5.11)
RBC # BLD: ABNORMAL 10*6/UL
SEG NEUTROPHILS: 53 % (ref 36–65)
SEGMENTED NEUTROPHILS ABSOLUTE COUNT: 3.69 K/UL (ref 1.5–8.1)
SODIUM BLD-SCNC: 142 MMOL/L (ref 135–144)
WBC # BLD: 6.9 K/UL (ref 3.5–11.3)

## 2022-05-24 PROCEDURE — 80048 BASIC METABOLIC PNL TOTAL CA: CPT

## 2022-05-24 PROCEDURE — 71260 CT THORAX DX C+: CPT

## 2022-05-24 PROCEDURE — 99285 EMERGENCY DEPT VISIT HI MDM: CPT

## 2022-05-24 PROCEDURE — 6360000004 HC RX CONTRAST MEDICATION: Performed by: NURSE PRACTITIONER

## 2022-05-24 PROCEDURE — 85025 COMPLETE CBC W/AUTO DIFF WBC: CPT

## 2022-05-24 PROCEDURE — 93970 EXTREMITY STUDY: CPT

## 2022-05-24 PROCEDURE — 36415 COLL VENOUS BLD VENIPUNCTURE: CPT

## 2022-05-24 RX ORDER — 0.9 % SODIUM CHLORIDE 0.9 %
80 INTRAVENOUS SOLUTION INTRAVENOUS ONCE
Status: DISCONTINUED | OUTPATIENT
Start: 2022-05-24 | End: 2022-05-24 | Stop reason: HOSPADM

## 2022-05-24 RX ORDER — SODIUM CHLORIDE 0.9 % (FLUSH) 0.9 %
10 SYRINGE (ML) INJECTION ONCE
Status: DISCONTINUED | OUTPATIENT
Start: 2022-05-24 | End: 2022-05-24 | Stop reason: HOSPADM

## 2022-05-24 ASSESSMENT — ENCOUNTER SYMPTOMS
COLOR CHANGE: 0
RHINORRHEA: 0
SHORTNESS OF BREATH: 1
DIARRHEA: 0
VOMITING: 0
COUGH: 0
SORE THROAT: 0
ABDOMINAL PAIN: 0
NAUSEA: 0

## 2022-05-24 ASSESSMENT — PAIN - FUNCTIONAL ASSESSMENT: PAIN_FUNCTIONAL_ASSESSMENT: NONE - DENIES PAIN

## 2022-05-24 NOTE — ED PROVIDER NOTES
eMERGENCY dEPARTMENT eNCOUnter   [address] [place] Name: Dania Darnell  MRN: [id number]  [place] 1950  Date of evaluation: 5/24/22     Dania Darnell is a 66 y.o. female with CC: Abnormal Lab (pt had blood work done for procedure shes having next week and dr told her to come to ed. pt states she doesn't know what level is off. pt sent by dr Jeremy Hill)        This visit was performed by both a physician and an APC. I performed all aspects of the MDM as documented.       The care is provided during an unprecedented national emergency due to the novel coronavirus, Sylvester Corcoran MD  Attending Emergency Physician           Mika Berrios MD  05/24/22 1818

## 2022-05-24 NOTE — ED PROVIDER NOTES
[facility] ED  eMERGENCY dEPARTMENT eNCOUnter      Pt Name: Stefan Reid  MRN: [id number]  [place] 1950  Date of evaluation: 5/24/2022  Provider: YOMI Guerra CNP    CHIEF COMPLAINT       Chief Complaint   Patient presents with    Abnormal Lab     pt had blood work done for procedure shes having next week and dr told her to come to ed. pt states she doesn't know what level is off. pt sent by dr Roseanne Shi  (Location/Symptom, Timing/Onset, Context/Setting, Quality, Duration, Modifying Factors, Severity.)   Stefan Reid is a 66 y.o. female who presents to the emergency department via private auto. She was sent to the ER for an elevated d-dimer. She has been having SOB with exertion for some time. Denies fever, chills, edema, N/V/D. Rates her pain 0/10 at this time. Denies ill contacts. Nursing Notes were reviewed.     ALLERGIES     Penicillins    CURRENT MEDICATIONS       Previous Medications    ACETAMINOPHEN (TYLENOL 8 HOUR) 650 MG EXTENDED RELEASE TABLET    Take 1 tablet by mouth every 8 hours as needed for Pain    ALBUTEROL SULFATE HFA (VENTOLIN HFA) 108 (90 BASE) MCG/ACT INHALER    Inhale 2 puffs into the lungs 4 times daily as needed for Wheezing    ALENDRONATE (FOSAMAX) 70 MG TABLET    Take 70 mg by mouth every 7 days Sunday    AMLODIPINE (NORVASC) 5 MG TABLET    Take 5 mg by mouth daily    ASPIRIN 81 MG CHEWABLE TABLET    Take 1 tablet by mouth daily    ATENOLOL (TENORMIN) 100 MG TABLET    Take 100 mg by mouth daily    ATORVASTATIN (LIPITOR) 40 MG TABLET    Take 40 mg by mouth daily     CALCIUM-VITAMIN D (OSCAL-500) 500-200 MG-UNIT PER TABLET    Take 1 tablet by mouth daily    DOCUSATE SODIUM (COLACE) 100 MG CAPSULE    Take 1 capsule by mouth 2 times daily    FLUTICASONE (FLONASE) 50 MCG/ACT NASAL SPRAY    2 sprays by Each Nostril route daily    FLUTICASONE-VILANTEROL (BREO ELLIPTA) 100-25 MCG/INH AEPB INHALER    Inhale into the lungs daily    GARLIC 975 MG CAPS    Take by mouth daily    HYDROCHLOROTHIAZIDE (HYDRODIURIL) 50 MG TABLET    Take 50 mg by mouth daily    LIDOCAINE 4 % EXTERNAL PATCH    Place 1 patch onto the skin daily    LISINOPRIL (PRINIVIL;ZESTRIL) 20 MG TABLET    Take 20 mg by mouth daily    LORATADINE (CLARITIN) 10 MG TABLET    Take 10 mg by mouth daily    MAGNESIUM OXIDE (MAG-OX) 400 MG TABLET    Take 400 mg by mouth daily    METHOCARBAMOL (ROBAXIN) 500 MG TABLET    Take 500 mg by mouth 4 times daily    MISC.  DEVICES (RAISED TOILET SEAT) MISC    1 Device by Does not apply route daily    MULTIPLE VITAMIN (MULTIVITAMIN) TABLET    Take 1 tablet by mouth daily    OMEGA-3 FATTY ACIDS (FISH OIL) 1000 MG CAPS    Take 3,000 mg by mouth 3 times daily    POTASSIUM GLUCONATE 595 (99 K) MG TABS    Take by mouth    TRAMADOL (ULTRAM) 50 MG TABLET    Take 1 tablet by mouth 3 times daily as needed for Pain       PAST MEDICAL HISTORY         Diagnosis Date    Arthritis     Chronic back pain     [facility]ch[facility] obstructive [facility] disease) [facility])     Elevated troponin level 03/2020    Hyperlipidemia     Hypertension     MAYELA (obstructive sleep apnea)     Respiratory arrest ([facility] [address] 03/11/2020    Thrombus 03/2020    descending aortic mural thrombus    Wears partial dentures     UPPER AND LOWER       SURGICAL HISTORY           Procedure Laterality Date    ABDOMINAL AORTIC ANEURYSM REPAIR, ENDOVASCULAR  06/29/2016    thoracic    CARDIAC CATHETERIZATION  03/20/2020    COLONOSCOPY      CORONARY ANGIOPLASTY WITH STENT PLACEMENT  03/23/2020    JOINT REPLACEMENT Left 2011    HIP    JOINT REPLACEMENT Right 08/22/2017    ant. total hip    NERVE BLOCK Right 03/08/2017    right hip arthrogram injection depomedrol 80mg    TOTAL HIP ARTHROPLASTY Right 8/22/2017    HIP TOTAL ARTHROPLASTY ANTERIOR APPROACH - MEDACTA, C-ARM, MEDACTA TABLE, FASCIA ILIACA BLOCK, NSA=SPINAL VS GENERAL  performed by Kathie Browne DO at [address] [facility] HISTORY           Problem Relation Age of Onset    COPD Mother     Prostate Cancer Father     Hypertension Father     Diabetes Maternal Grandmother      Family Status   Relation Name Status    Mother      Father      MGM  (Not Specified)        SOCIAL HISTORY      reports that she quit smoking about 2 years ago. She has a 19.00 pack-year smoking history. She has never used smokeless tobacco. She reports current alcohol use. She reports that she does not use drugs. REVIEW OF SYSTEMS    (2-9 systems for level 4, 10 or more for level 5)     Review of Systems   Constitutional: Negative for chills, diaphoresis, fatigue and fever. HENT: Negative for congestion, rhinorrhea and sore throat. Respiratory: Positive for shortness of breath. Negative for cough. Cardiovascular: Negative for chest pain, palpitations and leg swelling. Gastrointestinal: Negative for abdominal pain, diarrhea, nausea and vomiting. Musculoskeletal: Negative for arthralgias, myalgias, neck pain and neck stiffness. Skin: Negative for color change and rash. Neurological: Negative for dizziness, weakness, light-headedness and headaches. Except as noted above the remainder of the review of systems was reviewed and negative. PHYSICAL EXAM    (up to 7 for level 4, 8 or more for level 5)     ED Triage Vitals   BP Temp Temp Source Pulse Resp SpO2 Height Weight   22 1413 22 1415 22 1415 22 1413 22 1413 22 1413 22 1413 22 1413   (!) 145/89 97.8 °F (36.6 °C) Oral 85 16 98 % 5' 1\" (1.549 m) 192 lb (87.1 kg)     Physical Exam  Vitals reviewed. Constitutional:       General: She is not in acute distress. Appearance: She is well-developed. She is not diaphoretic. Eyes:      General: No scleral icterus. Conjunctiva/sclera: Conjunctivae normal.   Cardiovascular:      Rate and Rhythm: Normal rate and regular rhythm. Heart sounds: Normal heart sounds. Pulmonary:      Effort: Pulmonary effort is normal. No respiratory distress. Breath sounds: Normal breath sounds. No stridor. No wheezing or rales. Musculoskeletal:      Right lower leg: No edema. Left lower leg: No edema. Comments: Moves extremities. Skin:     General: Skin is warm and dry. Findings: No rash. Neurological:      Mental Status: She is alert and oriented to person, place, and time. Psychiatric:         Behavior: Behavior normal.         DIAGNOSTIC RESULTS     RADIOLOGY:   Non-plain film images such as CT, Ultrasound and MRI are read by the radiologist. Plain radiographic images are visualized and preliminarily interpreted by the emergency physician with the below findings:    Interpretation per the Radiologist below, if available at the time of this note:    CT CHEST PULMONARY EMBOLISM W CONTRAST    Result Date: 5/24/2022  EXAMINATION: CTA OF THE CHEST 5/24/2022 4:36 pm TECHNIQUE: CTA of the chest was performed after the administration of intravenous contrast.  Multiplanar reformatted images are provided for review. MIP images are provided for review. Automated exposure control, iterative reconstruction, and/or weight based adjustment of the mA/kV was utilized to reduce the radiation dose to as low as reasonably achievable. COMPARISON: None. HISTORY: ORDERING SYSTEM PROVIDED HISTORY: R/O PE. TECHNOLOGIST PROVIDED HISTORY: R/O PE. Decision Support Exception - unselect if not a suspected or confirmed emergency medical condition->Emergency Medical Condition (MA) FINDINGS: Pulmonary Arteries: Pulmonary arteries are adequately opacified for evaluation. No evidence of intraluminal filling defect to suggest pulmonary embolism. Main pulmonary artery is normal in caliber. Mediastinum: There is a native aneurysm of the ascending and descending aorta with presence of aortic stent graft. No acute complicating process is identified. No mediastinal adenopathy is identified. Small pericardial effusion is noted. Lungs/pleura: The lungs are without acute process. No focal consolidation or pulmonary edema. No evidence of pleural effusion or pneumothorax. Mild COPD is identified. Upper Abdomen: Tiny gallstones are identified within the gallbladder. Right adrenal gland is mildly enlarged and could contain a small adenoma measuring 1 cm which is incidental.  No further follow-up is required. Soft Tissues/Bones: No acute bone or soft tissue abnormality. Old left rib fractures are noted. No evidence of pulmonary embolism or acute pulmonary abnormality. Small pericardial effusion. Mild COPD. Aortic stent graft for thoracic aortic aneurysm. No gross complication. Probable small right adrenal adenoma. No further follow-up is required. RECOMMENDATIONS: Unavailable     VL DUP LOWER EXTREMITY VENOUS BILATERAL    Result Date: 5/24/2022    [facility]  Vascular Lower Extremities DVT Study Procedure   [facility]       Date of Study             05/24/2022                   Kvng Jackson   Date of Birth    1950   Gender                    Female   Age              66 year(s)   Race                      Black   Room Number      16   Corporate ID #   [id number]   Patient Acct #   [de-identified]   MR #             [id number]      [facility]   Accession #      [id number]   Interpreting Physician    Kyara Tucker   Referring Nurse               Referring Physician  Practitioner  Procedure Type of Study:   Veins: Lower Extremities DVT Study, Venous Scan Lower Bilateral.  Indications for Study:Elevated D-Dimer. Patient Status:ER. Technical Quality:Adequate visualization. Conclusions   Summary   No evidence of superficial or deep venous thrombosis in both lower  extremities.    Signature   ----------------------------------------------------------------  Electronically signed by Cahd Alamo(Sonographer) on  05/24/2022 03:55 PM ----------------------------------------------------------------   ----------------------------------------------------------------  Electronically signed by Gt MukherjeeInterpreting physician)  on 05/24/2022 05:31 PM  ----------------------------------------------------------------  Findings:   Right Impression:                    Left Impression:  The common femoral, femoral,         The common femoral, femoral,  popliteal and tibial veins           popliteal, tibials and saphenous  demonstrate normal compressibility   veins are compressible with normal  and augmentation. doppler responses. Allergies   - Allergy:Penicillin(Drug). Velocities are measured in cm/s ; Diameters are measured in cm Right Lower Extremities DVT Study Measurements Right 2D Measurements +------------------------------------+----------+---------------+----------+ ! Location                            ! Visualized! Compressibility! Thrombosis! +------------------------------------+----------+---------------+----------+ ! Common Femoral                      !Yes       ! Yes            ! None      ! +------------------------------------+----------+---------------+----------+ ! Prox Femoral                        !Yes       ! Yes            ! None      ! +------------------------------------+----------+---------------+----------+ ! Mid Femoral                         !Yes       ! Yes            ! None      ! +------------------------------------+----------+---------------+----------+ ! Dist Femoral                        !Yes       ! Yes            ! None      ! +------------------------------------+----------+---------------+----------+ ! Deep Femoral                        !Yes       ! Yes            ! None      ! +------------------------------------+----------+---------------+----------+ ! Popliteal                           !Yes       ! Yes            ! None      ! +------------------------------------+----------+---------------+----------+ !Sapheno Femoral Junction            ! Yes       ! Yes            ! None      ! +------------------------------------+----------+---------------+----------+ ! PTV                                 ! Yes       ! Yes            ! None      ! +------------------------------------+----------+---------------+----------+ ! Peroneal                            !Yes       ! Yes            ! None      ! +------------------------------------+----------+---------------+----------+ ! Gastroc                             ! Yes       ! Yes            ! None      ! +------------------------------------+----------+---------------+----------+ ! GSV Thigh                           ! Yes       ! Yes            ! None      ! +------------------------------------+----------+---------------+----------+ ! GSV Knee                            ! Yes       ! Yes            ! None      ! +------------------------------------+----------+---------------+----------+ ! GSV Ankle                           ! Yes       ! Yes            ! None      ! +------------------------------------+----------+---------------+----------+ ! SSV                                 ! Yes       ! Yes            ! None      ! +------------------------------------+----------+---------------+----------+ Right Doppler Measurements +---------------------------+------+------+--------------------------------+ ! Location                   ! Signal!Reflux! Reflux (msec)                   ! +---------------------------+------+------+--------------------------------+ ! Common Femoral             !Phasic! No    !                                ! +---------------------------+------+------+--------------------------------+ ! Prox Femoral               !Phasic! No    !                                ! +---------------------------+------+------+--------------------------------+ ! Popliteal                  !Phasic! No    !                                ! +---------------------------+------+------+--------------------------------+ Left Lower Extremities DVT Study Measurements Left 2D Measurements +------------------------------------+----------+---------------+----------+ ! Location                            ! Visualized! Compressibility! Thrombosis! +------------------------------------+----------+---------------+----------+ ! Common Femoral                      !Yes       ! Yes            ! None      ! +------------------------------------+----------+---------------+----------+ ! Prox Femoral                        !Yes       ! Yes            ! None      ! +------------------------------------+----------+---------------+----------+ ! Mid Femoral                         !Yes       ! Yes            ! None      ! +------------------------------------+----------+---------------+----------+ ! Dist Femoral                        !Yes       ! Yes            ! None      ! +------------------------------------+----------+---------------+----------+ ! Deep Femoral                        !Yes       ! Yes            ! None      ! +------------------------------------+----------+---------------+----------+ ! Popliteal                           !Yes       ! Yes            ! None      ! +------------------------------------+----------+---------------+----------+ ! Sapheno Femoral Junction            ! Yes       ! Yes            ! None      ! +------------------------------------+----------+---------------+----------+ ! PTV                                 ! Yes       ! Yes            ! None      ! +------------------------------------+----------+---------------+----------+ ! Peroneal                            !Yes       ! Yes            ! None      ! +------------------------------------+----------+---------------+----------+ ! Gastroc                             ! Yes       ! Yes            ! None      ! +------------------------------------+----------+---------------+----------+ ! GSV Thigh !Yes       !Yes            ! None      ! +------------------------------------+----------+---------------+----------+ ! GSV Knee                            ! Yes       ! Yes            ! None      ! +------------------------------------+----------+---------------+----------+ ! GSV Ankle                           ! Yes       ! Yes            ! None      ! +------------------------------------+----------+---------------+----------+ ! SSV                                 ! Yes       ! Yes            ! None      ! +------------------------------------+----------+---------------+----------+ Left Doppler Measurements +---------------------------+------+------+--------------------------------+ ! Location                   ! Signal!Reflux! Reflux (msec)                   ! +---------------------------+------+------+--------------------------------+ ! Common Femoral             !Phasic! No    !                                ! +---------------------------+------+------+--------------------------------+ ! Prox Femoral               !Phasic! No    !                                ! +---------------------------+------+------+--------------------------------+ ! Popliteal                  !Phasic! No    !                                ! +---------------------------+------+------+--------------------------------+      LABS:  Labs Reviewed   CBC WITH AUTO DIFFERENTIAL - Abnormal; Notable for the following components:       Result Value    RDW 15.0 (*)     All other components within normal limits   BASIC METABOLIC PANEL - Abnormal; Notable for the following components:    CREATININE 1.06 (*)     GFR Non- 51 (*)     All other components within normal limits       All other labs were within normal range or not returned as of this dictation.     EMERGENCY DEPARTMENT COURSE and DIFFERENTIAL DIAGNOSIS/MDM:   Vitals:    Vitals:    05/24/22 1413 05/24/22 1415   BP: (!) 145/89    Pulse: 85    Resp: 16    Temp:  97.8 °F (36.6 °C)   TempSrc: Oral   SpO2: 98%    Weight: 192 lb (87.1 kg)    Height: 5' 1\" (1.549 m)        MEDICATIONS GIVEN IN THE ED:  Medications   0.9 % sodium chloride bolus (has no administration in time range)   iopamidol (ISOVUE-370) 76 % injection 75 mL (100 mLs IntraVENous Incomplete 5/24/22 1644)   sodium chloride flush 0.9 % injection 10 mL (10 mLs IntraVENous Incomplete 5/24/22 1648)       CLINICAL DECISION MAKING:  The patient presented alert with a nontoxic appearance and was seen in conjunction with Dr. Dixie Hicks. Venous doppler was negative. CT scan of the chest was negative for PE. Follow up with pcp for a recheck. Evaluation and treatment course in the ED, and plan of care upon discharge was discussed in length with the patient. Patient had no further questions prior to being discharged and was instructed to return to the ED for new or worsening symptoms. Care was provided during an unprecedented national emergency due to the novel coronavirus, Covid-19. FINAL IMPRESSION      1. Abnormal laboratory test    2.  No problem, feared complaint unfounded            Problem List  Patient Active Problem List   Diagnosis Code    Descending thoracic aortic aneurysm (HCC) I71.2    Essential hypertension I10    Hyperlipidemia E78.5    COPD (chronic obstructive pulmonary disease) ([facility]) J44.9    Tobacco abuse Z72.0    Normocytic anemia D64.9    Hip pain, left M25.552    Arthritis of right hip M16.11    ASHLEY (acute kidney injury) ([facility] [address] N17.9    Urine retention R33.9    Pure hypercholesterolemia E78.00    Shortness of breath R06.02    Acute respiratory failure with hypoxia and hypercapnia ([facility]) J96.01, J96.02    COPD with acute exacerbation ([facility]) J44.1    Acute pulmonary edema ([facility]) J81.0    NSTEMI (non-ST elevated myocardial infarction) ([facility]) I21.4    Shock circulatory ([facility]) R57.9    Thrombus of aorta ([facility]) I74.10    Lactic acidosis E87.2    Pneumonia of both upper lobes due to infectious organism J18.9    Respiratory arrest [facility]) R09.2         DISPOSITION/PLAN   DISPOSITION Decision To Discharge 05/24/2022 06:01:37 PM      PATIENT REFERRED TO:   Lincoln Blair MD  [address] [facility]  [phone]    Schedule an appointment as soon as possible for a visit       [facility] ED  [address] [facility]  [phone]    If symptoms worsen, As needed      DISCHARGE MEDICATIONS:     New Prescriptions    No medications on file           (Please note that portions of this note were completed with a voice recognition program.  Efforts were made to edit the dictations but occasionally words are mis-transcribed.)    YOMI Garner CNP, APRN - CNP  05/24/22 3904

## 2022-05-24 NOTE — DISCHARGE INSTR - COC
Continuity of Care Form    Patient Name: Moy Patel   :  1950  MRN:  [id number]    Admit date:  2022  Discharge date:  ***    Code Status Order: Prior   Advance Directives:      Admitting Physician:  No admitting provider for patient encounter.   PCP: Sofi Duenas MD    Discharging Nurse: [facility] Unit/Room#: STA17/17  Discharging Unit Phone Number: ***    Emergency Contact:   Extended Emergency Contact Information  Primary Emergency Contact: Tamara Laxmiyessy  Address: [address] [place], [place] [address] [facility]  Home Phone: [phone]  Relation: Spouse  Secondary Emergency Contact: Jet Solexel  Mobile Phone: [phone]  Relation: Brother/Sister    Past Surgical History:  Past Surgical History:   Procedure Laterality Date    ABDOMINAL AORTIC ANEURYSM REPAIR, ENDOVASCULAR  2016    thoracic    CARDIAC CATHETERIZATION  2020    COLONOSCOPY      CORONARY ANGIOPLASTY WITH STENT PLACEMENT  2020    JOINT REPLACEMENT Left     HIP    JOINT REPLACEMENT Right 2017    ant. total hip    NERVE BLOCK Right 2017    right hip arthrogram injection depomedrol 80mg    TOTAL HIP ARTHROPLASTY Right 2017    HIP TOTAL ARTHROPLASTY ANTERIOR APPROACH - [facility], C-ARM, MEDACTA TABLE, FASCIA ILIACA BLOCK, NSA=SPINAL VS GENERAL  performed by Tracy Gage DO at [address] [place] [address] History:   Immunization History   Administered Date(s) Administered    COVID-19, Red [employer] top, DO NOT Dilute, Tino-Sucrose, 12+ yrs, PF, 30 mcg/0.3 mL dose 2022    COVID-19, Pfizer Purple top, DILUTE for use, 12+ yrs, 30mcg/0.3mL dose 2021, 2021, 2021       Active Problems:  Patient Active Problem List   Diagnosis Code    Descending thoracic aortic aneurysm ([facility] [address] I71.2    Essential hypertension I10    Hyperlipidemia E78.5    COPD (chronic obstructive pulmonary disease) ([facility] [address] J44.9    Tobacco abuse Z72.0    Normocytic anemia D64.9    Hip pain, left M25.552    Arthritis of right hip M16.11    ASHLEY (acute kidney injury) ([facility] [address] N17.9    Urine retention R33.9    Pure hypercholesterolemia E78.00    Shortness of breath R06.02    Acute respiratory failure with hypoxia and hypercapnia ([facility]) J96.01, J96.02    COPD with acute exacerbation ([facility]) J44.1    Acute pulmonary edema ([facility]) J81.0    NSTEMI (non-ST elevated myocardial infarction) ([facility]) I21.4    Shock circulatory ([facility]) R57.9    Thrombus of aorta ([facility]) I74.10    Lactic acidosis E87.2    Pneumonia of both upper lobes due to infectious organism J18.9    Respiratory arrest ([facility] [address] R09.2       Isolation/Infection:   Isolation            No Isolation          Patient Infection Status       Infection Onset Added Last Indicated Last Indicated By Review Planned Expiration Resolved Resolved By    None active    Resolved    COVID-19 (Rule Out) 20 Covid-19 Ambulatory (Ordered)   20 Rule-Out Test Resulted            Nurse Assessment:  Last Vital Signs: BP (!) 145/89   Pulse 85   Temp 97.8 °F (36.6 °C) (Oral)   Resp 16   Ht 5' 1\" (1.549 m)   Wt 192 lb (87.1 kg)   SpO2 98%   BMI 36.28 kg/m²     Last documented pain score (0-10 scale):    Last Weight:   Wt Readings from Last 1 Encounters:   22 192 lb (87.1 kg)     Mental Status:  {IP PT MENTAL STATUS:}    IV Access:  { KWABENA IV ACCESS:197218604}    Nursing Mobility/ADLs:  Walking   {P DME PFZO:637798930}  Transfer  {P DME ZVLU:955655330}  Bathing  {P DME PUCX:209807790}  Dressing  {P DME FTYH:703768369}  Toileting  {P DME OGHN:169498347}  Feeding  {P DME IPDE:581949324}  Med Admin  {P DME AMSP:805222243}  Med Delivery   { KWABENA MED Delivery:183570208}    Wound Care Documentation and Therapy:        Elimination:  Continence:    Bowel: {YES / XQ:88509}  Bladder: {YES / Q}  Urinary Catheter: {Urinary Catheter:733611946}   Colostomy/Ileostomy/Ileal Conduit: {YES / FH:47775}       Date of Last BM: ***  No intake or output data in the 24 hours ending 22 1802  No intake/output data recorded.     Safety Concerns:     [address] [facility] Safety Concerns:787409216}    Impairments/Disabilities:      [address] [facility] Impairments/Disabilities:860942765}    Nutrition Therapy:  Current Nutrition Therapy:   [address] [facility] Diet List:172945998}    Routes of Feeding: {CHP DME Other Feedings:851966358}  Liquids: {Slp liquid thickness:77980}  Daily Fluid Restriction: {CHP DME Yes amt example:417876553}  Last Modified Barium Swallow with Video (Video Swallowing Test): {Done Not Done ZKDS:228191915}    Treatments at the Time of Hospital Discharge:   Respiratory Treatments: ***  Oxygen Therapy:  {Therapy; copd oxygen:90896}  Ventilator:    {[facility] Vent XFU}    Rehab Therapies: {THERAPEUTIC INTERVENTION:8913865088}  Weight Bearing Status/Restrictions: [facility] Mer[address] Smith  Weight Bearin}  Other Medical Equipment (for information only, NOT a DME order):  {EQUIPMENT:944281017}  Other Treatments: ***    Patient's personal belongings (please select all that are sent with patient):  {CHP DME Belongings:086938717}    RN SIGNATURE:  {Esignature:028040617}    CASE MANAGEMENT/SOCIAL WORK SECTION    Inpatient Status Date: ***    Readmission Risk Assessment Score:  Readmission Risk              Risk of Unplanned Readmission:  0           Discharging to Facility/ Agency   Name:   Address:  Phone:  Fax:    Dialysis Facility (if applicable)   Name:  Address:  Dialysis Schedule:  Phone:  Fax:    / signature: {Esignature:585363055}    PHYSICIAN SECTION    Prognosis: {Prognosis:7316268206}    Condition at Discharge: Becki Smith Patient Condition:115809991}    Rehab Potential (if transferring to Rehab): {Prognosis:7676336343}    Recommended Labs or Other Treatments After Discharge: ***    Physician Certification: I certify the above information and transfer of Yuki Delgadillo  is necessary for the continuing treatment of the diagnosis listed and that she requires {Admit to Appropriate Level of Care:60131} for {GREATER/LESS:670411200} 30 days.      Update Admission H&P: {CHP DME Changes in VPD:631160264}    PHYSICIAN SIGNATURE:  {Esignature:691283820}

## 2022-06-14 ENCOUNTER — ANESTHESIA (OUTPATIENT)
Dept: OPERATING ROOM | Age: 72
DRG: 039 | End: 2022-06-14
Payer: MEDICARE

## 2022-06-14 ENCOUNTER — HOSPITAL ENCOUNTER (INPATIENT)
Age: 72
LOS: 1 days | Discharge: HOME | DRG: 039 | End: 2022-06-15
Attending: SURGERY | Admitting: SURGERY
Payer: MEDICARE

## 2022-06-14 PROBLEM — J96.02 ACUTE RESPIRATORY FAILURE WITH HYPOXIA AND HYPERCAPNIA (HCC): Status: RESOLVED | Noted: 2020-03-11 | Resolved: 2022-06-14

## 2022-06-14 PROBLEM — I65.22 LEFT CAROTID STENOSIS: Chronic | Status: ACTIVE | Noted: 2022-06-14

## 2022-06-14 PROBLEM — J96.01 ACUTE RESPIRATORY FAILURE WITH HYPOXIA AND HYPERCAPNIA (HCC): Status: RESOLVED | Noted: 2020-03-11 | Resolved: 2022-06-14

## 2022-06-14 PROBLEM — Z87.891 FORMER SMOKER: Status: ACTIVE | Noted: 2022-06-14

## 2022-06-14 LAB
-: ABNORMAL
BACTERIA: ABNORMAL
BILIRUBIN URINE: NEGATIVE
COLOR: YELLOW
EPITHELIAL CELLS UA: ABNORMAL /HPF (ref 0–5)
GLUCOSE URINE: NEGATIVE
KETONES, URINE: NEGATIVE
LEUKOCYTE ESTERASE, URINE: ABNORMAL
NITRITE, URINE: NEGATIVE
PH UA: 6 (ref 5–8)
PROTEIN UA: NEGATIVE
RBC UA: ABNORMAL /HPF (ref 0–2)
SPECIFIC GRAVITY UA: 1.01 (ref 1–1.03)
TURBIDITY: CLEAR
URINE HGB: NEGATIVE
UROBILINOGEN, URINE: NORMAL
WBC UA: ABNORMAL /HPF (ref 0–5)

## 2022-06-14 PROCEDURE — 6360000002 HC RX W HCPCS: Performed by: NURSE ANESTHETIST, CERTIFIED REGISTERED

## 2022-06-14 PROCEDURE — 99221 1ST HOSP IP/OBS SF/LOW 40: CPT | Performed by: FAMILY MEDICINE

## 2022-06-14 PROCEDURE — 94761 N-INVAS EAR/PLS OXIMETRY MLT: CPT

## 2022-06-14 PROCEDURE — 3600000002 HC SURGERY LEVEL 2 BASE: Performed by: SURGERY

## 2022-06-14 PROCEDURE — 3700000001 HC ADD 15 MINUTES (ANESTHESIA): Performed by: SURGERY

## 2022-06-14 PROCEDURE — 2709999900 HC NON-CHARGEABLE SUPPLY: Performed by: SURGERY

## 2022-06-14 PROCEDURE — 2500000003 HC RX 250 WO HCPCS: Performed by: NURSE ANESTHETIST, CERTIFIED REGISTERED

## 2022-06-14 PROCEDURE — 2700000000 HC OXYGEN THERAPY PER DAY

## 2022-06-14 PROCEDURE — 3700000000 HC ANESTHESIA ATTENDED CARE: Performed by: SURGERY

## 2022-06-14 PROCEDURE — 88311 DECALCIFY TISSUE: CPT

## 2022-06-14 PROCEDURE — 6360000002 HC RX W HCPCS: Performed by: SURGERY

## 2022-06-14 PROCEDURE — 6360000002 HC RX W HCPCS: Performed by: ANESTHESIOLOGY

## 2022-06-14 PROCEDURE — 2580000003 HC RX 258: Performed by: ANESTHESIOLOGY

## 2022-06-14 PROCEDURE — 6370000000 HC RX 637 (ALT 250 FOR IP): Performed by: SURGERY

## 2022-06-14 PROCEDURE — 88304 TISSUE EXAM BY PATHOLOGIST: CPT

## 2022-06-14 PROCEDURE — 2580000003 HC RX 258: Performed by: NURSE ANESTHETIST, CERTIFIED REGISTERED

## 2022-06-14 PROCEDURE — 2780000010 HC IMPLANT OTHER: Performed by: SURGERY

## 2022-06-14 PROCEDURE — 3600000012 HC SURGERY LEVEL 2 ADDTL 15MIN: Performed by: SURGERY

## 2022-06-14 PROCEDURE — 2500000003 HC RX 250 WO HCPCS: Performed by: SURGERY

## 2022-06-14 PROCEDURE — 03UJ0JZ SUPPLEMENT LEFT COMMON CAROTID ARTERY WITH SYNTHETIC SUBSTITUTE, OPEN APPROACH: ICD-10-PCS | Performed by: SURGERY

## 2022-06-14 PROCEDURE — 03CJ0ZZ EXTIRPATION OF MATTER FROM LEFT COMMON CAROTID ARTERY, OPEN APPROACH: ICD-10-PCS | Performed by: SURGERY

## 2022-06-14 PROCEDURE — 2580000003 HC RX 258: Performed by: SURGERY

## 2022-06-14 PROCEDURE — 81001 URINALYSIS AUTO W/SCOPE: CPT

## 2022-06-14 PROCEDURE — 7100000000 HC PACU RECOVERY - FIRST 15 MIN: Performed by: SURGERY

## 2022-06-14 PROCEDURE — A4217 STERILE WATER/SALINE, 500 ML: HCPCS | Performed by: SURGERY

## 2022-06-14 PROCEDURE — 7100000001 HC PACU RECOVERY - ADDTL 15 MIN: Performed by: SURGERY

## 2022-06-14 PROCEDURE — 87635 SARS-COV-2 COVID-19 AMP PRB: CPT

## 2022-06-14 PROCEDURE — 2000000000 HC ICU R&B

## 2022-06-14 RX ORDER — ONDANSETRON 2 MG/ML
INJECTION INTRAMUSCULAR; INTRAVENOUS PRN
Status: DISCONTINUED | OUTPATIENT
Start: 2022-06-14 | End: 2022-06-14 | Stop reason: SDUPTHER

## 2022-06-14 RX ORDER — LIDOCAINE HYDROCHLORIDE 10 MG/ML
1 INJECTION, SOLUTION EPIDURAL; INFILTRATION; INTRACAUDAL; PERINEURAL
Status: DISCONTINUED | OUTPATIENT
Start: 2022-06-15 | End: 2022-06-14 | Stop reason: HOSPADM

## 2022-06-14 RX ORDER — TRAMADOL HYDROCHLORIDE 50 MG/1
50 TABLET ORAL 3 TIMES DAILY PRN
Status: DISCONTINUED | OUTPATIENT
Start: 2022-06-14 | End: 2022-06-15 | Stop reason: HOSPADM

## 2022-06-14 RX ORDER — LIDOCAINE HYDROCHLORIDE 20 MG/ML
INJECTION, SOLUTION EPIDURAL; INFILTRATION; INTRACAUDAL; PERINEURAL PRN
Status: DISCONTINUED | OUTPATIENT
Start: 2022-06-14 | End: 2022-06-14 | Stop reason: SDUPTHER

## 2022-06-14 RX ORDER — METHOCARBAMOL 500 MG/1
500 TABLET, FILM COATED ORAL 4 TIMES DAILY
Status: DISCONTINUED | OUTPATIENT
Start: 2022-06-14 | End: 2022-06-14

## 2022-06-14 RX ORDER — PROTAMINE SULFATE 10 MG/ML
INJECTION, SOLUTION INTRAVENOUS PRN
Status: DISCONTINUED | OUTPATIENT
Start: 2022-06-14 | End: 2022-06-14 | Stop reason: SDUPTHER

## 2022-06-14 RX ORDER — LIDOCAINE 4 G/G
1 PATCH TOPICAL DAILY
Status: DISCONTINUED | OUTPATIENT
Start: 2022-06-14 | End: 2022-06-15

## 2022-06-14 RX ORDER — ATENOLOL 50 MG/1
100 TABLET ORAL DAILY
Status: DISCONTINUED | OUTPATIENT
Start: 2022-06-14 | End: 2022-06-14

## 2022-06-14 RX ORDER — SODIUM CHLORIDE, SODIUM LACTATE, POTASSIUM CHLORIDE, CALCIUM CHLORIDE 600; 310; 30; 20 MG/100ML; MG/100ML; MG/100ML; MG/100ML
INJECTION, SOLUTION INTRAVENOUS CONTINUOUS
Status: DISCONTINUED | OUTPATIENT
Start: 2022-06-14 | End: 2022-06-15

## 2022-06-14 RX ORDER — DOCUSATE SODIUM 100 MG/1
100 CAPSULE, LIQUID FILLED ORAL 2 TIMES DAILY
Status: DISCONTINUED | OUTPATIENT
Start: 2022-06-14 | End: 2022-06-15 | Stop reason: HOSPADM

## 2022-06-14 RX ORDER — LANOLIN ALCOHOL/MO/W.PET/CERES
400 CREAM (GRAM) TOPICAL DAILY
Status: DISCONTINUED | OUTPATIENT
Start: 2022-06-14 | End: 2022-06-15 | Stop reason: HOSPADM

## 2022-06-14 RX ORDER — SODIUM CHLORIDE, SODIUM LACTATE, POTASSIUM CHLORIDE, CALCIUM CHLORIDE 600; 310; 30; 20 MG/100ML; MG/100ML; MG/100ML; MG/100ML
INJECTION, SOLUTION INTRAVENOUS CONTINUOUS
Status: DISCONTINUED | OUTPATIENT
Start: 2022-06-15 | End: 2022-06-14

## 2022-06-14 RX ORDER — MORPHINE SULFATE 4 MG/ML
4 INJECTION, SOLUTION INTRAMUSCULAR; INTRAVENOUS
Status: DISCONTINUED | OUTPATIENT
Start: 2022-06-14 | End: 2022-06-15 | Stop reason: HOSPADM

## 2022-06-14 RX ORDER — ASPIRIN 81 MG/1
81 TABLET ORAL DAILY
Status: DISCONTINUED | OUTPATIENT
Start: 2022-06-14 | End: 2022-06-15 | Stop reason: HOSPADM

## 2022-06-14 RX ORDER — SODIUM CHLORIDE 9 MG/ML
INJECTION, SOLUTION INTRAVENOUS CONTINUOUS PRN
Status: DISCONTINUED | OUTPATIENT
Start: 2022-06-14 | End: 2022-06-14 | Stop reason: SDUPTHER

## 2022-06-14 RX ORDER — ATENOLOL 50 MG/1
50 TABLET ORAL 2 TIMES DAILY
Status: DISCONTINUED | OUTPATIENT
Start: 2022-06-15 | End: 2022-06-15 | Stop reason: HOSPADM

## 2022-06-14 RX ORDER — SODIUM CHLORIDE 0.9 % (FLUSH) 0.9 %
5-40 SYRINGE (ML) INJECTION EVERY 12 HOURS SCHEDULED
Status: DISCONTINUED | OUTPATIENT
Start: 2022-06-14 | End: 2022-06-14 | Stop reason: HOSPADM

## 2022-06-14 RX ORDER — HEPARIN SODIUM 1000 [USP'U]/ML
INJECTION, SOLUTION INTRAVENOUS; SUBCUTANEOUS PRN
Status: DISCONTINUED | OUTPATIENT
Start: 2022-06-14 | End: 2022-06-14 | Stop reason: SDUPTHER

## 2022-06-14 RX ORDER — LISINOPRIL 20 MG/1
20 TABLET ORAL DAILY
Status: DISCONTINUED | OUTPATIENT
Start: 2022-06-14 | End: 2022-06-15 | Stop reason: HOSPADM

## 2022-06-14 RX ORDER — SODIUM CHLORIDE 0.9 % (FLUSH) 0.9 %
5-40 SYRINGE (ML) INJECTION PRN
Status: DISCONTINUED | OUTPATIENT
Start: 2022-06-14 | End: 2022-06-15 | Stop reason: HOSPADM

## 2022-06-14 RX ORDER — METHOCARBAMOL 500 MG/1
500 TABLET, FILM COATED ORAL 4 TIMES DAILY PRN
Status: DISCONTINUED | OUTPATIENT
Start: 2022-06-14 | End: 2022-06-15 | Stop reason: HOSPADM

## 2022-06-14 RX ORDER — HYDROMORPHONE HYDROCHLORIDE 1 MG/ML
0.5 INJECTION, SOLUTION INTRAMUSCULAR; INTRAVENOUS; SUBCUTANEOUS EVERY 5 MIN PRN
Status: DISCONTINUED | OUTPATIENT
Start: 2022-06-14 | End: 2022-06-14 | Stop reason: HOSPADM

## 2022-06-14 RX ORDER — LABETALOL HYDROCHLORIDE 5 MG/ML
INJECTION, SOLUTION INTRAVENOUS PRN
Status: DISCONTINUED | OUTPATIENT
Start: 2022-06-14 | End: 2022-06-14 | Stop reason: SDUPTHER

## 2022-06-14 RX ORDER — ATORVASTATIN CALCIUM 20 MG/1
20 TABLET, FILM COATED ORAL NIGHTLY
Status: DISCONTINUED | OUTPATIENT
Start: 2022-06-15 | End: 2022-06-14

## 2022-06-14 RX ORDER — MULTIVITAMIN WITH IRON
1 TABLET ORAL DAILY
Status: DISCONTINUED | OUTPATIENT
Start: 2022-06-14 | End: 2022-06-15 | Stop reason: HOSPADM

## 2022-06-14 RX ORDER — CEFAZOLIN SODIUM 1 G/3ML
INJECTION, POWDER, FOR SOLUTION INTRAMUSCULAR; INTRAVENOUS PRN
Status: DISCONTINUED | OUTPATIENT
Start: 2022-06-14 | End: 2022-06-14 | Stop reason: SDUPTHER

## 2022-06-14 RX ORDER — DEXAMETHASONE SODIUM PHOSPHATE 10 MG/ML
INJECTION, SOLUTION INTRAMUSCULAR; INTRAVENOUS PRN
Status: DISCONTINUED | OUTPATIENT
Start: 2022-06-14 | End: 2022-06-14 | Stop reason: SDUPTHER

## 2022-06-14 RX ORDER — MORPHINE SULFATE 2 MG/ML
2 INJECTION, SOLUTION INTRAMUSCULAR; INTRAVENOUS
Status: DISCONTINUED | OUTPATIENT
Start: 2022-06-14 | End: 2022-06-15 | Stop reason: HOSPADM

## 2022-06-14 RX ORDER — AMLODIPINE BESYLATE 5 MG/1
5 TABLET ORAL DAILY
Status: DISCONTINUED | OUTPATIENT
Start: 2022-06-15 | End: 2022-06-15 | Stop reason: HOSPADM

## 2022-06-14 RX ORDER — ASPIRIN 81 MG/1
81 TABLET, CHEWABLE ORAL DAILY
Status: DISCONTINUED | OUTPATIENT
Start: 2022-06-14 | End: 2022-06-14

## 2022-06-14 RX ORDER — FENTANYL CITRATE 50 UG/ML
INJECTION, SOLUTION INTRAMUSCULAR; INTRAVENOUS PRN
Status: DISCONTINUED | OUTPATIENT
Start: 2022-06-14 | End: 2022-06-14 | Stop reason: SDUPTHER

## 2022-06-14 RX ORDER — ALBUTEROL SULFATE 90 UG/1
2 AEROSOL, METERED RESPIRATORY (INHALATION) 4 TIMES DAILY PRN
Status: DISCONTINUED | OUTPATIENT
Start: 2022-06-14 | End: 2022-06-15 | Stop reason: HOSPADM

## 2022-06-14 RX ORDER — ROCURONIUM BROMIDE 10 MG/ML
INJECTION, SOLUTION INTRAVENOUS PRN
Status: DISCONTINUED | OUTPATIENT
Start: 2022-06-14 | End: 2022-06-14 | Stop reason: SDUPTHER

## 2022-06-14 RX ORDER — CALCIUM CARBONATE-CHOLECALCIFEROL TAB 250 MG-125 UNIT 250-125 MG-UNIT
2 TAB ORAL DAILY
Status: DISCONTINUED | OUTPATIENT
Start: 2022-06-14 | End: 2022-06-15 | Stop reason: HOSPADM

## 2022-06-14 RX ORDER — PHENYLEPHRINE HCL IN 0.9% NACL 1 MG/10 ML
SYRINGE (ML) INTRAVENOUS PRN
Status: DISCONTINUED | OUTPATIENT
Start: 2022-06-14 | End: 2022-06-14 | Stop reason: SDUPTHER

## 2022-06-14 RX ORDER — PROPOFOL 10 MG/ML
INJECTION, EMULSION INTRAVENOUS PRN
Status: DISCONTINUED | OUTPATIENT
Start: 2022-06-14 | End: 2022-06-14 | Stop reason: SDUPTHER

## 2022-06-14 RX ORDER — ALENDRONATE SODIUM 70 MG/1
70 TABLET ORAL
Status: DISCONTINUED | OUTPATIENT
Start: 2022-06-14 | End: 2022-06-14

## 2022-06-14 RX ORDER — FLUTICASONE PROPIONATE 50 MCG
2 SPRAY, SUSPENSION (ML) NASAL DAILY
Status: DISCONTINUED | OUTPATIENT
Start: 2022-06-14 | End: 2022-06-15 | Stop reason: HOSPADM

## 2022-06-14 RX ORDER — SODIUM CHLORIDE 0.9 % (FLUSH) 0.9 %
5-40 SYRINGE (ML) INJECTION PRN
Status: DISCONTINUED | OUTPATIENT
Start: 2022-06-14 | End: 2022-06-14 | Stop reason: HOSPADM

## 2022-06-14 RX ORDER — CHLORAL HYDRATE 500 MG
3000 CAPSULE ORAL 3 TIMES DAILY
Status: DISCONTINUED | OUTPATIENT
Start: 2022-06-14 | End: 2022-06-14

## 2022-06-14 RX ORDER — HYDROCHLOROTHIAZIDE 25 MG/1
50 TABLET ORAL DAILY
Status: DISCONTINUED | OUTPATIENT
Start: 2022-06-14 | End: 2022-06-15 | Stop reason: HOSPADM

## 2022-06-14 RX ORDER — SODIUM CHLORIDE 0.9 % (FLUSH) 0.9 %
5-40 SYRINGE (ML) INJECTION EVERY 12 HOURS SCHEDULED
Status: DISCONTINUED | OUTPATIENT
Start: 2022-06-14 | End: 2022-06-15 | Stop reason: HOSPADM

## 2022-06-14 RX ORDER — SODIUM CHLORIDE 9 MG/ML
INJECTION, SOLUTION INTRAVENOUS PRN
Status: DISCONTINUED | OUTPATIENT
Start: 2022-06-14 | End: 2022-06-14 | Stop reason: HOSPADM

## 2022-06-14 RX ORDER — SODIUM CHLORIDE 9 MG/ML
INJECTION, SOLUTION INTRAVENOUS PRN
Status: DISCONTINUED | OUTPATIENT
Start: 2022-06-14 | End: 2022-06-15 | Stop reason: HOSPADM

## 2022-06-14 RX ORDER — ACETAMINOPHEN 325 MG/1
650 TABLET ORAL EVERY 8 HOURS PRN
Status: DISCONTINUED | OUTPATIENT
Start: 2022-06-14 | End: 2022-06-15 | Stop reason: HOSPADM

## 2022-06-14 RX ORDER — BUDESONIDE AND FORMOTEROL FUMARATE DIHYDRATE 80; 4.5 UG/1; UG/1
2 AEROSOL RESPIRATORY (INHALATION) 2 TIMES DAILY
Status: DISCONTINUED | OUTPATIENT
Start: 2022-06-14 | End: 2022-06-14

## 2022-06-14 RX ORDER — FLUTICASONE FUROATE AND VILANTEROL 100; 25 UG/1; UG/1
1 POWDER RESPIRATORY (INHALATION) DAILY
Status: DISCONTINUED | OUTPATIENT
Start: 2022-06-15 | End: 2022-06-15 | Stop reason: HOSPADM

## 2022-06-14 RX ORDER — CLINDAMYCIN PHOSPHATE 900 MG/50ML
900 INJECTION INTRAVENOUS ONCE
Status: DISCONTINUED | OUTPATIENT
Start: 2022-06-14 | End: 2022-06-14

## 2022-06-14 RX ORDER — FENTANYL CITRATE 50 UG/ML
25 INJECTION, SOLUTION INTRAMUSCULAR; INTRAVENOUS EVERY 5 MIN PRN
Status: DISCONTINUED | OUTPATIENT
Start: 2022-06-14 | End: 2022-06-14 | Stop reason: HOSPADM

## 2022-06-14 RX ORDER — ESMOLOL HYDROCHLORIDE 10 MG/ML
INJECTION INTRAVENOUS PRN
Status: DISCONTINUED | OUTPATIENT
Start: 2022-06-14 | End: 2022-06-14 | Stop reason: SDUPTHER

## 2022-06-14 RX ORDER — ONDANSETRON 2 MG/ML
4 INJECTION INTRAMUSCULAR; INTRAVENOUS
Status: DISCONTINUED | OUTPATIENT
Start: 2022-06-14 | End: 2022-06-14 | Stop reason: HOSPADM

## 2022-06-14 RX ORDER — ATORVASTATIN CALCIUM 40 MG/1
40 TABLET, FILM COATED ORAL DAILY
Status: DISCONTINUED | OUTPATIENT
Start: 2022-06-14 | End: 2022-06-15 | Stop reason: HOSPADM

## 2022-06-14 RX ORDER — LANOLIN ALCOHOL/MO/W.PET/CERES
6 CREAM (GRAM) TOPICAL NIGHTLY PRN
Status: DISCONTINUED | OUTPATIENT
Start: 2022-06-14 | End: 2022-06-15 | Stop reason: HOSPADM

## 2022-06-14 RX ADMIN — PROPOFOL 25 MCG/KG/MIN: 10 INJECTION, EMULSION INTRAVENOUS at 13:19

## 2022-06-14 RX ADMIN — FLUTICASONE PROPIONATE 2 SPRAY: 50 SPRAY, METERED NASAL at 20:25

## 2022-06-14 RX ADMIN — TRAMADOL HYDROCHLORIDE 50 MG: 50 TABLET, COATED ORAL at 21:59

## 2022-06-14 RX ADMIN — MORPHINE SULFATE 4 MG: 4 INJECTION, SOLUTION INTRAMUSCULAR; INTRAVENOUS at 16:47

## 2022-06-14 RX ADMIN — METHOCARBAMOL TABLETS 500 MG: 500 TABLET, COATED ORAL at 21:59

## 2022-06-14 RX ADMIN — Medication 50 MCG: at 12:34

## 2022-06-14 RX ADMIN — PROPOFOL 130 MG: 10 INJECTION, EMULSION INTRAVENOUS at 12:34

## 2022-06-14 RX ADMIN — Medication 400 MG: at 20:23

## 2022-06-14 RX ADMIN — Medication 50 MCG: at 13:14

## 2022-06-14 RX ADMIN — CEFAZOLIN SODIUM 2000 MG: 1 INJECTION, POWDER, FOR SOLUTION INTRAMUSCULAR; INTRAVENOUS at 12:44

## 2022-06-14 RX ADMIN — Medication 50 MCG: at 13:54

## 2022-06-14 RX ADMIN — SODIUM CHLORIDE: 9 INJECTION, SOLUTION INTRAVENOUS at 12:54

## 2022-06-14 RX ADMIN — SODIUM CHLORIDE, POTASSIUM CHLORIDE, SODIUM LACTATE AND CALCIUM CHLORIDE: 600; 310; 30; 20 INJECTION, SOLUTION INTRAVENOUS at 10:58

## 2022-06-14 RX ADMIN — SODIUM CHLORIDE, POTASSIUM CHLORIDE, SODIUM LACTATE AND CALCIUM CHLORIDE: 600; 310; 30; 20 INJECTION, SOLUTION INTRAVENOUS at 16:27

## 2022-06-14 RX ADMIN — PROTAMINE SULFATE 15 MG: 10 INJECTION, SOLUTION INTRAVENOUS at 14:35

## 2022-06-14 RX ADMIN — MORPHINE SULFATE 4 MG: 4 INJECTION, SOLUTION INTRAMUSCULAR; INTRAVENOUS at 19:35

## 2022-06-14 RX ADMIN — HYDROMORPHONE HYDROCHLORIDE 0.5 MG: 1 INJECTION, SOLUTION INTRAMUSCULAR; INTRAVENOUS; SUBCUTANEOUS at 15:53

## 2022-06-14 RX ADMIN — MORPHINE SULFATE 4 MG: 4 INJECTION, SOLUTION INTRAMUSCULAR; INTRAVENOUS at 23:19

## 2022-06-14 RX ADMIN — ATENOLOL 100 MG: 50 TABLET ORAL at 20:25

## 2022-06-14 RX ADMIN — ROCURONIUM BROMIDE 50 MG: 10 INJECTION, SOLUTION INTRAVENOUS at 12:34

## 2022-06-14 RX ADMIN — SUGAMMADEX 200 MG: 100 INJECTION, SOLUTION INTRAVENOUS at 14:56

## 2022-06-14 RX ADMIN — Medication 50 MCG: at 13:48

## 2022-06-14 RX ADMIN — PROTAMINE SULFATE 5 MG: 10 INJECTION, SOLUTION INTRAVENOUS at 14:45

## 2022-06-14 RX ADMIN — PROPOFOL 30 MG: 10 INJECTION, EMULSION INTRAVENOUS at 13:16

## 2022-06-14 RX ADMIN — ATORVASTATIN CALCIUM 40 MG: 40 TABLET, FILM COATED ORAL at 20:33

## 2022-06-14 RX ADMIN — Medication 50 MCG: at 14:13

## 2022-06-14 RX ADMIN — ESMOLOL HYDROCHLORIDE 30 MG: 100 INJECTION, SOLUTION INTRAVENOUS at 15:06

## 2022-06-14 RX ADMIN — HYDROCHLOROTHIAZIDE 50 MG: 25 TABLET ORAL at 20:23

## 2022-06-14 RX ADMIN — LIDOCAINE HYDROCHLORIDE 80 MG: 20 INJECTION, SOLUTION EPIDURAL; INFILTRATION; INTRACAUDAL; PERINEURAL at 12:34

## 2022-06-14 RX ADMIN — HYDROMORPHONE HYDROCHLORIDE 0.5 MG: 1 INJECTION, SOLUTION INTRAMUSCULAR; INTRAVENOUS; SUBCUTANEOUS at 15:46

## 2022-06-14 RX ADMIN — Medication 50 MCG: at 14:06

## 2022-06-14 RX ADMIN — HEPARIN SODIUM 8000 UNITS: 1000 INJECTION, SOLUTION INTRAVENOUS; SUBCUTANEOUS at 13:46

## 2022-06-14 RX ADMIN — Medication 100 MCG: at 12:56

## 2022-06-14 RX ADMIN — Medication 50 MCG: at 13:27

## 2022-06-14 RX ADMIN — LABETALOL HYDROCHLORIDE 10 MG: 5 INJECTION, SOLUTION INTRAVENOUS at 15:13

## 2022-06-14 RX ADMIN — Medication 50 MCG: at 13:34

## 2022-06-14 RX ADMIN — CEFAZOLIN SODIUM 2000 MG: 10 INJECTION, POWDER, FOR SOLUTION INTRAVENOUS at 20:36

## 2022-06-14 RX ADMIN — SODIUM CHLORIDE, PRESERVATIVE FREE 10 ML: 5 INJECTION INTRAVENOUS at 20:25

## 2022-06-14 RX ADMIN — PROPOFOL 40 MG: 10 INJECTION, EMULSION INTRAVENOUS at 14:29

## 2022-06-14 RX ADMIN — DOCUSATE SODIUM 100 MG: 100 CAPSULE, LIQUID FILLED ORAL at 20:22

## 2022-06-14 RX ADMIN — SODIUM CHLORIDE, PRESERVATIVE FREE 10 ML: 5 INJECTION INTRAVENOUS at 20:36

## 2022-06-14 RX ADMIN — Medication 50 MCG: at 13:05

## 2022-06-14 RX ADMIN — ROCURONIUM BROMIDE 10 MG: 10 INJECTION, SOLUTION INTRAVENOUS at 13:53

## 2022-06-14 RX ADMIN — ASPIRIN 81 MG: 81 TABLET, COATED ORAL at 20:22

## 2022-06-14 RX ADMIN — Medication 50 MCG: at 14:25

## 2022-06-14 RX ADMIN — Medication 2 TABLET: at 20:32

## 2022-06-14 RX ADMIN — LISINOPRIL 20 MG: 20 TABLET ORAL at 20:22

## 2022-06-14 RX ADMIN — Medication 50 MCG: at 13:50

## 2022-06-14 RX ADMIN — MULTIVITAMIN TABLET 1 TABLET: TABLET at 20:23

## 2022-06-14 RX ADMIN — ONDANSETRON 4 MG: 2 INJECTION INTRAMUSCULAR; INTRAVENOUS at 14:56

## 2022-06-14 RX ADMIN — DEXAMETHASONE SODIUM PHOSPHATE 10 MG: 10 INJECTION, SOLUTION INTRAMUSCULAR; INTRAVENOUS at 13:21

## 2022-06-14 ASSESSMENT — ENCOUNTER SYMPTOMS
RHINORRHEA: 0
VOMITING: 0
BLOOD IN STOOL: 0
COUGH: 0
DIARRHEA: 0
NAUSEA: 0
SHORTNESS OF BREATH: 0
CONSTIPATION: 0
WHEEZING: 0
ABDOMINAL PAIN: 0
CHEST TIGHTNESS: 0

## 2022-06-14 ASSESSMENT — PAIN - FUNCTIONAL ASSESSMENT
PAIN_FUNCTIONAL_ASSESSMENT: ACTIVITIES ARE NOT PREVENTED
PAIN_FUNCTIONAL_ASSESSMENT: ACTIVITIES ARE NOT PREVENTED
PAIN_FUNCTIONAL_ASSESSMENT: 0-10
PAIN_FUNCTIONAL_ASSESSMENT: ACTIVITIES ARE NOT PREVENTED

## 2022-06-14 ASSESSMENT — PAIN DESCRIPTION - ORIENTATION
ORIENTATION: LEFT

## 2022-06-14 ASSESSMENT — PAIN SCALES - GENERAL
PAINLEVEL_OUTOF10: 8
PAINLEVEL_OUTOF10: 7
PAINLEVEL_OUTOF10: 2
PAINLEVEL_OUTOF10: 7
PAINLEVEL_OUTOF10: 2
PAINLEVEL_OUTOF10: 3
PAINLEVEL_OUTOF10: 8
PAINLEVEL_OUTOF10: 2

## 2022-06-14 ASSESSMENT — PAIN DESCRIPTION - DESCRIPTORS
DESCRIPTORS: ACHING;THROBBING
DESCRIPTORS: SORE;ACHING

## 2022-06-14 ASSESSMENT — PAIN DESCRIPTION - PAIN TYPE: TYPE: SURGICAL PAIN

## 2022-06-14 ASSESSMENT — PAIN DESCRIPTION - LOCATION
LOCATION: INCISION;NECK
LOCATION: NECK

## 2022-06-14 ASSESSMENT — PAIN SCALES - WONG BAKER: WONGBAKER_NUMERICALRESPONSE: 0

## 2022-06-14 NOTE — ANESTHESIA PROCEDURE NOTES
Arterial Line:    An arterial line was placed using surface landmarks, in the OR for the following indication(s): continuous blood pressure monitoring. A (size), 1 and 3/4 inch (length), Arrow (type) catheter was placed, Seldinger technique used, into the left radial artery, secured by tape and Tegaderm. Anesthesia type: General    Events:  patient tolerated procedure well with no complications. 6/14/2022 12:35 PM6/14/2022 12:40 PM  Anesthesiologist: Soy Lima DO  Performed: Anesthesiologist   Preanesthetic Checklist  Completed: patient identified, IV checked, site marked, risks and benefits discussed, surgical/procedural consents, equipment checked, pre-op evaluation, timeout performed, anesthesia consent given, oxygen available, monitors applied/VS acknowledged, fire risk safety assessment completed and verbalized and blood product R/B/A discussed and consented

## 2022-06-14 NOTE — CONSULTS
[facility]  Office: [address], DO, Montana Montes, DO, Josh Garcia, DO, Stacia Trent Blood, DO, Anuj Castillo MD, Cal Beckford MD, Edward Thurman MD, Cleopatra Hunt MD, Gregory Crump MD, Luiza Alonso MD, Yanet Garcia MD, Harry Eduardo, DO, Robson Florian MD,  Shanell Carias DO, Antoinette Maria MD, Remington Benedict MD, Sree Hutton MD, [facility], DO, Gamal Coleman MD, Irasema Woods MD, Magnolia Gordon, DO, Candy Ovalles MD, Noni Coleman, [facility], [facility], CNP, Derick Ross, CNP, Thomas Saez, CNP, Susy Saab, CNP, Roberto Smart, CNP, Nabeel Palomo PA-C, Favian Craig, [facility], Faith Fraire, CNP, Hammad Awad, CNP, Monserrat Mccormick, CNP, Sydni Holt, CNS, Herrera Ortiz, [facility], Chris Lindsay, CNP, Chapincito Miranda, CNP, [facility]      Consultation Note       Admit Date: 6/14/2022  Bed/Room No.  2033/2033-01  Admitting Physician : Rubén Penny MD  Code Status :Full Code  Hospital Day:  LOS: 0 days   Patient is currently admitted for  treatment of  Left carotid stenosis  Reason for Consult:  Medical Management   Requesting Physician: MD Hermann Niño MD    HISTORY OF PRESENT ILLNESS:   The patient is a 66 y.o. female who is admitted for surgical treatment of left carotid stenosis and underwent left carotid endarterectomy. She has past medical history of COPD, sleep apnea on CPAP, hypertension, hyperlipidemia, CAD with stents x2 in proximal and mid left circumflex, CKD stage III, thoracic aortic aneurysm  S/p endovascular repair in 2016 aortic mural thrombus in 2020. Patient has underlying history of COPD and follows pulmonology in clinic. She is not on chronic home O2. Patient denies any dyspnea, orthopnea, palpitations.     Past Medical History:        Diagnosis Date    Arthritis     Chronic back pain     COPD (chronic obstructive pulmonary disease) (HCC)     Elevated troponin level fluticasone (FLONASE) 50 MCG/ACT nasal spray 2 sprays by Each Nostril route daily    Historical Provider, MD   Garlic 055 MG CAPS Take by mouth daily    Historical Provider, MD   magnesium oxide (MAG-OX) 400 MG tablet Take 400 mg by mouth daily    Historical Provider, MD   calcium-vitamin D (OSCAL-500) 500-200 MG-UNIT per tablet Take 1 tablet by mouth daily    Historical Provider, MD   Potassium Gluconate 595 (99 K) MG TABS Take by mouth    Historical Provider, MD   Omega-3 Fatty Acids (FISH OIL) 1000 MG CAPS Take 3,000 mg by mouth 3 times daily    Historical Provider, MD   acetaminophen (TYLENOL 8 HOUR) 650 MG extended release tablet Take 1 tablet by mouth every 8 hours as needed for Pain 3/26/20   Peggy Norwood MD   lidocaine 4 % external patch Place 1 patch onto the skin daily 3/27/20   Peggy Norwood MD   albuterol sulfate HFA (VENTOLIN HFA) 108 (90 Base) MCG/ACT inhaler Inhale 2 puffs into the lungs 4 times daily as needed for Wheezing 3/26/20   Peggy Norwood MD   aspirin 81 MG chewable tablet Take 1 tablet by mouth daily 3/24/20   Miguel Grey MD   Multiple Vitamin (MULTIVITAMIN) tablet Take 1 tablet by mouth daily 3/24/20   Miguel Grey MD   traMADol (ULTRAM) 50 MG tablet Take 1 tablet by mouth 3 times daily as needed for Pain  Patient taking differently: Take 50 mg by mouth 4 times daily as needed for Pain. 10/9/17   Camilo Shaikh DO   docusate sodium (COLACE) 100 MG capsule Take 1 capsule by mouth 2 times daily 8/24/17   [facility], DO   Misc. Devices (RAISED TOILET SEAT) [facility] Device by Does not apply route daily 8/24/17   Camilo Shaikh DO   loratadine (CLARITIN) 10 MG tablet Take 10 mg by mouth daily    Historical Provider, MD   atorvastatin (LIPITOR) 40 MG tablet Take 40 mg by mouth daily  2/8/16   Historical Provider, MD       Allergies:  Penicillins    Social History:   TOBACCO:   reports that she quit smoking about 2 years ago.  She has a 19.00 pack-year smoking history. She has never used smokeless tobacco.  ETOH:   reports current alcohol use. OCCUPATION:      Family History:       Problem Relation Age of Onset   Ardyth Moritz COPD Mother     Prostate Cancer Father     Hypertension Father     Diabetes Maternal Grandmother        Review of Systems   Constitutional: Negative for appetite change, fatigue, fever and unexpected weight change. HENT: Negative for congestion, rhinorrhea and sneezing. Eyes: Negative for visual disturbance. Respiratory: Negative for cough, chest tightness, shortness of breath and wheezing. Cardiovascular: Negative for chest pain and palpitations. Gastrointestinal: Negative for abdominal pain, blood in stool, constipation, diarrhea, nausea and vomiting. Genitourinary: Negative for dysuria, enuresis, frequency and hematuria. Musculoskeletal: Negative for arthralgias and myalgias. Skin: Negative for rash. Neurological: Negative for dizziness, weakness, light-headedness and headaches. Hematological: Does not bruise/bleed easily. Psychiatric/Behavioral: Negative for dysphoric mood and sleep disturbance. Objective:   BP (!) 145/86   Pulse 81   Temp 97.2 °F (36.2 °C) (Temporal)   Resp 18   Ht 5' 1\" (1.549 m)   Wt 192 lb (87.1 kg)   SpO2 94%   BMI 36.28 kg/m²       Intake/Output Summary (Last 24 hours) at 6/14/2022 1702  Last data filed at 6/14/2022 1615  Gross per 24 hour   Intake 1110 ml   Output 50 ml   Net 1060 ml       Physical Exam  Vitals and nursing note reviewed. Constitutional:       General: She is not in acute distress. Appearance: She is not diaphoretic. Interventions: Nasal cannula in place. HENT:      Head: Normocephalic and atraumatic. Nose:      Right Sinus: No maxillary sinus tenderness or frontal sinus tenderness. Left Sinus: No maxillary sinus tenderness or frontal sinus tenderness. Mouth/Throat:      Pharynx: No oropharyngeal exudate.    Eyes:      General: No scleral icterus. Conjunctiva/sclera: Conjunctivae normal.      Pupils: Pupils are equal, round, and reactive to light. Neck:      Thyroid: No thyromegaly. Vascular: No JVD. Comments: Left carotid surgical wound  dressing in place  Cardiovascular:      Rate and Rhythm: Normal rate and regular rhythm. Pulses:           Dorsalis pedis pulses are 2+ on the right side and 2+ on the left side. Heart sounds: Normal heart sounds. No murmur heard. Pulmonary:      Effort: Pulmonary effort is normal.      Breath sounds: Normal breath sounds. No wheezing or rales. Abdominal:      Palpations: Abdomen is soft. There is no mass. Tenderness: There is no abdominal tenderness. Musculoskeletal:      Cervical back: Full passive range of motion without pain and neck supple. Lymphadenopathy:      Head:      Right side of head: No submandibular adenopathy. Left side of head: No submandibular adenopathy. Cervical: No cervical adenopathy. Skin:     General: Skin is warm. Neurological:      Mental Status: She is alert and oriented to person, place, and time. Motor: No tremor. Psychiatric:         Behavior: Behavior is cooperative.          Laboratory findings:    Hematology:  Recent Labs     05/24/22  1530 05/18/22  0911   WBC 6.9 7.8   HGB 12.3 12.9   HCT 40.6 42.2   MCV 94.0 94.0    284       Chemistry:  Lab Results   Component Value Date     05/24/2022    K 4.4 05/24/2022     05/24/2022    CO2 26 05/24/2022    BUN 18 05/24/2022    CREATININE 1.06 (H) 05/24/2022    GLUCOSE 93 05/24/2022    CALCIUM 9.2 05/24/2022    PROT 6.3 (L) 03/11/2020    LABALBU 3.0 (L) 03/11/2020    BILITOT 0.18 (L) 03/11/2020    ALKPHOS 89 03/11/2020    AST 78 (H) 03/11/2020    ALT 64 (H) 03/11/2020    LABGLOM 51 (L) 05/24/2022    GFRAA >60 05/24/2022    GLOB NOT REPORTED 03/11/2020     Lab Results   Component Value Date    LABALBU 3.0 (L) 03/11/2020     Lab Results   Component Value Date    LABA1C 6.2 (H) 03/11/2020     Lab Results   Component Value Date     03/11/2020     No results found for: TSHFT4, TSH    Magnesium:   Lab Results   Component Value Date    MG 1.8 03/24/2020     Phosphorus:   Lab Results   Component Value Date    PHOS 2.8 03/14/2020     Ionized Calcium: No results found for: CAION   Last 3 Blood Glucose:   No results for input(s): GLUCOSE in the last 72 hours. Urinalysis:   PT/INR:    Lab Results   Component Value Date    PROTIME 12.9 05/18/2022    INR 1.0 05/18/2022     PTT:    Lab Results   Component Value Date    APTT 29.2 05/18/2022     Microbiology:    Imaging / Clinical Data:   No results found. Assessment and Plan   Principal Problem:    Left carotid stenosis  Resolved Problems:    * No resolved hospital problems. *         1. Left carotid stenosis s/p left carotid endarterectomy -postoperative management by vascular surgery. 2. COPD -continue O2 supplement for now, continuous pulse ox monitoring. Albuterol as needed. 3. Essential hypertension -well-controlled on amlodipine, atenolol, hydrochlorothiazide, lisinopril. 4. CAD s/p stents x2 -aspirin resumed, continue atenolol, Lipitor  5. Obstructive sleep apnea on CPAP  6. Thoracic aortic aneurysm s/p endovascular repair      Thank you for allowing me in care of this patient and consult. call with any questions. Will follow with you. Patients questions answered . Updates discussed with patient and Staff  RN.      Marialuisa Buenrostro MD, MD  6/14/2022  Copy of note to Naila Erickson MD and  Ritchie Oseguera MD    (Please note that portions of this note were completed with a voice recognition program. Efforts were made to edit the dictations but occasionally words are mis-transcribed.)

## 2022-06-14 NOTE — ANESTHESIA PRE PROCEDURE
Department of Anesthesiology  Preprocedure Note       Name:  Sueann Skiff   Age:  66 y.o.  :  1950                                          MRN:  [id number]         Date:  2022      Surgeon: Rimma Ramirez):  Marval Heimlich, MD    Procedure: Procedure(s):  LEFT CAROTID ENDARTERECTOMY TYPE 2 EVERSION WITH REIMPLANTATION OF THE LEFT CAROTID BULB    Medications prior to admission:   Prior to Admission medications    Medication Sig Start Date End Date Taking?  Authorizing Provider   fluticasone-vilanterol (BREO ELLIPTA) 100-25 MCG/INH AEPB inhaler Inhale into the lungs daily    Historical Provider, MD   methocarbamol (ROBAXIN) 500 MG tablet Take 500 mg by mouth 4 times daily    Historical Provider, MD   atenolol (TENORMIN) 100 MG tablet Take 100 mg by mouth daily    Historical Provider, MD   amLODIPine (NORVASC) 5 MG tablet Take 5 mg by mouth daily    Historical Provider, MD   lisinopril (PRINIVIL;ZESTRIL) 20 MG tablet Take 20 mg by mouth daily    Historical Provider, MD   hydroCHLOROthiazide (HYDRODIURIL) 50 MG tablet Take 50 mg by mouth daily    Historical Provider, MD   alendronate (FOSAMAX) 70 MG tablet Take 70 mg by mouth every 7 days     Historical Provider, MD   fluticasone (FLONASE) 50 MCG/ACT nasal spray 2 sprays by Each Nostril route daily    Historical Provider, MD   Garlic 163 MG CAPS Take by mouth daily    Historical Provider, MD   magnesium oxide (MAG-OX) 400 MG tablet Take 400 mg by mouth daily    Historical Provider, MD   calcium-vitamin D (OSCAL-500) 500-200 MG-UNIT per tablet Take 1 tablet by mouth daily    Historical Provider, MD   Potassium Gluconate 595 (99 K) MG TABS Take by mouth    Historical Provider, MD   Omega-3 Fatty Acids (FISH OIL) 1000 MG CAPS Take 3,000 mg by mouth 3 times daily    Historical Provider, MD   acetaminophen (TYLENOL 8 HOUR) 650 MG extended release tablet Take 1 tablet by mouth every 8 hours as needed for Pain 3/26/20   Christiana Velazquez MD   lidocaine 4 % external patch Place 1 patch onto the skin daily 3/27/20   Viji Ross MD   albuterol sulfate HFA (VENTOLIN HFA) 108 (90 Base) MCG/ACT inhaler Inhale 2 puffs into the lungs 4 times daily as needed for Wheezing 3/26/20   Viji Ross MD   aspirin 81 MG chewable tablet Take 1 tablet by mouth daily 3/24/20   Gregory Sexton MD   Multiple Vitamin (MULTIVITAMIN) tablet Take 1 tablet by mouth daily 3/24/20   Gregory Sexton MD   traMADol (ULTRAM) 50 MG tablet Take 1 tablet by mouth 3 times daily as needed for Pain  Patient taking differently: Take 50 mg by mouth 4 times daily as needed for Pain. 10/9/17   Camilo Coleman DO   docusate sodium (COLACE) 100 MG capsule Take 1 capsule by mouth 2 times daily 8/24/17   [facility], DO   Misc.  Devices (RAISED TOILET SEAT) MISC 1 Device by Does not apply route daily 8/24/17   Camilo Coleman DO   loratadine (CLARITIN) 10 MG tablet Take 10 mg by mouth daily    Historical Provider, MD   atorvastatin (LIPITOR) 40 MG tablet Take 40 mg by mouth daily  2/8/16   Historical Provider, MD       Current medications:    Current Facility-Administered Medications   Medication Dose Route Frequency Provider Last Rate Last Admin    [START ON 6/15/2022] lidocaine PF 1 % injection 1 mL  1 mL IntraDERmal Once PRN Kyler Rios MD        [START ON 6/15/2022] lactated ringers infusion   IntraVENous Continuous Kyler Rios MD 50 mL/hr at 06/14/22 1229 Restarted at 06/14/22 1254    sodium chloride flush 0.9 % injection 5-40 mL  5-40 mL IntraVENous 2 times per day Kyler Rios MD        sodium chloride flush 0.9 % injection 5-40 mL  5-40 mL IntraVENous PRN Kyler Rios MD        0.9 % sodium chloride infusion   IntraVENous PRN Kyler Rios MD        sodium chloride flush 0.9 % injection 5-40 mL  5-40 mL IntraVENous 2 times per day Ervin Lopez DO        sodium chloride flush 0.9 % injection 5-40 mL  5-40 mL IntraVENous PRN Gloria Torres DO        0.9 % sodium chloride infusion   IntraVENous PRN Gwendloyn Reek, DO        fentaNYL (SUBLIMAZE) injection 25 mcg  25 mcg IntraVENous Q5 Min PRN Gwendloyn Reek, DO        HYDROmorphone HCl PF (DILAUDID) injection 0.5 mg  0.5 mg IntraVENous Q5 Min PRN Gwendloyn Reek, DO        [facility] injection 4 mg  4 mg IntraVENous Once PRN Gwendloyn Reek, DO           Allergies:     Allergies   Allergen Reactions    Penicillins      Chills  Tolerates ceftriaxone       Problem List:    Patient Active Problem List   Diagnosis Code    Descending thoracic aortic aneurysm (HCC) I71.2    Essential hypertension I10    Hyperlipidemia E78.5    COPD (chronic obstructive pulmonary disease) ([facility]) J44.9    Tobacco abuse Z72.0    Normocytic anemia D64.9    Hip pain, left M25.552    Arthritis of right hip M16.11    ASHLEY (acute kidney injury) ([facility] [address] N17.9    Urine retention R33.9    Pure hypercholesterolemia E78.00    Shortness of breath R06.02    Acute respiratory failure with hypoxia and hypercapnia ([facility]) J96.01, J96.02    COPD with acute exacerbation ([facility]) J44.1    Acute pulmonary edema ([facility]) J81.0    NSTEMI (non-ST elevated myocardial infarction) ([facility]) I21.4    Shock circulatory ([facility]) R57.9    Thrombus of aorta ([facility]) I74.10    Lactic acidosis E87.2    Pneumonia of both upper lobes due to infectious organism J18.9    Respiratory arrest ([facility]) R09.2    Left carotid stenosis I65.22       Past Medical History:        Diagnosis Date    Arthritis     Chronic back pain     COPD (chronic obstructive pulmonary disease) ([facility])     Elevated troponin level 03/2020    Hyperlipidemia     Hypertension     MAYELA (obstructive sleep apnea)     Respiratory arrest ([facility] [address] 03/11/2020    Thrombus 03/2020    descending aortic mural thrombus    Wears partial dentures     UPPER AND LOWER       Past Surgical History:        Procedure Laterality Date    ABDOMINAL AORTIC ANEURYSM REPAIR, ENDOVASCULAR  06/29/2016 thoracic    CARDIAC CATHETERIZATION  2020    CAROTID ENDARTERECTOMY Left 2022    COLONOSCOPY      CORONARY ANGIOPLASTY WITH STENT PLACEMENT  2020    JOINT REPLACEMENT Left     HIP    JOINT REPLACEMENT Right 2017    ant. total hip    NERVE BLOCK Right 2017    right hip arthrogram injection depomedrol 80mg    TOTAL HIP ARTHROPLASTY Right 2017    HIP TOTAL ARTHROPLASTY ANTERIOR APPROACH - MEDACTA, C-ARM, MEDACTA TABLE, FASCIA ILIACA BLOCK, NSA=SPINAL VS GENERAL  performed by Darlyn Mcneill DO at Clinical Data History:    Social History     Tobacco Use    Smoking status: Former Smoker     Packs/day: 0.50     Years: 38.00     Pack years: 19.00     Quit date: 2020     Years since quittin.0    Smokeless tobacco: Never Used   Substance Use Topics    Alcohol use: Yes     Comment: socially                                Counseling given: Not Answered      Vital Signs (Current):   Vitals:    22 1037 22 1040 22 1522 22 1530   BP: (!) 158/88  122/67 139/65   Pulse: 84  87 87   Resp:    Temp: 97.9 °F (36.6 °C)  97.2 °F (36.2 °C)    TempSrc:   Temporal    SpO2: 96%  91% 92%   Weight:  192 lb (87.1 kg)     Height:  5' 1\" (1.549 m)                                                BP Readings from Last 3 Encounters:   22 139/65   22 (!) 145/89   22 (!) 142/74       NPO Status: Time of last liquid consumption:                         Time of last solid consumption:                         Date of last liquid consumption: 22                        Date of last solid food consumption: 22    BMI:   Wt Readings from Last 3 Encounters:   22 192 lb (87.1 kg)   22 192 lb (87.1 kg)   22 192 lb (87.1 kg)     Body mass index is 36.28 kg/m².     CBC:   Lab Results   Component Value Date    WBC 6.9 2022    RBC 4.32 2022    HGB 12.3 2022    HCT 40.6 2022    MCV 94.0 05/24/2022    RDW 15.0 05/24/2022     05/24/2022       CMP:   Lab Results   Component Value Date     05/24/2022    K 4.4 05/24/2022     05/24/2022    CO2 26 05/24/2022    BUN 18 05/24/2022    CREATININE 1.06 05/24/2022    GFRAA >60 05/24/2022    LABGLOM 51 05/24/2022    GLUCOSE 93 05/24/2022    PROT 6.3 03/11/2020    CALCIUM 9.2 05/24/2022    BILITOT 0.18 03/11/2020    ALKPHOS 89 03/11/2020    AST 78 03/11/2020    ALT 64 03/11/2020       POC Tests: No results for input(s): POCGLU, POCNA, POCK, POCCL, POCBUN, POCHEMO, POCHCT in the last 72 hours. Coags:   Lab Results   Component Value Date    PROTIME 12.9 05/18/2022    INR 1.0 05/18/2022    APTT 29.2 05/18/2022       HCG (If Applicable): No results found for: PREGTESTUR, PREGSERUM, HCG, HCGQUANT     ABGs: No results found for: PHART, PO2ART, YMN5PLM, ION9LLB, BEART, P0VXHHPV     Type & Screen (If Applicable):  No results found for: LABABO, LABRH    Drug/Infectious Status (If Applicable):  No results found for: HIV, HEPCAB    COVID-19 Screening (If Applicable):   Lab Results   Component Value Date    COVID19 Not Detected 11/30/2020           Anesthesia Evaluation  Patient summary reviewed and Nursing notes reviewed no history of anesthetic complications:   Airway: Mallampati: II  TM distance: >3 FB   Neck ROM: full  Mouth opening: > = 3 FB   Dental: normal exam         Pulmonary:normal exam    (+) COPD:  sleep apnea:                             Cardiovascular:  Exercise tolerance: no interval change,   (+) hypertension:, past MI:,       ECG reviewed    Rate: normal      Cleared by cardiology              Neuro/Psych:      (-) TIA and CVA           GI/Hepatic/Renal:        (-) GERD       Endo/Other:        (-) diabetes mellitus               Abdominal:             Vascular: Other Findings:           Anesthesia Plan      general     ASA 3       Induction: intravenous. arterial line  MIPS: Prophylactic antiemetics administered.   Anesthetic plan and risks discussed with patient. Plan discussed with CRNA.     Attending anesthesiologist reviewed and agrees with Preprocedure content                Avery Yung DO   6/14/2022

## 2022-06-14 NOTE — INTERVAL H&P NOTE
Interval H&P Note    Pt Name: Mindy Montez  MRN: [id number]  YOB: 1950  Date of evaluation: 6/14/2022      [x] I have reviewed in epic the H&P by TERRENCE Hidalgo CNP done in [facility] for an Interval History and Physical note. [x] I have examined  Mindy Montez , a 67 yo female with multiple comorbidities followed by PCP and specialist  She arrived for her scheduled LEFT CAROTID ENDARTERECTOMY by Mindy Koenig MD for DX LEFT CAROTID STENOSIS. The patient denies dizziness, lightheadedness, sudden unilateral extremity weakness, syncope, vision/speech changes or confusion. No fever, chills, wheezing, cough, increased SOB, chest pain, open sores or wounds. No dysuria, hematuria, urgency, frequency or abdominal pain. Per H&P by NAT Hidalgo CNP 5/18/22  \"Extensive history - COPD, obstructive sleep apnea with CPAP, hyperlipidemia, hypertension, aortic mural thrombus (2020), coronary artery disease with 2 stents to proximal and mid LCx, left ventricular hypertrophy, CKD IIIa, and respiratory arrest. Patient previously had PEA arrest in March 2020. She was given CPR and Atropine and intubated. Patient had NSTEMI, acute kidney injury, and pulmonary edema. Patient had cardiac catheterization with 2 stents placed. Patient follows with cardiologist Dr. Sylvester Gill and received cardiac clearance (refer to short chart). Patient has upcoming appointment with pulmonologist Dr. Abiola Fitch on 6/7/2022. Patient follows with nephrology Dr. Kyleigh Watts" Patient continues to have YORK Today she denies s chest pain, palpitations, dizziness. Cardiac Clearance 4/27/22 \"considered an intermediate cardiac risk acceptable for planned surgery\" by Dr Kelton Cameron.    Medical Clearance 5/3/22 by Sher Jasso CNP 5/3/22     Vital signs: BP (!) 158/88   Pulse 84   Temp 97.9 °F (36.6 °C)   Resp 16   Ht 5' 1\" (1.549 m)   Wt 192 lb (87.1 kg)   SpO2 96%   BMI 36.28 kg/m²     Allergies:  Penicillins    Medications:    Prior to Admission medications    Medication Sig Start Date End Date Taking?  Authorizing Provider   fluticasone-vilanterol (BREO ELLIPTA) 100-25 MCG/INH AEPB inhaler Inhale into the lungs daily    Historical Provider, MD   methocarbamol (ROBAXIN) 500 MG tablet Take 500 mg by mouth 4 times daily    Historical Provider, MD   atenolol (TENORMIN) 100 MG tablet Take 100 mg by mouth daily    Historical Provider, MD   amLODIPine (NORVASC) 5 MG tablet Take 5 mg by mouth daily    Historical Provider, MD   lisinopril (PRINIVIL;ZESTRIL) 20 MG tablet Take 20 mg by mouth daily    Historical Provider, MD   hydroCHLOROthiazide (HYDRODIURIL) 50 MG tablet Take 50 mg by mouth daily    Historical Provider, MD   alendronate (FOSAMAX) 70 MG tablet Take 70 mg by mouth every 7 days Sunday    Historical Provider, MD   fluticasone (FLONASE) 50 MCG/ACT nasal spray 2 sprays by Each Nostril route daily    Historical Provider, MD   Garlic 433 MG CAPS Take by mouth daily    Historical Provider, MD   magnesium oxide (MAG-OX) 400 MG tablet Take 400 mg by mouth daily    Historical Provider, MD   calcium-vitamin D (OSCAL-500) 500-200 MG-UNIT per tablet Take 1 tablet by mouth daily    Historical Provider, MD   Potassium Gluconate 595 (99 K) MG TABS Take by mouth    Historical Provider, MD   Omega-3 Fatty Acids (FISH OIL) 1000 MG CAPS Take 3,000 mg by mouth 3 times daily    Historical Provider, MD   acetaminophen (TYLENOL 8 HOUR) 650 MG extended release tablet Take 1 tablet by mouth every 8 hours as needed for Pain 3/26/20   Johnathan Akhtar MD   lidocaine 4 % external patch Place 1 patch onto the skin daily 3/27/20   Johnathan Akhtar MD   albuterol sulfate HFA (VENTOLIN HFA) 108 (90 Base) MCG/ACT inhaler Inhale 2 puffs into the lungs 4 times daily as needed for Wheezing 3/26/20   Johnathan Akhtar MD   aspirin 81 MG chewable tablet Take 1 tablet by mouth daily 3/24/20   Kandice Rubin MD   Multiple Vitamin (MULTIVITAMIN) tablet Take 1 tablet by mouth daily 3/24/20   Markos Alvarez MD   traMADol (ULTRAM) 50 MG tablet Take 1 tablet by mouth 3 times daily as needed for Pain  Patient taking differently: Take 50 mg by mouth 4 times daily as needed for Pain. 10/9/17   Camilo Edward DO   docusate sodium (COLACE) 100 MG capsule Take 1 capsule by mouth 2 times daily 8/24/17   [facility], DO   Misc. Devices (RAISED TOILET SEAT) [facility] 1 Device by Does not apply route daily 8/24/17   Camilo Edward DO   loratadine (CLARITIN) 10 MG tablet Take 10 mg by mouth daily    Historical Provider, MD   atorvastatin (LIPITOR) 40 MG tablet Take 40 mg by mouth daily  2/8/16   Historical Provider, MD         This is a 66 y.o. female who is pleasant, cooperative, alert and oriented x3, in no acute distress. Glasses  Partial upper denture Missing lower teeth     Heart: Heart sounds are normal.  HR 84  regular rate and rhythm without murmur, gallop or rub. Lungs: Normal respiratory effort with equal expansion, good air exchange, unlabored and clear to auscultation without wheezes or rales bilaterally   Abdomen: rotund, soft, nontender, nondistended with bowel sounds  Extremities/Neuro: Equal bilateral lower extremity strength  Cranial nerves II-XII grossly intact . Normal clear speech       Labs:  Recent Labs     05/24/22  1530 05/18/22  0911 05/18/22  0911   HGB 12.3   < > 12.9   HCT 40.6   < > 42.2   WBC 6.9   < > 7.8   MCV 94.0   < > 94.0      < > 284      < > 143   K 4.4   < > 4.6      < > 107   CO2 26   < > 27   BUN 18   < > 17   CREATININE 1.06*   < > 1.03*   GLUCOSE 93  --   --    INR  --   --  1.0   PROTIME  --   --  12.9   APTT  --   --  29.2    < > = values in this interval not displayed.      Time Patricia Gouty Orders]       Results     Component Value Units   Urinalysis with Reflex to Culture [[id number]] (Abnormal)    Collected: 06/14/22 1035    Updated: 06/14/22 1054    Specimen Source: Urine     Color, UA Yellow    Turbidity UA Clear    Glucose, Ur NEGATIVE    Bilirubin Urine NEGATIVE    Ketones, Urine NEGATIVE    Specific [place], UA 1.010    Urine Hgb NEGATIVE    pH, UA 6.0    Protein, UA NEGATIVE    Urobilinogen, Urine Normal    Nitrite, Urine NEGATIVE    Leukocyte Esterase, Urine MOD Abnormal    Microscopic Urinalysis [[id number]] (Abnormal)    Collected: 06/14/22 1035    Updated: 06/14/22 1054     -         WBC, UA 2 TO 5 /HPF    RBC, UA None /HPF    Epithelial Cells UA 2 TO 5 /HPF    Bacteria, UA RARE Abnormal      No results for input(s): COVID19 in the last 720 hours.     YOMI Peña CNP  Electronically signed 6/14/2022 at 11:34 AM

## 2022-06-14 NOTE — PROGRESS NOTES
iPly Cueva was ordered Fish oil. As per the [facility], herbals and certain dietary supplements will be discontinued. The herbal or dietary supplement may be continued after discharge from the hospital.     If you feel the patient needs to continue their home herbal therapy during the inpatient stay, the patient may bring an unopened bottle for verification and administration pursuant to our home medication use policy. Please contact the pharmacy with any questions or concerns. Thank you.   Teri Foreman, PharmD  Inpatient Pharmacist  6/14/2022 4:41 PM

## 2022-06-14 NOTE — PROGRESS NOTES
Pharmacy Note  You patient, Yuki Delgadillo, has an order for the bisphosphonate Alendronate(Fosamax)    Per [facility] approved policy, this maintenance medication is not continued while the patient is inpatient. Please resume this upon discharge if it remains appropriate.     Cullen Burton [facility], PharmD   6/14/2022  4:42 PM

## 2022-06-14 NOTE — BRIEF OP NOTE
Brief Postoperative Note      Patient: Tia Sommers  YOB: 1950  MRN: [id number]    Date of Procedure: 6/14/2022    Pre-Op Diagnosis: DX SEVERE 70-80% LEFT CAROTID STENOSIS    Post-Op Diagnosis: Same       Procedure(s):  LEFT CAROTID ENDARTERECTOMY TYPE 2 EVERSION WITH REIMPLANTATION OF THE LEFT CAROTID BULB    Surgeon(s):  Blessing Tobin MD    Assistant:  First Assistant: Cate Diaz RN    Anesthesia: General    Estimated Blood Loss (mL): less than 50     Complications: None    Specimens:   ID Type Source Tests Collected by Time Destination   A : LEFT CAROTID PLAQUE Tissue Carotid Arteries SURGICAL PATHOLOGY Blessing Tobin MD 6/14/2022 1346        Implants:  * No implants in log *      Drains: * No LDAs found *    Findings: 70-80% Lt carotid stenosis with mild tortuosity    Electronically signed by Blessing Tobin MD on 6/14/2022 at 3:00 PM

## 2022-06-14 NOTE — PROGRESS NOTES
NO TONGUE DEVIATION OR FACIAL ASYMMETRY PREOP BILATERAL HAND GRASPS EQUAL BILATERAL EXTENSION AND FLEXION OF FEET EQUAL PREOP. PHONE PLACED IN SUITCASE.  DENTURE CUP WITH DENTURE PLACED IN SUITCASE

## 2022-06-14 NOTE — ANESTHESIA POSTPROCEDURE EVALUATION
Department of Anesthesiology  Postprocedure Note    Patient: Luh Coppola  MRN: [id number]  YOB: 1950  Date of evaluation: 6/14/2022  Time:  3:46 PM     Procedure Summary     Date: 06/14/22 Room / Location: [facility] 01 / [facility] - INPATIENT    Anesthesia Start: 7725 Anesthesia Stop: 2587    Procedure: LEFT CAROTID ENDARTERECTOMY TYPE 2 EVERSION WITH REIMPLANTATION OF THE LEFT CAROTID BULB (Left Neck) Diagnosis: (DX LEFT CAROTID STENOSIS)    Surgeons: Mary Gallego MD Responsible Provider: Eva Millan DO    Anesthesia Type: general ASA Status: 3          Anesthesia Type: No value filed. Gabe Phase I: Gabe Score: 9    Gabe Phase II:      Last vitals: Reviewed and per EMR flowsheets.        Anesthesia Post Evaluation    Patient location during evaluation: PACU  Patient participation: complete - patient participated  Level of consciousness: awake and alert  Airway patency: patent  Nausea & Vomiting: no nausea and no vomiting  Complications: no  Cardiovascular status: hemodynamically stable  Respiratory status: acceptable  Hydration status: stable
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
## 2022-06-14 NOTE — FLOWSHEET NOTE
06/14/22 1600   Vital Signs   Heart Rate 90   Resp 22   /82   BP Location Right upper arm   MAP (Calculated) 100.67   Level of Consciousness Responds to voice (1)   Gabe Pre/Post Procedure   Oxygen 1   Oxygen Therapy   SpO2 94 %   O2 Device Nasal cannula   O2 Flow Rate (L/min) 4 L/min   Peripheral Vascular   RUE Sensation  Full sensation   LUE Sensation  Full sensation   Puncture Site Assessment 1   Location Jugular - left   Site Assessment No redness, drainage, swelling or hematoma   Hemostasis Intervention Other (comment)  (surgical glue)   Dressing Applied Other (Comment)  (island dressing)   Multiple puncture sites No     Patient arrived to room 2033 via bed post left carotid endart. Patient is alert and oriented and denies pain. Bed locked and placed in lowest position. Side rails up x2. Call light placed at the patient's bedside. Bedside report completed. Left carotid assessed. No redness, drainage, swelling, or hematoma present. Dressing remains clean, dry and intact. Pulses palpable. Vital signs obtained (see flowsheet). RN educated the patient on position orders and activity restrictions for recovery. Patient verbalizes understanding.

## 2022-06-15 VITALS
RESPIRATION RATE: 23 BRPM | TEMPERATURE: 97.5 F | DIASTOLIC BLOOD PRESSURE: 79 MMHG | HEART RATE: 61 BPM | WEIGHT: 192 LBS | SYSTOLIC BLOOD PRESSURE: 90 MMHG | HEIGHT: 61 IN | OXYGEN SATURATION: 91 % | BODY MASS INDEX: 36.25 KG/M2

## 2022-06-15 LAB
HCT VFR BLD CALC: 36.6 % (ref 36.3–47.1)
HEMOGLOBIN: 11.3 G/DL (ref 11.9–15.1)
MAGNESIUM: 2.1 MG/DL (ref 1.6–2.6)
MCH RBC QN AUTO: 28.5 PG (ref 25.2–33.5)
MCHC RBC AUTO-ENTMCNC: 30.9 G/DL (ref 28.4–34.8)
MCV RBC AUTO: 92.4 FL (ref 82.6–102.9)
NRBC AUTOMATED: 0 PER 100 WBC
PDW BLD-RTO: 14.7 % (ref 11.8–14.4)
PHOSPHORUS: 4.4 MG/DL (ref 2.6–4.5)
PLATELET # BLD: 258 K/UL (ref 138–453)
PMV BLD AUTO: 9.6 FL (ref 8.1–13.5)
RBC # BLD: 3.96 M/UL (ref 3.95–5.11)
SARS-COV-2, RAPID: NOT DETECTED
SPECIMEN DESCRIPTION: NORMAL
WBC # BLD: 9.2 K/UL (ref 3.5–11.3)

## 2022-06-15 PROCEDURE — 99232 SBSQ HOSP IP/OBS MODERATE 35: CPT | Performed by: NURSE PRACTITIONER

## 2022-06-15 PROCEDURE — 84100 ASSAY OF PHOSPHORUS: CPT

## 2022-06-15 PROCEDURE — 36415 COLL VENOUS BLD VENIPUNCTURE: CPT

## 2022-06-15 PROCEDURE — 6360000002 HC RX W HCPCS: Performed by: SURGERY

## 2022-06-15 PROCEDURE — 6370000000 HC RX 637 (ALT 250 FOR IP): Performed by: SURGERY

## 2022-06-15 PROCEDURE — 2580000003 HC RX 258: Performed by: SURGERY

## 2022-06-15 PROCEDURE — 83735 ASSAY OF MAGNESIUM: CPT

## 2022-06-15 PROCEDURE — 85027 COMPLETE CBC AUTOMATED: CPT

## 2022-06-15 PROCEDURE — 6370000000 HC RX 637 (ALT 250 FOR IP): Performed by: NURSE PRACTITIONER

## 2022-06-15 RX ORDER — FUROSEMIDE 20 MG/1
20 TABLET ORAL SEE ADMIN INSTRUCTIONS
COMMUNITY

## 2022-06-15 RX ORDER — ALENDRONATE SODIUM 35 MG/1
35 TABLET ORAL
COMMUNITY

## 2022-06-15 RX ADMIN — LISINOPRIL 20 MG: 20 TABLET ORAL at 08:00

## 2022-06-15 RX ADMIN — Medication 2 TABLET: at 08:00

## 2022-06-15 RX ADMIN — CEFAZOLIN SODIUM 2000 MG: 10 INJECTION, POWDER, FOR SOLUTION INTRAVENOUS at 06:00

## 2022-06-15 RX ADMIN — AMLODIPINE BESYLATE 5 MG: 5 TABLET ORAL at 08:00

## 2022-06-15 RX ADMIN — ATORVASTATIN CALCIUM 40 MG: 40 TABLET, FILM COATED ORAL at 08:00

## 2022-06-15 RX ADMIN — Medication 6 MG: at 00:44

## 2022-06-15 RX ADMIN — DOCUSATE SODIUM 100 MG: 100 CAPSULE, LIQUID FILLED ORAL at 08:00

## 2022-06-15 RX ADMIN — ATENOLOL 50 MG: 50 TABLET ORAL at 08:00

## 2022-06-15 RX ADMIN — FLUTICASONE FUROATE AND VILANTEROL 1 PUFF: 100; 25 POWDER RESPIRATORY (INHALATION) at 08:02

## 2022-06-15 RX ADMIN — TRAMADOL HYDROCHLORIDE 50 MG: 50 TABLET, COATED ORAL at 10:02

## 2022-06-15 RX ADMIN — HYDROCHLOROTHIAZIDE 50 MG: 25 TABLET ORAL at 08:10

## 2022-06-15 RX ADMIN — SODIUM CHLORIDE, PRESERVATIVE FREE 10 ML: 5 INJECTION INTRAVENOUS at 08:05

## 2022-06-15 RX ADMIN — MORPHINE SULFATE 4 MG: 4 INJECTION, SOLUTION INTRAMUSCULAR; INTRAVENOUS at 02:24

## 2022-06-15 RX ADMIN — FLUTICASONE PROPIONATE 2 SPRAY: 50 SPRAY, METERED NASAL at 08:02

## 2022-06-15 RX ADMIN — MORPHINE SULFATE 2 MG: 2 INJECTION, SOLUTION INTRAMUSCULAR; INTRAVENOUS at 08:05

## 2022-06-15 RX ADMIN — ASPIRIN 81 MG: 81 TABLET, COATED ORAL at 08:14

## 2022-06-15 RX ADMIN — Medication 400 MG: at 08:00

## 2022-06-15 RX ADMIN — MULTIVITAMIN TABLET 1 TABLET: TABLET at 08:00

## 2022-06-15 ASSESSMENT — PAIN DESCRIPTION - LOCATION
LOCATION: NECK;INCISION
LOCATION: INCISION;NECK
LOCATION: NECK

## 2022-06-15 ASSESSMENT — PAIN SCALES - GENERAL
PAINLEVEL_OUTOF10: 0
PAINLEVEL_OUTOF10: 4
PAINLEVEL_OUTOF10: 8
PAINLEVEL_OUTOF10: 7

## 2022-06-15 ASSESSMENT — PAIN - FUNCTIONAL ASSESSMENT
PAIN_FUNCTIONAL_ASSESSMENT: ACTIVITIES ARE NOT PREVENTED
PAIN_FUNCTIONAL_ASSESSMENT: ACTIVITIES ARE NOT PREVENTED

## 2022-06-15 ASSESSMENT — PAIN DESCRIPTION - ORIENTATION
ORIENTATION: LEFT

## 2022-06-15 ASSESSMENT — PAIN DESCRIPTION - PAIN TYPE: TYPE: ACUTE PAIN;SURGICAL PAIN

## 2022-06-15 ASSESSMENT — PAIN DESCRIPTION - DESCRIPTORS
DESCRIPTORS: ACHING;PATIENT UNABLE TO DESCRIBE
DESCRIPTORS: PRESSURE;SHARP

## 2022-06-15 ASSESSMENT — PAIN DESCRIPTION - FREQUENCY: FREQUENCY: CONTINUOUS

## 2022-06-15 NOTE — PROGRESS NOTES
VASCULAR SURGERY  PROGRESS NOTE  POST-OP CAROTID ENDARTERECTOMY    6/15/2022  10:08 AM     Jj Antonio    1950   [id number]        SUBJECTIVE:  Patient awake and alert. No complaints. Good pain control. Denies nausea. OBJECTIVE    Physical  VITALS:  /65   Pulse 72   Temp 97.5 °F (36.4 °C) (Temporal)   Resp 22   Ht 5' 1\" (1.549 m)   Wt 192 lb (87.1 kg)   SpO2 91%   BMI 36.28 kg/m²     CONSTITUTIONAL:  awake, alert, cooperative, no apparent distress and appears stated age  NECK: Incision clean and dry with no unexpected swelling. NEUROLOGIC:  Mental status unchanged. Motor and sensory function intact. Tongue midline. Data  Hemoglobin   Date/Time Value Ref Range Status   06/15/2022 03:43 AM 11.3 (L) 11.9 - 15.1 g/dL Final     Hematocrit   Date/Time Value Ref Range Status   06/15/2022 03:43 AM 36.6 36.3 - 47.1 % Final     Sodium   Date/Time Value Ref Range Status   05/24/2022 03:30  135 - 144 mmol/L Final     Potassium   Date/Time Value Ref Range Status   05/24/2022 03:30 PM 4.4 3.7 - 5.3 mmol/L Final     Chloride   Date/Time Value Ref Range Status   05/24/2022 03:30  98 - 107 mmol/L Final     CO2   Date/Time Value Ref Range Status   05/24/2022 03:30 PM 26 20 - 31 mmol/L Final     BUN   Date/Time Value Ref Range Status   05/24/2022 03:30 PM 18 8 - 23 mg/dL Final       ASSESSMENT AND PLAN    66 y.o. female doing well status post Left Carotid Endarterectomy       Plan home today. Follow-up in Office. Continue aspirin. Call for problems.     Electronically signed by Rsoalie Tanner MD on 6/15/2022 at 10:08 AM

## 2022-06-15 NOTE — PLAN OF CARE
Problem: Discharge Planning  Goal: Discharge to home or other facility with appropriate resources  Outcome: Progressing     Problem: Pain  Goal: Verbalizes/displays adequate comfort level or baseline comfort level  Outcome: Progressing     Problem: ABCDS Injury Assessment  Goal: Absence of physical injury  Outcome: Progressing 213492:1-3 Days;

## 2022-06-15 NOTE — FLOWSHEET NOTE
06/15/22 1146   Discharge Event   Discharged with Documented Belongings Yes   Departure Mode With spouse   Mobility at [employer]   Discharged to [address] [place] [address]   Time of Discharge [phone]

## 2022-06-15 NOTE — FLOWSHEET NOTE
[facility] NOTE    Room # 2033/2033-01   Name: Sueann Skiff              Reason for visit: Routine    I visited the patient. Admit Date & Time: 6/14/2022 10:11 AM    Assessment:  Sueann Skiff is a 66 y.o. female. Upon entering the room patient states well. Patient was busy with RN.  leaves prayer card for patient for possible follow up as needed. Intervention:   provided a ministry presence. Outcome:  Patient grateful for the visit. Plan:  Chaplains will remain available to offer spiritual and emotional support as needed. Electronically signed by Margarette Daniel.  Chaplain Zain, on 6/14/2022 at 8:51 PM.  Leonie       06/14/22 2049   Encounter Summary   Service Provided For: Patient not available   Referral/Consult From: Zandra   Last Encounter  06/14/22   Complexity of Encounter Low   Begin Time 2036   End Time  2038   Total Time Calculated 2 min   Encounter    Type Initial Screen/Assessment   Assessment/Intervention/Outcome   Assessment Calm   Intervention Prayer (assurance of)/[place];Sustaining Presence/Ministry of presence   Outcome Expressed Gratitude

## 2022-06-15 NOTE — PROGRESS NOTES
Transitions of Care Pharmacy Service   Medication Review    The patient's list of current home medications has been reviewed. Source(s) of information: spoke to patient and sure scripts     Based on information provided by the above source(s), I have updated the patient's home med list as described below. I changed or updated the following medications on the patient's home medication list:  Discontinued lidocaine 4 % external patch  alendronate (FOSAMAX) 70 MG tablet     Added furosemide (LASIX) 20 MG tablet  alendronate (FOSAMAX) 35 MG tablet     Adjusted   atorvastatin (LIPITOR) 40 MG tablet  docusate sodium (COLACE) 100 MG capsule  traMADol (ULTRAM) 50 MG tablet  Omega-3 Fatty Acids (FISH OIL) 1000 MG CAPS  Potassium Gluconate 595 (99 K) MG TABS  amLODIPine (NORVASC) 5 MG tablet     Other Notes Patient stated that she takes a 1/2 tablet of atenolol (TENORMIN) 100 MG tablet twice a day. Please feel free to call me with any questions about this encounter. Thank you. This note will be reviewed and co-signed by the Transitions of Care Pharmacist. The pharmacist will review inpatient orders and contact the physician about any discrepancies.     Paula Abdullahi, pharmacy technician  Transitions of Care Pharmacy Service  Phone:  [phone]  Fax: [phone]      Electronically signed by Paula Abdullahi on 6/15/2022 at 11:30 AM         Prior to Admission medications    Medication Sig   alendronate (FOSAMAX) 35 MG tablet Take 35 mg by mouth every 7 days On Sunday   furosemide (LASIX) 20 MG tablet Take 20 mg by mouth (See Admin Instructions) As needed for swelling or weight gain   fluticasone-vilanterol (BREO ELLIPTA) 100-25 MCG/INH AEPB inhaler Inhale into the lungs daily   methocarbamol (ROBAXIN) 500 MG tablet Take 500 mg by mouth 4 times daily as needed    atenolol (TENORMIN) 100 MG tablet Take 100 mg by mouth daily (1/2 tablet 2 times daily)   amLODIPine (NORVASC) 5 MG tablet Take 5 mg by mouth in the morning and at bedtime    lisinopril (PRINIVIL;ZESTRIL) 20 MG tablet Take 20 mg by mouth daily   hydroCHLOROthiazide (HYDRODIURIL) 50 MG tablet Take 50 mg by mouth daily   fluticasone (FLONASE) 50 MCG/ACT nasal spray 2 sprays by Each Nostril route daily as needed      Garlic 122 MG CAPS   Take by mouth daily   magnesium oxide (MAG-OX) 400 MG tablet Take 400 mg by mouth daily   calcium-vitamin D (OSCAL-500) 500-200 MG-UNIT per tablet Take 1 tablet by mouth daily   Potassium Gluconate 595 (99 K) MG TABS Take 1 tablet by mouth daily    Omega-3 Fatty Acids (FISH OIL) 1000 MG CAPS Take 1,000 mg by mouth daily    acetaminophen (TYLENOL 8 HOUR) 650 MG extended release tablet Take 1 tablet by mouth every 8 hours as needed for Pain   albuterol sulfate HFA (VENTOLIN HFA) 108 (90 Base) MCG/ACT inhaler Inhale 2 puffs into the lungs 4 times daily as needed for Wheezing   aspirin 81 MG chewable tablet Take 1 tablet by mouth daily   Multiple Vitamin (MULTIVITAMIN) tablet Take 1 tablet by mouth daily   traMADol (ULTRAM) 50 MG tablet Take 50 mg by mouth 4 times daily as needed for Pain.     docusate sodium (COLACE) 100 MG capsule Take 100 mg by mouth as needed for Constipation    loratadine (CLARITIN) 10 MG tablet Take 10 mg by mouth daily   atorvastatin (LIPITOR) 40 MG tablet Take 40 mg by mouth nightly

## 2022-06-15 NOTE — CARE COORDINATION
Case Management Initial Discharge Plan  Sueann Skiff,             Met with:patient to discuss discharge plans. Information verified: address, contacts, phone number, , insurance Yes  Insurance Provider: medicare  : No    Emergency Contact/Next of Kin name & number: Bryon/spouse   [phone]  Who are involved in patient's support system? spouse    PCP: Khoi Mcarthur MD  Date of last visit: 2 weeks ago      Discharge Planning    Living Arrangements:        Home has 1 stories  1 flight stairs to climb to get into front door, 0stairs to climb to reach second floor  Location of bedroom/bathroom in home main    Patient able to perform ADL's:Independent    Current Services (outpatient & in home) none  DME equipment: cane and CPAP  DME provider: unknown    Is patient receiving oral anticoagulation therapy? No    If indicated:   Physician managing anticoagulation treatment:   Where does patient obtain lab work for ATC treatment? Does patient have any issues/concerns obtaining medications? No  If yes, what are patient's concerns? Is there a preferred Pharmacy after hours or on weekends? Yes    If yes, which pharmacy? [facility] on [address] Assistance Needed:       Patient agreeable to home care: No  [place] choice provided:  n/a    Prior SNF/Rehab Placement and Facility: [facility]  Agreeable to SNF/Rehab: No  [place] of choice provided: n/a     Evaluation: no    Expected Discharge date:       Patient expects to be discharged to: If home: is the family and/or caregiver wiling & able to provide support at home? yes  Who will be providing this support?  Spouse and self    Follow Up Appointment: Best Day/ Time:      Transportation provider: spouse  Transportation arrangements needed for discharge: No    Readmission Risk              Risk of Unplanned Readmission:  14             Does patient have a readmission risk score greater than 14?: No  If yes, follow-up appointment must be made within 7 days of discharge. Goals of Care:       Educated patient on transitional options, provided freedom of choice and are agreeable with plan      Discharge Plan: Left carotid stenosis  Patient lives with spouse in a second floor duplex. I flight of stairs to enter. Declines any skilled needs. Independent  Uses a CPAP and a cane  Has a follow up appt with Dr. Cayla Julien in August.   Plan is home with spouse independently.           Electronically signed by Cayden Lucero RN on 6/15/22 at 11:00 AM EDT

## 2022-06-15 NOTE — PROGRESS NOTES
[facility]  Office: [address], DO, Sandra Decker, DO, Elia Miranda, DO, Mihai Mclean, DO, Marlin Fleming MD, John Serna MD, Steven Mar MD, Sanjeev Simon MD, Mary Smith MD, Sandra Wyatt MD, Kelly Randall MD, Dylan Gray, DO, Rita Hutchins MD,  Nathanial Apgar, DO, Uriel Cazares MD, Yolanda Fitzgerald MD, Nico Meneses MD, Cliff Good DO, Truman Loera MD, Tempie Mcburney, MD, Magnolia Caicedo DO, Wei Oneill MD, Teresa Barr, [facility], [facility], CNP, Aracely Mars, CNP, Jorge Hopson, CNP, Kae Dubin, CNP, Indy Diaz, CNP, WILFRID MinorC, Aiden Mensah, [facility], Christopher Mesa, CNP, Kary Muñiz, CNP, Samira Mendez, CNP, Anca Morfin, CNS, Maren Arnold, [facility], Tram Martinez, CNP, Darwin Garcia, CNP, Mckenzie Frye, [facility]    Progress Note    6/15/2022    8:16 AM    Name:   Jj Antonio  MRN:     [id number]     Acct:      [de-identified]   Room:      Day:  1  Admit Date:  2022 10:11 AM    PCP:   Carmela Martin MD  Code Status:  Full Code    Subjective:     C/C: carotid stenosis    Interval History Status: improved. Patient reports she is feeling good, she does have a small hematoma around the surgical site, airway/breathing intact, trachea is midline. She denies any Shortness of breath, difficulty breathing or dizziness. She only has mild pain/soreness with palpation. VSS     Brief History:   Per my partner   The patient is a 66 y.o. female who is admitted for surgical treatment of left carotid stenosis and underwent left carotid endarterectomy. She has past medical history of COPD, sleep apnea on CPAP, hypertension, hyperlipidemia, CAD with stents x2 in proximal and mid left circumflex, CKD stage III, thoracic aortic aneurysm  S/p endovascular repair in 2016 aortic mural thrombus in .   Patient has underlying history of COPD and follows pulmonology in clinic. She is not on chronic home O2. .  Review of Systems:     Constitutional:  negative for chills, fevers, sweats  Respiratory:  negative for cough, dyspnea on exertion, shortness of breath, wheezing  Cardiovascular:  negative for chest pain, chest pressure/discomfort, lower extremity edema, palpitations  Gastrointestinal:  negative for abdominal pain, constipation, diarrhea, nausea, vomiting  Neurological:  negative for dizziness, headache    Medications: Allergies: Allergies   Allergen Reactions    Penicillins      Chills  Tolerates ceftriaxone       Current Meds:   Scheduled Meds:    atorvastatin  40 mg Oral Daily    docusate sodium  100 mg Oral BID    multivitamin  1 tablet Oral Daily    lidocaine  1 patch TransDERmal Daily    magnesium oxide  400 mg Oral Daily    calcium carb-cholecalciferol  2 tablet Oral Daily    hydroCHLOROthiazide  50 mg Oral Daily    fluticasone  2 spray Each Nostril Daily    amLODIPine  5 mg Oral Daily    lisinopril  20 mg Oral Daily    sodium chloride flush  5-40 mL IntraVENous 2 times per day    aspirin  81 mg Oral Daily    fluticasone-vilanterol  1 puff Inhalation Daily    atenolol  50 mg Oral BID     Continuous Infusions:    lactated ringers 125 mL/hr at 06/14/22 1627    sodium chloride       PRN Meds: traMADol, acetaminophen, albuterol sulfate HFA, sodium chloride flush, sodium chloride, morphine **OR** morphine, methocarbamol, melatonin    Data:     Past Medical History:   has a past medical history of Arthritis, CAD (coronary artery disease), Chronic back pain, COPD (chronic obstructive pulmonary disease) ([facility] Elevated troponin level, Hyperlipidemia, Hypertension, MAYELA (obstructive sleep apnea), MAYELA (obstructive sleep apnea), Respiratory arrest ([facility] Thrombus, and Wears partial dentures. Social History:   reports that she quit smoking about 2 years ago. She has a 19.00 pack-year smoking history.  She has never used smokeless tobacco. She reports current alcohol use. She reports that she does not use drugs. Family History:   Family History   Problem Relation Age of Onset   Yuki COPD Mother     Prostate Cancer Father     Hypertension Father     Diabetes Maternal Grandmother        Vitals:  /75   Pulse 71   Temp 97.5 °F (36.4 °C) (Temporal)   Resp 20   Ht 5' 1\" (1.549 m)   Wt 192 lb (87.1 kg)   SpO2 95%   BMI 36.28 kg/m²   Temp (24hrs), Av.5 °F (36.4 °C), Min:97.2 °F (36.2 °C), Max:97.9 °F (36.6 °C)    No results for input(s): POCGLU in the last 72 hours. I/O (24Hr): Intake/Output Summary (Last 24 hours) at 6/15/2022 0841  Last data filed at 6/15/2022 2557  Gross per 24 hour   Intake 2004 ml   Output 350 ml   Net 1654 ml       Labs:  Hematology:  Recent Labs     06/15/22  0343   WBC 9.2   RBC 3.96   HGB 11.3*   HCT 36.6   MCV 92.4   MCH 28.5   MCHC 30.9   RDW 14.7*      MPV 9.6     Chemistry:  Recent Labs     06/15/22  0343   MG 2.1   PHOS 4.4   No results for input(s): PROT, LABALBU, LABA1C, K1OWDGL, J2SVYDA, FT4, TSH, AST, ALT, LDH, GGT, ALKPHOS, LABGGT, BILITOT, BILIDIR, AMMONIA, AMYLASE, LIPASE, LACTATE, CHOL, HDL, LDLCHOLESTEROL, CHOLHDLRATIO, TRIG, VLDL, IQR90RH, PHENYTOIN, PHENYF, URICACID, POCGLU in the last 72 hours. ABG:  Lab Results   Component Value Date    POCPH 7.451 2020    POCPCO2 44.0 2020    POCPO2 105.4 2020    POCHCO3 30.6 2020    NBEA NOT REPORTED 2020    PBEA 6 2020    FEC1YJL 32 2020    NCAQ0LYT 98 2020    FIO2 45.0 2020     Lab Results   Component Value Date/Time    SPECIAL NOT REPORTED 2020 09:36 PM    SPECIAL NOT REPORTED 2020 09:36 PM     Lab Results   Component Value Date/Time    CULTURE NORMAL RESPIRATORY JOHN LIGHT GROWTH 2020 05:29 PM       Radiology:  No results found.     Physical Examination:        General appearance:  alert, cooperative and no distress  Mental Status:  oriented to person, place and time and normal affect  Lungs:  clear to auscultation bilaterally, normal effort  Heart:  regular rate and rhythm, no murmur  Abdomen:  soft, nontender, nondistended, normal bowel sounds, no masses, hepatomegaly, splenomegaly  Extremities:  no edema, redness, tenderness in the calves  Skin:  no gross lesions, rashes, induration  Left neck incision with small stable hematoma, incision intact, no drainage noted   Assessment:        Hospital Problems           Last Modified POA    * (Principal) Left carotid stenosis (Chronic) 6/14/2022 Yes    Former smoker 6/14/2022 Yes    Essential hypertension 6/14/2022 Yes          Plan:        1. Monitor labs. Replace electrolytes as needed, H&H stable   2. Discontinue IV fluids   3. Post-op instructions provided  4. Hypertension controlled   5. Ok for discharge from our standpoint when ok with Primary   6.  Plan discussed with patient and Mayelin Dale, APRN - NP  6/15/2022  8:41 AM

## 2022-06-15 NOTE — PROGRESS NOTES
Lab called with negative results of Rule out Covid swab (rapid), pt had been swabbed to utilize home CPAP machine.

## 2022-06-15 NOTE — DISCHARGE SUMMARY
VASCULAR SURGERY   DISCHARGE SUMMARY      Patient Identification  Thelda Cabot is a 66 y.o. female. :  1950  Admit Date:  2022    Discharge date:   No discharge date for patient encounter. Disposition: home    Discharge Diagnoses:   Patient Active Problem List   Diagnosis    Descending thoracic aortic aneurysm ([facility]    Essential hypertension    Hyperlipidemia    COPD (chronic obstructive pulmonary disease) (HCC)    Normocytic anemia    Hip pain, left    Arthritis of right hip    [facility]) [facility]    Urine retention    Pure hypercholesterolemia    Shortness of breath    COPD with acute exacerbation ([facility]    Acute pulmonary edema (HCC)    NSTEMI (non-ST elevated myocardial infarction) ([facility]    Shock circulatory ([facility])    Thrombus of aorta ([facility])    Lactic acidosis    Pneumonia of both upper lobes due to infectious organism    Respiratory arrest ([facility]    Left carotid stenosis    Former smoker         Consults: IM    Surgery: Left type II eversion carotid endarterectomy  Reimplantation left carotid bulb    Patient Instructions: Activity: no heavy lifting, pushing, pulling for 6 weeks, no driving for 2 weeks or while on analgesics  Diet: As tolerated  Follow-up with Jasmine Main MD in 4 weeks. See pre-printed instructions in chart and given to patient upon discharge. Discharge Medications:      Medication List      CHANGE how you take these medications     traMADol 50 MG tablet; Commonly known as: Ultram; Take 1 tablet by mouth   3 times daily as needed for Pain; What changed: when to take this     CONTINUE taking these medications     acetaminophen 650 MG extended release tablet; Commonly known as: Tylenol   8 Hour; Take 1 tablet by mouth every 8 hours as needed for Pain   albuterol sulfate  (90 Base) MCG/ACT inhaler; Commonly known as:   Ventolin HFA;  Inhale 2 puffs into the lungs 4 times daily as needed for   Wheezing   alendronate 70 MG tablet; Commonly known as: FOSAMAX   amLODIPine 5 MG tablet; Commonly known as: NORVASC   aspirin 81 MG chewable tablet; Take 1 tablet by mouth daily   atenolol 100 MG tablet; Commonly known as: TENORMIN   atorvastatin 40 MG tablet; Commonly known as: LIPITOR   Breo Ellipta 100-25 MCG/INH Aepb inhaler; Generic drug:   fluticasone-vilanterol   calcium-vitamin D 500-200 MG-UNIT per tablet; Commonly known as:   OSCAL-500   Claritin 10 MG tablet; Generic drug: loratadine   docusate sodium 100 MG capsule; Commonly known as: Colace; Take 1   capsule by mouth 2 times daily   fish oil 1000 MG Caps   fluticasone 50 MCG/ACT nasal spray; Commonly known as: FLONASE   Garlic 250 MG Caps   hydroCHLOROthiazide 50 MG tablet; Commonly known as: HYDRODIURIL   lisinopril 20 MG tablet; Commonly known as: PRINIVIL;ZESTRIL   magnesium oxide 400 MG tablet; Commonly known as: MAG-OX   methocarbamol 500 MG tablet; Commonly known as: ROBAXIN   multivitamin tablet; Take 1 tablet by mouth daily   Potassium Gluconate 595 (99 K) MG Tabs   Raised Toilet Seat Misc; 1 Device by Does not apply route daily          HPI and Hospital Course: 67-year-old female underwent a left carotid endarterectomy without incident. Normal postoperative course. She was discharged home on postoperative day #1 in good condition. Instructed to follow-up in the office in 4 weeks.         Cammy Campuzano MD MD FACS

## 2022-06-15 NOTE — OP NOTE
[id number] [facility] [address]                [address] [place] [address]                                OPERATIVE REPORT    PATIENT NAME: Albert Landers                    :        1950  MED REC NO:   [id number]                             ROOM:  ACCOUNT NO:   [de-identified]                           ADMIT DATE: 2022  PROVIDER:     Ezekiel Saucedo MD    DATE OF PROCEDURE:  2022    PREOPERATIVE DIAGNOSIS:  Severe 70-80% left distal common and proximal  internal carotid artery stenosis. POSTOPERATIVE DIAGNOSIS:  Severe 70-80% left distal common and proximal  internal carotid artery stenosis. OPERATION PERFORMED:  1. Left type 2 eversion carotid endarterectomy. 2.  Reimplantation of left carotid bulb. SURGEON:  Ezekiel Saucedo MD    ANESTHESIA:  General endotracheal.    ESTIMATED BLOOD LOSS:  50 mL. COMPLICATIONS:  None. OPERATIVE INDICATIONS:  The patient is a 65-year-old female, who  presents with severe bottleneck stenosis at the left distal common and  proximal internal carotid artery. At this time, she is being taken to  the operating room for elective left carotid endarterectomy. OPERATIVE TECHNIQUE:  After informed consent had been obtained, the  patient was brought to the operating room, placed in supine position,  and general endotracheal anesthesia was induced. Left neck was then  prepped and draped in the usual sterile fashion. Oblique incision was  then made in the left neck and subcutaneous tissue was sharply taken  down. Bleeding points being controlled with cautery. The platysma was  divided and the anterior border of sternocleidomastoid was dissected  free. Internal jugular vein was readily identified and common facial  vein was suture ligated with 3-0 Vicryl and divided. Carotid  bifurcation was isolated and common internal and external carotid  arteries were circumferentially dissected and vessels placed around  them. The superior thyroid artery originated somewhat high and  therefore was not encircled. At this point, the patient was  intravenously heparinized with 8000 units of aqueous heparin. The  external carotid was clamped with a Kerri followed by the internal  carotid artery. Common carotid was clamped with a Quinones clamp. With  some tortuosity noted within the common carotid system, I therefore  elected to perform a type 2 eversion endarterectomy. The carotid bulb  was then sharply amputated from the common carotid in an oblique fashion  and the area was spatulated out the internal carotid and opening the  common carotid medially. There was extensive calcific plaque present at  the bifurcation of proximal internal carotid artery. Plaque was then  everted from the bifurcation in the standard fashion with excellent  distal taper points about the internal and external carotid arteries. Vessels irrigated with heparinized saline and small debris fragments  were removed. A 3 x 5 mm shunt was placed and flow reestablished to the  brain. Next, the plaque was endarterectomized from the common carotid  with an excellent distal taper point. The edges of the common carotid  and bulb were then spatulated and the bulb was brought down and  reimplanted to the common carotid using running 6-0 Prolene suture. Prior to completing the closure, the shunt was clamped and removed. Carotid was irrigated with heparinized saline and the closure completed. Flow was reestablished first to external carotid followed by the  internal carotid artery. Thrombin and Gelfoam was placed at the closure  site. Doppler was used to interrogate both the internal and external  carotid arteries and excellent phasic flow was noted. 20 mg of  protamine was given intravenously. Wound was then irrigated with  bacitracin antibiotic solution and the irrigation evacuated. Suture  line was inspected and found to be hemostatic.   Further irrigation was  carried out and the platysma was reapproximated using running 3-0 Vicryl  suture followed by closure of skin with interrupted 4-0 Monocryl  subcuticular suture. Dermabond was applied followed by a sterile  dressing. The patient tolerated the procedure well and was awakened in  the operating room, moving all four extremities with the tongue in  midline. She was transferred to the recovery room in satisfactory  condition. Sponge, instrument, and needle counts were correct at the  conclusion of operation.         Davon Jacob MD    D: 06/14/2022 15:34:50       T: 06/14/2022 15:39:22     [id number]  Job#: [id number]     Doc#: [id number]    CC:  MD Quan Griggs MD

## 2022-06-16 LAB — SURGICAL PATHOLOGY REPORT: NORMAL

## 2022-08-03 ENCOUNTER — HOSPITAL ENCOUNTER (OUTPATIENT)
Dept: MAMMOGRAPHY | Age: 72
Discharge: HOME | End: 2022-08-05
Payer: MEDICARE

## 2022-08-03 DIAGNOSIS — Z12.31 ENCOUNTER FOR SCREENING MAMMOGRAM FOR MALIGNANT NEOPLASM OF BREAST: ICD-10-CM

## 2022-08-03 PROCEDURE — 77063 BREAST TOMOSYNTHESIS BI: CPT

## 2023-12-14 ENCOUNTER — APPOINTMENT (OUTPATIENT)
Dept: CT IMAGING | Age: 73
End: 2023-12-14
Payer: MEDICARE

## 2023-12-14 ENCOUNTER — HOSPITAL ENCOUNTER (EMERGENCY)
Age: 73
Discharge: ANOTHER HOSPITAL | End: 2023-12-14
Attending: EMERGENCY MEDICINE
Payer: MEDICARE

## 2023-12-14 ENCOUNTER — APPOINTMENT (OUTPATIENT)
Dept: GENERAL RADIOLOGY | Age: 73
End: 2023-12-14
Payer: MEDICARE

## 2023-12-14 ENCOUNTER — HOSPITAL ENCOUNTER (INPATIENT)
Age: 73
LOS: 1 days | Discharge: HOME | End: 2023-12-15
Attending: EMERGENCY MEDICINE | Admitting: SURGERY
Payer: MEDICARE

## 2023-12-14 VITALS
HEART RATE: 60 BPM | RESPIRATION RATE: 16 BRPM | DIASTOLIC BLOOD PRESSURE: 62 MMHG | HEIGHT: 61 IN | WEIGHT: 196 LBS | SYSTOLIC BLOOD PRESSURE: 137 MMHG | TEMPERATURE: 98.3 F | BODY MASS INDEX: 37 KG/M2 | OXYGEN SATURATION: 94 %

## 2023-12-14 DIAGNOSIS — I62.00 SUBDURAL HEMORRHAGE (HCC): Primary | ICD-10-CM

## 2023-12-14 DIAGNOSIS — S06.5XAA SDH (SUBDURAL HEMATOMA) (HCC): Primary | ICD-10-CM

## 2023-12-14 DIAGNOSIS — S02.2XXA CLOSED FRACTURE OF NASAL BONE, INITIAL ENCOUNTER: ICD-10-CM

## 2023-12-14 LAB
ABO + RH BLD: NORMAL
ALBUMIN SERPL-MCNC: 4 G/DL (ref 3.5–5.2)
ANION GAP SERPL CALCULATED.3IONS-SCNC: 9 MMOL/L (ref 9–17)
ARM BAND NUMBER: NORMAL
BASOPHILS # BLD: 0.05 K/UL (ref 0–0.2)
BASOPHILS NFR BLD: 1 % (ref 0–2)
BLOOD BANK SAMPLE EXPIRATION: NORMAL
BLOOD GROUP ANTIBODIES SERPL: NEGATIVE
BUN SERPL-MCNC: 18 MG/DL (ref 8–23)
BUN/CREAT SERPL: 18 (ref 9–20)
CALCIUM SERPL-MCNC: 9.5 MG/DL (ref 8.6–10.4)
CHLORIDE SERPL-SCNC: 105 MMOL/L (ref 98–107)
CO2 SERPL-SCNC: 26 MMOL/L (ref 20–31)
CREAT SERPL-MCNC: 1 MG/DL (ref 0.5–0.9)
EOSINOPHIL # BLD: 0.1 K/UL (ref 0–0.44)
EOSINOPHILS RELATIVE PERCENT: 1 % (ref 1–4)
ERYTHROCYTE [DISTWIDTH] IN BLOOD BY AUTOMATED COUNT: 13.8 % (ref 11.8–14.4)
EST. AVERAGE GLUCOSE BLD GHB EST-MCNC: 120 MG/DL
GFR SERPL CREATININE-BSD FRML MDRD: 59 ML/MIN/1.73M2
GLUCOSE SERPL-MCNC: 93 MG/DL (ref 70–99)
HBA1C MFR BLD: 5.8 % (ref 4–6)
HCT VFR BLD AUTO: 43.4 % (ref 36.3–47.1)
HGB BLD-MCNC: 13.7 G/DL (ref 11.9–15.1)
IMM GRANULOCYTES # BLD AUTO: 0.03 K/UL (ref 0–0.3)
IMM GRANULOCYTES NFR BLD: 0 %
INR PPP: 1.1
LYMPHOCYTES NFR BLD: 2.99 K/UL (ref 1.1–3.7)
LYMPHOCYTES RELATIVE PERCENT: 28 % (ref 24–43)
MCH RBC QN AUTO: 30 PG (ref 25.2–33.5)
MCHC RBC AUTO-ENTMCNC: 31.6 G/DL (ref 28.4–34.8)
MCV RBC AUTO: 95 FL (ref 82.6–102.9)
MONOCYTES NFR BLD: 0.71 K/UL (ref 0.1–1.2)
MONOCYTES NFR BLD: 7 % (ref 3–12)
NEUTROPHILS NFR BLD: 63 % (ref 36–65)
NEUTS SEG NFR BLD: 6.68 K/UL (ref 1.5–8.1)
NRBC BLD-RTO: 0 PER 100 WBC
PARTIAL THROMBOPLASTIN TIME: 27.3 SEC (ref 23.9–33.8)
PLATELET # BLD AUTO: 230 K/UL (ref 138–453)
PMV BLD AUTO: 9.6 FL (ref 8.1–13.5)
POTASSIUM SERPL-SCNC: 4 MMOL/L (ref 3.7–5.3)
PROTHROMBIN TIME: 13.6 SEC (ref 11.5–14.2)
RBC # BLD AUTO: 4.57 M/UL (ref 3.95–5.11)
SODIUM SERPL-SCNC: 140 MMOL/L (ref 135–144)
T4 FREE SERPL-MCNC: 1.5 NG/DL (ref 0.9–1.7)
TROPONIN I SERPL HS-MCNC: 6 NG/L (ref 0–14)
TSH SERPL DL<=0.05 MIU/L-ACNC: 0.52 UIU/ML (ref 0.3–5)
WBC OTHER # BLD: 10.6 K/UL (ref 3.5–11.3)

## 2023-12-14 PROCEDURE — 80048 BASIC METABOLIC PNL TOTAL CA: CPT

## 2023-12-14 PROCEDURE — 71045 X-RAY EXAM CHEST 1 VIEW: CPT

## 2023-12-14 PROCEDURE — 99285 EMERGENCY DEPT VISIT HI MDM: CPT

## 2023-12-14 PROCEDURE — 93005 ELECTROCARDIOGRAM TRACING: CPT

## 2023-12-14 PROCEDURE — 2580000003 HC RX 258

## 2023-12-14 PROCEDURE — 82040 ASSAY OF SERUM ALBUMIN: CPT

## 2023-12-14 PROCEDURE — 84443 ASSAY THYROID STIM HORMONE: CPT

## 2023-12-14 PROCEDURE — 84439 ASSAY OF FREE THYROXINE: CPT

## 2023-12-14 PROCEDURE — 72170 X-RAY EXAM OF PELVIS: CPT

## 2023-12-14 PROCEDURE — 6370000000 HC RX 637 (ALT 250 FOR IP): Performed by: EMERGENCY MEDICINE

## 2023-12-14 PROCEDURE — 6370000000 HC RX 637 (ALT 250 FOR IP)

## 2023-12-14 PROCEDURE — 70450 CT HEAD/BRAIN W/O DYE: CPT

## 2023-12-14 PROCEDURE — 84484 ASSAY OF TROPONIN QUANT: CPT

## 2023-12-14 PROCEDURE — 85025 COMPLETE CBC W/AUTO DIFF WBC: CPT

## 2023-12-14 PROCEDURE — 83036 HEMOGLOBIN GLYCOSYLATED A1C: CPT

## 2023-12-14 PROCEDURE — 6360000002 HC RX W HCPCS

## 2023-12-14 PROCEDURE — 96374 THER/PROPH/DIAG INJ IV PUSH: CPT

## 2023-12-14 PROCEDURE — 72125 CT NECK SPINE W/O DYE: CPT

## 2023-12-14 PROCEDURE — 6370000000 HC RX 637 (ALT 250 FOR IP): Performed by: STUDENT IN AN ORGANIZED HEALTH CARE EDUCATION/TRAINING PROGRAM

## 2023-12-14 PROCEDURE — 70486 CT MAXILLOFACIAL W/O DYE: CPT

## 2023-12-14 PROCEDURE — 85610 PROTHROMBIN TIME: CPT

## 2023-12-14 PROCEDURE — 82607 VITAMIN B-12: CPT

## 2023-12-14 PROCEDURE — 86900 BLOOD TYPING SEROLOGIC ABO: CPT

## 2023-12-14 PROCEDURE — 82306 VITAMIN D 25 HYDROXY: CPT

## 2023-12-14 PROCEDURE — 86850 RBC ANTIBODY SCREEN: CPT

## 2023-12-14 PROCEDURE — 86901 BLOOD TYPING SEROLOGIC RH(D): CPT

## 2023-12-14 PROCEDURE — 85730 THROMBOPLASTIN TIME PARTIAL: CPT

## 2023-12-14 PROCEDURE — 1200000000 HC SEMI PRIVATE

## 2023-12-14 RX ORDER — ONDANSETRON 2 MG/ML
4 INJECTION INTRAMUSCULAR; INTRAVENOUS EVERY 6 HOURS PRN
Status: DISCONTINUED | OUTPATIENT
Start: 2023-12-14 | End: 2023-12-15 | Stop reason: HOSPADM

## 2023-12-14 RX ORDER — FLUTICASONE PROPIONATE 50 MCG
2 SPRAY, SUSPENSION (ML) NASAL DAILY
Status: DISCONTINUED | OUTPATIENT
Start: 2023-12-15 | End: 2023-12-15 | Stop reason: HOSPADM

## 2023-12-14 RX ORDER — SODIUM CHLORIDE 0.9 % (FLUSH) 0.9 %
5-40 SYRINGE (ML) INJECTION EVERY 12 HOURS SCHEDULED
Status: DISCONTINUED | OUTPATIENT
Start: 2023-12-14 | End: 2023-12-15 | Stop reason: HOSPADM

## 2023-12-14 RX ORDER — IPRATROPIUM BROMIDE AND ALBUTEROL SULFATE 2.5; .5 MG/3ML; MG/3ML
1 SOLUTION RESPIRATORY (INHALATION) EVERY 4 HOURS PRN
Status: DISCONTINUED | OUTPATIENT
Start: 2023-12-14 | End: 2023-12-15 | Stop reason: HOSPADM

## 2023-12-14 RX ORDER — ONDANSETRON 4 MG/1
4 TABLET, ORALLY DISINTEGRATING ORAL EVERY 8 HOURS PRN
Status: DISCONTINUED | OUTPATIENT
Start: 2023-12-14 | End: 2023-12-15 | Stop reason: HOSPADM

## 2023-12-14 RX ORDER — ATENOLOL 50 MG/1
100 TABLET ORAL DAILY
Status: DISCONTINUED | OUTPATIENT
Start: 2023-12-15 | End: 2023-12-15 | Stop reason: HOSPADM

## 2023-12-14 RX ORDER — FUROSEMIDE 20 MG/1
20 TABLET ORAL SEE ADMIN INSTRUCTIONS
Status: DISCONTINUED | OUTPATIENT
Start: 2023-12-14 | End: 2023-12-15 | Stop reason: HOSPADM

## 2023-12-14 RX ORDER — LANOLIN ALCOHOL/MO/W.PET/CERES
400 CREAM (GRAM) TOPICAL DAILY
Status: DISCONTINUED | OUTPATIENT
Start: 2023-12-15 | End: 2023-12-15 | Stop reason: HOSPADM

## 2023-12-14 RX ORDER — ACETAMINOPHEN 500 MG
1000 TABLET ORAL ONCE
Status: COMPLETED | OUTPATIENT
Start: 2023-12-14 | End: 2023-12-14

## 2023-12-14 RX ORDER — SENNA AND DOCUSATE SODIUM 50; 8.6 MG/1; MG/1
1 TABLET, FILM COATED ORAL 2 TIMES DAILY
Status: DISCONTINUED | OUTPATIENT
Start: 2023-12-14 | End: 2023-12-15 | Stop reason: HOSPADM

## 2023-12-14 RX ORDER — CETIRIZINE HYDROCHLORIDE 10 MG/1
10 TABLET ORAL DAILY
Status: DISCONTINUED | OUTPATIENT
Start: 2023-12-15 | End: 2023-12-15 | Stop reason: HOSPADM

## 2023-12-14 RX ORDER — HYDRALAZINE HYDROCHLORIDE 20 MG/ML
10 INJECTION INTRAMUSCULAR; INTRAVENOUS EVERY 6 HOURS PRN
Status: DISCONTINUED | OUTPATIENT
Start: 2023-12-14 | End: 2023-12-15 | Stop reason: HOSPADM

## 2023-12-14 RX ORDER — METOPROLOL TARTRATE 1 MG/ML
INJECTION, SOLUTION INTRAVENOUS
Status: DISCONTINUED
Start: 2023-12-14 | End: 2023-12-14 | Stop reason: WASHOUT

## 2023-12-14 RX ORDER — AMLODIPINE BESYLATE 5 MG/1
5 TABLET ORAL 2 TIMES DAILY
Status: DISCONTINUED | OUTPATIENT
Start: 2023-12-14 | End: 2023-12-15 | Stop reason: HOSPADM

## 2023-12-14 RX ORDER — ATORVASTATIN CALCIUM 40 MG/1
40 TABLET, FILM COATED ORAL NIGHTLY
Status: DISCONTINUED | OUTPATIENT
Start: 2023-12-14 | End: 2023-12-15 | Stop reason: HOSPADM

## 2023-12-14 RX ORDER — HYDROCHLOROTHIAZIDE 50 MG/1
50 TABLET ORAL DAILY
Status: DISCONTINUED | OUTPATIENT
Start: 2023-12-15 | End: 2023-12-15 | Stop reason: HOSPADM

## 2023-12-14 RX ORDER — BUDESONIDE AND FORMOTEROL FUMARATE DIHYDRATE 80; 4.5 UG/1; UG/1
2 AEROSOL RESPIRATORY (INHALATION)
Status: DISCONTINUED | OUTPATIENT
Start: 2023-12-14 | End: 2023-12-15 | Stop reason: HOSPADM

## 2023-12-14 RX ORDER — SODIUM CHLORIDE 0.9 % (FLUSH) 0.9 %
5-40 SYRINGE (ML) INJECTION PRN
Status: DISCONTINUED | OUTPATIENT
Start: 2023-12-14 | End: 2023-12-15 | Stop reason: HOSPADM

## 2023-12-14 RX ORDER — POLYETHYLENE GLYCOL 3350 17 G/17G
17 POWDER, FOR SOLUTION ORAL DAILY
Status: DISCONTINUED | OUTPATIENT
Start: 2023-12-15 | End: 2023-12-15 | Stop reason: HOSPADM

## 2023-12-14 RX ORDER — LISINOPRIL 20 MG/1
20 TABLET ORAL DAILY
Status: DISCONTINUED | OUTPATIENT
Start: 2023-12-15 | End: 2023-12-15 | Stop reason: HOSPADM

## 2023-12-14 RX ORDER — CALCIUM CARBONATE/VITAMIN D3 600 MG-10
1 TABLET ORAL DAILY
Status: DISCONTINUED | OUTPATIENT
Start: 2023-12-15 | End: 2023-12-15 | Stop reason: HOSPADM

## 2023-12-14 RX ORDER — LABETALOL HYDROCHLORIDE 5 MG/ML
10 INJECTION, SOLUTION INTRAVENOUS ONCE
Status: COMPLETED | OUTPATIENT
Start: 2023-12-14 | End: 2023-12-14

## 2023-12-14 RX ORDER — SODIUM CHLORIDE 9 MG/ML
INJECTION, SOLUTION INTRAVENOUS PRN
Status: DISCONTINUED | OUTPATIENT
Start: 2023-12-14 | End: 2023-12-15 | Stop reason: HOSPADM

## 2023-12-14 RX ORDER — ACETAMINOPHEN 325 MG/1
650 TABLET ORAL ONCE
Status: COMPLETED | OUTPATIENT
Start: 2023-12-14 | End: 2023-12-14

## 2023-12-14 RX ORDER — GINSENG 100 MG
CAPSULE ORAL 3 TIMES DAILY
Status: DISCONTINUED | OUTPATIENT
Start: 2023-12-14 | End: 2023-12-15 | Stop reason: HOSPADM

## 2023-12-14 RX ORDER — ACETAMINOPHEN 500 MG
1000 TABLET ORAL EVERY 8 HOURS SCHEDULED
Status: DISCONTINUED | OUTPATIENT
Start: 2023-12-14 | End: 2023-12-15 | Stop reason: HOSPADM

## 2023-12-14 RX ORDER — ALBUTEROL SULFATE 90 UG/1
2 AEROSOL, METERED RESPIRATORY (INHALATION) 4 TIMES DAILY PRN
Status: DISCONTINUED | OUTPATIENT
Start: 2023-12-14 | End: 2023-12-15 | Stop reason: HOSPADM

## 2023-12-14 RX ORDER — LABETALOL HYDROCHLORIDE 5 MG/ML
INJECTION, SOLUTION INTRAVENOUS
Status: COMPLETED
Start: 2023-12-14 | End: 2023-12-14

## 2023-12-14 RX ADMIN — ATORVASTATIN CALCIUM 40 MG: 80 TABLET, FILM COATED ORAL at 22:33

## 2023-12-14 RX ADMIN — AMLODIPINE BESYLATE 5 MG: 5 TABLET ORAL at 22:33

## 2023-12-14 RX ADMIN — LABETALOL HYDROCHLORIDE 10 MG: 5 INJECTION, SOLUTION INTRAVENOUS at 21:07

## 2023-12-14 RX ADMIN — ACETAMINOPHEN 1000 MG: 500 TABLET ORAL at 21:06

## 2023-12-14 RX ADMIN — Medication 10 MG: at 21:07

## 2023-12-14 RX ADMIN — SODIUM CHLORIDE, PRESERVATIVE FREE 10 ML: 5 INJECTION INTRAVENOUS at 23:44

## 2023-12-14 RX ADMIN — ACETAMINOPHEN 650 MG: 325 TABLET ORAL at 16:07

## 2023-12-14 RX ADMIN — DOCUSATE SODIUM 50 MG AND SENNOSIDES 8.6 MG 1 TABLET: 8.6; 5 TABLET, FILM COATED ORAL at 23:44

## 2023-12-14 ASSESSMENT — PAIN - FUNCTIONAL ASSESSMENT: PAIN_FUNCTIONAL_ASSESSMENT: 0-10

## 2023-12-14 ASSESSMENT — PAIN SCALES - GENERAL
PAINLEVEL_OUTOF10: 3
PAINLEVEL_OUTOF10: 8
PAINLEVEL_OUTOF10: 3

## 2023-12-15 VITALS
HEIGHT: 61 IN | OXYGEN SATURATION: 93 % | BODY MASS INDEX: 33.76 KG/M2 | WEIGHT: 178.79 LBS | SYSTOLIC BLOOD PRESSURE: 145 MMHG | RESPIRATION RATE: 22 BRPM | TEMPERATURE: 98 F | HEART RATE: 79 BPM | DIASTOLIC BLOOD PRESSURE: 75 MMHG

## 2023-12-15 PROBLEM — S02.2XXA CLOSED FRACTURE OF NASAL BONE: Status: ACTIVE | Noted: 2023-12-15

## 2023-12-15 LAB
25(OH)D3 SERPL-MCNC: 50 NG/ML
ANION GAP SERPL CALCULATED.3IONS-SCNC: 13 MMOL/L (ref 9–17)
BACTERIA URNS QL MICRO: ABNORMAL
BASOPHILS # BLD: 0.05 K/UL (ref 0–0.2)
BASOPHILS NFR BLD: 1 % (ref 0–2)
BILIRUB UR QL STRIP: NEGATIVE
BUN SERPL-MCNC: 18 MG/DL (ref 8–23)
CA-I BLD-SCNC: 1.12 MMOL/L (ref 1.13–1.33)
CALCIUM SERPL-MCNC: 8.7 MG/DL (ref 8.6–10.4)
CASTS #/AREA URNS LPF: ABNORMAL /LPF (ref 0–8)
CHLORIDE SERPL-SCNC: 107 MMOL/L (ref 98–107)
CLARITY UR: CLEAR
CO2 SERPL-SCNC: 19 MMOL/L (ref 20–31)
COLOR UR: YELLOW
CREAT SERPL-MCNC: 1 MG/DL (ref 0.5–0.9)
EKG ATRIAL RATE: 72 BPM
EKG P AXIS: 91 DEGREES
EKG P-R INTERVAL: 134 MS
EKG Q-T INTERVAL: 406 MS
EKG QRS DURATION: 92 MS
EKG QTC CALCULATION (BAZETT): 444 MS
EKG R AXIS: -27 DEGREES
EKG T AXIS: 66 DEGREES
EKG VENTRICULAR RATE: 72 BPM
EOSINOPHIL # BLD: 0.06 K/UL (ref 0–0.44)
EOSINOPHILS RELATIVE PERCENT: 1 % (ref 1–4)
EPI CELLS #/AREA URNS HPF: ABNORMAL /HPF (ref 0–5)
ERYTHROCYTE [DISTWIDTH] IN BLOOD BY AUTOMATED COUNT: 13.8 % (ref 11.8–14.4)
GFR SERPL CREATININE-BSD FRML MDRD: 59 ML/MIN/1.73M2
GLUCOSE SERPL-MCNC: 126 MG/DL (ref 70–99)
GLUCOSE UR STRIP-MCNC: NEGATIVE MG/DL
HCT VFR BLD AUTO: 37.8 % (ref 36.3–47.1)
HGB BLD-MCNC: 11.7 G/DL (ref 11.9–15.1)
HGB UR QL STRIP.AUTO: NEGATIVE
IMM GRANULOCYTES # BLD AUTO: <0.03 K/UL (ref 0–0.3)
IMM GRANULOCYTES NFR BLD: 0 %
KETONES UR STRIP-MCNC: ABNORMAL MG/DL
LEUKOCYTE ESTERASE UR QL STRIP: ABNORMAL
LYMPHOCYTES NFR BLD: 1.73 K/UL (ref 1.1–3.7)
LYMPHOCYTES RELATIVE PERCENT: 23 % (ref 24–43)
MAGNESIUM SERPL-MCNC: 2.6 MG/DL (ref 1.6–2.6)
MCH RBC QN AUTO: 29.6 PG (ref 25.2–33.5)
MCHC RBC AUTO-ENTMCNC: 31 G/DL (ref 28.4–34.8)
MCV RBC AUTO: 95.7 FL (ref 82.6–102.9)
MONOCYTES NFR BLD: 0.87 K/UL (ref 0.1–1.2)
MONOCYTES NFR BLD: 12 % (ref 3–12)
NEUTROPHILS NFR BLD: 63 % (ref 36–65)
NEUTS SEG NFR BLD: 4.67 K/UL (ref 1.5–8.1)
NITRITE UR QL STRIP: NEGATIVE
NRBC BLD-RTO: 0 PER 100 WBC
PH UR STRIP: 5.5 [PH] (ref 5–8)
PHOSPHATE SERPL-MCNC: 4.2 MG/DL (ref 2.6–4.5)
PLATELET # BLD AUTO: 211 K/UL (ref 138–453)
PMV BLD AUTO: 9.5 FL (ref 8.1–13.5)
POTASSIUM SERPL-SCNC: 4.1 MMOL/L (ref 3.7–5.3)
PROT UR STRIP-MCNC: NEGATIVE MG/DL
RBC # BLD AUTO: 3.95 M/UL (ref 3.95–5.11)
RBC #/AREA URNS HPF: ABNORMAL /HPF (ref 0–4)
SODIUM SERPL-SCNC: 139 MMOL/L (ref 135–144)
SP GR UR STRIP: 1.02 (ref 1–1.03)
UROBILINOGEN UR STRIP-ACNC: NORMAL EU/DL (ref 0–1)
VIT B12 SERPL-MCNC: 469 PG/ML (ref 232–1245)
WBC #/AREA URNS HPF: ABNORMAL /HPF (ref 0–5)
WBC OTHER # BLD: 7.4 K/UL (ref 3.5–11.3)

## 2023-12-15 PROCEDURE — 81001 URINALYSIS AUTO W/SCOPE: CPT

## 2023-12-15 PROCEDURE — 83735 ASSAY OF MAGNESIUM: CPT

## 2023-12-15 PROCEDURE — 94761 N-INVAS EAR/PLS OXIMETRY MLT: CPT

## 2023-12-15 PROCEDURE — 2580000003 HC RX 258

## 2023-12-15 PROCEDURE — 84100 ASSAY OF PHOSPHORUS: CPT

## 2023-12-15 PROCEDURE — 97165 OT EVAL LOW COMPLEX 30 MIN: CPT

## 2023-12-15 PROCEDURE — 94640 AIRWAY INHALATION TREATMENT: CPT

## 2023-12-15 PROCEDURE — 36415 COLL VENOUS BLD VENIPUNCTURE: CPT

## 2023-12-15 PROCEDURE — 80048 BASIC METABOLIC PNL TOTAL CA: CPT

## 2023-12-15 PROCEDURE — 82330 ASSAY OF CALCIUM: CPT

## 2023-12-15 PROCEDURE — 85025 COMPLETE CBC W/AUTO DIFF WBC: CPT

## 2023-12-15 PROCEDURE — 6370000000 HC RX 637 (ALT 250 FOR IP)

## 2023-12-15 PROCEDURE — 97535 SELF CARE MNGMENT TRAINING: CPT

## 2023-12-15 PROCEDURE — 2700000000 HC OXYGEN THERAPY PER DAY

## 2023-12-15 RX ORDER — CLINDAMYCIN HYDROCHLORIDE 150 MG/1
300 CAPSULE ORAL 3 TIMES DAILY
Status: DISCONTINUED | OUTPATIENT
Start: 2023-12-15 | End: 2023-12-15 | Stop reason: HOSPADM

## 2023-12-15 RX ORDER — CLINDAMYCIN HYDROCHLORIDE 300 MG/1
300 CAPSULE ORAL 3 TIMES DAILY
Qty: 20 CAPSULE | Refills: 0 | Status: SHIPPED | OUTPATIENT
Start: 2023-12-15 | End: 2023-12-22

## 2023-12-15 RX ADMIN — MAGNESIUM GLUCONATE 500 MG ORAL TABLET 400 MG: 500 TABLET ORAL at 08:34

## 2023-12-15 RX ADMIN — BUDESONIDE AND FORMOTEROL FUMARATE DIHYDRATE 2 PUFF: 80; 4.5 AEROSOL RESPIRATORY (INHALATION) at 08:30

## 2023-12-15 RX ADMIN — ACETAMINOPHEN 1000 MG: 500 TABLET ORAL at 05:33

## 2023-12-15 RX ADMIN — BACITRACIN: 500 OINTMENT TOPICAL at 08:34

## 2023-12-15 RX ADMIN — BACITRACIN: 500 OINTMENT TOPICAL at 14:15

## 2023-12-15 RX ADMIN — SODIUM CHLORIDE, PRESERVATIVE FREE 10 ML: 5 INJECTION INTRAVENOUS at 08:34

## 2023-12-15 RX ADMIN — FLUTICASONE PROPIONATE 2 SPRAY: 50 SPRAY, METERED NASAL at 08:35

## 2023-12-15 RX ADMIN — ACETAMINOPHEN 1000 MG: 500 TABLET ORAL at 14:15

## 2023-12-15 RX ADMIN — CETIRIZINE HYDROCHLORIDE 10 MG: 10 TABLET ORAL at 08:33

## 2023-12-15 RX ADMIN — Medication 1 TABLET: at 08:30

## 2023-12-15 RX ADMIN — ATENOLOL 100 MG: 50 TABLET ORAL at 08:35

## 2023-12-15 RX ADMIN — DOCUSATE SODIUM 50 MG AND SENNOSIDES 8.6 MG 1 TABLET: 8.6; 5 TABLET, FILM COATED ORAL at 08:30

## 2023-12-15 RX ADMIN — CLINDAMYCIN HYDROCHLORIDE 300 MG: 150 CAPSULE ORAL at 14:15

## 2023-12-15 RX ADMIN — CLINDAMYCIN HYDROCHLORIDE 300 MG: 150 CAPSULE ORAL at 08:33

## 2023-12-15 RX ADMIN — LISINOPRIL 20 MG: 20 TABLET ORAL at 08:30

## 2023-12-15 RX ADMIN — BACITRACIN: 500 OINTMENT TOPICAL at 05:33

## 2023-12-15 RX ADMIN — HYDROCHLOROTHIAZIDE 50 MG: 50 TABLET ORAL at 08:34

## 2023-12-15 RX ADMIN — POLYETHYLENE GLYCOL 3350 17 G: 17 POWDER, FOR SOLUTION ORAL at 08:30

## 2023-12-15 RX ADMIN — AMLODIPINE BESYLATE 5 MG: 5 TABLET ORAL at 08:30

## 2023-12-15 ASSESSMENT — PAIN DESCRIPTION - DESCRIPTORS: DESCRIPTORS: DISCOMFORT

## 2023-12-15 ASSESSMENT — PAIN SCALES - GENERAL: PAINLEVEL_OUTOF10: 1

## 2023-12-15 ASSESSMENT — PAIN DESCRIPTION - LOCATION: LOCATION: FACE

## 2023-12-15 ASSESSMENT — PAIN DESCRIPTION - ORIENTATION: ORIENTATION: MID

## 2023-12-15 NOTE — CARE COORDINATION
Met with pt to complete an SBIRT. Pt is alert and oriented and stated that she had a fall. She denies alcohol and drug use and denies depression. SBIRT is negative. Alcohol Screening and Brief Intervention        No results for input(s): \"ALC\" in the last 72 hours. Alcohol Pre-screening             Drug Pre-Screening NO       Drug Screening DAST       Mood Pre-Screening (PHQ-2)  During the past 2 weeks, have you been bothered by, feeling down, depressed or hopeless? No        I have interviewed Annabella Snellen, [id number] regarding  Her alcohol consumption/drug use and risk for excessive use. Screenings were negative. Patient  N/A intervention at this time.    Deferred []    Completed on: 12/15/2023   STEPHANY Lawson

## 2023-12-15 NOTE — PLAN OF CARE
Problem: Skin/Tissue Integrity  Goal: Absence of new skin breakdown  Description: 1. Monitor for areas of redness and/or skin breakdown  2. Assess vascular access sites hourly  3. Every 4-6 hours minimum:  Change oxygen saturation probe site  4. Every 4-6 hours:  If on nasal continuous positive airway pressure, respiratory therapy assess nares and determine need for appliance change or resting period.   Outcome: Progressing     Problem: Safety - Adult  Goal: Free from fall injury  Outcome: Progressing     Problem: Pain  Goal: Verbalizes/displays adequate comfort level or baseline comfort level  Outcome: Progressing     Problem: Discharge Planning  Goal: Discharge to home or other facility with appropriate resources  Outcome: Progressing

## 2023-12-15 NOTE — ED PROVIDER NOTES
[facility] 1C STEPDOWN  Emergency Department Encounter  Emergency Medicine Resident     Pt Name:Roshni Hernandez  MRN: [id number]  [address] [facility] 1950  Date of evaluation: 12/14/23  PCP:  Wendy Cho MD  Note Started: 8:43 PM EST    CHIEF COMPLAINT       Chief Complaint   Patient presents with    Fall     HISTORY OF PRESENT ILLNESS  (Location/Symptom, Timing/Onset, Context/Setting, Quality, Duration, Modifying Factors, Severity.)      Tonia Whitt is a 75 y.o. female who presents as a transfer from [facility] to the emergency department for evaluation of subdural hemorrhage after a fall in which she tripped over uneven concrete, landing on her face. Imaging at outlying facility revealed subdural hemorrhage as well as nasal bone fracture. At time of arrival to the emergency department, patient is without complaint aside from a slight headache. She denies any neck pain or back pain. No chest or abdominal pain. No pain in her face although she does have abrasions overlying the nose, forehead and lip. Patient is on aspirin but otherwise does not take any anticoagulation. PAST MEDICAL / SURGICAL / SOCIAL / FAMILY HISTORY      has a past medical history of Arthritis, CAD (coronary artery disease), Chronic back pain, COPD (chronic obstructive pulmonary disease) ([address]), Elevated troponin level, Hyperlipidemia, Hypertension, MAYELA (obstructive sleep apnea), MAYELA (obstructive sleep apnea), Respiratory arrest ([address]), Thrombus, and Wears partial dentures. has a past surgical history that includes joint replacement (Left, 2011); AAA repair, endovascular (06/29/2016); Nerve Block (Right, 03/08/2017); joint replacement (Right, 08/22/2017); Total hip arthroplasty (Right, 8/22/2017); Cardiac catheterization (03/20/2020); Coronary angioplasty with stent (03/23/2020); Colonoscopy; Carotid endarterectomy (Left, 06/14/2022); and Carotid endarterectomy (Left, 6/14/2022).     Social History     Socioeconomic History    Marital
Emergency  Physician       Lexie Chiu MD  12/14/23 6665

## 2023-12-15 NOTE — ED NOTES
26-year-old female with fall at [facility] struck concrete. Only on aspirin no other anticoagulation. Has a small subdural hematoma. Trauma consult, BIG 1?   [place], [place]  12/14/23 2028

## 2023-12-15 NOTE — PROGRESS NOTES
FINDINGS: The lungs appear clear. There are no pulmonary infiltrates, CHF or pneumothorax. The heart appears unremarkable. There is evidence of prominent thoracic aortic aneurysm without indwelling endovascular stent. Bony structures unremarkable. Prominent thoracic aortic aneurysm with an indwelling endovascular stent.           Darvin Hayward DO  12/15/2023, 6:31 AM

## 2023-12-15 NOTE — PROGRESS NOTES
Occupational Therapy  [facility] [address] [facility]  [facility]ational Therapy Initial Assessment    Name: Dar Ventura  : 1950  MRN: [id number]  Date of Service: 12/15/2023    Discharge Recommendations:   No occupational therapy recommended at discharge             Patient Diagnosis(es): The primary encounter diagnosis was SDH (subdural hematoma) ([address] A diagnosis of Closed fracture of nasal bone, initial encounter was also pertinent to this visit. Past Medical History:  has a past medical history of Arthritis, CAD (coronary artery disease), Chronic back pain, COPD (chronic obstructive pulmonary disease) ([address]), Elevated troponin level, Hyperlipidemia, Hypertension, MAYELA (obstructive sleep apnea), MAYELA (obstructive sleep apnea), Respiratory arrest ([address]), Thrombus, and Wears partial dentures. Past Surgical History:  has a past surgical history that includes joint replacement (Left, ); AAA repair, endovascular (2016); Nerve Block (Right, 2017); joint replacement (Right, 2017); Total hip arthroplasty (Right, 2017); Cardiac catheterization (2020); Coronary angioplasty with stent (2020); Colonoscopy; Carotid endarterectomy (Left, 2022); and Carotid endarterectomy (Left, 2022). Assessment   Assessment: pt demonstrated ability to safely and independently complete ADLs and functional mobility. pt with no acute OT needs at this time, please re-order if future needs arise.   Prognosis: Good  Decision Making: Low Complexity  REQUIRES OT FOLLOW-UP: No  Activity Tolerance  Activity Tolerance: Patient Tolerated treatment well        Plan   Occupational Therapy Plan  Times Per Week: D/C OT     Restrictions  Restrictions/Precautions  Required Braces or Orthoses?: No  Position Activity Restriction  Other position/activity restrictions: up with assist    Subjective   General  Patient assessed for rehabilitation services?: Yes  Family / Caregiver Present: Individual Concurrent Group Co-treatment   Time In [address]         Time Out 0948         Minutes 24         Timed Code Treatment Minutes: 98 Lisa Puckett, OTR/L

## 2023-12-15 NOTE — PROGRESS NOTES
CLINICAL PHARMACY NOTE: MEDS TO BEDS    Total # of Prescriptions Filled: 1   The following medications were delivered to the patient:  clindamycin    Additional Documentation:

## 2023-12-15 NOTE — ED NOTES
Pt presents to ED as a transfer from [facility] [place] [address] At [facility]. Pt had a fall on uneven concrete, mechanical, and hit face. Pt denies LOC and denies blood thinners. Pt found to have large SDH and a broken nose. Upon arrival, pt is alert, oriented, and ambulatory. Pt was advised not to ambulate d/t her condition to which she refused and walked to the bathroom with a steady gait. Pt denies pain, states her face is sore.       Chris Del Valle RN  12/14/23 2040

## 2023-12-15 NOTE — PLAN OF CARE
Problem: Discharge Planning  Goal: Discharge to home or other facility with appropriate resources  12/15/2023 1111 by Sharmila Song RN  Outcome: Progressing  12/15/2023 0451 by Kade Mayfield RN  Outcome: Progressing     Problem: Pain  Goal: Verbalizes/displays adequate comfort level or baseline comfort level  12/15/2023 1111 by Sharmila Song RN  Outcome: Progressing  12/15/2023 0451 by Kade Mayfield RN  Outcome: Progressing     Problem: Skin/Tissue Integrity  Goal: Absence of new skin breakdown  Description: 1. Monitor for areas of redness and/or skin breakdown  2. Assess vascular access sites hourly  3. Every 4-6 hours minimum:  Change oxygen saturation probe site  4. Every 4-6 hours:  If on nasal continuous positive airway pressure, respiratory therapy assess nares and determine need for appliance change or resting period.   12/15/2023 1111 by Sharmila Song RN  Outcome: Progressing  12/15/2023 0451 by Kade Mayfield RN  Outcome: Progressing     Problem: Safety - Adult  Goal: Free from fall injury  12/15/2023 1111 by Sharmila Song RN  Outcome: Progressing  12/15/2023 0451 by Kade Mayfield RN  Outcome: Progressing     Problem: Respiratory - Adult  Goal: Achieves optimal ventilation and oxygenation  12/15/2023 1111 by Sharmila Song RN  Outcome: Progressing  12/15/2023 0835 by Ba Carlson RCP  Outcome: Progressing

## 2023-12-15 NOTE — H&P
TRAUMA H&P/CONSULT    PATIENT NAME: Smooth Pour: 1950  MEDICAL RECORD NO. [id number]   DATE: 12/14/2023  PRIMARY CARE PHYSICIAN: Radha Luis MD  PATIENT EVALUATED AT THE REQUEST OF DR.: Dr Rodolfo Jimenez     [] Trauma Priority     [x]Trauma Consult: Time at bedside: 10:05 PM    Patient Active Problem List   Diagnosis    Descending thoracic aortic aneurysm [facility])    Essential hypertension    Hyperlipidemia    COPD (chronic obstructive pulmonary disease) (HCC)    Normocytic anemia    Hip pain, left    Arthritis of right hip    ASHLEY (acute kidney injury) ([address])    Urine retention    Pure hypercholesterolemia    Shortness of breath    COPD with acute exacerbation (HCC)    Acute pulmonary edema (HCC)    NSTEMI (non-ST elevated myocardial infarction) ([address])    Shock circulatory (HCC)    Thrombus of aorta (HCC)    Lactic acidosis    Pneumonia of both upper lobes due to infectious organism    Respiratory arrest ([address])    Left carotid stenosis    Former smoker       IMPRESSION AND PLAN:       Diagnosis: Fall, small volume subdural hemorrhage extending along the left tentorial leaflet, frontal scalp hematoma, fracture involving the right para median nasal bone. BIG 2   Plan:   No acute surgical intervention is indicated at this point   Neurocheck every 4 hours   Work with PT/OT, and fall precaution   Plan to admit patient for 24 hours for observation, if patient work well with PT OT, and continue to remain at her baseline. Plan for discharge in the afternoon.       If intracranial hemorrhage is present, is it a:  [] BIG 1  [] BIG 2  [] BIG 3  If chest wall injury: Rib score___    CONSULT SERVICES    [] Neurosurgery     [] Orthopedic Surgery    [] Cardiothoracic     [] Facial Trauma    [] Plastic Surgery (Burn)    [] Pediatric Surgery     [] Internal Medicine    [] Pulmonary Medicine    [] Geriatrics    [] Other:        HISTORY:     Chief Complaint:  \"Fall\"    GENERAL DATA  Patient

## 2023-12-15 NOTE — DISCHARGE SUMMARY
levels in the cervical spine. Probable moderate central canal stenosis at the C3-4 level. Mild heterogeneity of the thyroid gland. Vascular calcifications in the neck. Remainder of the imaged paraspinous soft tissues are unremarkable in appearance. Centrilobular emphysema involving the imaged lungs. SOFT TISSUES/RETROPERITONEUM: No paraspinal mass is seen. Small volume subdural hemorrhage extending along the left tentorial leaflet. No appreciable mass effect or midline shift. Frontal scalp hematoma. Indeterminate age fracture involving the right para median nasal bone. Spondylosis of the cervical spine. No fracture. CT Head W/O Contrast    Result Date: 12/14/2023  EXAMINATION: CT OF THE HEAD WITHOUT CONTRAST; CT OF THE FACE WITHOUT CONTRAST; CT OF THE CERVICAL SPINE WITHOUT CONTRAST 12/14/2023 2:45 pm TECHNIQUE: CT of the head was performed without the administration of intravenous contrast. Automated exposure control, iterative reconstruction, and/or weight based adjustment of the mA/kV was utilized to reduce the radiation dose to as low as reasonably achievable.; CT of the face was performed without the administration of intravenous contrast. Multiplanar reformatted images are provided for review. Automated exposure control, iterative reconstruction, and/or weight based adjustment of the mA/kV was utilized to reduce the radiation dose to as low as reasonably achievable.; CT of the cervical spine was performed without the administration of intravenous contrast. Multiplanar reformatted images are provided for review. Automated exposure control, iterative reconstruction, and/or weight based adjustment of the mA/kV was utilized to reduce the radiation dose to as low as reasonably achievable.  COMPARISON: March 11, 2020 HISTORY: ORDERING SYSTEM PROVIDED HISTORY: fall TECHNOLOGIST PROVIDED HISTORY: fall Decision Support Exception - unselect if not a suspected or confirmed emergency medical canal stenosis at the C3-4 level. Mild heterogeneity of the thyroid gland. Vascular calcifications in the neck. Remainder of the imaged paraspinous soft tissues are unremarkable in appearance. Centrilobular emphysema involving the imaged lungs. SOFT TISSUES/RETROPERITONEUM: No paraspinal mass is seen. Small volume subdural hemorrhage extending along the left tentorial leaflet. No appreciable mass effect or midline shift. Frontal scalp hematoma. Indeterminate age fracture involving the right para median nasal bone. Spondylosis of the cervical spine. No fracture. XR CHEST PORTABLE    Result Date: 12/14/2023  EXAMINATION: ONE XRAY VIEW OF THE CHEST 12/14/2023 2:05 pm COMPARISON: None. HISTORY: ORDERING SYSTEM PROVIDED HISTORY: fall TECHNOLOGIST PROVIDED HISTORY: fall Reason for Exam: fall, chest pain FINDINGS: The lungs appear clear. There are no pulmonary infiltrates, CHF or pneumothorax. The heart appears unremarkable. There is evidence of prominent thoracic aortic aneurysm without indwelling endovascular stent. Bony structures unremarkable. Prominent thoracic aortic aneurysm with an indwelling endovascular stent.        DISCHARGE INSTRUCTIONS     Discharge Medications:        Medication List        START taking these medications      clindamycin 300 MG capsule  Commonly known as: CLEOCIN  Take 1 capsule by mouth in the morning, at noon, and at bedtime for 20 doses            CHANGE how you take these medications      docusate sodium 100 MG capsule  Commonly known as: Colace  Take 1 capsule by mouth 2 times daily  What changed:   when to take this  reasons to take this     traMADol 50 MG tablet  Commonly known as: Ultram  Take 1 tablet by mouth 3 times daily as needed for Pain  What changed: when to take this            CONTINUE taking these medications      acetaminophen 650 MG extended release tablet  Commonly known as: Tylenol 8 Hour  Take 1 tablet by mouth every 8 hours as needed for Pain

## 2023-12-15 NOTE — DISCHARGE INSTR - COC
Continuity of Care Form    Patient Name: Soco Manzano   :  8[birth year]  MRN:  [id number]    [address] date:  2023  Discharge date:  ***    Code Status Order: Full Code   Advance Directives:     Admitting Physician:  Irasema Gerard MD  PCP: Dahlia King MD    Discharging Nurse: [facility] Unit/Room#: 3693/4312-53  Discharging Unit Phone Number: ***    Emergency Contact:   Extended Emergency Contact Information  Primary Emergency Contact: [facility]  Address: [address] [place] [address] [facility], [address]  Home Phone: [phone]  Relation: Spouse  Secondary Emergency Contact: Diane Saez  Home Phone: [phone]  Relation: Brother/Sister    Past Surgical History:  Past Surgical History:   Procedure Laterality Date    ABDOMINAL AORTIC ANEURYSM REPAIR, ENDOVASCULAR  2016    thoracic    CARDIAC CATHETERIZATION  2020    CAROTID ENDARTERECTOMY Left 2022    CAROTID ENDARTERECTOMY Left 2022    LEFT CAROTID ENDARTERECTOMY TYPE 2 EVERSION WITH REIMPLANTATION OF THE LEFT CAROTID BULB performed by Shayna Brown MD at [address] [place] [address]  2020    JOINT REPLACEMENT Left     HIP    JOINT REPLACEMENT Right 2017    ant. total hip    NERVE BLOCK Right 2017    right hip arthrogram injection depomedrol 80mg    TOTAL HIP ARTHROPLASTY Right 2017    HIP TOTAL ARTHROPLASTY ANTERIOR APPROACH - MEDACTA, C-ARM, MEDACTA TABLE, FASCIA ILIACA BLOCK, NSA=SPINAL VS GENERAL  performed by Rex Dietrich DO at [address]       Immunization History:   Immunization History   Administered Date(s) Administered    COVID-19, PFIZER GRAY top, DO NOT Dilute, (age 15 y+), IM, 30 mcg/0.3 mL 2022    COVID-19, PFIZER PURPLE top, DILUTE for use, (age 15 y+), 30mcg/0.3mL 2021, 2021, 2021       Active Problems:  Patient Active Problem List   Diagnosis Code    Descending thoracic aortic aneurysm ([address]) Magan Krishnamurthy  is necessary for the continuing treatment of the diagnosis listed and that she requires {Admit to Appropriate Level of Care:18906} for {GREATER/LESS:860231540} 30 days.      Update Admission H&P: {CHP DME Changes in PCEFR:319075387}    PHYSICIAN SIGNATURE:  Electronically signed by YOMI Callahan CNP on 12/15/23 at 9:46 AM EST

## 2023-12-15 NOTE — DISCHARGE INSTRUCTIONS
Discharge Instructions for Trauma                             What to do after you leave the hospital:     - Take medication as prescribed, monitor for any changes in your condition. Follow up with your PCP and Plastic Surgery in the next 1-2 weeks as directed. - For resources transitioning back to the community following your trauma, visit [email or web address]     - General questions or concerns please call the Trauma and [address] [place] [address] at [phone]. If needed, the clinic fax number is [phone].     - Trauma is a life-threatening condition. Your doctor will want to closely monitor you. Be sure to go to all of your appointments. Right para median nasal bone fracture    *Please follow sinus precautions below, take ABX for prophylaxis as prescribed (Clindamycin for 7 days), and follow-up with plastic surgery in 7 to 10 days as needed    Sinus Precautions    - DO NOT blow your nose for at least two weeks. - DO NOT forcibly spit for one week. - DO NOT smoke or use smokeless tobacco; smoking greatly inhibits the healing process, especially in the sinuses. - Sneeze with your MOUTH OPEN. If the urge to sneeze arises, do not sneeze through your nose and avoid pinching nostrils. - Drink without a straw for one week. - Avoid swimming for one month and strenuous exercise (e.g. heavy lifting) for one week.   - Slight bleeding from the nose is not uncommon and may occur for several days.   -Hold aspirin for 1 month

## 2023-12-15 NOTE — PROGRESS NOTES
Trauma Tertiary Survey    Admit Date: 12/14/2023  Hospital day 1      Subjective:     Patient seen and examined at bedside. Denies any complaints at this time. Denies any headache, nausea, vomiting, weakness, numbness or tingling. Objective:   PHYSICAL EXAM:   Physical Exam  HENT:      Head: Normocephalic. Comments: Scattered facial abrasions      Right Ear: External ear normal.      Left Ear: External ear normal.      Nose: Nose normal.      Mouth/Throat:      Mouth: Mucous membranes are moist.   Eyes:      Extraocular Movements: Extraocular movements intact. Cardiovascular:      Rate and Rhythm: Normal rate. Pulses: Normal pulses. Pulmonary:      Effort: Pulmonary effort is normal. No respiratory distress. Abdominal:      General: There is no distension. Palpations: Abdomen is soft. Tenderness: There is no abdominal tenderness. There is no guarding or rebound. Musculoskeletal:         General: No swelling, tenderness or deformity. Cervical back: Neck supple. Skin:     General: Skin is warm and dry. Capillary Refill: Capillary refill takes less than 2 seconds. Neurological:      Mental Status: She is alert and oriented to person, place, and time.            Spine:     Spine Tenderness ROM   Cervical 0 /10 Normal   Thoracic 0 /10 Normal   Lumbar 0 /10 Normal     Musculoskeletal    Joint Tenderness Swelling ROM   Right shoulder absent absent normal   Left shoulder absent absent normal   Right elbow absent absent normal   Left elbow absent absent normal   Right wrist absent absent normal   Left wrist absent absent normal   Right hand grasp absent absent normal   Left hand grasp absent absent normal   Right hip absent absent normal   Left hip absent absent normal   Right knee absent absent normal   Left knee absent absent normal   Right ankle absent absent normal   Left ankle absent absent normal   Right foot absent absent normal   Left foot absent absent normal

## 2023-12-15 NOTE — CARE COORDINATION
Case Management Assessment  Initial Evaluation    Date/Time of Evaluation: 12/15/2023 9:04 AM  Assessment Completed by: Mark Ellis    If patient is discharged prior to next notation, then this note serves as note for discharge by case management. Patient Name: Elba Ricks                   YOB: 1950  Diagnosis: SDH (subdural hematoma) ([address]) [S06. 5XAA]                   Date / Time: 12/14/2023  8:22 PM    Patient Admission Status: Inpatient   Readmission Risk (Low < 19, Mod (19-27), High > 27): Readmission Risk Score: 16.1    Current PCP: Isaiah Walker MD  PCP verified by CM? (P) Yes (Dr. Mary Ortiz)    Chart Reviewed: Yes      History Provided by: (P) Patient  Patient Orientation: (P) Alert and Oriented, Person, Place, Situation, Self    Patient Cognition: (P) Alert    Hospitalization in the last 30 days (Readmission):  No    If yes, Readmission Assessment in CM Navigator will be completed.     Advance Directives:      Code Status: Full Code   Patient's Primary Decision Maker is: (P) Patient Declined (Legal Next of Kin Remains as Decision Maker)      Discharge Planning:    Patient lives with: (P) Spouse/Significant Other Type of Home: (P) House  Primary Care Giver: (P) Self  Patient Support Systems include: (P) Spouse/Significant Other, Children   Current Financial resources: (P) Medicare  Current community resources: (P) None  Current services prior to admission: (P) C-pap, Durable Medical Equipment            Current DME: (P) Cane            Type of Home Care services:  (P) None    ADLS  Prior functional level: (P) Independent in ADLs/IADLs  Current functional level: (P) Independent in ADLs/IADLs    PT AM-PAC:   /24  OT AM-PAC:   /24    Family can provide assistance at DC: (P) Yes (lives with )  Would you like Case Management to discuss the discharge plan with any other family members/significant others, and if so, who? (P) No  Plans to Return to Present Housing: (P) Yes  Other Identified Issues/Barriers to RETURNING to current housing: medical condition   Potential Assistance needed at discharge: (P) N/A            Potential DME:    Patient expects to discharge to: (P) [address] [facility] for transportation at discharge: (P) Family    Financial    Payor: MEDICARE / Plan: MEDICARE PART A AND B / Product Type: *No Product type* /     Does insurance require precert for SNF: No    Potential assistance Purchasing Medications: (P) No  Meds-to-Beds request: Yes      [address], [address] [facility] [place] [address] [facility]  [address] [facility] [address]  Phone: [phone] Fax: [address] [place] [address]  Phone: [phone] Fax: [phone]      Notes:    Factors facilitating achievement of predicted outcomes: Family support, Caregiver support, Motivated, Cooperative, Pleasant, and Good insight into deficits    Barriers to discharge: Pain and Lower extremity weakness    Additional Case Management Notes: Spoke with pt , role explained. Pt states she plans to return home with  who can provide transport. Denies any needs at this time. Pt states she has cane and CPAP machine. The Plan for Transition of Care is related to the following treatment goals of SDH (subdural hematoma) ([address]) [S06. 5XAA]    IF APPLICABLE: The Patient and/or patient representative Mansoor Germain and her family were provided with a choice of provider and agrees with the discharge plan. Freedom of choice list with basic dialogue that supports the patient's individualized plan of care/goals and shares the quality data associated with the providers was provided to: (P) Patient   Patient Representative Name:       The Patient and/or Patient Representative Agree with the Discharge Plan?  (P) Yes    Alan Sahni  Case Management Department  Ph: [phone]

## 2023-12-15 NOTE — PROGRESS NOTES
Physical Therapy        Physical Therapy Cancel Note      DATE: 12/15/2023    NAME: Kathi Eldridge  MRN: [id number]   : 1950      Patient not seen this date for Physical Therapy due to:    Patient independent with functional mobility. Will defer PT evaluation at this time. Please reorder PT if future needs arise. Spoke with pt.  Pt denies acute PT concerns, agreeable to deferral.      Electronically signed by Zack Naylor PT on 12/15/2023 at 3:23 PM

## 2023-12-15 NOTE — PROGRESS NOTES
[facility] Inpatient Progress Note  Enrrique Espinojayme KEYONNAKRIS   12/15/2023    Lauren Cai  1950  [id number]      Time spent with Patient: 0 minutes    This writer was unable to complete screen or therapy services with patient at bedside at this time due to the following:   Other service provider in room / procedure being performed: patient comfort

## 2023-12-15 NOTE — PLAN OF CARE
Problem: Discharge Planning  Goal: Discharge to home or other facility with appropriate resources  12/15/2023 1327 by Julia Mccloud RN  Outcome: Adequate for Discharge  12/15/2023 1111 by Julia Mccloud RN  Outcome: Progressing  12/15/2023 0451 by Malgorzata Goldstein RN  Outcome: Progressing     Problem: Pain  Goal: Verbalizes/displays adequate comfort level or baseline comfort level  12/15/2023 1327 by Julia Mccloud RN  Outcome: Adequate for Discharge  12/15/2023 1111 by Julia Mccloud RN  Outcome: Progressing  12/15/2023 0451 by Malgorzata Goldstein RN  Outcome: Progressing     Problem: Skin/Tissue Integrity  Goal: Absence of new skin breakdown  Description: 1. Monitor for areas of redness and/or skin breakdown  2. Assess vascular access sites hourly  3. Every 4-6 hours minimum:  Change oxygen saturation probe site  4. Every 4-6 hours:  If on nasal continuous positive airway pressure, respiratory therapy assess nares and determine need for appliance change or resting period.   12/15/2023 1327 by Julia Mccloud RN  Outcome: Adequate for Discharge  12/15/2023 1111 by Julia Mccloud RN  Outcome: Progressing  12/15/2023 0451 by Malgorzata Goldstein RN  Outcome: Progressing     Problem: Safety - Adult  Goal: Free from fall injury  12/15/2023 1327 by Julia Mccloud RN  Outcome: Adequate for Discharge  12/15/2023 1111 by Julia Mccloud RN  Outcome: Progressing  12/15/2023 0451 by Malgorzata Goldstein RN  Outcome: Progressing     Problem: Respiratory - Adult  Goal: Achieves optimal ventilation and oxygenation  12/15/2023 1327 by Julia Mccloud RN  Outcome: Adequate for Discharge  12/15/2023 1111 by Julia Mccloud RN  Outcome: Progressing  12/15/2023 0835 by Rigoberto Billings RCP  Outcome: Progressing

## 2024-02-08 ENCOUNTER — OFFICE VISIT (OUTPATIENT)
Dept: NEUROLOGY | Age: 74
End: 2024-02-08
Payer: MEDICARE

## 2024-02-08 VITALS
HEIGHT: 61 IN | WEIGHT: 193.8 LBS | BODY MASS INDEX: 36.59 KG/M2 | DIASTOLIC BLOOD PRESSURE: 83 MMHG | SYSTOLIC BLOOD PRESSURE: 139 MMHG | HEART RATE: 72 BPM

## 2024-02-08 DIAGNOSIS — S06.5XAA SDH (SUBDURAL HEMATOMA) (HCC): Primary | ICD-10-CM

## 2024-02-08 PROCEDURE — 1090F PRES/ABSN URINE INCON ASSESS: CPT | Performed by: PHYSICIAN ASSISTANT

## 2024-02-08 PROCEDURE — 1124F ACP DISCUSS-NO DSCNMKR DOCD: CPT | Performed by: PHYSICIAN ASSISTANT

## 2024-02-08 PROCEDURE — G8417 CALC BMI ABV UP PARAM F/U: HCPCS | Performed by: PHYSICIAN ASSISTANT

## 2024-02-08 PROCEDURE — 99203 OFFICE O/P NEW LOW 30 MIN: CPT | Performed by: PHYSICIAN ASSISTANT

## 2024-02-08 PROCEDURE — 3079F DIAST BP 80-89 MM HG: CPT | Performed by: PHYSICIAN ASSISTANT

## 2024-02-08 PROCEDURE — G8399 PT W/DXA RESULTS DOCUMENT: HCPCS | Performed by: PHYSICIAN ASSISTANT

## 2024-02-08 PROCEDURE — 1036F TOBACCO NON-USER: CPT | Performed by: PHYSICIAN ASSISTANT

## 2024-02-08 PROCEDURE — 3075F SYST BP GE 130 - 139MM HG: CPT | Performed by: PHYSICIAN ASSISTANT

## 2024-02-08 PROCEDURE — G8427 DOCREV CUR MEDS BY ELIG CLIN: HCPCS | Performed by: PHYSICIAN ASSISTANT

## 2024-02-08 PROCEDURE — G8484 FLU IMMUNIZE NO ADMIN: HCPCS | Performed by: PHYSICIAN ASSISTANT

## 2024-02-08 PROCEDURE — 3017F COLORECTAL CA SCREEN DOC REV: CPT | Performed by: PHYSICIAN ASSISTANT

## 2024-02-08 RX ORDER — CLINDAMYCIN HYDROCHLORIDE 300 MG/1
CAPSULE ORAL
COMMUNITY
Start: 2022-10-05

## 2024-02-08 RX ORDER — MONTELUKAST SODIUM 10 MG/1
TABLET ORAL
COMMUNITY

## 2024-02-08 NOTE — PROGRESS NOTES
[address] [facility] [address]  [facility] [address]  Dept: [phone]    PATIENT NAME: Roshni Mario  PATIENT MRN: [id number]  PRIMARY CARE PHYSICIAN: Zoe Humphries MD    HPI:      Roshni Mario is a 73 y.o. female who presents to clinic today for evaluation of subdural hematoma in Dec 2023 following a slip and fall onto concrete; no LOC. She was admitted for observation at [facility] and discharged the following day.    Patient reports she slipped forward and scraped her forehead and the front of her face. Abrasions are healed today and generally she feels well. Denies increased headaches. Denies new imbalance, dizziness, vision changes since the fall. Denies cognitive fog or new memory issues. She did follow with plastics once regarding possible nose fracture     She had a follow up MRI brain through [facility] last week to ensure resolution/ no progression of SDH. Images were available for review through [facility] and appeared benign- will request formal radiologist interpretation to confirm this.    Ambulates with cane outside of the house for balance, but she reports she ambulates fairly well without it. She continues to line dance 3 days a week to help with her balance, cardio, strength and she is not having difficulty participating in these activities.     CT head Dec 2023: Small volume subdural hemorrhage extending along the left tentorial leaflet.  No appreciable mass effect or midline shift.  Frontal scalp hematoma.  Indeterminate age fracture involving the right para median nasal bone.    PREVIOUS WORKUP:     Past Medical History:   Diagnosis Date    Arthritis     CAD (coronary artery disease)     Chronic back pain     COPD (chronic obstructive pulmonary disease) ([facility])     Elevated troponin level 03/2020    Hyperlipidemia     Hypertension     MAYELA (obstructive sleep apnea)     Respiratory arrest ([facility]) 03/11/2020    Thrombus 03/2020    descending aortic mural thrombus    Wears partial

## 2024-06-26 ENCOUNTER — HOSPITAL ENCOUNTER (OUTPATIENT)
Dept: VASCULAR LAB | Age: 74
Discharge: HOME | End: 2024-06-28
Attending: SURGERY
Payer: MEDICARE

## 2024-06-26 DIAGNOSIS — I70.1 ATHEROSCLEROSIS OF RENAL ARTERY (HCC): ICD-10-CM

## 2024-06-26 LAB
VAS AORTA AT RENAL ARTERIES PSV: 63.3 CM/S
VAS AORTA PROX PSV: 4.8 CM/S
VAS AORTA PROX PSV: 63.3 CM/S
VAS LEFT HILAR EDV: 24.2 CM/S
VAS LEFT HILAR PSV: 79.1 CM/S
VAS LEFT KIDNEY LENGTH: 8.28 CM
VAS LEFT KIDNEY WIDTH: 4.8 CM
VAS LEFT RENAL DIST EDV: 5.9 CM/S
VAS LEFT RENAL DIST PSV: 57.2 CM/S
VAS LEFT RENAL DIST RI: 0.9 NO UNITS
VAS LEFT RENAL MID EDV: 11.4 CM/S
VAS LEFT RENAL MID PSV: 59 CM/S
VAS LEFT RENAL MID RI: 0.81 NO UNITS
VAS LEFT RENAL MIDDLE PARENCHYMA EDV: 16.8 CM/S
VAS LEFT RENAL MIDDLE PARENCHYMA PSV: 57.1 CM/S
VAS LEFT RENAL MIDDLE PARENCHYMA RI: 0.71
VAS LEFT RENAL PROX EDV: 0 CM/S
VAS LEFT RENAL PROX PSV: 55.3 CM/S
VAS LEFT RENAL PROX RI: 1 NO UNITS
VAS LEFT RENAL UPPER PARENCHYMA EDV: 22.3 CM/S
VAS LEFT RENAL UPPER PARENCHYMA PSV: 68.1 CM/S
VAS LEFT RENAL UPPER PARENCHYMA RI: 0.67
VAS RIGHT HILAR EDV: 7.1 CM/S
VAS RIGHT HILAR PSV: 47.3 CM/S
VAS RIGHT KIDNEY LENGTH: 9.23 CM
VAS RIGHT KIDNEY WIDTH: 4.14 CM
VAS RIGHT RENAL DIST EDV: 26.2 CM/S
VAS RIGHT RENAL DIST PSV: 109.7 CM/S
VAS RIGHT RENAL DIST RI: 0.76 NO UNITS
VAS RIGHT RENAL MID EDV: 15.1 CM/S
VAS RIGHT RENAL MID PSV: 72.2 CM/S
VAS RIGHT RENAL MID RI: 0.79 NO UNITS
VAS RIGHT RENAL MIDDLE PARENCHYMA EDV: 16.3 CM/S
VAS RIGHT RENAL MIDDLE PARENCHYMA PSV: 62 CM/S
VAS RIGHT RENAL MIDDLE PARENCHYMA RI: 0.74
VAS RIGHT RENAL PROX EDV: 10.8 CM/S
VAS RIGHT RENAL PROX PSV: 98.5 CM/S
VAS RIGHT RENAL PROX RI: 0.89 NO UNITS
VAS RIGHT RENAL UPPER PARENCHYMA EDV: 0 CM/S
VAS RIGHT RENAL UPPER PARENCHYMA PSV: 49.2 CM/S
VAS RIGHT RENAL UPPER PARENCHYMA RI: 1

## 2024-06-26 PROCEDURE — 93975 VASCULAR STUDY: CPT

## 2024-11-26 ENCOUNTER — HOSPITAL ENCOUNTER (EMERGENCY)
Age: 74
Discharge: HOME | End: 2024-11-26
Payer: MEDICARE

## 2024-11-26 VITALS
WEIGHT: 182 LBS | TEMPERATURE: 97.7 F | SYSTOLIC BLOOD PRESSURE: 143 MMHG | DIASTOLIC BLOOD PRESSURE: 86 MMHG | HEART RATE: 90 BPM | OXYGEN SATURATION: 94 % | RESPIRATION RATE: 18 BRPM | HEIGHT: 61 IN | BODY MASS INDEX: 34.36 KG/M2

## 2024-11-26 DIAGNOSIS — T50.901A ACCIDENTAL DRUG OVERDOSE, INITIAL ENCOUNTER: Primary | ICD-10-CM

## 2024-11-26 PROCEDURE — 99282 EMERGENCY DEPT VISIT SF MDM: CPT

## 2024-11-26 ASSESSMENT — ENCOUNTER SYMPTOMS
VOMITING: 0
SHORTNESS OF BREATH: 0
ABDOMINAL PAIN: 0
NAUSEA: 0
COUGH: 1

## 2024-11-26 NOTE — DISCHARGE INSTRUCTIONS
Please schedule follow-up appointment with your primary care physician as needed.    Continue to take medication as previously prescribed.  Make sure to double check your bottles before taking any medications.  You can also talk to your pharmacy about having your medication prepared for you in daily blister packs to prevent this from happening in the future.    PLEASE RETURN TO THE EMERGENCY DEPARTMENT IMMEDIATELY if your symptoms worsen in anyway or in 8-12 hours if not improved for re-evaluation.  You should immediately return to the ER for symptoms such as increasing pain, bloody stool, fever, a feeling of passing out, light headed, dizziness, chest pain, shortness of breath, persistent nausea and/or vomiting, numbness or weakness to the arms or legs, coolness or color change of the arms or legs.      Take your medication as indicated and prescribed.  If you are given an antibiotic then, make sure you get the prescription filled and take the antibiotics until finished.      THANK YOU!!!      On behalf of the Emergency Department staff at [facility], I would like to thank you for giving us the opportunity to address your health care needs and concerns.    We hope that during your visit, our service was delivered in a professional and caring manner. Please keep [facility] in mind as we walk with you down the path to your own personal wellness.     Please expect an automated text message or email from us so we can ask a few questions about your health and progress. Based on your answers, a clinician may call you back to offer help and instructions.    Please understand that early in the process of an illness or injury, an emergency department workup can be falsely reassuring.  If you notice any worsening, changing or persistent symptoms please call your family doctor or return to the ER immediately.

## 2024-11-26 NOTE — ED NOTES
Writer completed rounding on this patient. Pt resting comfortably in bed, awake with her eyes open. Breathing non labored, no active signs of distress noted. Pt currently has a visitor at bedside with her. Writer adjusted the head of the bed and brought another warm blanket per request. Pt updated on the plan of care. Call light within reach, bed locked, and in the lowest position. Pt denies any further needs at this time.

## 2024-11-26 NOTE — ED PROVIDER NOTES
eMERGENCY dEPARTMENT eNCOUnter   Independent Attestation     Pt Name: Roshni Mario  MRN: [id number]  Birthdate 1950  Date of evaluation: 11/26/24     Roshni Mario is a 74 y.o. female with CC: Accidently took wrong medicine this morning (Accidentally took 2 Trazodone this morning instead of her morning Prednisone. She normally takes 1/2 - 1 tab)      Based on the medical record the care appears appropriate.  I was personally available for consultation in the Emergency Department.    Krzysztof Amezcua MD  Attending Emergency Physician          Krzysztof Amezcua MD  11/26/24 0968

## 2024-11-26 NOTE — ED PROVIDER NOTES
[facility] ED  [address] [address]  Phone: [phone]        Pt Name: Roshni Mario  MRN: [id number]  Birthdate 1950  Date of evaluation: 11/26/24    CHIEFCOMPLAINT       Chief Complaint   Patient presents with    Accidently took wrong medicine this morning     Accidentally took 2 Trazodone this morning instead of her morning Prednisone. She normally takes 1/2 - 1 tab       HISTORY OF PRESENT ILLNESS (Location/Symptom, Timing/Onset, Context/Setting, Quality, Duration, Modifying Factors, Severity)      Roshni Mario is a 74 y.o. female with no pertinent PMH who presents to the ED via private auto with complaint of taking two 100 mg trazodone around 745 this morning.  Patient reports she normally takes 50 to 100 mg at night, patient was prescribed prednisone for recent COPD exacerbation.  States she accidentally took 2 trazodone instead of prednisone.  Patient denies feeling very sleepy, any nausea, vomiting, diarrhea, chest pain or shortness of breath, fever or chills, confusion or excessive drowsiness.  Patient acting appropriately, states she was not sure if she was able to take a whole extra pill without needing to come to the emergency department so she wanted advice on what to do.    PAST MEDICAL / SURGICAL / SOCIAL / FAMILY HISTORY     PMH:  has a past medical history of Arthritis, CAD (coronary artery disease), Chronic back pain, COPD (chronic obstructive pulmonary disease) (HCC), Elevated troponin level, Hyperlipidemia, Hypertension, MAYELA (obstructive sleep apnea), Respiratory arrest (HCC), Thrombus, and Wears partial dentures.  Surgical History:  has a past surgical history that includes joint replacement (Left, 2011); AAA repair, endovascular (06/29/2016); Nerve Block (Right, 03/08/2017); joint replacement (Right, 08/22/2017); Total hip arthroplasty (Right, 8/22/2017); Cardiac catheterization (03/20/2020); Coronary angioplasty with stent (03/23/2020); Colonoscopy; Carotid

## 2025-04-24 ENCOUNTER — TRANSCRIBE ORDERS (OUTPATIENT)
Dept: ADMINISTRATIVE | Age: 75
End: 2025-04-24

## 2025-04-24 DIAGNOSIS — Z12.31 ENCOUNTER FOR SCREENING MAMMOGRAM FOR MALIGNANT NEOPLASM OF BREAST: Primary | ICD-10-CM

## 2025-06-02 ENCOUNTER — HOSPITAL ENCOUNTER (OUTPATIENT)
Dept: MAMMOGRAPHY | Age: 75
Discharge: HOME | End: 2025-06-04
Payer: MEDICARE

## 2025-06-02 VITALS — HEIGHT: 61 IN | BODY MASS INDEX: 35.68 KG/M2 | WEIGHT: 189 LBS

## 2025-06-02 DIAGNOSIS — Z12.31 ENCOUNTER FOR SCREENING MAMMOGRAM FOR MALIGNANT NEOPLASM OF BREAST: ICD-10-CM

## 2025-06-02 PROCEDURE — 77063 BREAST TOMOSYNTHESIS BI: CPT

## 2025-06-05 ENCOUNTER — OFFICE VISIT (OUTPATIENT)
Dept: ORTHOPEDIC SURGERY | Age: 75
End: 2025-06-05
Payer: MEDICARE

## 2025-06-05 VITALS — RESPIRATION RATE: 16 BRPM | BODY MASS INDEX: 36.4 KG/M2 | HEIGHT: 61 IN | OXYGEN SATURATION: 98 % | WEIGHT: 192.8 LBS

## 2025-06-05 DIAGNOSIS — M65.312 TRIGGER FINGER OF LEFT THUMB: Primary | ICD-10-CM

## 2025-06-05 DIAGNOSIS — M18.12 ARTHRITIS OF CARPOMETACARPAL (CMC) JOINT OF LEFT THUMB: ICD-10-CM

## 2025-06-05 DIAGNOSIS — M19.042 DEGENERATIVE ARTHRITIS OF METACARPOPHALANGEAL JOINT OF LEFT THUMB: ICD-10-CM

## 2025-06-05 PROCEDURE — 1159F MED LIST DOCD IN RCRD: CPT | Performed by: PHYSICIAN ASSISTANT

## 2025-06-05 PROCEDURE — 1124F ACP DISCUSS-NO DSCNMKR DOCD: CPT | Performed by: PHYSICIAN ASSISTANT

## 2025-06-05 PROCEDURE — G8427 DOCREV CUR MEDS BY ELIG CLIN: HCPCS | Performed by: PHYSICIAN ASSISTANT

## 2025-06-05 PROCEDURE — G8417 CALC BMI ABV UP PARAM F/U: HCPCS | Performed by: PHYSICIAN ASSISTANT

## 2025-06-05 PROCEDURE — 1125F AMNT PAIN NOTED PAIN PRSNT: CPT | Performed by: PHYSICIAN ASSISTANT

## 2025-06-05 PROCEDURE — 3017F COLORECTAL CA SCREEN DOC REV: CPT | Performed by: PHYSICIAN ASSISTANT

## 2025-06-05 PROCEDURE — 99213 OFFICE O/P EST LOW 20 MIN: CPT | Performed by: PHYSICIAN ASSISTANT

## 2025-06-05 PROCEDURE — 1090F PRES/ABSN URINE INCON ASSESS: CPT | Performed by: PHYSICIAN ASSISTANT

## 2025-06-05 PROCEDURE — 1036F TOBACCO NON-USER: CPT | Performed by: PHYSICIAN ASSISTANT

## 2025-06-05 PROCEDURE — G8399 PT W/DXA RESULTS DOCUMENT: HCPCS | Performed by: PHYSICIAN ASSISTANT

## 2025-06-05 NOTE — PROGRESS NOTES
[facility]  [address]  [place] [address]  Dept: [phone]  Dept Fax: [phone]        Left hand- New Patient       Subjective:   Roshni Mario is a 74 y.o. year old female who presents to our office today for routine followup regarding her   1. Trigger finger of left thumb    2. Arthritis of carpometacarpal (CMC) joint of left thumb    3. 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    Chief Complaint   Patient presents with    Hand Pain     Left hand pain       History of Present Illness  The patient presents for evaluation of trigger finger.    She is scheduled for a surgical procedure on her left thumb trigger finger on 6/18/2025, to be performed by Dr. Mckeon. She reports experiencing pain during flexion of her finger, which is exacerbated upon touch. She has previously received an injection for this issue. She also mentions that she has been informed that the surgery may not be beneficial due to the absence of pain. She expresses a preference for sedation over general anesthesia during the procedure.    Additionally, she reports a history of a broken finger that was not properly healed, resulting in a deformity. She was advised against surgical intervention at the time to preserve flexibility. She also mentions having severe arthritis in her hand, which contributes to the deformity.    SOCIAL HISTORY  Occupations: Running  Exercise: Running    Review of Systems      Objective :   Resp 16   Ht 1.549 m (5' 1\")   Wt 87.5 kg (192 lb 12.8 oz)   SpO2 98%   BMI 36.43 kg/m²  Body mass index is 36.43 kg/m².  General: Roshni Mario is a 74 y.o. female who is alert and oriented and sitting comfortably in our office.      Physical Exam  Gen: alert and oriented  Psych:  Appropriate affect; Appropriate knowledge base; Appropriate mood  Head: normocephalic, atraumatic

## (undated) DEVICE — SUTURE VCRL SZ 3-0 L54IN ABSRB UD LIGAPAK REEL POLYGLACTIN J285G

## (undated) DEVICE — GOWN,AURORA,NONREINFORCED,LARGE: Brand: MEDLINE

## (undated) DEVICE — GOWN,AURORA,NONRNF,XL,30/CS: Brand: MEDLINE

## (undated) DEVICE — Z DISCONTINUED CATHETER BALLOON DIL BILI 0.035 IN 5 FRX180 CM MAXFORC [M00567410] [STRYKER CORP]

## (undated) DEVICE — DRAPE,THYROID,SOFT,STERILE: Brand: MEDLINE

## (undated) DEVICE — ELECTRODE ES L3IN S STL BLDE INSUL DISP VALLEYLAB EDGE

## (undated) DEVICE — GARMENT,MEDLINE,DVT,INT,CALF,MED, GEN2: Brand: MEDLINE

## (undated) DEVICE — STERILE PATIENT PROTECTIVE PAD FOR IMP® KNEE POSITIONERS & COHESIVE WRAP (10 / CASE): Brand: DE MAYO KNEE POSITIONER®

## (undated) DEVICE — MEDI-VAC NON-CONDUCTIVE SUCTION TUBING 7MM X 6.1M (20 FT.) L: Brand: CARDINAL HEALTH

## (undated) DEVICE — POSITIONER HD W8XH4XL8.5IN RASPBERRY FOAM SLT

## (undated) DEVICE — CORD,CAUTERY,BIPOLAR,STERILE: Brand: MEDLINE

## (undated) DEVICE — GLOVE SURG SZ 65 THK91MIL LTX FREE SYN POLYISOPRENE

## (undated) DEVICE — SYRINGE, LUER LOCK, 10ML: Brand: MEDLINE

## (undated) DEVICE — SUTURE ABSRB BRAID COAT UD CP NO 2 27IN VCRL J195H

## (undated) DEVICE — SOLUTION IV 500ML 0.9% SOD CHL PH 5 INJ USP VIAFLX PLAS

## (undated) DEVICE — CONTAINER,SPECIMEN,OR STERILE,4OZ: Brand: MEDLINE

## (undated) DEVICE — GLOVE ORANGE PI 8 1/2   MSG9085

## (undated) DEVICE — SUTURE VCRL SZ 3-0 L27IN ABSRB UD L26MM SH 1/2 CIR J416H

## (undated) DEVICE — DRAPE,EXTREMITY,89X128,STERILE: Brand: MEDLINE

## (undated) DEVICE — APPLICATOR MEDICATED 10.5 CC SOLUTION HI LT ORNG CHLORAPREP

## (undated) DEVICE — SUTURE PERMAHAND SZ 0 L30IN NONABSORBABLE BLK SILK BRAID A306H

## (undated) DEVICE — BLANKET WRM W40.2XL55.9IN IORT LO BODY + MISTRAL AIR

## (undated) DEVICE — SUTURE PROL SZ 6-0 L30IN NONABSORBABLE BLU L13MM RB-2 1/2 8711H

## (undated) DEVICE — DRAPE,REIN 53X77,STERILE: Brand: MEDLINE

## (undated) DEVICE — Device

## (undated) DEVICE — SUTURE NONABSORBABLE MONOFILAMENT 6-0 BV-1 1X30 IN PROLENE 8709H

## (undated) DEVICE — SUTURE STRATAFIX SPRL SZ 1 L14IN ABSRB VLT L48CM CTX 1/2 SXPD2B405

## (undated) DEVICE — DRESSING FOAM SELF ADH 20X10 CM ABSORBENT MEPILEX BORDER

## (undated) DEVICE — SYRINGE, LUER SLIP, STERILE, 3ML: Brand: MEDLINE

## (undated) DEVICE — SUTURE STRATAFIX SPRL SZ 3-0 L5IN ABSRB UD FS L26MM 3/8 CIR SXMP2B411

## (undated) DEVICE — BIPOLAR SEALER 23-112-1 AQM 6.0: Brand: AQUAMANTYS ®

## (undated) DEVICE — GAUZE, BORDER, 3"X6", 1.5"X4"PAD, STERIL: Brand: MEDLINE INDUSTRIES, INC.

## (undated) DEVICE — GLOVE ORANGE PI 8   MSG9080

## (undated) DEVICE — GLOVE SURG SZ 6 THK91MIL LTX FREE SYN POLYISOPRENE ANTI

## (undated) DEVICE — ZIPPERED TOGA, X-LARGE: Brand: FLYTE

## (undated) DEVICE — SUTURE STRATAFIX SPRL SZ 2-0 L9IN ABSRB VLT MH L36MM 1/2 SXPD2B408

## (undated) DEVICE — BLADE ES L6IN ELASTOMERIC COAT EXT DURABLE BEND UPTO 90DEG

## (undated) DEVICE — PRESSURE MONITORING SET: Brand: TRUWAVE

## (undated) DEVICE — SHUNT CV L30CM DIA3X5MM SIL EXT LOOP FULL SPR REINF SUNDT

## (undated) DEVICE — ADHESIVE SKIN CLOSURE TOP 36 CC HI VISC DERMBND MINI

## (undated) DEVICE — GLOVE SURG SZ 8 L11.77IN FNGR THK9.8MIL STRW LTX POLYMER

## (undated) DEVICE — GLOVE ORANGE PI 7 1/2   MSG9075

## (undated) DEVICE — SOLUTION PREP 4OZ 3% H PEROX 1ST AID ANTISEP ORAL DEBRIDING

## (undated) DEVICE — Z CONVERTED USE 2162213 APPLICATOR PREP 4OZ CHG 4% BACTOSHIELD USE FOR HND WSH AND

## (undated) DEVICE — SUTURE PERMA-HAND SZ 2-0 L30IN NONABSORBABLE BLK L26MM SH K833H

## (undated) DEVICE — MEDI-VAC SUCTION HANDLE REGULAR CAPACITY: Brand: CARDINAL HEALTH

## (undated) DEVICE — ADHESIVE SKIN CLSR 0.7ML TOP DERMBND ADV

## (undated) DEVICE — SET CATH 20GA L1.75IN RAD ART POLYUR RADPQ W/ INTEGR

## (undated) DEVICE — GLOVE ORANGE PI 7   MSG9070

## (undated) DEVICE — SUTURE MCRYL SZ 4-0 L27IN ABSRB UD L19MM PS-2 1/2 CIR PRIM Y426H